# Patient Record
Sex: MALE | Race: WHITE | NOT HISPANIC OR LATINO | Employment: OTHER | URBAN - METROPOLITAN AREA
[De-identification: names, ages, dates, MRNs, and addresses within clinical notes are randomized per-mention and may not be internally consistent; named-entity substitution may affect disease eponyms.]

---

## 2017-01-11 RX ORDER — OMEPRAZOLE 40 MG/1
20 CAPSULE, DELAYED RELEASE ORAL
COMMUNITY
End: 2019-09-03

## 2017-01-18 ENCOUNTER — ALLSCRIPTS OFFICE VISIT (OUTPATIENT)
Dept: OTHER | Facility: OTHER | Age: 67
End: 2017-01-18

## 2017-01-23 ENCOUNTER — DOCTOR'S OFFICE (OUTPATIENT)
Dept: URBAN - NONMETROPOLITAN AREA CLINIC 1 | Facility: CLINIC | Age: 67
Setting detail: OPHTHALMOLOGY
End: 2017-01-23
Payer: COMMERCIAL

## 2017-01-23 DIAGNOSIS — Z79.4: ICD-10-CM

## 2017-01-23 DIAGNOSIS — E11.3412: ICD-10-CM

## 2017-01-23 DIAGNOSIS — H35.371: ICD-10-CM

## 2017-01-23 DIAGNOSIS — H35.373: ICD-10-CM

## 2017-01-23 DIAGNOSIS — H35.372: ICD-10-CM

## 2017-01-23 DIAGNOSIS — H35.61: ICD-10-CM

## 2017-01-23 DIAGNOSIS — H33.001: ICD-10-CM

## 2017-01-23 DIAGNOSIS — E11.3411: ICD-10-CM

## 2017-01-23 DIAGNOSIS — H33.301: ICD-10-CM

## 2017-01-23 DIAGNOSIS — H43.811: ICD-10-CM

## 2017-01-23 DIAGNOSIS — H43.812: ICD-10-CM

## 2017-01-23 PROCEDURE — 92226 OPHTHALMOSCOPY EXT SUBSEQUENT: CPT | Performed by: OPHTHALMOLOGY

## 2017-01-23 PROCEDURE — 92014 COMPRE OPH EXAM EST PT 1/>: CPT | Performed by: OPHTHALMOLOGY

## 2017-01-23 PROCEDURE — 92134 CPTRZ OPH DX IMG PST SGM RTA: CPT | Performed by: OPHTHALMOLOGY

## 2017-01-23 ASSESSMENT — REFRACTION_CURRENTRX
OS_OVR_VA: 20/
OD_OVR_VA: 20/

## 2017-01-23 ASSESSMENT — REFRACTION_MANIFEST
OS_VA1: 20/
OD_VA1: 20/
OS_VA3: 20/
OD_VA2: 20/
OD_VA3: 20/
OU_VA: 20/
OU_VA: 20/
OD_VA1: 20/
OU_VA: 20/
OD_VA1: 20/
OS_VA1: 20/
OD_VA3: 20/
OS_VA3: 20/
OS_VA3: 20/
OS_VA2: 20/
OS_VA1: 20/
OS_VA2: 20/
OD_VA3: 20/
OS_VA2: 20/
OD_VA2: 20/
OD_VA2: 20/

## 2017-01-23 ASSESSMENT — VISUAL ACUITY
OS_BCVA: 20/20-2
OD_BCVA: 20/CFX4'

## 2017-01-23 ASSESSMENT — CONFRONTATIONAL VISUAL FIELD TEST (CVF)
OS_FINDINGS: FULL
OD_FINDINGS: FULL

## 2017-01-25 ENCOUNTER — HOSPITAL ENCOUNTER (OUTPATIENT)
Dept: RADIOLOGY | Facility: MEDICAL CENTER | Age: 67
Discharge: HOME | End: 2017-01-25
Payer: COMMERCIAL

## 2017-01-25 ENCOUNTER — ALLSCRIPTS OFFICE VISIT (OUTPATIENT)
Dept: OTHER | Facility: OTHER | Age: 67
End: 2017-01-25

## 2017-01-25 DIAGNOSIS — M25.512 PAIN IN LEFT SHOULDER: ICD-10-CM

## 2017-01-25 PROCEDURE — 73030 X-RAY EXAM OF SHOULDER: CPT

## 2017-01-27 ENCOUNTER — TRANSCRIBE ORDERS (OUTPATIENT)
Dept: ADMINISTRATIVE | Facility: HOSPITAL | Age: 67
End: 2017-01-27

## 2017-01-27 ENCOUNTER — HOSPITAL ENCOUNTER (OUTPATIENT)
Dept: NON INVASIVE DIAGNOSTICS | Facility: HOSPITAL | Age: 67
Discharge: HOME | End: 2017-01-27
Attending: UROLOGY
Payer: COMMERCIAL

## 2017-01-27 ENCOUNTER — APPOINTMENT (OUTPATIENT)
Dept: LAB | Facility: HOSPITAL | Age: 67
End: 2017-01-27
Attending: UROLOGY
Payer: COMMERCIAL

## 2017-01-27 DIAGNOSIS — Z01.818 OTHER SPECIFIED PRE-OPERATIVE EXAMINATION: ICD-10-CM

## 2017-01-27 DIAGNOSIS — C67.9 MALIGNANT NEOPLASM OF OTHER SPECIFIED SITES OF BLADDER: ICD-10-CM

## 2017-01-27 DIAGNOSIS — C67.9 MALIGNANT NEOPLASM OF OTHER SPECIFIED SITES OF BLADDER: Primary | ICD-10-CM

## 2017-01-27 LAB
ANION GAP SERPL CALCULATED.3IONS-SCNC: 19 MMOL/L (ref 4–13)
APTT PPP: 31 SECONDS (ref 24–36)
ATRIAL RATE: 71 BPM
BASOPHILS # BLD AUTO: 0.05 THOUSANDS/ΜL (ref 0–0.1)
BASOPHILS NFR BLD AUTO: 1 % (ref 0–1)
BUN SERPL-MCNC: 55 MG/DL (ref 5–25)
CALCIUM SERPL-MCNC: 9.5 MG/DL (ref 8.3–10.1)
CHLORIDE SERPL-SCNC: 97 MMOL/L (ref 100–108)
CO2 SERPL-SCNC: 26 MMOL/L (ref 21–32)
CREAT SERPL-MCNC: 6.69 MG/DL (ref 0.6–1.3)
EOSINOPHIL # BLD AUTO: 0.53 THOUSAND/ΜL (ref 0–0.61)
EOSINOPHIL NFR BLD AUTO: 7 % (ref 0–6)
ERYTHROCYTE [DISTWIDTH] IN BLOOD BY AUTOMATED COUNT: 16 % (ref 11.6–15.1)
GFR SERPL CREATININE-BSD FRML MDRD: 8.3 ML/MIN/1.73SQ M
GLUCOSE SERPL-MCNC: 60 MG/DL (ref 65–140)
HCT VFR BLD AUTO: 34.2 % (ref 36.5–49.3)
HGB BLD-MCNC: 11.2 G/DL (ref 12–17)
INR PPP: 1.14 (ref 0.86–1.16)
LYMPHOCYTES # BLD AUTO: 1.16 THOUSANDS/ΜL (ref 0.6–4.47)
LYMPHOCYTES NFR BLD AUTO: 15 % (ref 14–44)
MCH RBC QN AUTO: 31.1 PG (ref 26.8–34.3)
MCHC RBC AUTO-ENTMCNC: 32.7 G/DL (ref 31.4–37.4)
MCV RBC AUTO: 95 FL (ref 82–98)
MONOCYTES # BLD AUTO: 0.83 THOUSAND/ΜL (ref 0.17–1.22)
MONOCYTES NFR BLD AUTO: 11 % (ref 4–12)
NEUTROPHILS # BLD AUTO: 5.14 THOUSANDS/ΜL (ref 1.85–7.62)
NEUTS SEG NFR BLD AUTO: 66 % (ref 43–75)
P AXIS: 48 DEGREES
PLATELET # BLD AUTO: 355 THOUSANDS/UL (ref 149–390)
PMV BLD AUTO: 9.4 FL (ref 8.9–12.7)
POTASSIUM SERPL-SCNC: 5.8 MMOL/L (ref 3.5–5.3)
PR INTERVAL: 168 MS
PROTHROMBIN TIME: 14.2 SECONDS (ref 12–14.3)
QRS AXIS: 37 DEGREES
QRSD INTERVAL: 92 MS
QT INTERVAL: 378 MS
QTC INTERVAL: 410 MS
RBC # BLD AUTO: 3.6 MILLION/UL (ref 3.88–5.62)
SODIUM SERPL-SCNC: 142 MMOL/L (ref 136–145)
T WAVE AXIS: 6 DEGREES
VENTRICULAR RATE: 71 BPM
WBC # BLD AUTO: 7.71 THOUSAND/UL (ref 4.31–10.16)

## 2017-01-27 PROCEDURE — 93005 ELECTROCARDIOGRAM TRACING: CPT

## 2017-01-27 PROCEDURE — 80048 BASIC METABOLIC PNL TOTAL CA: CPT

## 2017-01-27 PROCEDURE — 85730 THROMBOPLASTIN TIME PARTIAL: CPT

## 2017-01-27 PROCEDURE — 36415 COLL VENOUS BLD VENIPUNCTURE: CPT

## 2017-01-27 PROCEDURE — 85025 COMPLETE CBC W/AUTO DIFF WBC: CPT

## 2017-01-27 PROCEDURE — 85610 PROTHROMBIN TIME: CPT

## 2017-02-04 ENCOUNTER — ANESTHESIA EVENT (OUTPATIENT)
Dept: PERIOP | Facility: HOSPITAL | Age: 67
End: 2017-02-04
Payer: COMMERCIAL

## 2017-02-06 ENCOUNTER — HOSPITAL ENCOUNTER (OUTPATIENT)
Facility: HOSPITAL | Age: 67
Setting detail: OUTPATIENT SURGERY
Discharge: HOME | End: 2017-02-06
Attending: UROLOGY | Admitting: UROLOGY
Payer: COMMERCIAL

## 2017-02-06 ENCOUNTER — ANESTHESIA (OUTPATIENT)
Dept: PERIOP | Facility: HOSPITAL | Age: 67
End: 2017-02-06
Payer: COMMERCIAL

## 2017-02-06 VITALS
HEART RATE: 76 BPM | DIASTOLIC BLOOD PRESSURE: 84 MMHG | OXYGEN SATURATION: 99 % | BODY MASS INDEX: 25.18 KG/M2 | HEIGHT: 69 IN | WEIGHT: 170 LBS | TEMPERATURE: 98 F | SYSTOLIC BLOOD PRESSURE: 154 MMHG | RESPIRATION RATE: 18 BRPM

## 2017-02-06 DIAGNOSIS — C67.9 MALIGNANT NEOPLASM OF BLADDER (HCC): ICD-10-CM

## 2017-02-06 PROCEDURE — 88341 IMHCHEM/IMCYTCHM EA ADD ANTB: CPT | Performed by: UROLOGY

## 2017-02-06 PROCEDURE — 88305 TISSUE EXAM BY PATHOLOGIST: CPT | Performed by: UROLOGY

## 2017-02-06 PROCEDURE — 88342 IMHCHEM/IMCYTCHM 1ST ANTB: CPT | Performed by: UROLOGY

## 2017-02-06 PROCEDURE — 88112 CYTOPATH CELL ENHANCE TECH: CPT | Performed by: UROLOGY

## 2017-02-06 RX ORDER — MAGNESIUM HYDROXIDE 1200 MG/15ML
LIQUID ORAL AS NEEDED
Status: DISCONTINUED | OUTPATIENT
Start: 2017-02-06 | End: 2017-02-06 | Stop reason: HOSPADM

## 2017-02-06 RX ORDER — OXYBUTYNIN CHLORIDE 5 MG/1
5 TABLET ORAL ONCE
Status: COMPLETED | OUTPATIENT
Start: 2017-02-06 | End: 2017-02-06

## 2017-02-06 RX ORDER — FENTANYL CITRATE/PF 50 MCG/ML
25 SYRINGE (ML) INJECTION
Status: DISCONTINUED | OUTPATIENT
Start: 2017-02-06 | End: 2017-02-06 | Stop reason: HOSPADM

## 2017-02-06 RX ORDER — LIDOCAINE HYDROCHLORIDE 10 MG/ML
INJECTION, SOLUTION INFILTRATION; PERINEURAL AS NEEDED
Status: DISCONTINUED | OUTPATIENT
Start: 2017-02-06 | End: 2017-02-06 | Stop reason: SURG

## 2017-02-06 RX ORDER — ONDANSETRON 2 MG/ML
INJECTION INTRAMUSCULAR; INTRAVENOUS AS NEEDED
Status: DISCONTINUED | OUTPATIENT
Start: 2017-02-06 | End: 2017-02-06 | Stop reason: SURG

## 2017-02-06 RX ORDER — SOLIFENACIN SUCCINATE 5 MG/1
5 TABLET, FILM COATED ORAL DAILY
Qty: 10 TABLET | Refills: 0 | Status: ON HOLD | OUTPATIENT
Start: 2017-02-06 | End: 2017-06-14 | Stop reason: ALTCHOICE

## 2017-02-06 RX ORDER — CEPHALEXIN 250 MG/1
250 CAPSULE ORAL 2 TIMES DAILY
Qty: 6 CAPSULE | Refills: 0 | Status: SHIPPED | OUTPATIENT
Start: 2017-02-06 | End: 2017-02-09

## 2017-02-06 RX ORDER — SODIUM CHLORIDE, SODIUM LACTATE, POTASSIUM CHLORIDE, CALCIUM CHLORIDE 600; 310; 30; 20 MG/100ML; MG/100ML; MG/100ML; MG/100ML
125 INJECTION, SOLUTION INTRAVENOUS CONTINUOUS
Status: DISCONTINUED | OUTPATIENT
Start: 2017-02-06 | End: 2017-02-06 | Stop reason: HOSPADM

## 2017-02-06 RX ORDER — FENTANYL CITRATE 50 UG/ML
INJECTION, SOLUTION INTRAMUSCULAR; INTRAVENOUS AS NEEDED
Status: DISCONTINUED | OUTPATIENT
Start: 2017-02-06 | End: 2017-02-06 | Stop reason: SURG

## 2017-02-06 RX ORDER — MEPERIDINE HYDROCHLORIDE 25 MG/ML
12.5 INJECTION INTRAMUSCULAR; INTRAVENOUS; SUBCUTANEOUS ONCE AS NEEDED
Status: DISCONTINUED | OUTPATIENT
Start: 2017-02-06 | End: 2017-02-06 | Stop reason: HOSPADM

## 2017-02-06 RX ORDER — ONDANSETRON 2 MG/ML
4 INJECTION INTRAMUSCULAR; INTRAVENOUS ONCE AS NEEDED
Status: DISCONTINUED | OUTPATIENT
Start: 2017-02-06 | End: 2017-02-06 | Stop reason: HOSPADM

## 2017-02-06 RX ORDER — PROPOFOL 10 MG/ML
INJECTION, EMULSION INTRAVENOUS AS NEEDED
Status: DISCONTINUED | OUTPATIENT
Start: 2017-02-06 | End: 2017-02-06 | Stop reason: SURG

## 2017-02-06 RX ORDER — OXYCODONE HYDROCHLORIDE 5 MG/1
5 TABLET ORAL EVERY 4 HOURS PRN
Status: DISCONTINUED | OUTPATIENT
Start: 2017-02-06 | End: 2017-02-06 | Stop reason: HOSPADM

## 2017-02-06 RX ORDER — MIDAZOLAM HYDROCHLORIDE 1 MG/ML
INJECTION INTRAMUSCULAR; INTRAVENOUS AS NEEDED
Status: DISCONTINUED | OUTPATIENT
Start: 2017-02-06 | End: 2017-02-06 | Stop reason: SURG

## 2017-02-06 RX ADMIN — SODIUM CHLORIDE, SODIUM LACTATE, POTASSIUM CHLORIDE, AND CALCIUM CHLORIDE 125 ML/HR: .6; .31; .03; .02 INJECTION, SOLUTION INTRAVENOUS at 06:55

## 2017-02-06 RX ADMIN — DEXAMETHASONE SODIUM PHOSPHATE 4 MG: 10 INJECTION INTRAMUSCULAR; INTRAVENOUS at 07:45

## 2017-02-06 RX ADMIN — OXYBUTYNIN CHLORIDE 5 MG: 5 TABLET ORAL at 09:01

## 2017-02-06 RX ADMIN — FENTANYL CITRATE 25 MCG: 50 INJECTION, SOLUTION INTRAMUSCULAR; INTRAVENOUS at 07:47

## 2017-02-06 RX ADMIN — FENTANYL CITRATE 25 MCG: 50 INJECTION, SOLUTION INTRAMUSCULAR; INTRAVENOUS at 07:49

## 2017-02-06 RX ADMIN — CEFAZOLIN SODIUM 2000 MG: 2 SOLUTION INTRAVENOUS at 07:40

## 2017-02-06 RX ADMIN — LIDOCAINE HYDROCHLORIDE 50 MG: 10 INJECTION, SOLUTION INFILTRATION; PERINEURAL at 07:36

## 2017-02-06 RX ADMIN — MIDAZOLAM HYDROCHLORIDE 2 MG: 1 INJECTION, SOLUTION INTRAMUSCULAR; INTRAVENOUS at 07:30

## 2017-02-06 RX ADMIN — PROPOFOL 150 MG: 10 INJECTION, EMULSION INTRAVENOUS at 07:36

## 2017-02-06 RX ADMIN — ONDANSETRON HYDROCHLORIDE 4 MG: 2 INJECTION, SOLUTION INTRAVENOUS at 07:45

## 2017-02-06 RX ADMIN — FENTANYL CITRATE 50 MCG: 50 INJECTION, SOLUTION INTRAMUSCULAR; INTRAVENOUS at 07:36

## 2017-03-01 ENCOUNTER — ALLSCRIPTS OFFICE VISIT (OUTPATIENT)
Dept: OTHER | Facility: OTHER | Age: 67
End: 2017-03-01

## 2017-03-01 ENCOUNTER — TRANSCRIBE ORDERS (OUTPATIENT)
Dept: ADMINISTRATIVE | Facility: HOSPITAL | Age: 67
End: 2017-03-01

## 2017-03-01 DIAGNOSIS — M75.82 OTHER SHOULDER LESIONS, LEFT SHOULDER: ICD-10-CM

## 2017-03-01 DIAGNOSIS — K85.90 ACUTE PANCREATITIS WITHOUT INFECTION OR NECROSIS: ICD-10-CM

## 2017-03-01 DIAGNOSIS — M77.8 TENDINITIS OF LEFT SHOULDER: Primary | ICD-10-CM

## 2017-03-01 DIAGNOSIS — R30.0 DYSURIA: ICD-10-CM

## 2017-03-02 ENCOUNTER — GENERIC CONVERSION - ENCOUNTER (OUTPATIENT)
Dept: OTHER | Facility: OTHER | Age: 67
End: 2017-03-02

## 2017-03-06 ENCOUNTER — ALLSCRIPTS OFFICE VISIT (OUTPATIENT)
Dept: OTHER | Facility: OTHER | Age: 67
End: 2017-03-06

## 2017-03-06 LAB
CLARITY UR: NORMAL
COLOR UR: YELLOW
GLUCOSE (HISTORICAL): NORMAL
HGB UR QL STRIP.AUTO: NORMAL
KETONES UR STRIP-MCNC: NORMAL MG/DL
LEUKOCYTE ESTERASE UR QL STRIP: NORMAL
NITRITE UR QL STRIP: NORMAL
PH UR STRIP.AUTO: 8.5 [PH]
PROT UR STRIP-MCNC: 300 MG/DL
SP GR UR STRIP.AUTO: 1.01

## 2017-03-08 ENCOUNTER — ALLSCRIPTS OFFICE VISIT (OUTPATIENT)
Dept: OTHER | Facility: OTHER | Age: 67
End: 2017-03-08

## 2017-03-13 ENCOUNTER — HOSPITAL ENCOUNTER (OUTPATIENT)
Dept: RADIOLOGY | Facility: HOSPITAL | Age: 67
Discharge: HOME | End: 2017-03-13
Admitting: RADIOLOGY
Payer: COMMERCIAL

## 2017-03-13 DIAGNOSIS — N18.6 ESRD (END STAGE RENAL DISEASE) (HCC): ICD-10-CM

## 2017-03-13 PROCEDURE — 36901 INTRO CATH DIALYSIS CIRCUIT: CPT

## 2017-03-13 RX ADMIN — IOHEXOL 40 ML: 300 INJECTION, SOLUTION INTRAVENOUS at 14:47

## 2017-03-20 ENCOUNTER — DOCTOR'S OFFICE (OUTPATIENT)
Dept: URBAN - NONMETROPOLITAN AREA CLINIC 1 | Facility: CLINIC | Age: 67
Setting detail: OPHTHALMOLOGY
End: 2017-03-20
Payer: COMMERCIAL

## 2017-03-20 DIAGNOSIS — H35.61: ICD-10-CM

## 2017-03-20 DIAGNOSIS — H01.004: ICD-10-CM

## 2017-03-20 DIAGNOSIS — H43.812: ICD-10-CM

## 2017-03-20 DIAGNOSIS — H33.301: ICD-10-CM

## 2017-03-20 DIAGNOSIS — Z79.4: ICD-10-CM

## 2017-03-20 DIAGNOSIS — H33.003: ICD-10-CM

## 2017-03-20 DIAGNOSIS — H01.001: ICD-10-CM

## 2017-03-20 DIAGNOSIS — E11.3413: ICD-10-CM

## 2017-03-20 DIAGNOSIS — H35.373: ICD-10-CM

## 2017-03-20 DIAGNOSIS — H43.811: ICD-10-CM

## 2017-03-20 PROCEDURE — 92014 COMPRE OPH EXAM EST PT 1/>: CPT | Performed by: OPHTHALMOLOGY

## 2017-03-20 PROCEDURE — 92134 CPTRZ OPH DX IMG PST SGM RTA: CPT | Performed by: OPHTHALMOLOGY

## 2017-03-20 PROCEDURE — 92226 OPHTHALMOSCOPY EXT SUBSEQUENT: CPT | Performed by: OPHTHALMOLOGY

## 2017-03-20 ASSESSMENT — REFRACTION_MANIFEST
OS_VA1: 20/
OU_VA: 20/
OS_VA1: 20/
OU_VA: 20/
OD_VA3: 20/
OD_VA1: 20/
OS_VA3: 20/
OD_VA2: 20/
OU_VA: 20/
OD_VA3: 20/
OS_VA1: 20/
OD_VA3: 20/
OD_VA2: 20/
OS_VA2: 20/
OS_VA3: 20/
OD_VA1: 20/
OS_VA3: 20/
OS_VA2: 20/
OS_VA2: 20/
OD_VA1: 20/
OD_VA2: 20/

## 2017-03-20 ASSESSMENT — VISUAL ACUITY
OD_BCVA: 20/400
OS_BCVA: 20/20

## 2017-03-20 ASSESSMENT — LID EXAM ASSESSMENTS
OS_BLEPHARITIS: LUL 2+
OD_BLEPHARITIS: RUL 2+

## 2017-03-20 ASSESSMENT — CONFRONTATIONAL VISUAL FIELD TEST (CVF)
OS_FINDINGS: FULL
OD_FINDINGS: FULL

## 2017-03-20 ASSESSMENT — REFRACTION_CURRENTRX
OD_OVR_VA: 20/
OS_OVR_VA: 20/
OD_OVR_VA: 20/
OD_OVR_VA: 20/

## 2017-03-22 ENCOUNTER — GENERIC CONVERSION - ENCOUNTER (OUTPATIENT)
Dept: OTHER | Facility: OTHER | Age: 67
End: 2017-03-22

## 2017-03-22 ENCOUNTER — APPOINTMENT (OUTPATIENT)
Dept: LAB | Facility: HOSPITAL | Age: 67
End: 2017-03-22
Attending: UROLOGY
Payer: COMMERCIAL

## 2017-03-22 DIAGNOSIS — R30.0 DYSURIA: ICD-10-CM

## 2017-03-22 LAB
CLARITY UR: NORMAL
COLOR UR: YELLOW
GLUCOSE (HISTORICAL): NORMAL
HGB UR QL STRIP.AUTO: NORMAL
KETONES UR STRIP-MCNC: NORMAL MG/DL
LEUKOCYTE ESTERASE UR QL STRIP: NORMAL
NITRITE UR QL STRIP: NORMAL
PH UR STRIP.AUTO: 7.5 [PH]
PROT UR STRIP-MCNC: 300 MG/DL
SP GR UR STRIP.AUTO: 1.02

## 2017-03-22 PROCEDURE — 87086 URINE CULTURE/COLONY COUNT: CPT

## 2017-03-23 LAB — BACTERIA UR CULT: NORMAL

## 2017-04-03 ENCOUNTER — TRANSCRIBE ORDERS (OUTPATIENT)
Dept: ADMINISTRATIVE | Facility: HOSPITAL | Age: 67
End: 2017-04-03

## 2017-04-03 ENCOUNTER — APPOINTMENT (OUTPATIENT)
Dept: LAB | Facility: HOSPITAL | Age: 67
End: 2017-04-03
Payer: COMMERCIAL

## 2017-04-03 ENCOUNTER — ALLSCRIPTS OFFICE VISIT (OUTPATIENT)
Dept: OTHER | Facility: OTHER | Age: 67
End: 2017-04-03

## 2017-04-03 ENCOUNTER — GENERIC CONVERSION - ENCOUNTER (OUTPATIENT)
Dept: OTHER | Facility: OTHER | Age: 67
End: 2017-04-03

## 2017-04-03 DIAGNOSIS — K85.90 ACUTE PANCREATITIS WITHOUT INFECTION OR NECROSIS: ICD-10-CM

## 2017-04-03 DIAGNOSIS — E11.9 TYPE 2 DIABETES MELLITUS WITHOUT COMPLICATIONS (HCC): ICD-10-CM

## 2017-04-03 LAB
ALBUMIN SERPL BCP-MCNC: 3.3 G/DL (ref 3.5–5)
ALP SERPL-CCNC: 93 U/L (ref 46–116)
ALT SERPL W P-5'-P-CCNC: 15 U/L (ref 12–78)
AMYLASE SERPL-CCNC: 192 IU/L (ref 25–115)
ANION GAP SERPL CALCULATED.3IONS-SCNC: 14 MMOL/L (ref 4–13)
AST SERPL W P-5'-P-CCNC: 22 U/L (ref 5–45)
BASOPHILS # BLD AUTO: 0.03 THOUSANDS/ΜL (ref 0–0.1)
BASOPHILS NFR BLD AUTO: 0 % (ref 0–1)
BILIRUB SERPL-MCNC: 0.5 MG/DL (ref 0.2–1)
BUN SERPL-MCNC: 46 MG/DL (ref 5–25)
CALCIUM SERPL-MCNC: 9.3 MG/DL (ref 8.3–10.1)
CHLORIDE SERPL-SCNC: 101 MMOL/L (ref 100–108)
CLARITY UR: NORMAL
CO2 SERPL-SCNC: 29 MMOL/L (ref 21–32)
COLOR UR: YELLOW
CREAT SERPL-MCNC: 7.8 MG/DL (ref 0.6–1.3)
EOSINOPHIL # BLD AUTO: 0.25 THOUSAND/ΜL (ref 0–0.61)
EOSINOPHIL NFR BLD AUTO: 3 % (ref 0–6)
ERYTHROCYTE [DISTWIDTH] IN BLOOD BY AUTOMATED COUNT: 14.4 % (ref 11.6–15.1)
EST. AVERAGE GLUCOSE BLD GHB EST-MCNC: 131 MG/DL
GFR SERPL CREATININE-BSD FRML MDRD: 7 ML/MIN/1.73SQ M
GLUCOSE (HISTORICAL): NORMAL
GLUCOSE SERPL-MCNC: 88 MG/DL (ref 65–140)
HBA1C MFR BLD: 6.2 % (ref 4.2–6.3)
HCT VFR BLD AUTO: 30.2 % (ref 36.5–49.3)
HGB BLD-MCNC: 9.7 G/DL (ref 12–17)
HGB UR QL STRIP.AUTO: NORMAL
KETONES UR STRIP-MCNC: NORMAL MG/DL
LEUKOCYTE ESTERASE UR QL STRIP: NORMAL
LIPASE SERPL-CCNC: 735 U/L (ref 73–393)
LYMPHOCYTES # BLD AUTO: 1.45 THOUSANDS/ΜL (ref 0.6–4.47)
LYMPHOCYTES NFR BLD AUTO: 16 % (ref 14–44)
MCH RBC QN AUTO: 31.8 PG (ref 26.8–34.3)
MCHC RBC AUTO-ENTMCNC: 32.1 G/DL (ref 31.4–37.4)
MCV RBC AUTO: 99 FL (ref 82–98)
MONOCYTES # BLD AUTO: 0.81 THOUSAND/ΜL (ref 0.17–1.22)
MONOCYTES NFR BLD AUTO: 9 % (ref 4–12)
NEUTROPHILS # BLD AUTO: 6.53 THOUSANDS/ΜL (ref 1.85–7.62)
NEUTS SEG NFR BLD AUTO: 72 % (ref 43–75)
NITRITE UR QL STRIP: NORMAL
PH UR STRIP.AUTO: 8 [PH]
PLATELET # BLD AUTO: 458 THOUSANDS/UL (ref 149–390)
PMV BLD AUTO: 9.4 FL (ref 8.9–12.7)
POTASSIUM SERPL-SCNC: 4.4 MMOL/L (ref 3.5–5.3)
PROT SERPL-MCNC: 7 G/DL (ref 6.4–8.2)
PROT UR STRIP-MCNC: 300 MG/DL
RBC # BLD AUTO: 3.05 MILLION/UL (ref 3.88–5.62)
SODIUM SERPL-SCNC: 144 MMOL/L (ref 136–145)
SP GR UR STRIP.AUTO: 1.01
WBC # BLD AUTO: 9.07 THOUSAND/UL (ref 4.31–10.16)

## 2017-04-03 PROCEDURE — 83036 HEMOGLOBIN GLYCOSYLATED A1C: CPT

## 2017-04-03 PROCEDURE — 83690 ASSAY OF LIPASE: CPT

## 2017-04-03 PROCEDURE — 85025 COMPLETE CBC W/AUTO DIFF WBC: CPT

## 2017-04-03 PROCEDURE — 80053 COMPREHEN METABOLIC PANEL: CPT

## 2017-04-03 PROCEDURE — 36415 COLL VENOUS BLD VENIPUNCTURE: CPT

## 2017-04-03 PROCEDURE — 82150 ASSAY OF AMYLASE: CPT

## 2017-04-04 ENCOUNTER — GENERIC CONVERSION - ENCOUNTER (OUTPATIENT)
Dept: OTHER | Facility: OTHER | Age: 67
End: 2017-04-04

## 2017-04-11 ENCOUNTER — GENERIC CONVERSION - ENCOUNTER (OUTPATIENT)
Dept: OTHER | Facility: OTHER | Age: 67
End: 2017-04-11

## 2017-04-12 ENCOUNTER — ALLSCRIPTS OFFICE VISIT (OUTPATIENT)
Dept: OTHER | Facility: OTHER | Age: 67
End: 2017-04-12

## 2017-05-03 ENCOUNTER — ALLSCRIPTS OFFICE VISIT (OUTPATIENT)
Dept: OTHER | Facility: OTHER | Age: 67
End: 2017-05-03

## 2017-05-31 ENCOUNTER — ALLSCRIPTS OFFICE VISIT (OUTPATIENT)
Dept: OTHER | Facility: OTHER | Age: 67
End: 2017-05-31

## 2017-06-05 ENCOUNTER — HOSPITAL ENCOUNTER (EMERGENCY)
Facility: HOSPITAL | Age: 67
Discharge: HOME | End: 2017-06-05
Payer: COMMERCIAL

## 2017-06-05 ENCOUNTER — APPOINTMENT (OUTPATIENT)
Dept: LAB | Facility: HOSPITAL | Age: 67
End: 2017-06-05
Attending: UROLOGY
Payer: COMMERCIAL

## 2017-06-05 ENCOUNTER — APPOINTMENT (OUTPATIENT)
Dept: PREADMISSION TESTING | Facility: HOSPITAL | Age: 67
End: 2017-06-05
Payer: COMMERCIAL

## 2017-06-05 ENCOUNTER — TRANSCRIBE ORDERS (OUTPATIENT)
Dept: ADMINISTRATIVE | Facility: HOSPITAL | Age: 67
End: 2017-06-05

## 2017-06-05 ENCOUNTER — ANESTHESIA EVENT (OUTPATIENT)
Dept: PERIOP | Facility: HOSPITAL | Age: 67
End: 2017-06-05
Payer: COMMERCIAL

## 2017-06-05 ENCOUNTER — HOSPITAL ENCOUNTER (OUTPATIENT)
Dept: NON INVASIVE DIAGNOSTICS | Facility: HOSPITAL | Age: 67
Discharge: HOME | End: 2017-06-05
Attending: UROLOGY
Payer: COMMERCIAL

## 2017-06-05 VITALS
DIASTOLIC BLOOD PRESSURE: 75 MMHG | TEMPERATURE: 96.7 F | WEIGHT: 186 LBS | RESPIRATION RATE: 18 BRPM | HEIGHT: 69 IN | SYSTOLIC BLOOD PRESSURE: 131 MMHG | BODY MASS INDEX: 27.55 KG/M2 | HEART RATE: 75 BPM

## 2017-06-05 VITALS
OXYGEN SATURATION: 96 % | SYSTOLIC BLOOD PRESSURE: 185 MMHG | RESPIRATION RATE: 20 BRPM | WEIGHT: 186 LBS | BODY MASS INDEX: 27.55 KG/M2 | DIASTOLIC BLOOD PRESSURE: 81 MMHG | TEMPERATURE: 98.7 F | HEART RATE: 77 BPM | HEIGHT: 69 IN

## 2017-06-05 DIAGNOSIS — E87.5 HYPERKALEMIA: ICD-10-CM

## 2017-06-05 DIAGNOSIS — Z01.818 PREOP EXAMINATION: ICD-10-CM

## 2017-06-05 DIAGNOSIS — E87.5 HYPERKALEMIA: Primary | ICD-10-CM

## 2017-06-05 DIAGNOSIS — Z01.818 PREOP EXAMINATION: Primary | ICD-10-CM

## 2017-06-05 DIAGNOSIS — N32.89 NONTRAUMATIC RUPTURE OF BLADDER: ICD-10-CM

## 2017-06-05 DIAGNOSIS — C67.9 MALIGNANT NEOPLASM OF URINARY BLADDER, UNSPECIFIED SITE (HCC): ICD-10-CM

## 2017-06-05 DIAGNOSIS — E11.9 TYPE 2 DIABETES MELLITUS WITHOUT COMPLICATIONS (HCC): ICD-10-CM

## 2017-06-05 DIAGNOSIS — N18.9 CKD (CHRONIC KIDNEY DISEASE): ICD-10-CM

## 2017-06-05 DIAGNOSIS — E78.5 HYPERLIPIDEMIA: ICD-10-CM

## 2017-06-05 DIAGNOSIS — R30.0 DYSURIA: ICD-10-CM

## 2017-06-05 LAB
ANION GAP SERPL CALCULATED.3IONS-SCNC: 17 MMOL/L (ref 4–13)
ANION GAP SERPL CALCULATED.3IONS-SCNC: 18 MMOL/L (ref 4–13)
APTT PPP: 33 SECONDS (ref 23–35)
BASOPHILS # BLD AUTO: 0.03 THOUSANDS/ΜL (ref 0–0.1)
BASOPHILS # BLD AUTO: 0.04 THOUSANDS/ΜL (ref 0–0.1)
BASOPHILS NFR BLD AUTO: 0 % (ref 0–1)
BASOPHILS NFR BLD AUTO: 1 % (ref 0–1)
BUN SERPL-MCNC: 81 MG/DL (ref 5–25)
BUN SERPL-MCNC: 88 MG/DL (ref 5–25)
CALCIUM SERPL-MCNC: 9.6 MG/DL (ref 8.3–10.1)
CALCIUM SERPL-MCNC: 9.7 MG/DL (ref 8.3–10.1)
CHLORIDE SERPL-SCNC: 92 MMOL/L (ref 100–108)
CHLORIDE SERPL-SCNC: 94 MMOL/L (ref 100–108)
CO2 SERPL-SCNC: 27 MMOL/L (ref 21–32)
CO2 SERPL-SCNC: 28 MMOL/L (ref 21–32)
CREAT SERPL-MCNC: 9.49 MG/DL (ref 0.6–1.3)
CREAT SERPL-MCNC: 9.77 MG/DL (ref 0.6–1.3)
EOSINOPHIL # BLD AUTO: 0.54 THOUSAND/ΜL (ref 0–0.61)
EOSINOPHIL # BLD AUTO: 0.54 THOUSAND/ΜL (ref 0–0.61)
EOSINOPHIL NFR BLD AUTO: 7 % (ref 0–6)
EOSINOPHIL NFR BLD AUTO: 7 % (ref 0–6)
ERYTHROCYTE [DISTWIDTH] IN BLOOD BY AUTOMATED COUNT: 13.9 % (ref 11.6–15.1)
ERYTHROCYTE [DISTWIDTH] IN BLOOD BY AUTOMATED COUNT: 14.3 % (ref 11.6–15.1)
GFR SERPL CREATININE-BSD FRML MDRD: 5.4 ML/MIN/1.73SQ M
GFR SERPL CREATININE-BSD FRML MDRD: 5.6 ML/MIN/1.73SQ M
GLUCOSE SERPL-MCNC: 104 MG/DL (ref 65–140)
GLUCOSE SERPL-MCNC: 150 MG/DL (ref 65–140)
HCT VFR BLD AUTO: 30.2 % (ref 36.5–49.3)
HCT VFR BLD AUTO: 31.7 % (ref 36.5–49.3)
HGB BLD-MCNC: 10.3 G/DL (ref 12–17)
HGB BLD-MCNC: 10.4 G/DL (ref 12–17)
HOLD SPECIMEN: NORMAL
INR PPP: 1.04 (ref 0.86–1.16)
LYMPHOCYTES # BLD AUTO: 1.39 THOUSANDS/ΜL (ref 0.6–4.47)
LYMPHOCYTES # BLD AUTO: 1.75 THOUSANDS/ΜL (ref 0.6–4.47)
LYMPHOCYTES NFR BLD AUTO: 18 % (ref 14–44)
LYMPHOCYTES NFR BLD AUTO: 23 % (ref 14–44)
MAGNESIUM SERPL-MCNC: 2.2 MG/DL (ref 1.6–2.6)
MCH RBC QN AUTO: 33 PG (ref 26.8–34.3)
MCH RBC QN AUTO: 33.1 PG (ref 26.8–34.3)
MCHC RBC AUTO-ENTMCNC: 32.8 G/DL (ref 31.4–37.4)
MCHC RBC AUTO-ENTMCNC: 34.1 G/DL (ref 31.4–37.4)
MCV RBC AUTO: 101 FL (ref 82–98)
MCV RBC AUTO: 97 FL (ref 82–98)
MONOCYTES # BLD AUTO: 0.55 THOUSAND/ΜL (ref 0.17–1.22)
MONOCYTES # BLD AUTO: 0.65 THOUSAND/ΜL (ref 0.17–1.22)
MONOCYTES NFR BLD AUTO: 7 % (ref 4–12)
MONOCYTES NFR BLD AUTO: 8 % (ref 4–12)
NEUTROPHILS # BLD AUTO: 4.77 THOUSANDS/ΜL (ref 1.85–7.62)
NEUTROPHILS # BLD AUTO: 5.2 THOUSANDS/ΜL (ref 1.85–7.62)
NEUTS SEG NFR BLD AUTO: 62 % (ref 43–75)
NEUTS SEG NFR BLD AUTO: 67 % (ref 43–75)
NRBC BLD AUTO-RTO: 0 /100 WBCS
PLATELET # BLD AUTO: 316 THOUSANDS/UL (ref 149–390)
PLATELET # BLD AUTO: 320 THOUSANDS/UL (ref 149–390)
PMV BLD AUTO: 9.1 FL (ref 8.9–12.7)
PMV BLD AUTO: 9.8 FL (ref 8.9–12.7)
POTASSIUM SERPL-SCNC: 6.2 MMOL/L (ref 3.5–5.3)
POTASSIUM SERPL-SCNC: 6.4 MMOL/L (ref 3.5–5.3)
POTASSIUM SERPL-SCNC: 6.4 MMOL/L (ref 3.5–5.3)
PROTHROMBIN TIME: 13.6 SECONDS (ref 12.1–14.4)
RBC # BLD AUTO: 3.11 MILLION/UL (ref 3.88–5.62)
RBC # BLD AUTO: 3.15 MILLION/UL (ref 3.88–5.62)
SODIUM SERPL-SCNC: 137 MMOL/L (ref 136–145)
SODIUM SERPL-SCNC: 139 MMOL/L (ref 136–145)
WBC # BLD AUTO: 7.65 THOUSAND/UL (ref 4.31–10.16)
WBC # BLD AUTO: 7.81 THOUSAND/UL (ref 4.31–10.16)

## 2017-06-05 PROCEDURE — 99284 EMERGENCY DEPT VISIT MOD MDM: CPT

## 2017-06-05 PROCEDURE — 80048 BASIC METABOLIC PNL TOTAL CA: CPT | Performed by: PHYSICIAN ASSISTANT

## 2017-06-05 PROCEDURE — 87186 SC STD MICRODIL/AGAR DIL: CPT

## 2017-06-05 PROCEDURE — 85730 THROMBOPLASTIN TIME PARTIAL: CPT

## 2017-06-05 PROCEDURE — 36415 COLL VENOUS BLD VENIPUNCTURE: CPT

## 2017-06-05 PROCEDURE — 87147 CULTURE TYPE IMMUNOLOGIC: CPT

## 2017-06-05 PROCEDURE — 85025 COMPLETE CBC W/AUTO DIFF WBC: CPT

## 2017-06-05 PROCEDURE — 85610 PROTHROMBIN TIME: CPT

## 2017-06-05 PROCEDURE — 83735 ASSAY OF MAGNESIUM: CPT | Performed by: PHYSICIAN ASSISTANT

## 2017-06-05 PROCEDURE — 80048 BASIC METABOLIC PNL TOTAL CA: CPT

## 2017-06-05 PROCEDURE — 93005 ELECTROCARDIOGRAM TRACING: CPT | Performed by: PHYSICIAN ASSISTANT

## 2017-06-05 PROCEDURE — 93005 ELECTROCARDIOGRAM TRACING: CPT

## 2017-06-05 PROCEDURE — 85025 COMPLETE CBC W/AUTO DIFF WBC: CPT | Performed by: PHYSICIAN ASSISTANT

## 2017-06-05 PROCEDURE — 87077 CULTURE AEROBIC IDENTIFY: CPT

## 2017-06-05 PROCEDURE — 87086 URINE CULTURE/COLONY COUNT: CPT

## 2017-06-05 PROCEDURE — 84132 ASSAY OF SERUM POTASSIUM: CPT

## 2017-06-05 RX ORDER — SODIUM POLYSTYRENE SULFONATE 15 G/60ML
15 SUSPENSION ORAL; RECTAL ONCE
Qty: 60 ML | Refills: 0 | Status: SHIPPED | OUTPATIENT
Start: 2017-06-05 | End: 2017-06-05

## 2017-06-05 RX ORDER — LISINOPRIL 5 MG/1
5 TABLET ORAL DAILY
COMMUNITY
End: 2018-02-08 | Stop reason: ALTCHOICE

## 2017-06-05 RX ORDER — PAROXETINE HYDROCHLORIDE 20 MG/1
30 TABLET, FILM COATED ORAL EVERY MORNING
COMMUNITY
End: 2018-05-29

## 2017-06-05 RX ORDER — SODIUM POLYSTYRENE SULFONATE 15 G/60ML
15 SUSPENSION ORAL; RECTAL ONCE
Status: COMPLETED | OUTPATIENT
Start: 2017-06-05 | End: 2017-06-05

## 2017-06-05 RX ORDER — SODIUM CHLORIDE 9 MG/ML
125 INJECTION, SOLUTION INTRAVENOUS CONTINUOUS
Status: CANCELLED | OUTPATIENT
Start: 2017-06-05

## 2017-06-05 RX ADMIN — SODIUM POLYSTYRENE SULFONATE 15 G: 15 SUSPENSION ORAL; RECTAL at 19:22

## 2017-06-06 LAB
ATRIAL RATE: 70 BPM
ATRIAL RATE: 74 BPM
P AXIS: 29 DEGREES
P AXIS: 9 DEGREES
PR INTERVAL: 182 MS
PR INTERVAL: 188 MS
QRS AXIS: -14 DEGREES
QRS AXIS: -6 DEGREES
QRSD INTERVAL: 90 MS
QRSD INTERVAL: 92 MS
QT INTERVAL: 390 MS
QT INTERVAL: 408 MS
QTC INTERVAL: 432 MS
QTC INTERVAL: 440 MS
T WAVE AXIS: 15 DEGREES
T WAVE AXIS: 6 DEGREES
VENTRICULAR RATE: 70 BPM
VENTRICULAR RATE: 74 BPM

## 2017-06-07 LAB — BACTERIA UR CULT: NORMAL

## 2017-06-12 ENCOUNTER — ALLSCRIPTS OFFICE VISIT (OUTPATIENT)
Dept: OTHER | Facility: OTHER | Age: 67
End: 2017-06-12

## 2017-06-12 ENCOUNTER — APPOINTMENT (OUTPATIENT)
Dept: LAB | Facility: MEDICAL CENTER | Age: 67
End: 2017-06-12
Payer: COMMERCIAL

## 2017-06-12 ENCOUNTER — TRANSCRIBE ORDERS (OUTPATIENT)
Dept: LAB | Facility: MEDICAL CENTER | Age: 67
End: 2017-06-12

## 2017-06-12 DIAGNOSIS — E11.9 TYPE 2 DIABETES MELLITUS WITHOUT COMPLICATIONS (HCC): ICD-10-CM

## 2017-06-12 DIAGNOSIS — E87.5 HYPERKALEMIA: ICD-10-CM

## 2017-06-12 DIAGNOSIS — E78.5 HYPERLIPIDEMIA: ICD-10-CM

## 2017-06-12 LAB
CHOLEST SERPL-MCNC: 165 MG/DL (ref 50–200)
EST. AVERAGE GLUCOSE BLD GHB EST-MCNC: 128 MG/DL
HBA1C MFR BLD: 6.1 % (ref 4.2–6.3)
HDLC SERPL-MCNC: 59 MG/DL (ref 40–60)
LDLC SERPL CALC-MCNC: 90 MG/DL (ref 0–100)
POTASSIUM SERPL-SCNC: 5 MMOL/L (ref 3.5–5.3)
TRIGL SERPL-MCNC: 80 MG/DL

## 2017-06-12 PROCEDURE — 36415 COLL VENOUS BLD VENIPUNCTURE: CPT

## 2017-06-12 PROCEDURE — 80061 LIPID PANEL: CPT

## 2017-06-12 PROCEDURE — 83036 HEMOGLOBIN GLYCOSYLATED A1C: CPT

## 2017-06-12 PROCEDURE — 84132 ASSAY OF SERUM POTASSIUM: CPT

## 2017-06-13 ENCOUNTER — GENERIC CONVERSION - ENCOUNTER (OUTPATIENT)
Dept: OTHER | Facility: OTHER | Age: 67
End: 2017-06-13

## 2017-06-14 ENCOUNTER — ANESTHESIA (OUTPATIENT)
Dept: PERIOP | Facility: HOSPITAL | Age: 67
End: 2017-06-14
Payer: COMMERCIAL

## 2017-06-14 ENCOUNTER — HOSPITAL ENCOUNTER (OUTPATIENT)
Facility: HOSPITAL | Age: 67
Setting detail: OUTPATIENT SURGERY
Discharge: HOME | End: 2017-06-14
Attending: UROLOGY | Admitting: UROLOGY
Payer: COMMERCIAL

## 2017-06-14 VITALS
HEIGHT: 69 IN | RESPIRATION RATE: 18 BRPM | WEIGHT: 180.78 LBS | BODY MASS INDEX: 26.78 KG/M2 | SYSTOLIC BLOOD PRESSURE: 111 MMHG | DIASTOLIC BLOOD PRESSURE: 56 MMHG | TEMPERATURE: 98.3 F | HEART RATE: 86 BPM | OXYGEN SATURATION: 97 %

## 2017-06-14 DIAGNOSIS — N32.89 OTHER SPECIFIED DISORDERS OF BLADDER: ICD-10-CM

## 2017-06-14 DIAGNOSIS — C67.9 MALIGNANT NEOPLASM OF BLADDER (HCC): ICD-10-CM

## 2017-06-14 LAB
GLUCOSE SERPL-MCNC: 162 MG/DL (ref 65–140)
GLUCOSE SERPL-MCNC: 166 MG/DL (ref 65–140)

## 2017-06-14 PROCEDURE — 82948 REAGENT STRIP/BLOOD GLUCOSE: CPT

## 2017-06-14 PROCEDURE — 88305 TISSUE EXAM BY PATHOLOGIST: CPT | Performed by: UROLOGY

## 2017-06-14 PROCEDURE — 88312 SPECIAL STAINS GROUP 1: CPT | Performed by: UROLOGY

## 2017-06-14 PROCEDURE — 88307 TISSUE EXAM BY PATHOLOGIST: CPT | Performed by: UROLOGY

## 2017-06-14 RX ORDER — PROPOFOL 10 MG/ML
INJECTION, EMULSION INTRAVENOUS AS NEEDED
Status: DISCONTINUED | OUTPATIENT
Start: 2017-06-14 | End: 2017-06-14 | Stop reason: SURG

## 2017-06-14 RX ORDER — MIDAZOLAM HYDROCHLORIDE 1 MG/ML
INJECTION INTRAMUSCULAR; INTRAVENOUS AS NEEDED
Status: DISCONTINUED | OUTPATIENT
Start: 2017-06-14 | End: 2017-06-14 | Stop reason: SURG

## 2017-06-14 RX ORDER — B COMPLEX, C NO.20/FOLIC ACID 1 MG
1 CAPSULE ORAL DAILY
COMMUNITY

## 2017-06-14 RX ORDER — LIDOCAINE HYDROCHLORIDE 10 MG/ML
INJECTION, SOLUTION INFILTRATION; PERINEURAL AS NEEDED
Status: DISCONTINUED | OUTPATIENT
Start: 2017-06-14 | End: 2017-06-14 | Stop reason: SURG

## 2017-06-14 RX ORDER — FENTANYL CITRATE 50 UG/ML
INJECTION, SOLUTION INTRAMUSCULAR; INTRAVENOUS AS NEEDED
Status: DISCONTINUED | OUTPATIENT
Start: 2017-06-14 | End: 2017-06-14 | Stop reason: SURG

## 2017-06-14 RX ORDER — GLYCINE 1.5 G/100ML
SOLUTION IRRIGATION AS NEEDED
Status: DISCONTINUED | OUTPATIENT
Start: 2017-06-14 | End: 2017-06-14 | Stop reason: HOSPADM

## 2017-06-14 RX ORDER — FENTANYL CITRATE/PF 50 MCG/ML
50 SYRINGE (ML) INJECTION
Status: DISCONTINUED | OUTPATIENT
Start: 2017-06-14 | End: 2017-06-14 | Stop reason: HOSPADM

## 2017-06-14 RX ORDER — ONDANSETRON 2 MG/ML
4 INJECTION INTRAMUSCULAR; INTRAVENOUS ONCE AS NEEDED
Status: DISCONTINUED | OUTPATIENT
Start: 2017-06-14 | End: 2017-06-14 | Stop reason: HOSPADM

## 2017-06-14 RX ORDER — LORATADINE 10 MG/1
10 TABLET ORAL AS NEEDED
COMMUNITY
End: 2018-07-10 | Stop reason: ALTCHOICE

## 2017-06-14 RX ORDER — SODIUM CHLORIDE 9 MG/ML
125 INJECTION, SOLUTION INTRAVENOUS CONTINUOUS
Status: DISCONTINUED | OUTPATIENT
Start: 2017-06-14 | End: 2017-06-14 | Stop reason: HOSPADM

## 2017-06-14 RX ORDER — OXYCODONE HYDROCHLORIDE 5 MG/1
5 TABLET ORAL EVERY 4 HOURS PRN
Status: DISCONTINUED | OUTPATIENT
Start: 2017-06-14 | End: 2017-06-14 | Stop reason: HOSPADM

## 2017-06-14 RX ORDER — ONDANSETRON 2 MG/ML
INJECTION INTRAMUSCULAR; INTRAVENOUS AS NEEDED
Status: DISCONTINUED | OUTPATIENT
Start: 2017-06-14 | End: 2017-06-14 | Stop reason: SURG

## 2017-06-14 RX ADMIN — MIDAZOLAM HYDROCHLORIDE 1 MG: 1 INJECTION, SOLUTION INTRAMUSCULAR; INTRAVENOUS at 09:10

## 2017-06-14 RX ADMIN — FENTANYL CITRATE 50 MCG: 50 INJECTION, SOLUTION INTRAMUSCULAR; INTRAVENOUS at 09:24

## 2017-06-14 RX ADMIN — ONDANSETRON HYDROCHLORIDE 4 MG: 2 INJECTION, SOLUTION INTRAVENOUS at 09:33

## 2017-06-14 RX ADMIN — PROPOFOL 20 MG: 10 INJECTION, EMULSION INTRAVENOUS at 09:31

## 2017-06-14 RX ADMIN — CEFAZOLIN SODIUM 2000 MG: 2 SOLUTION INTRAVENOUS at 09:22

## 2017-06-14 RX ADMIN — LIDOCAINE HYDROCHLORIDE 60 MG: 10 INJECTION, SOLUTION INFILTRATION; PERINEURAL at 09:18

## 2017-06-14 RX ADMIN — SODIUM CHLORIDE 125 ML/HR: 0.9 INJECTION, SOLUTION INTRAVENOUS at 07:54

## 2017-06-14 RX ADMIN — PROPOFOL 30 MG: 10 INJECTION, EMULSION INTRAVENOUS at 09:20

## 2017-06-14 RX ADMIN — PROPOFOL 100 MG: 10 INJECTION, EMULSION INTRAVENOUS at 09:18

## 2017-06-14 RX ADMIN — PROPOFOL 50 MG: 10 INJECTION, EMULSION INTRAVENOUS at 09:19

## 2017-06-14 RX ADMIN — LEVOFLOXACIN 500 MG: 5 INJECTION, SOLUTION INTRAVENOUS at 09:30

## 2017-06-14 RX ADMIN — FENTANYL CITRATE 50 MCG: 50 INJECTION, SOLUTION INTRAMUSCULAR; INTRAVENOUS at 09:31

## 2017-06-16 ENCOUNTER — ALLSCRIPTS OFFICE VISIT (OUTPATIENT)
Dept: OTHER | Facility: OTHER | Age: 67
End: 2017-06-16

## 2017-06-19 ENCOUNTER — DOCTOR'S OFFICE (OUTPATIENT)
Dept: URBAN - NONMETROPOLITAN AREA CLINIC 1 | Facility: CLINIC | Age: 67
Setting detail: OPHTHALMOLOGY
End: 2017-06-19
Payer: COMMERCIAL

## 2017-06-19 DIAGNOSIS — H35.373: ICD-10-CM

## 2017-06-19 DIAGNOSIS — H35.61: ICD-10-CM

## 2017-06-19 DIAGNOSIS — H33.003: ICD-10-CM

## 2017-06-19 DIAGNOSIS — E11.3413: ICD-10-CM

## 2017-06-19 DIAGNOSIS — H33.301: ICD-10-CM

## 2017-06-19 DIAGNOSIS — H43.811: ICD-10-CM

## 2017-06-19 DIAGNOSIS — H43.812: ICD-10-CM

## 2017-06-19 PROCEDURE — 92226 OPHTHALMOSCOPY EXT SUBSEQUENT: CPT | Performed by: OPHTHALMOLOGY

## 2017-06-19 PROCEDURE — 92014 COMPRE OPH EXAM EST PT 1/>: CPT | Performed by: OPHTHALMOLOGY

## 2017-06-19 PROCEDURE — 92134 CPTRZ OPH DX IMG PST SGM RTA: CPT | Performed by: OPHTHALMOLOGY

## 2017-06-19 ASSESSMENT — REFRACTION_MANIFEST
OD_VA1: 20/
OD_VA3: 20/
OU_VA: 20/
OS_VA1: 20/
OS_VA2: 20/
OD_VA1: 20/
OD_VA2: 20/
OS_VA1: 20/
OS_VA3: 20/
OD_VA3: 20/
OS_VA1: 20/
OD_VA2: 20/
OS_VA2: 20/
OS_VA3: 20/
OS_VA2: 20/
OU_VA: 20/
OU_VA: 20/
OD_VA1: 20/
OD_VA3: 20/
OS_VA3: 20/
OD_VA2: 20/

## 2017-06-19 ASSESSMENT — VISUAL ACUITY
OD_BCVA: 20/CFX3'
OS_BCVA: 20/20-2

## 2017-06-19 ASSESSMENT — REFRACTION_CURRENTRX
OS_OVR_VA: 20/
OD_OVR_VA: 20/
OS_OVR_VA: 20/
OD_OVR_VA: 20/
OD_OVR_VA: 20/
OS_OVR_VA: 20/

## 2017-06-19 ASSESSMENT — CONFRONTATIONAL VISUAL FIELD TEST (CVF)
OD_FINDINGS: FULL
OS_FINDINGS: FULL

## 2017-06-19 ASSESSMENT — LID EXAM ASSESSMENTS
OD_BLEPHARITIS: RUL 2+
OS_BLEPHARITIS: LUL 2+

## 2017-06-26 ENCOUNTER — TRANSCRIBE ORDERS (OUTPATIENT)
Dept: RADIOLOGY | Facility: MEDICAL CENTER | Age: 67
End: 2017-06-26

## 2017-06-26 ENCOUNTER — APPOINTMENT (OUTPATIENT)
Dept: LAB | Facility: MEDICAL CENTER | Age: 67
End: 2017-06-26
Payer: COMMERCIAL

## 2017-06-26 ENCOUNTER — TRANSCRIBE ORDERS (OUTPATIENT)
Dept: LAB | Facility: MEDICAL CENTER | Age: 67
End: 2017-06-26

## 2017-06-26 DIAGNOSIS — R30.0 DYSURIA: ICD-10-CM

## 2017-06-26 PROCEDURE — 87086 URINE CULTURE/COLONY COUNT: CPT

## 2017-06-26 PROCEDURE — 87077 CULTURE AEROBIC IDENTIFY: CPT

## 2017-06-26 PROCEDURE — 87186 SC STD MICRODIL/AGAR DIL: CPT

## 2017-06-26 PROCEDURE — 87147 CULTURE TYPE IMMUNOLOGIC: CPT

## 2017-06-29 LAB — BACTERIA UR CULT: NORMAL

## 2017-07-11 ENCOUNTER — ALLSCRIPTS OFFICE VISIT (OUTPATIENT)
Dept: OTHER | Facility: OTHER | Age: 67
End: 2017-07-11

## 2017-07-12 ENCOUNTER — ANESTHESIA EVENT (OUTPATIENT)
Dept: PERIOP | Facility: HOSPITAL | Age: 67
End: 2017-07-12
Payer: COMMERCIAL

## 2017-07-13 ENCOUNTER — HOSPITAL ENCOUNTER (OUTPATIENT)
Facility: HOSPITAL | Age: 67
Setting detail: OUTPATIENT SURGERY
Discharge: HOME | End: 2017-07-13
Attending: UROLOGY | Admitting: UROLOGY
Payer: COMMERCIAL

## 2017-07-13 ENCOUNTER — ANESTHESIA (OUTPATIENT)
Dept: PERIOP | Facility: HOSPITAL | Age: 67
End: 2017-07-13
Payer: COMMERCIAL

## 2017-07-13 VITALS
HEART RATE: 88 BPM | DIASTOLIC BLOOD PRESSURE: 69 MMHG | RESPIRATION RATE: 18 BRPM | OXYGEN SATURATION: 95 % | SYSTOLIC BLOOD PRESSURE: 135 MMHG | TEMPERATURE: 97.6 F

## 2017-07-13 LAB — GLUCOSE SERPL-MCNC: 125 MG/DL (ref 65–140)

## 2017-07-13 PROCEDURE — 82948 REAGENT STRIP/BLOOD GLUCOSE: CPT

## 2017-07-13 RX ORDER — FENTANYL CITRATE 50 UG/ML
INJECTION, SOLUTION INTRAMUSCULAR; INTRAVENOUS AS NEEDED
Status: DISCONTINUED | OUTPATIENT
Start: 2017-07-13 | End: 2017-07-13 | Stop reason: SURG

## 2017-07-13 RX ORDER — PROPOFOL 10 MG/ML
INJECTION, EMULSION INTRAVENOUS AS NEEDED
Status: DISCONTINUED | OUTPATIENT
Start: 2017-07-13 | End: 2017-07-13 | Stop reason: SURG

## 2017-07-13 RX ORDER — SODIUM CHLORIDE, SODIUM LACTATE, POTASSIUM CHLORIDE, CALCIUM CHLORIDE 600; 310; 30; 20 MG/100ML; MG/100ML; MG/100ML; MG/100ML
125 INJECTION, SOLUTION INTRAVENOUS CONTINUOUS
Status: DISCONTINUED | OUTPATIENT
Start: 2017-07-13 | End: 2017-07-13 | Stop reason: HOSPADM

## 2017-07-13 RX ORDER — LIDOCAINE HYDROCHLORIDE 10 MG/ML
INJECTION, SOLUTION INFILTRATION; PERINEURAL AS NEEDED
Status: DISCONTINUED | OUTPATIENT
Start: 2017-07-13 | End: 2017-07-13 | Stop reason: SURG

## 2017-07-13 RX ORDER — ATROPA BELLADONNA AND OPIUM 16.2; 6 MG/1; MG/1
SUPPOSITORY RECTAL AS NEEDED
Status: DISCONTINUED | OUTPATIENT
Start: 2017-07-13 | End: 2017-07-13 | Stop reason: HOSPADM

## 2017-07-13 RX ORDER — FENTANYL CITRATE/PF 50 MCG/ML
25 SYRINGE (ML) INJECTION
Status: DISCONTINUED | OUTPATIENT
Start: 2017-07-13 | End: 2017-07-13 | Stop reason: HOSPADM

## 2017-07-13 RX ORDER — MIDAZOLAM HYDROCHLORIDE 1 MG/ML
INJECTION INTRAMUSCULAR; INTRAVENOUS AS NEEDED
Status: DISCONTINUED | OUTPATIENT
Start: 2017-07-13 | End: 2017-07-13 | Stop reason: SURG

## 2017-07-13 RX ORDER — ONDANSETRON 2 MG/ML
INJECTION INTRAMUSCULAR; INTRAVENOUS AS NEEDED
Status: DISCONTINUED | OUTPATIENT
Start: 2017-07-13 | End: 2017-07-13 | Stop reason: SURG

## 2017-07-13 RX ORDER — ONDANSETRON 2 MG/ML
4 INJECTION INTRAMUSCULAR; INTRAVENOUS ONCE AS NEEDED
Status: DISCONTINUED | OUTPATIENT
Start: 2017-07-13 | End: 2017-07-13 | Stop reason: HOSPADM

## 2017-07-13 RX ORDER — OXYBUTYNIN CHLORIDE 5 MG/1
5 TABLET, EXTENDED RELEASE ORAL DAILY
Qty: 10 TABLET | Refills: 0 | Status: ON HOLD | OUTPATIENT
Start: 2017-07-13 | End: 2019-08-27 | Stop reason: SDUPTHER

## 2017-07-13 RX ADMIN — ONDANSETRON HYDROCHLORIDE 4 MG: 2 INJECTION, SOLUTION INTRAVENOUS at 08:02

## 2017-07-13 RX ADMIN — FENTANYL CITRATE 25 MCG: 50 INJECTION, SOLUTION INTRAMUSCULAR; INTRAVENOUS at 08:03

## 2017-07-13 RX ADMIN — MIDAZOLAM HYDROCHLORIDE 2 MG: 1 INJECTION, SOLUTION INTRAMUSCULAR; INTRAVENOUS at 07:38

## 2017-07-13 RX ADMIN — CEFAZOLIN SODIUM 2000 MG: 2 SOLUTION INTRAVENOUS at 07:40

## 2017-07-13 RX ADMIN — PROPOFOL 100 MG: 10 INJECTION, EMULSION INTRAVENOUS at 07:47

## 2017-07-13 RX ADMIN — FENTANYL CITRATE 25 MCG: 50 INJECTION, SOLUTION INTRAMUSCULAR; INTRAVENOUS at 07:57

## 2017-07-13 RX ADMIN — FENTANYL CITRATE 25 MCG: 50 INJECTION, SOLUTION INTRAMUSCULAR; INTRAVENOUS at 07:48

## 2017-07-13 RX ADMIN — SODIUM CHLORIDE, SODIUM LACTATE, POTASSIUM CHLORIDE, AND CALCIUM CHLORIDE 125 ML/HR: .6; .31; .03; .02 INJECTION, SOLUTION INTRAVENOUS at 06:45

## 2017-07-13 RX ADMIN — LIDOCAINE HYDROCHLORIDE 50 MG: 10 INJECTION, SOLUTION INFILTRATION; PERINEURAL at 07:47

## 2017-07-13 RX ADMIN — FENTANYL CITRATE 25 MCG: 50 INJECTION, SOLUTION INTRAMUSCULAR; INTRAVENOUS at 07:56

## 2017-07-13 RX ADMIN — PROPOFOL 100 MG: 10 INJECTION, EMULSION INTRAVENOUS at 07:48

## 2017-08-04 ENCOUNTER — GENERIC CONVERSION - ENCOUNTER (OUTPATIENT)
Dept: OTHER | Facility: OTHER | Age: 67
End: 2017-08-04

## 2017-08-15 ENCOUNTER — HOSPITAL ENCOUNTER (OUTPATIENT)
Dept: INFUSION CENTER | Facility: HOSPITAL | Age: 67
Discharge: HOME | End: 2017-08-15
Payer: COMMERCIAL

## 2017-08-15 VITALS
RESPIRATION RATE: 16 BRPM | SYSTOLIC BLOOD PRESSURE: 151 MMHG | OXYGEN SATURATION: 99 % | TEMPERATURE: 96.6 F | HEART RATE: 95 BPM | DIASTOLIC BLOOD PRESSURE: 80 MMHG

## 2017-08-15 RX ADMIN — MITOMYCIN 40 MG: 40 INJECTION, POWDER, LYOPHILIZED, FOR SOLUTION INTRAVENOUS at 14:01

## 2017-08-15 NOTE — PROGRESS NOTES
Patient here for Chemotherapy via Bladder Instillation  Offering no complaints  Site prepped via aseptic technique  Catheter inserted without difficulty, clear yellow urine noted upon insertion  Tolerated insertion well via 14 Urdu newman as per order  Medication instilled as per order  Newman unclamped and removed without difficulty  Patient tolerated procedure well  Post instructions reviewed with patient and wife  Verbalized understanding of same  Left unit in stable condition

## 2017-08-18 ENCOUNTER — DOCTOR'S OFFICE (OUTPATIENT)
Dept: URBAN - NONMETROPOLITAN AREA CLINIC 1 | Facility: CLINIC | Age: 67
Setting detail: OPHTHALMOLOGY
End: 2017-08-18
Payer: COMMERCIAL

## 2017-08-18 DIAGNOSIS — H43.812: ICD-10-CM

## 2017-08-18 DIAGNOSIS — H43.811: ICD-10-CM

## 2017-08-18 DIAGNOSIS — H33.003: ICD-10-CM

## 2017-08-18 DIAGNOSIS — H33.301: ICD-10-CM

## 2017-08-18 DIAGNOSIS — E11.3413: ICD-10-CM

## 2017-08-18 DIAGNOSIS — Z79.4: ICD-10-CM

## 2017-08-18 DIAGNOSIS — H35.61: ICD-10-CM

## 2017-08-18 DIAGNOSIS — H35.373: ICD-10-CM

## 2017-08-18 PROCEDURE — 92014 COMPRE OPH EXAM EST PT 1/>: CPT | Performed by: OPHTHALMOLOGY

## 2017-08-18 PROCEDURE — 92226 OPHTHALMOSCOPY EXT SUBSEQUENT: CPT | Performed by: OPHTHALMOLOGY

## 2017-08-18 PROCEDURE — 92235 FLUORESCEIN ANGRPH MLTIFRAME: CPT | Performed by: OPHTHALMOLOGY

## 2017-08-18 PROCEDURE — 92250 FUNDUS PHOTOGRAPHY W/I&R: CPT | Performed by: OPHTHALMOLOGY

## 2017-08-18 ASSESSMENT — REFRACTION_CURRENTRX
OS_OVR_VA: 20/
OD_OVR_VA: 20/
OS_OVR_VA: 20/
OD_OVR_VA: 20/
OS_OVR_VA: 20/
OD_OVR_VA: 20/

## 2017-08-18 ASSESSMENT — REFRACTION_MANIFEST
OD_VA1: 20/
OD_VA2: 20/
OD_VA2: 20/
OS_VA2: 20/
OD_VA1: 20/
OD_VA3: 20/
OS_VA1: 20/
OD_VA1: 20/
OS_VA1: 20/
OS_VA3: 20/
OS_VA2: 20/
OS_VA2: 20/
OS_VA1: 20/
OU_VA: 20/
OS_VA3: 20/
OS_VA3: 20/
OU_VA: 20/
OU_VA: 20/
OD_VA2: 20/

## 2017-08-18 ASSESSMENT — VISUAL ACUITY
OD_BCVA: 20/CFX3'
OS_BCVA: 20/20

## 2017-08-18 ASSESSMENT — LID EXAM ASSESSMENTS
OS_BLEPHARITIS: LUL 2+
OD_BLEPHARITIS: RUL 2+

## 2017-08-18 ASSESSMENT — CONFRONTATIONAL VISUAL FIELD TEST (CVF)
OS_FINDINGS: FULL
OD_FINDINGS: FULL

## 2017-08-21 RX ORDER — LIDOCAINE HYDROCHLORIDE 20 MG/ML
JELLY TOPICAL ONCE
Status: COMPLETED | OUTPATIENT
Start: 2017-08-22 | End: 2017-08-22

## 2017-08-22 ENCOUNTER — HOSPITAL ENCOUNTER (OUTPATIENT)
Dept: INFUSION CENTER | Facility: HOSPITAL | Age: 67
Discharge: HOME | End: 2017-08-22
Payer: COMMERCIAL

## 2017-08-22 ENCOUNTER — TELEPHONE (OUTPATIENT)
Dept: INFUSION CENTER | Facility: HOSPITAL | Age: 67
End: 2017-08-22

## 2017-08-22 VITALS
OXYGEN SATURATION: 93 % | HEART RATE: 110 BPM | RESPIRATION RATE: 16 BRPM | SYSTOLIC BLOOD PRESSURE: 153 MMHG | DIASTOLIC BLOOD PRESSURE: 87 MMHG

## 2017-08-22 PROCEDURE — 51720 TREATMENT OF BLADDER LESION: CPT

## 2017-08-22 RX ADMIN — LIDOCAINE HYDROCHLORIDE: 20 JELLY TOPICAL at 13:07

## 2017-08-22 RX ADMIN — MITOMYCIN 40 MG: 40 INJECTION, POWDER, LYOPHILIZED, FOR SOLUTION INTRAVENOUS at 13:57

## 2017-08-22 NOTE — PROGRESS NOTES
Patient here for Chemotherapy  Lidocane 2 percent administered as per order at 1307pm    Site prepped via aseptic technique and 14 Chinese newman inserted at 1325pm    Patient rotated on side per request of wife  Resting quietly in bed monitored closely  Newman unclamped after one hour post insertion  Drained approved 100 cc of urine/chemo combination  Denies any pain  Patient tolerated treatment well  Offered no complaints  Left unit in stable condition

## 2017-08-28 RX ORDER — LIDOCAINE HYDROCHLORIDE 20 MG/ML
JELLY TOPICAL ONCE
Status: COMPLETED | OUTPATIENT
Start: 2017-08-29 | End: 2017-08-29

## 2017-08-29 ENCOUNTER — HOSPITAL ENCOUNTER (OUTPATIENT)
Dept: INFUSION CENTER | Facility: HOSPITAL | Age: 67
Discharge: HOME | End: 2017-08-29
Payer: COMMERCIAL

## 2017-08-29 VITALS
RESPIRATION RATE: 18 BRPM | DIASTOLIC BLOOD PRESSURE: 85 MMHG | OXYGEN SATURATION: 96 % | SYSTOLIC BLOOD PRESSURE: 136 MMHG | TEMPERATURE: 98.7 F | HEART RATE: 104 BPM

## 2017-08-29 PROCEDURE — 51720 TREATMENT OF BLADDER LESION: CPT

## 2017-08-29 RX ADMIN — MITOMYCIN 40 MG: 40 INJECTION, POWDER, LYOPHILIZED, FOR SOLUTION INTRAVENOUS at 13:14

## 2017-08-29 RX ADMIN — LIDOCAINE HYDROCHLORIDE: 20 JELLY TOPICAL at 12:27

## 2017-08-29 NOTE — PROGRESS NOTES
Patient here for Bladder Chemo  Lidocaine 2 percent given via jet by Kassy Sainz  Waited 5 minutes then 14 Maltese newman inserted via aseptic technique by Kassy Sainz  Bladder draining clear yellow urine approx  125cc in drainage bag  Tolerating po food and fluids  Patient tolerated procedure well  Offered no complaints  Patient discharge in stable condition

## 2017-09-13 ENCOUNTER — ALLSCRIPTS OFFICE VISIT (OUTPATIENT)
Dept: OTHER | Facility: OTHER | Age: 67
End: 2017-09-13

## 2017-09-20 ENCOUNTER — ALLSCRIPTS OFFICE VISIT (OUTPATIENT)
Dept: OTHER | Facility: OTHER | Age: 67
End: 2017-09-20

## 2017-09-25 ENCOUNTER — TRANSCRIBE ORDERS (OUTPATIENT)
Dept: ADMINISTRATIVE | Facility: HOSPITAL | Age: 67
End: 2017-09-25

## 2017-09-25 DIAGNOSIS — I71.2 THORACIC AORTIC ANEURYSM WITHOUT RUPTURE (HCC): Primary | ICD-10-CM

## 2017-10-03 ENCOUNTER — HOSPITAL ENCOUNTER (OUTPATIENT)
Dept: CT IMAGING | Facility: HOSPITAL | Age: 67
Discharge: HOME | End: 2017-10-03
Payer: COMMERCIAL

## 2017-10-03 DIAGNOSIS — I71.2 THORACIC AORTIC ANEURYSM WITHOUT RUPTURE (HCC): ICD-10-CM

## 2017-10-03 PROCEDURE — 71250 CT THORAX DX C-: CPT

## 2017-10-10 ENCOUNTER — GENERIC CONVERSION - ENCOUNTER (OUTPATIENT)
Dept: OTHER | Facility: OTHER | Age: 67
End: 2017-10-10

## 2017-11-10 ENCOUNTER — GENERIC CONVERSION - ENCOUNTER (OUTPATIENT)
Dept: OTHER | Facility: OTHER | Age: 67
End: 2017-11-10

## 2017-11-13 DIAGNOSIS — E11.9 TYPE 2 DIABETES MELLITUS WITHOUT COMPLICATIONS (HCC): ICD-10-CM

## 2017-11-13 DIAGNOSIS — M67.912 UNSPECIFIED DISORDER OF SYNOVIUM AND TENDON, LEFT SHOULDER: ICD-10-CM

## 2017-11-13 DIAGNOSIS — M75.42 IMPINGEMENT SYNDROME OF LEFT SHOULDER: ICD-10-CM

## 2017-11-13 DIAGNOSIS — M25.512 PAIN IN LEFT SHOULDER: ICD-10-CM

## 2017-11-14 ENCOUNTER — ALLSCRIPTS OFFICE VISIT (OUTPATIENT)
Dept: OTHER | Facility: OTHER | Age: 67
End: 2017-11-14

## 2017-11-16 ENCOUNTER — RX ONLY (RX ONLY)
Age: 67
End: 2017-11-16

## 2017-11-16 ENCOUNTER — DOCTOR'S OFFICE (OUTPATIENT)
Dept: URBAN - NONMETROPOLITAN AREA CLINIC 1 | Facility: CLINIC | Age: 67
Setting detail: OPHTHALMOLOGY
End: 2017-11-16
Payer: COMMERCIAL

## 2017-11-16 DIAGNOSIS — H35.61: ICD-10-CM

## 2017-11-16 DIAGNOSIS — H33.301: ICD-10-CM

## 2017-11-16 DIAGNOSIS — H35.373: ICD-10-CM

## 2017-11-16 DIAGNOSIS — H33.003: ICD-10-CM

## 2017-11-16 DIAGNOSIS — Z79.4: ICD-10-CM

## 2017-11-16 DIAGNOSIS — H43.811: ICD-10-CM

## 2017-11-16 DIAGNOSIS — E11.3413: ICD-10-CM

## 2017-11-16 DIAGNOSIS — H43.813: ICD-10-CM

## 2017-11-16 DIAGNOSIS — H43.812: ICD-10-CM

## 2017-11-16 PROCEDURE — 92014 COMPRE OPH EXAM EST PT 1/>: CPT | Performed by: OPHTHALMOLOGY

## 2017-11-16 PROCEDURE — 92226 OPHTHALMOSCOPY EXT SUBSEQUENT: CPT | Performed by: OPHTHALMOLOGY

## 2017-11-16 PROCEDURE — 92134 CPTRZ OPH DX IMG PST SGM RTA: CPT | Performed by: OPHTHALMOLOGY

## 2017-11-16 ASSESSMENT — REFRACTION_MANIFEST
OS_VA1: 20/
OD_VA1: 20/
OS_VA2: 20/
OD_VA3: 20/
OU_VA: 20/
OS_VA1: 20/
OD_VA1: 20/
OD_VA2: 20/
OD_VA1: 20/
OS_VA3: 20/
OD_VA3: 20/
OD_VA3: 20/
OD_VA2: 20/
OD_VA2: 20/
OS_VA1: 20/
OU_VA: 20/
OS_VA3: 20/
OS_VA2: 20/
OU_VA: 20/
OS_VA3: 20/
OS_VA2: 20/

## 2017-11-16 ASSESSMENT — LID EXAM ASSESSMENTS
OS_BLEPHARITIS: LUL 2+
OD_BLEPHARITIS: RUL 2+

## 2017-11-16 ASSESSMENT — VISUAL ACUITY
OS_BCVA: 20/20-1
OD_BCVA: 20/CF XS 4'

## 2017-11-16 ASSESSMENT — CONFRONTATIONAL VISUAL FIELD TEST (CVF)
OD_FINDINGS: FULL
OS_FINDINGS: FULL

## 2017-11-16 ASSESSMENT — REFRACTION_CURRENTRX
OS_OVR_VA: 20/
OS_OVR_VA: 20/
OD_OVR_VA: 20/
OS_OVR_VA: 20/
OD_OVR_VA: 20/
OD_OVR_VA: 20/
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
## 2017-12-05 ENCOUNTER — ALLSCRIPTS OFFICE VISIT (OUTPATIENT)
Dept: OTHER | Facility: OTHER | Age: 67
End: 2017-12-05

## 2017-12-05 ENCOUNTER — TRANSCRIBE ORDERS (OUTPATIENT)
Dept: RADIOLOGY | Facility: MEDICAL CENTER | Age: 67
End: 2017-12-05

## 2017-12-05 ENCOUNTER — APPOINTMENT (OUTPATIENT)
Dept: RADIOLOGY | Facility: MEDICAL CENTER | Age: 67
End: 2017-12-05
Payer: COMMERCIAL

## 2017-12-05 ENCOUNTER — APPOINTMENT (OUTPATIENT)
Dept: LAB | Facility: MEDICAL CENTER | Age: 67
End: 2017-12-05
Payer: COMMERCIAL

## 2017-12-05 DIAGNOSIS — E11.9 TYPE 2 DIABETES MELLITUS WITHOUT COMPLICATIONS (HCC): ICD-10-CM

## 2017-12-05 DIAGNOSIS — M25.512 PAIN IN LEFT SHOULDER: ICD-10-CM

## 2017-12-05 DIAGNOSIS — M75.42 IMPINGEMENT SYNDROME OF LEFT SHOULDER: ICD-10-CM

## 2017-12-05 LAB
EST. AVERAGE GLUCOSE BLD GHB EST-MCNC: 131 MG/DL
HBA1C MFR BLD: 6.2 % (ref 4.2–6.3)

## 2017-12-05 PROCEDURE — 83036 HEMOGLOBIN GLYCOSYLATED A1C: CPT

## 2017-12-05 PROCEDURE — 36415 COLL VENOUS BLD VENIPUNCTURE: CPT

## 2017-12-05 PROCEDURE — 73030 X-RAY EXAM OF SHOULDER: CPT

## 2017-12-06 ENCOUNTER — GENERIC CONVERSION - ENCOUNTER (OUTPATIENT)
Dept: OTHER | Facility: OTHER | Age: 67
End: 2017-12-06
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
## 2017-12-14 ENCOUNTER — ALLSCRIPTS OFFICE VISIT (OUTPATIENT)
Dept: OTHER | Facility: OTHER | Age: 67
End: 2017-12-14

## 2017-12-19 ENCOUNTER — HOSPITAL ENCOUNTER (OUTPATIENT)
Dept: MRI IMAGING | Facility: HOSPITAL | Age: 67
Discharge: HOME | End: 2017-12-19
Attending: ORTHOPAEDIC SURGERY
Payer: COMMERCIAL

## 2017-12-19 DIAGNOSIS — M67.912 UNSPECIFIED DISORDER OF SYNOVIUM AND TENDON, LEFT SHOULDER: ICD-10-CM

## 2017-12-19 PROCEDURE — 73221 MRI JOINT UPR EXTREM W/O DYE: CPT

## 2018-01-10 NOTE — MISCELLANEOUS
Message  patient is presently in [facility] with pancreatitis, will keep in clearance task 1  so when resolved patient can be medically cleared for AVF       1 Amended By: Selina Adame; Apr 27 2016 9:02 AM EST    Active Problems   1  Acute leg pain, left (729 5) (M79 605)  2  Allergic rhinitis (477 9) (J30 9)  3  Anemia (285 9) (D64 9)  4  Depression with anxiety (300 4) (F41 8)  5  Diabetic Peripheral Neuropathy (357 2)  6  DMII (diabetes mellitus, type 2) (250 00) (E11 9)  7  ESRD (end stage renal disease) (585 6) (N18 6)  8  Gastroenteritis (558 9) (K52 9)  9  Gout (274 9) (M10 9)  10  Headache (784 0) (R51)  11  Heme positive stool (792 1) (R19 5)  12  Hyperlipidemia (272 4) (E78 5)  13  Hypertension (401 9) (I10)  14  Hypertriglyceridemia (272 1) (E78 1)  15  Insomnia (780 52) (G47 00)  16  Lumbar radiculopathy (724 4) (M54 16)  17  Myalgia And Myositis (729 1)  18  Need for influenza vaccination (V04 81) (Z23)  19  Need for pneumococcal vaccination (V03 82) (Z23)  20  Oral thrush (112 0) (B37 0)  21  Osteoarthritis (715 90) (M19 90)  22  Overweight (278 02) (E66 3)  23  Right wrist pain (719 43) (M25 531)  24  Tooth pain (525 9) (K08 8)  25  Type 2 diabetes mellitus with hyperglycemia (250 00) (E11 65)  26  Vitamin D deficiency (268 9) (E55 9)    Current Meds  1  Allopurinol 100 MG Oral Tablet; TAKE 1 TABLET DAILY AS DIRECTED; Therapy: 87Bub1506 to (Evaluate:22Apr2016)  Requested for: 28Apr2015; Last   Rx:28Apr2015 Ordered  2  AmLODIPine Besylate 10 MG Oral Tablet; TAKE 1 TABLET DAILY; Therapy: 38OZU7337 to (Evaluate:24Jan2016)  Requested for: 14HWD2366; Last   Rx:65Jxp0916 Ordered  3  Atorvastatin Calcium 20 MG Oral Tablet; TAKE ONE TABLET BY MOUTH ONCE DAILY; Therapy: 37SPN3952 to (Jayda Roberson)  Requested for: 81WHE8217; Last   Rx:11Mar2016 Ordered  4  BD Pen Needle Mari U/F 32G X 4 MM Miscellaneous; use with insulin;    Therapy: 89OSE3673 to (Last Rx:08Sep2015)  Requested for: 08Sep2015 Ordered  5  Biotin 5000 MCG Oral Tablet; Take as directed Recorded  6  Claritin 10 MG Oral Capsule; TAKE 1 CAPSULE  PRN; Therapy: (Recorded:04Jun2015) to Recorded  7  Clobetasol Propionate 0 05 % External Cream; USE AS DIRECTED Recorded  8  Colchicine-Probenecid 0 5-500 MG Oral Tablet; TAKE 1 TABLET DAILY AS DIRECTED; Therapy: 53MYL0046 to (Evaluate:02Jun2016)  Requested for: 67NNE4501; Last   Rx:08Jun2015 Ordered  9  Dialyvite Vitamin D 5000 CAPS; Take as directed Recorded  10  Doxazosin Mesylate 2 MG Oral Tablet; hs Recorded  11  Fioricet -40 MG Oral Capsule; TAKE 1 CAPSULE 3 times daily PRN headache; Therapy: 28PJA5652 to (Evaluate:81Uzt3086)  Requested for: 09Apr2015; Last    Rx:09Apr2015 Ordered  12  Fluticasone Propionate 50 MCG/ACT Nasal Suspension; USE 2 SPRAYS IN EACH    NOSTRIL ONCE DAILY; Therapy: 84WAH2711 to (Last Rx:76Jex2208)  Requested for: 90Avh9407 Ordered  13  FreeStyle Test In Vitro Strip; Therapy: 66HYU9130 to (Last Rx:41Yxj7618)  Requested for: 77Ezb9885 Ordered  14  Gemfibrozil 600 MG Oral Tablet; TAKE 1 TABLET TWICE DAILY,  WITH MORNING AND    EVENING MEAL; Therapy: 27HIE5678 to (Evaluate:02Jun2016)  Requested for: 39JGS5873; Last    Rx:08Jun2015 Ordered  15  HydrALAZINE HCl - 100 MG Oral Tablet; TAKE 1 TABLET Every 8 hours; Therapy: 12OKA0424 to [phone])  Requested for: 38EJP2929; Last    Rx:04Jun2015 Ordered  16  Hydrocodone-Acetaminophen 5-325 MG Oral Tablet; TAKE 1 TABLET Every 6 hours    PRN pain; Therapy: 00Wqf6733 to (Evaluate:09Apr2016); Last Rx:69Uyi4196 Ordered  17  Lantus SoloStar 100 UNIT/ML Subcutaneous Solution Pen-injector; INJECT 20 UNIT    Daily; Therapy: 61MUH0562 to (Last Rx:09Nov2015)  Requested for: 42TFE1628 Ordered  18  Lisinopril 40 MG Oral Tablet; TAKE 1 TABLET DAILY AS DIRECTED; Therapy: (Recorded:75Qqq2485) to Recorded  19  Nizatidine 300 MG Oral Capsule; TAKE 1 CAPSULE DAILY;     Therapy: 29KDD2712 to (Last Rx:08Sep2015) Requested for: 60Yie1295 Ordered  20  Oxycodone-Acetaminophen 5-325 MG Oral Tablet; TAKE 1 TABLET EVERY 4 TO 6    HOURS AS NEEDED FOR PAIN;    Therapy: 15Aks7231 to (Evaluate:51Prf4406); Last Rx:92Ebx4807 Ordered  21  Sertraline HCl - 100 MG Oral Tablet; TAKE 1 TABLET DAILY; Therapy: 92TWF2898 to (Evaluate:07Hmj0829)  Requested for: 48Qsv0843; Last    Rx:06Evs7199 Ordered  22  TriLyte 420 GM Oral Solution Reconstituted; TAKE AS DIRECTED; Therapy: 14LXI7169 to (Evaluate:87Pfw5947)  Requested for: 06EFR2427; Last    Rx:97Ewk5360 Ordered  23  TRUEtest Control Level 1 In Vitro Liquid; USE AS DIRECTED; Therapy: 69QJC2950 to (Last Rx:98Uqj0663) Ordered  24  TrueTrack Test In Vitro Strip; USE AS DIRECTED once daily; Therapy: 45LIE0244 to (Verneita Nicky)  Requested for: 85Bmt0833; Last    Rx:16Zud7294 Ordered  25  Zofran 4 MG Oral Tablet (Ondansetron HCl); one every 6 hours as needed for nausea    Recorded    Allergies   1  No Known Drug Allergies    Signatures   Electronically signed by :  Esme Zavala, ; Apr 27 2016  9:02AM EST                       (Author)

## 2018-01-11 NOTE — MISCELLANEOUS
Message  called wife , patient is now home from hospital and doing better  she said when they are ready to reschedule, she will let us know  Active Problems    1  Acute leg pain, left (729 5) (M79 605)   2  Allergic rhinitis (477 9) (J30 9)   3  Anemia (285 9) (D64 9)   4  Depression with anxiety (300 4) (F41 8)   5  Diabetic Peripheral Neuropathy (357 2)   6  DMII (diabetes mellitus, type 2) (250 00) (E11 9)   7  ESRD (end stage renal disease) (585 6) (N18 6)   8  Gastroenteritis (558 9) (K52 9)   9  Gout (274 9) (M10 9)   10  Headache (784 0) (R51)   11  Heme positive stool (792 1) (R19 5)   12  Hyperlipidemia (272 4) (E78 5)   13  Hypertension (401 9) (I10)   14  Hypertriglyceridemia (272 1) (E78 1)   15  Insomnia (780 52) (G47 00)   16  Lumbar radiculopathy (724 4) (M54 16)   17  Myalgia And Myositis (729 1)   18  Need for influenza vaccination (V04 81) (Z23)   19  Need for pneumococcal vaccination (V03 82) (Z23)   20  Oral thrush (112 0) (B37 0)   21  Osteoarthritis (715 90) (M19 90)   22  Overweight (278 02) (E66 3)   23  Right wrist pain (719 43) (M25 531)   24  Tooth pain (525 9) (K08 8)   25  Type 2 diabetes mellitus with hyperglycemia (250 00) (E11 65)   26  Vitamin D deficiency (268 9) (E55 9)    Current Meds   1  Allopurinol 100 MG Oral Tablet; TAKE 1 TABLET DAILY AS DIRECTED; Therapy: 28Owq1930 to (Evaluate:22Apr2016)  Requested for: 93Liz3484; Last   Rx:28Apr2015 Ordered   2  AmLODIPine Besylate 10 MG Oral Tablet; TAKE 1 TABLET DAILY; Therapy: 75VUR6418 to (Evaluate:76Gui4150)  Requested for: 53DGF1315; Last   Rx:44Dox0012 Ordered   3  Atorvastatin Calcium 20 MG Oral Tablet; TAKE ONE TABLET BY MOUTH ONCE DAILY; Therapy: 18VTD5789 to (Rufus York)  Requested for: 37LBA8204; Last   Rx:11Mar2016 Ordered   4  BD Pen Needle Mari U/F 32G X 4 MM Miscellaneous; use with insulin; Therapy: 29KIN4340 to (Last Rx:08Sep2015)  Requested for: 08Sep2015 Ordered   5   Biotin 5000 MCG Oral Tablet; Take as directed Recorded   6  Claritin 10 MG Oral Capsule; TAKE 1 CAPSULE  PRN; Therapy: (Recorded:04Jun2015) to Recorded   7  Clobetasol Propionate 0 05 % External Cream; USE AS DIRECTED Recorded   8  Colchicine-Probenecid 0 5-500 MG Oral Tablet; TAKE 1 TABLET DAILY AS DIRECTED; Therapy: 20POZ5008 to (Evaluate:02Jun2016)  Requested for: 69MBW3698; Last   Rx:08Jun2015 Ordered   9  Dialyvite Vitamin D 5000 CAPS; Take as directed Recorded   10  Doxazosin Mesylate 2 MG Oral Tablet; hs Recorded   11  Fioricet -40 MG Oral Capsule; TAKE 1 CAPSULE 3 times daily PRN headache; Therapy: 30IVW8956 to (Evaluate:80Asq8826)  Requested for: 09Apr2015; Last    Rx:09Apr2015 Ordered   12  Fluticasone Propionate 50 MCG/ACT Nasal Suspension; USE 2 SPRAYS IN EACH    NOSTRIL ONCE DAILY; Therapy: 73EZN4481 to (Last Rx:11Wxs5287)  Requested for: 96Rtc0764 Ordered   13  FreeStyle Test In Vitro Strip; Therapy: 18WZV7410 to (Last Rx:36Hhi0562)  Requested for: 86Giw8776 Ordered   14  Gemfibrozil 600 MG Oral Tablet; TAKE 1 TABLET TWICE DAILY,  WITH MORNING AND    EVENING MEAL; Therapy: 40JKX6545 to (Evaluate:02Jun2016)  Requested for: 37YEU5856; Last    Rx:08Jun2015 Ordered   15  HydrALAZINE HCl - 100 MG Oral Tablet; TAKE 1 TABLET Every 8 hours; Therapy: 40XZV0934 to 320 2833)  Requested for: 60RSA9902; Last    Rx:04Jun2015 Ordered   16  Hydrocodone-Acetaminophen 5-325 MG Oral Tablet; TAKE 1 TABLET Every 6 hours    PRN pain; Therapy: 78Jgq5674 to (Evaluate:09Apr2016); Last Rx:73Ubv8638 Ordered   17  Lantus SoloStar 100 UNIT/ML Subcutaneous Solution Pen-injector; INJECT 20 UNIT    Daily; Therapy: 06TVD4119 to (Last Rx:09Nov2015)  Requested for: 03DMV2213 Ordered   18  Lisinopril 40 MG Oral Tablet; TAKE 1 TABLET DAILY AS DIRECTED; Therapy: (Recorded:50Ppy3783) to Recorded   19  Nizatidine 300 MG Oral Capsule; TAKE 1 CAPSULE DAILY;     Therapy: 78XTU7904 to (Last Rx:08Sep2015)  Requested for: 08Sep2015 Ordered   20  Oxycodone-Acetaminophen 5-325 MG Oral Tablet; TAKE 1 TABLET EVERY 4 TO 6    HOURS AS NEEDED FOR PAIN;    Therapy: 37Wiu2050 to (Evaluate:18Apr2016); Last Rx:13Wwm7491 Ordered   21  Sertraline HCl - 100 MG Oral Tablet; TAKE 1 TABLET DAILY; Therapy: 50UGW6099 to (Evaluate:03Apr2017)  Requested for: 95Slc4303; Last    Rx:41Fqy0324 Ordered   22  TriLyte 420 GM Oral Solution Reconstituted; TAKE AS DIRECTED; Therapy: 36IQE2187 to (Evaluate:02Ssi4915)  Requested for: 69KLA6806; Last    Rx:06Idu4723 Ordered   23  TRUEtest Control Level 1 In Vitro Liquid; USE AS DIRECTED; Therapy: 36AQB1818 to (Last Rx:54Cic1334) Ordered   24  TrueTrack Test In Vitro Strip; USE AS DIRECTED once daily; Therapy: 72TUK4599 to (Tiki Chisholm)  Requested for: 37Hzv7224; Last    Rx:24Ssl9354 Ordered   25  Zofran 4 MG Oral Tablet (Ondansetron HCl); one every 6 hours as needed for nausea    Recorded    Allergies    1  No Known Drug Allergies    Signatures   Electronically signed by :  Marques Babcock, ; May  2 2016 11:39AM EST                       (Author)

## 2018-01-11 NOTE — RESULT NOTES
Verified Results  (1) TISSUE EXAM [id number] 08:39AM [place]     Test Name Result Flag Reference   LAB AP CASE REPORT (Report)     Surgical Pathology Report             Case: [id number]                   Authorizing Provider: Samantha Schultz MD      Collected:      03/04/2016 0839        Ordering Location:   [facility] Received:      03/04/2016 1045                    Operating Room                                 Pathologist:      Zeynep Atkinson MD                                Specimens:  A) - Duodenum, rule out Celiac disease, retrieved with cold bipsy forcep                B) - Stomach, gastric body polyps, retrieved with cold biopsy forcep                  C) - Large Intestine, Right/Ascending Colon, polypectomy, retrieved with cold bipsy          forcep                                                 D) - Large Intestine, Sigmoid Colon, polypectomy, retrieved with cold bipsy forcep   LAB AP FINAL DIAGNOSIS (Report)     A  Duodenal biopsy:  ï¾ Duodenal mucosa showing focal Brunner's gland hyperplasia, focal gland   ectasia, and focal lymphangiectasia  ï¾ Diagnostic features of celiac disease are not identified  B  Gastric body polyps, biopsies of:  ï¾ Fundic gland polyp and gastric mucosa showing nonspecific patchy mild   chronic inflammation  ï¾ No H  pylori identified  ï¾ H  pylori immunohistochemical stain performed  Control slide reacted   appropriately  C  Ascending colon polyp, biopsy of:  ï¾ Tubular adenoma (two fragments)  D  Sigmoid colon polyp, biopsy of:  ï¾ Tubular adenoma (two fragments)  Interpretation performed at [facility]  [place] [address]   LAB AP SURGICAL ADDITIONAL INFORMATION (Report)     These tests were developed and their performance characteristics   determined by [address] [place] [address] Specialty Laboratory or [facility]   Laboratories  They may not be cleared or approved by the [place]  Food and   Drug Administration   The FDA has determined that such clearance or   approval is not necessary  These tests are used for clinical purposes  They should not be regarded as investigational or for research  This   laboratory has been approved by [place] [address], designated as a high-complexity   laboratory and is qualified to perform these tests  CPT codes: 92755W0, 75531   LAB AP GROSS DESCRIPTION (Report)     A  The specimen is received in formalin, labeled with the patient's name   and hospital number, and is designated duodenum rule out celiac   disease  The specimen consists of a single, rubbery, tan-brown tissue   fragment measuring up to 0 4 cm in greatest dimension  Entirely submitted  One cassette  B  The specimen is received in formalin, labeled with the patient's name   and hospital number, and is designated gastric body polyps  The   specimen consists of 2 rubbery, tan-brown tissue fragments measuring up to   0 5 cm in aggregate  Entirely submitted  One cassette  C  The specimen is received in formalin, labeled with the patient's name   and hospital number, and is designated polypectomy, ascending colon  The specimen consists of 2 rubbery and friable, tan-brown tissue fragments   measuring from 0 2 up to 0 3 cm in greatest dimension  Entirely submitted  One cassette  D  The specimen is received in formalin, labeled with the patient's name   and hospital number, and is designated sigmoid colon polypectomy  The   specimen consists of 2 rubbery, tan-brown tissue fragments measuring from   0 4 up to 0 5 cm in greatest dimension  Entirely submitted  One cassette  Note: The estimated total formalin fixation time based upon information   provided by the submitting clinician and the standard processing schedule   is over 72 hours  SRS    Note: The estimated total formalin fixation time based upon information   provided by the submitting clinician and the standard processing schedule   is over 72 hours
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
## 2018-01-12 VITALS
BODY MASS INDEX: 28.95 KG/M2 | DIASTOLIC BLOOD PRESSURE: 82 MMHG | RESPIRATION RATE: 14 BRPM | WEIGHT: 191 LBS | OXYGEN SATURATION: 96 % | TEMPERATURE: 97.5 F | SYSTOLIC BLOOD PRESSURE: 136 MMHG | HEIGHT: 68 IN | HEART RATE: 86 BPM

## 2018-01-12 VITALS
HEIGHT: 68 IN | SYSTOLIC BLOOD PRESSURE: 169 MMHG | WEIGHT: 203.25 LBS | DIASTOLIC BLOOD PRESSURE: 70 MMHG | HEART RATE: 81 BPM | BODY MASS INDEX: 30.8 KG/M2

## 2018-01-12 NOTE — CONSULTS
Assessment    1  Heme positive stool (792 1) (R19 5)   2  Anemia (285 9) (D64 9)    Plan  Anemia    · Follow Up After Tests Complete Evaluation and Treatment  Follow-up  Status: Hold For -  Scheduling  Requested for: 43CAN7408   Ordered; For: Anemia; Ordered By: Ismael Hinton Performed:  Due: 69LCL7648   · COLONOSCOPY (GI, SURG); Status:Hold For - Scheduling; Requested for:35Ttw8143;    Perform:[facility]; FGW:34TPW4885;VKQJIYO; Odalys Son; Ordered By:Sal Smith;   · EGD; Status:Hold For - Scheduling; Requested for:47Jxh0556;    Perform:[facility]; RTZ:55HKN7341;CUSHEABY; Odalys Son; Ordered By:Sal Smith; Anemia, Heme positive stool    · TriLyte 420 GM Oral Solution Reconstituted; TAKE AS DIRECTED   Rx By: Ismael Hinton; Dispense: 1 Days ; #:1 X 4000 ML Bottle; Refill: 0; For: Anemia, Heme positive stool; AAMIR = N; Verified Transmission to [facility]/PHARMACY #[id number] Last Updated By: System, [employer]; 2/3/2016 1:48:28 PM    Discussion/Summary  Discussion Summary:   I will schedule him for an upper endoscopy and colonoscopy because of his anemia and Hemoccult-positive stools  I offered him the option for starting with the colonoscopy and only performing the upper endoscopy if the colonoscopy is negative that he and his wife prefer to have both performed at the same time  I spoke to him about the benefits risks of the procedures as well as instructions for the bowel preparation and answered all of their questions  Medication SE Review and Pt Understands Tx: Possible side effects of new medications were reviewed with the patient/guardian today  The treatment plan was reviewed with the patient/guardian  The patient/guardian understands and agrees with the treatment plan   Counseling Documentation With Imm: The patient was counseled regarding diagnostic results, instructions for management, impressions, risks and benefits of treatment options, importance of compliance with treatment        Chief Complaint  Chief Complaint Free Text Note Form: Pt referred for blood in stool  History of Present Illness  HPI: He presents for evaluation because of a new diagnosis of Hemoccult-positive stool and worsening anemia  He was diagnosed with renal insufficiency and this seems to be progressing  He has not noticed any red blood in his stool or black stools and he denies abdominal pain, nausea, vomiting, diarrhea, constipation, but has had some intentional weight loss  He denies any prior history of colonoscopy or upper endoscopy  History Reviewed: The history was obtained today from the patient and I agree with the documented history  Review of Systems  Complete-Male GI Adult:   Constitutional: No fever or chills, feels well, no tiredness, no recent weight gain or weight loss  Eyes: eyesight problems and wears glasses, but no eye pain, no dryness of the eyes, eyes not red, no purulent discharge from the eyes and no itching of the eyes  ENT: no complaints of earache, no hearing loss, no nosebleeds, no nasal discharge, no sore throat, no hoarseness  Cardiovascular: No complaints of slow heart rate, no fast heart rate, no chest pain, no palpitations, no leg claudication, no lower extremity  Respiratory: No complaints of shortness of breath, no wheezing, no cough, no SOB on exertion, no orthopnea or PND  Gastrointestinal: No complaints of abdominal pain, no constipation, no nausea or vomiting, no diarrhea or bloody stools  Genitourinary: No complaints of dysuria, no incontinence, no hesitancy, no nocturia, no genital lesion, no testicular pain  Musculoskeletal: No complaints of arthralgia, no myalgias, no joint swelling or stiffness, no limb pain or swelling  Integumentary: No complaints of skin rash or skin lesions, no itching, no skin wound, no dry skin     Neurological: No compliants of headache, no confusion, no convulsions, no numbness or tingling, no dizziness or fainting, no limb weakness, no difficulty walking  Psychiatric: Is not suicidal, no sleep disturbances, no anxiety or depression, no change in personality, no emotional problems  Endocrine: No complaints of proptosis, no hot flashes, no muscle weakness, no erectile dysfunction, no deepening of the voice, no feelings of weakness  Hematologic/Lymphatic: No complaints of swollen glands, no swollen glands in the neck, does not bleed easily, no easy bruising  ROS Reviewed:   ROS reviewed  Active Problems    1  Acute leg pain, left (729 5) (M79 605)   2  Anemia (285 9) (D64 9)   3  Depression with anxiety (300 4) (F41 8)   4  Diabetic Peripheral Neuropathy (357 2)   5  DMII (diabetes mellitus, type 2) (250 00) (E11 9)   6  Gout (274 9) (M10 9)   7  Headache (784 0) (R51)   8  Heme positive stool (792 1) (R19 5)   9  Hyperlipidemia (272 4) (E78 5)   10  Hypertension (401 9) (I10)   11  Hypertriglyceridemia (272 1) (E78 1)   12  Insomnia (780 52) (G47 00)   13  Lumbar radiculopathy (724 4) (M54 16)   14  Myalgia And Myositis (729 1)   15  Need for influenza vaccination (V04 81) (Z23)   16  Oral thrush (112 0) (B37 0)   17  Osteoarthritis (715 90) (M19 90)   18  Overweight (278 02) (E66 3)   19  Right wrist pain (719 43) (M25 531)   20  Tooth pain (525 9) (K08 8)   21  Type 2 diabetes mellitus with hyperglycemia (250 00) (E11 65)   22  Vitamin D deficiency (268 9) (E55 9)    Past Medical History    1  History of Abnormal Liver Function Test (790 6)   2  History of Acute hepatitis A (070 1) (B15 9)   3  Acute upper respiratory infection (465 9) (J06 9)   4  Acute upper respiratory infection (465 9) (J06 9)   5  History of Diabetes Mellitus (250 00)   6  History of Fracture Of Carpal Bone(S) (814 00)   7  History of Gout (274 9) (M10 9)   8  History of acute bronchitis (V12 69) (Z87 09)   9  History of hyperlipidemia (V12 29) (Z86 39)   10  History of hypertension (V12 59) (Z86 79)   11  History of lipoma (V13 89) (Z86 018)   12  History of nicotine dependence (V15 82) (P65 803)  Active Problems And Past Medical History Reviewed: The active problems and past medical history were reviewed and updated today  Surgical History    1  History of Cataract Surgery   2  History of Hernia Repair  Surgical History Reviewed: The surgical history was reviewed and updated today  Family History    1  Family history of Diabetes Mellitus (250 00)    2  Family history of cardiac disorder (V17 49) (Z82 49)    3  Family history of thyroid disease (V18 19) (Z83 49)  Family History Reviewed: The family history was reviewed and updated today  Social History    · Being A Social Drinker   · Former smoker (F11 52) (M33 835)  Social History Reviewed: The social history was reviewed and is unchanged  Current Meds   1  Allopurinol 100 MG Oral Tablet; TAKE 1 TABLET DAILY AS DIRECTED; Therapy: 84Rap5371 to (Evaluate:22Apr2016)  Requested for: 28Apr2015; Last   Rx:28Apr2015 Ordered   2  AmLODIPine Besylate 10 MG Oral Tablet; TAKE 1 TABLET DAILY; Therapy: 64AHH4048 to (Evaluate:24Jan2016)  Requested for: 86WDW8878; Last   Rx:19Xah6578 Ordered   3  Atorvastatin Calcium 20 MG Oral Tablet; TAKE 1 TABLET DAILY AT BEDTIME; Therapy: 91ZXH0504 to (Evaluate:15Mar2016)  Requested for: 00WZW6746; Last   Rx:16Nov2015 Ordered   4  BD Pen Needle Mari U/F 32G X 4 MM Miscellaneous; use with insulin; Therapy: 76GVL8714 to (Last Rx:08Sep2015)  Requested for: 61Efx8339 Ordered   5  Biotin 1000 MCG Oral Tablet; Therapy: (Recorded:04Jun2015) to Recorded   6  Claritin 10 MG Oral Capsule; TAKE 1 CAPSULE  PRN; Therapy: (Recorded:04Jun2015) to Recorded   7  Clobetasol Propionate 0 05 % External Cream; USE AS DIRECTED Recorded   8  Colchicine-Probenecid 0 5-500 MG Oral Tablet; TAKE 1 TABLET DAILY AS DIRECTED; Therapy: 87XLG6962 to (Evaluate:02Jun2016)  Requested for: 33XGE0771; Last   Rx:08Jun2015 Ordered   9  Dialyvite Vitamin D 5000 CAPS;  Take as directed Recorded   10  Eye-Abdulaziz Plus Lutein CAPS Recorded   11  Fioricet -40 MG Oral Capsule; TAKE 1 CAPSULE 3 times daily PRN headache; Therapy: 26ODY0581 to (Evaluate:14Zzw8651)  Requested for: 09Apr2015; Last    Rx:09Apr2015 Ordered   12  FreeStyle Test In Vitro Strip; Therapy: 27HFQ3458 to (Last Rx:81Wwf0826)  Requested for: 60Pvq1619 Ordered   13  Gemfibrozil 600 MG Oral Tablet; TAKE 1 TABLET TWICE DAILY,  WITH MORNING AND    EVENING MEAL; Therapy: 79JHE9714 to (Evaluate:02Jun2016)  Requested for: 63WUK1178; Last    Rx:08Jun2015 Ordered   14  HydrALAZINE HCl - 100 MG Oral Tablet; TAKE 1 TABLET Every 8 hours; Therapy: 42PWN5444 to 467 20 767)  Requested for: 54YPI8294; Last    Rx:04Jun2015 Ordered   15  Hydrocodone-Acetaminophen 5-300 MG Oral Tablet; TAKE 1 TABLET 3 times daily PRN    pain; Therapy: 25Snm8230 to (Evaluate:18Jan2016); Last Rx:19Nov2015 Ordered   16  Januvia 50 MG Oral Tablet; TAKE 1 TABLET DAILY  Requested for: 30Xjv7711; Last    Rx:00Qzi5502 Ordered   17  Lantus SoloStar 100 UNIT/ML Subcutaneous Solution Pen-injector; INJECT 20 UNIT    Daily; Therapy: 57PYO4654 to (Last Rx:09Nov2015)  Requested for: 55WLO2970 Ordered   18  Lisinopril 40 MG Oral Tablet; TAKE 1 TABLET DAILY AS DIRECTED; Therapy: (Recorded:76Kaa2751) to Recorded   19  Magnesium 400 MG Oral Capsule; Take 1 capsule twice daily; Therapy: 75BWJ7098 to Recorded   20  Multi-Vitamin TABS; TAKE 1 TABLET DAILY; Therapy: (Recorded:04Jun2015) to Recorded   21  Nizatidine 300 MG Oral Capsule; TAKE 1 CAPSULE DAILY; Therapy: 07QFL3771 to (Last Rx:08Sep2015)  Requested for: 08Sep2015 Ordered   22  Sertraline HCl - 50 MG Oral Tablet; TAKE 1 TABLET BY MOUTH ONCE DAILY; Therapy: 98BZT6001 to (Evaluate:18Apr2016)  Requested for: 92LSN6582; Last    Rx:21Oct2015 Ordered   23  TRUEtest Control Level 1 In Vitro Liquid; USE AS DIRECTED; Therapy: 25FDP3867 to (Last Rx:56Klx0278) Ordered   24   TrueTrack Test In Vitro Strip; USE AS DIRECTED once daily; Therapy: 95UUG4864 to (Paola Rush)  Requested for: 90Uxe5126; Last    Rx:16Ltj6167 Ordered  Medication List Reviewed: The medication list was reviewed and updated today  Allergies    1  No Known Drug Allergies    Vitals  Vital Signs [Data Includes: Current Encounter]    Recorded: 74POI8624 01:08PM Recorded: 71Vsc2954 01:07PM   Temperature 96 6 F    Heart Rate 79    Respiration  20   Systolic 863    Diastolic 89    Height  5 ft 8 in   Weight  193 lb 4 00 oz   BMI Calculated  29 38   BSA Calculated  2 02     Physical Exam    Constitutional   General appearance: No acute distress, well appearing and well nourished  Eyes   Conjunctiva and lids: No swelling, erythema, or discharge  Pupils and irises: Equal, round and reactive to light  Ears, Nose, Mouth, and Throat   External inspection of ears and nose: Normal     Nasal mucosa, septum, and turbinates: Normal without edema or erythema  Oropharynx: Normal with no erythema, edema, exudate or lesions  Pulmonary   Respiratory effort: No increased work of breathing or signs of respiratory distress  Auscultation of lungs: Clear to auscultation, equal breath sounds bilaterally, no wheezes, no rales, no rhonci  Cardiovascular   Auscultation of heart: Normal rate and rhythm, normal S1 and S2, without murmurs  Examination of extremities for edema and/or varicosities: Normal     Abdomen   Abdomen: Abnormal   left groin nodule which he attributes to mesh from previous surgery  Liver and spleen: No hepatomegaly or splenomegaly  Lymphatic   Palpation of lymph nodes in neck: No lymphadenopathy  Musculoskeletal   Gait and station: Normal     Digits and nails: Normal without clubbing or cyanosis  Inspection/palpation of joints, bones, and muscles: Normal     Skin   Skin and subcutaneous tissue: Normal without rashes or lesions      Psychiatric   Orientation to person, place and time: Normal  Mood and affect: Normal          Future Appointments    Date/Time Provider Specialty Site   02/24/2016 12:15 PM Evan Olmstead DO [facility]     Signatures   Electronically signed by : Vito Barrios MD; Feb  3 2016  1:50PM EST                       (Author)
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
## 2018-01-12 NOTE — RESULT NOTES
Verified Results  (1) HEMOGLOBIN A1C 03Apr2017 02:16PM Giana Nephew Order Number: [id number]     Test Name Result Flag Reference   HEMOGLOBIN A1C 6 2 %  4 2-6 3   EST  AVG   GLUCOSE 131 mg/dl

## 2018-01-12 NOTE — MISCELLANEOUS
Message  received call from wife, patient has nausea and vomiting and his lipase and amylase are both elevated and on the rise  i notified Dr  Carolann Hammans, because his surgery was scheduled for 4/27 and he said to postphone the case  Active Problems    1  Acute leg pain, left (729 5) (M79 605)   2  Allergic rhinitis (477 9) (J30 9)   3  Anemia (285 9) (D64 9)   4  Depression with anxiety (300 4) (F41 8)   5  Diabetic Peripheral Neuropathy (357 2)   6  DMII (diabetes mellitus, type 2) (250 00) (E11 9)   7  ESRD (end stage renal disease) (585 6) (N18 6)   8  Gastroenteritis (558 9) (K52 9)   9  Gout (274 9) (M10 9)   10  Headache (784 0) (R51)   11  Heme positive stool (792 1) (R19 5)   12  Hyperlipidemia (272 4) (E78 5)   13  Hypertension (401 9) (I10)   14  Hypertriglyceridemia (272 1) (E78 1)   15  Insomnia (780 52) (G47 00)   16  Lumbar radiculopathy (724 4) (M54 16)   17  Myalgia And Myositis (729 1)   18  Need for influenza vaccination (V04 81) (Z23)   19  Need for pneumococcal vaccination (V03 82) (Z23)   20  Oral thrush (112 0) (B37 0)   21  Osteoarthritis (715 90) (M19 90)   22  Overweight (278 02) (E66 3)   23  Right wrist pain (719 43) (M25 531)   24  Tooth pain (525 9) (K08 8)   25  Type 2 diabetes mellitus with hyperglycemia (250 00) (E11 65)   26  Vitamin D deficiency (268 9) (E55 9)    Current Meds   1  Allopurinol 100 MG Oral Tablet; TAKE 1 TABLET DAILY AS DIRECTED; Therapy: 68Bpp4105 to (Evaluate:22Apr2016)  Requested for: 28Apr2015; Last   Rx:28Apr2015 Ordered   2  AmLODIPine Besylate 10 MG Oral Tablet; TAKE 1 TABLET DAILY; Therapy: 82TEK8293 to (Evaluate:24Jan2016)  Requested for: 43HLX6909; Last   Rx:12Pvh1139 Ordered   3  Atorvastatin Calcium 20 MG Oral Tablet; TAKE ONE TABLET BY MOUTH ONCE DAILY; Therapy: 31RWL0375 to (Butch Flores)  Requested for: 15XCM4454; Last   Rx:11Mar2016 Ordered   4  BD Pen Needle Mari U/F 32G X 4 MM Miscellaneous; use with insulin;    Therapy: 25HUM7642 to (Last Rx:08Sep2015)  Requested for: 65Wki6200 Ordered   5  Biotin 5000 MCG Oral Tablet; Take as directed Recorded   6  Claritin 10 MG Oral Capsule; TAKE 1 CAPSULE  PRN; Therapy: (Recorded:04Jun2015) to Recorded   7  Clobetasol Propionate 0 05 % External Cream; USE AS DIRECTED Recorded   8  Colchicine-Probenecid 0 5-500 MG Oral Tablet; TAKE 1 TABLET DAILY AS DIRECTED; Therapy: 00GJB3170 to (Evaluate:02Jun2016)  Requested for: 63YNJ2107; Last   Rx:08Jun2015 Ordered   9  Dialyvite Vitamin D 5000 CAPS; Take as directed Recorded   10  Doxazosin Mesylate 2 MG Oral Tablet; hs Recorded   11  Fioricet -40 MG Oral Capsule; TAKE 1 CAPSULE 3 times daily PRN headache; Therapy: 68HFT7294 to (Evaluate:74Knx0435)  Requested for: 09Apr2015; Last    Rx:09Apr2015 Ordered   12  Fluticasone Propionate 50 MCG/ACT Nasal Suspension; USE 2 SPRAYS IN EACH    NOSTRIL ONCE DAILY; Therapy: 12LXJ1854 to (Last Rx:42Njx8197)  Requested for: 98Zft4883 Ordered   13  FreeStyle Test In Vitro Strip; Therapy: 03WOI0222 to (Last Rx:04Lfm4295)  Requested for: 16Knx7160 Ordered   14  Gemfibrozil 600 MG Oral Tablet; TAKE 1 TABLET TWICE DAILY,  WITH MORNING AND    EVENING MEAL; Therapy: 45YSO6331 to (Evaluate:02Jun2016)  Requested for: 54XSN5114; Last    Rx:08Jun2015 Ordered   15  HydrALAZINE HCl - 100 MG Oral Tablet; TAKE 1 TABLET Every 8 hours; Therapy: 16NGV4860 to Uma Josue)  Requested for: 44YSI0592; Last    Rx:04Jun2015 Ordered   16  Hydrocodone-Acetaminophen 5-325 MG Oral Tablet; TAKE 1 TABLET Every 6 hours    PRN pain; Therapy: 88Gax7809 to (Evaluate:09Apr2016); Last Rx:43Ocx0158 Ordered   17  Lantus SoloStar 100 UNIT/ML Subcutaneous Solution Pen-injector; INJECT 20 UNIT    Daily; Therapy: 41TTI1405 to (Last Rx:09Nov2015)  Requested for: 70XFV6347 Ordered   18  Lisinopril 40 MG Oral Tablet; TAKE 1 TABLET DAILY AS DIRECTED; Therapy: (Recorded:41Rpl2113) to Recorded   19   Nizatidine 300 MG Oral Capsule; TAKE 1 CAPSULE DAILY; Therapy: 53ZXE1809 to (Last Rx:58Lov4086)  Requested for: 08Sep2015 Ordered   20  Oxycodone-Acetaminophen 5-325 MG Oral Tablet; TAKE 1 TABLET EVERY 4 TO 6    HOURS AS NEEDED FOR PAIN;    Therapy: 88Tag0145 to (Evaluate:13Glv7994); Last Rx:27Mks7423 Ordered   21  Sertraline HCl - 100 MG Oral Tablet; TAKE 1 TABLET DAILY; Therapy: 83SGF4041 to (Evaluate:78Qee1010)  Requested for: 08Apr2016; Last    Rx:18Ofv4369 Ordered   22  TriLyte 420 GM Oral Solution Reconstituted; TAKE AS DIRECTED; Therapy: 03ZGK2012 to (Evaluate:36Bzk8708)  Requested for: 23QZA5730; Last    Rx:23Xih3693 Ordered   23  TRUEtest Control Level 1 In Vitro Liquid; USE AS DIRECTED; Therapy: 18VGU3232 to (Last Rx:35Ddh5792) Ordered   24  TrueTrack Test In Vitro Strip; USE AS DIRECTED once daily; Therapy: 50PBS5896 to (Ethelene Francisco)  Requested for: 08Sep2015; Last    Rx:08Sep2015 Ordered   25  Zofran 4 MG Oral Tablet (Ondansetron HCl); one every 6 hours as needed for nausea    Recorded    Allergies    1  No Known Drug Allergies    Signatures   Electronically signed by :  Sandy Macdonald, ; Apr 25 2016  1:09PM EST                       (Author)

## 2018-01-13 VITALS
SYSTOLIC BLOOD PRESSURE: 120 MMHG | HEART RATE: 84 BPM | DIASTOLIC BLOOD PRESSURE: 78 MMHG | WEIGHT: 192 LBS | BODY MASS INDEX: 29.19 KG/M2

## 2018-01-13 VITALS
DIASTOLIC BLOOD PRESSURE: 70 MMHG | RESPIRATION RATE: 16 BRPM | HEIGHT: 68 IN | HEART RATE: 100 BPM | WEIGHT: 184 LBS | SYSTOLIC BLOOD PRESSURE: 150 MMHG | BODY MASS INDEX: 27.89 KG/M2

## 2018-01-13 VITALS
WEIGHT: 182.38 LBS | DIASTOLIC BLOOD PRESSURE: 68 MMHG | HEIGHT: 68 IN | BODY MASS INDEX: 27.64 KG/M2 | SYSTOLIC BLOOD PRESSURE: 126 MMHG

## 2018-01-13 VITALS
BODY MASS INDEX: 28.34 KG/M2 | SYSTOLIC BLOOD PRESSURE: 152 MMHG | DIASTOLIC BLOOD PRESSURE: 73 MMHG | HEART RATE: 99 BPM | HEIGHT: 68 IN | WEIGHT: 187 LBS

## 2018-01-13 VITALS
BODY MASS INDEX: 28.95 KG/M2 | HEIGHT: 68 IN | SYSTOLIC BLOOD PRESSURE: 150 MMHG | DIASTOLIC BLOOD PRESSURE: 74 MMHG | WEIGHT: 191 LBS

## 2018-01-13 NOTE — RESULT NOTES
Verified Results  (1) CBC/PLT/DIFF 03Apr2017 02:16PM Cierra Delvalle Order Number: [id number]     Test Name Result Flag Reference   WBC COUNT 9 07 Thousand/uL  4 31-10 16   RBC COUNT 3 05 Million/uL L 3 88-5 62   HEMOGLOBIN 9 7 g/dL L 12 0-17 0   HEMATOCRIT 30 2 % L 36 5-49 3   MCV 99 fL H 82-98   MCH 31 8 pg  26 8-34 3   MCHC 32 1 g/dL  31 4-37 4   RDW 14 4 %  11 6-15 1   MPV 9 4 fL  8 9-12 7   PLATELET COUNT 960 Thousands/uL H 149-390   NEUTROPHILS RELATIVE PERCENT 72 %  43-75   LYMPHOCYTES RELATIVE PERCENT 16 %  14-44   MONOCYTES RELATIVE PERCENT 9 %  4-12   EOSINOPHILS RELATIVE PERCENT 3 %  0-6   BASOPHILS RELATIVE PERCENT 0 %  0-1   NEUTROPHILS ABSOLUTE COUNT 6 53 Thousands/? ??L  1 85-7 62   LYMPHOCYTES ABSOLUTE COUNT 1 45 Thousands/? ??L  0 60-4 47   MONOCYTES ABSOLUTE COUNT 0 81 Thousand/? ??L  0 17-1 22   EOSINOPHILS ABSOLUTE COUNT 0 25 Thousand/? ??L  0 00-0 61   BASOPHILS ABSOLUTE COUNT 0 03 Thousands/? ??L  0 00-0 10   - Patient Instructions: This bloodwork is non-fasting  Please drink two glasses of water morning of bloodwork  - Patient Instructions: This bloodwork is non-fasting  Please drink two glasses of water morning of bloodwork  (1) COMPREHENSIVE METABOLIC PANEL 37WLT6894 90:38UC Cierra Delvalle Order Number: [id number]     Test Name Result Flag Reference   GLUCOSE,RANDM 88 mg/dL     If the patient is fasting, the ADA then defines impaired fasting glucose as > 100 mg/dL and diabetes as > or equal to 123 mg/dL     SODIUM 144 mmol/L  136-145   POTASSIUM 4 4 mmol/L  3 5-5 3   CHLORIDE 101 mmol/L  100-108   CARBON DIOXIDE 29 mmol/L  21-32   ANION GAP (CALC) 14 mmol/L H 4-13   BLOOD UREA NITROGEN 46 mg/dL H 5-25   CREATININE 7 80 mg/dL H 0 60-1 30   Standardized to IDMS reference method   CALCIUM 9 3 mg/dL  8 3-10 1   BILI, TOTAL 0 50 mg/dL  0 20-1 00   ALK PHOSPHATAS 93 U/L     ALT (SGPT) 15 U/L  12-78   AST(SGOT) 22 U/L  5-45   ALBUMIN 3 3 g/dL L 3 5-5 0   TOTAL PROTEIN 7 0 g/dL  6 4-8 2   eGFR Non-African American 7 0 ml/min/1 73sq [facility] Disease Education Program recommendations are as follows:  GFR calculation is accurate only with a steady state creatinine  Chronic Kidney disease less than 60 ml/min/1 73 sq  meters  Kidney failure less than 15 ml/min/1 73 sq  meters       (1) AMYLASE 33GFA8344 02:16PM [facility] Order Number: [id number]     Test Name Result Flag Reference   AMYLASE 192 IU/L H      (1) LIPASE 65LVC5534 02:16PM [facility] Order Number: [id number]     Test Name Result Flag Reference   LIPASE 257 u/L H

## 2018-01-13 NOTE — MISCELLANEOUS
Message  Patient with HGT1 bladder cancer  Received induction 6 week BCG therapy in October 2016  Patient has had multiple delays in treatment along standard SWOG maintenance protocol  His first course was delayed until March and he was only able to tolerate 2/3 treatments  He returned to the OR in June due to abnormal appearance on office cystoscopy  The pathology was negative  The patient has had recurrent infections and hematuria and has not been able to start his second maintenance course  He did return to the OR for evaluation  Because of his ESRD and dialysis, this is a potential persistent issue  At this point in time, the patient has responded to the BCG therapy but is not receiving it in a timely fashion due to other intevening issues   We have discussed usage of mitomycin-C as an alternative and the patient will be set up to receive this therapy for three weeks in the near future at the [facility] center at [address] [place] [address]   Electronically signed by : PINKY Murray ; Aug  4 2017  7:38AM EST                       (Author)

## 2018-01-13 NOTE — RESULT NOTES
Verified Results  (1) HEMOGLOBIN A1C 12Jun2017 08:49AM Tomasa NewRiver Order Number: [id number]     Test Name Result Flag Reference   HEMOGLOBIN A1C 6 1 %  4 2-6 3   EST  AVG  GLUCOSE 128 mg/dl       (1) LIPID PANEL, FASTING 12Jun2017 08:49AM [place] Order Number: [id number]     Test Name Result Flag Reference   CHOLESTEROL 165 mg/dL     HDL,DIRECT 59 mg/dL  40-60   Specimen collection should occur prior to Metamizole administration due to the potential for falsely depressed results  LDL CHOLESTEROL CALCULATED 90 mg/dL  0-100   Triglyceride:         Normal              <150 mg/dl       Borderline High    150-199 mg/dl       High               200-499 mg/dl       Very High          >499 mg/dl  Cholesterol:         Desirable        <200 mg/dl      Borderline High  200-239 mg/dl      High             >239 mg/dl  HDL Cholesterol:        High    >59 mg/dL      Low     <41 mg/dL  LDL CALCULATED:    This screening LDL is a calculated result  It does not have the accuracy of the Direct Measured LDL in the monitoring of patients with hyperlipidemia and/or statin therapy  Direct Measure LDL (XIB731) must be ordered separately in these patients  TRIGLYCERIDES 80 mg/dL  <=150   Specimen collection should occur prior to N-Acetylcysteine or Metamizole administration due to the potential for falsely depressed results

## 2018-01-13 NOTE — RESULT NOTES
Verified Results  (1) AMYLASE 80Vcb7748 09:23AM [facility] Number: [id number]     Test Name Result Flag Reference   AMYLASE 173 IU/L H      (1) LIPASE 14Ceb4334 09:23AM [facility] Order Number: [id number]     Test Name Result Flag Reference   LIPASE 717 u/L H 73393

## 2018-01-14 VITALS
HEIGHT: 68 IN | HEART RATE: 91 BPM | SYSTOLIC BLOOD PRESSURE: 131 MMHG | BODY MASS INDEX: 27.43 KG/M2 | DIASTOLIC BLOOD PRESSURE: 72 MMHG | WEIGHT: 181 LBS

## 2018-01-14 VITALS
BODY MASS INDEX: 26.58 KG/M2 | SYSTOLIC BLOOD PRESSURE: 120 MMHG | DIASTOLIC BLOOD PRESSURE: 70 MMHG | HEART RATE: 100 BPM | WEIGHT: 180 LBS

## 2018-01-14 VITALS
SYSTOLIC BLOOD PRESSURE: 136 MMHG | DIASTOLIC BLOOD PRESSURE: 77 MMHG | BODY MASS INDEX: 31.41 KG/M2 | HEIGHT: 68 IN | WEIGHT: 207.25 LBS

## 2018-01-14 VITALS
HEART RATE: 80 BPM | WEIGHT: 179 LBS | HEIGHT: 68 IN | BODY MASS INDEX: 27.13 KG/M2 | RESPIRATION RATE: 16 BRPM | SYSTOLIC BLOOD PRESSURE: 160 MMHG | DIASTOLIC BLOOD PRESSURE: 70 MMHG

## 2018-01-14 VITALS
SYSTOLIC BLOOD PRESSURE: 169 MMHG | WEIGHT: 180 LBS | HEIGHT: 68 IN | HEART RATE: 74 BPM | DIASTOLIC BLOOD PRESSURE: 82 MMHG | BODY MASS INDEX: 27.28 KG/M2

## 2018-01-14 VITALS
DIASTOLIC BLOOD PRESSURE: 84 MMHG | HEIGHT: 68 IN | BODY MASS INDEX: 26.83 KG/M2 | HEART RATE: 89 BPM | WEIGHT: 177 LBS | SYSTOLIC BLOOD PRESSURE: 131 MMHG

## 2018-01-14 NOTE — RESULT NOTES
Verified Results  (1) LIPID PANEL, FASTING 34JHO8105 06:48AM [facility] Number: [id number]      Triglyceride:         Normal              <150 mg/dl       Borderline High    150-199 mg/dl       High               200-499 mg/dl       Very High          >499 mg/dl  Cholesterol:         Desirable        <200 mg/dl      Borderline High  200-239 mg/dl      High             >239 mg/dl  HDL Cholesterol:        High    >59 mg/dL      Low     <41 mg/dL  LDL CALCULATED:    This screening LDL is a calculated result  It does not have the accuracy of the Direct Measured LDL in the monitoring of patients with hyperlipidemia and/or statin therapy  Direct Measure LDL (HOP121) must be ordered separately in these patients  Test Name Result Flag Reference   CHOLESTEROL 167 mg/dL     HDL,DIRECT 50 mg/dL  40-60   TRIGLYCERIDES 183 mg/dL H <=150     (1) MICROALBUMIN CREATININE RATIO, RANDOM URINE 51ZDA7527 06:48AM García Johnson Order Number: [id number]     Test Name Result Flag Reference   MICROALBUMIN/ CREAT R 3655 mg/g creatinine H 0-30   MICROALBUMIN,URINE 5190 0 mg/L H 0 0-20 0   CREATININE URINE 142 0 mg/dL       (1) HEMOGLOBIN A1C 04OWB7853 06:48AM Madeleine Quiñonez    Order Number: [id number]      5 7-6 4% impaired fasting glucose  >=6 5% diagnosis of diabetes    Falsely low levels are seen in conditions linked to short RBC life span-  hemolytic anemia, and splenomegaly  Falsely elevated levels are seen in situations where there is an increased production of RBC- receipt of erythropoietin or blood transfusions  Adopted from ADA-Clinical Practice Recommendations     Test Name Result Flag Reference   HEMOGLOBIN A1C 5 1 %  4 0-5 6   EST  AVG   GLUCOSE 100 mg/dl

## 2018-01-15 VITALS
RESPIRATION RATE: 20 BRPM | BODY MASS INDEX: 28.19 KG/M2 | HEART RATE: 120 BPM | SYSTOLIC BLOOD PRESSURE: 140 MMHG | WEIGHT: 186 LBS | HEIGHT: 68 IN | DIASTOLIC BLOOD PRESSURE: 80 MMHG

## 2018-01-15 NOTE — PROGRESS NOTES
Preliminary Nursing Report                Patient Information    Initial Encounter Entry Date:   2016 10:33 AM EST (Automated Transmission Automated Transmission)       Last Modified:   {Lilia Boothe}              Legal Name: Tasha Posey        Social Security Number:        YOB: 1950        Age (years): 72        Gender: M        Body Mass Index (BMI): 28 kg/m2        Height: 68 in  Weight: 181 lbs (82 kgs)           Address:   [address] [place] [id number] [place]              Phone: [phone]   (consent to leave messages)        Email:        Ethnicity: Decline to State        Muslim:        Marital Status:        Preferred Language: English        Race: Other Race                    Patient Insurance Information        Primary Insurance Information Carrier Name: {Primary  CarrierName}           Carrier Address:   {Primary  CarrierAddress}              Carrier Phone: {Primary  CarrierPhone}          Group Number: {Primary  GroupNumber}          Policy Number: {Primary  PolicyNumber}          Insured Name: {Primary  InsuredName}          Insured : {Primary  InsuredDOB}          Relationship to Insured: {Primary  RelationshiptoInsured}           Secondary Insurance Information Carrier Name: {Secondary  CarrierName}           Carrier Address:   {Secondary  CarrierAddress}              Carrier Phone: {Secondary  CarrierPhone}          Group Number: {Secondary  GroupNumber}          Policy Number: {Secondary  PolicyNumber}          Insured Name: {Secondary  InsuredName}          Insured : {Secondary  InsuredDOB}          Relationship to Insured: {Secondary  RelationshiptoInsured}                       Health Profile   Booking #:   Bart Standard #: [id number]               DOS: 2016    Surgery : CYSTOURETHROSCOPY, WITH URETEROSCOPY AND/OR PYELOSCOPY;    Add'l Procedures/Notes:     Surgery Risk: Minor          Precautions     DMII (diabetes mellitus, type 2) Type 2 diabetes mellitus with hyperglycemia                   Allergies    Anticoagulant Compound SOLN       Clinical Comments: Reaction Type: , Reaction: , Severity:        NSAIDs             Medications    Allopurinol 100 MG Oral Tablet       AmLODIPine Besylate 10 MG Oral Tablet       Atorvastatin Calcium 20 MG Oral Tablet       Claritin 10 MG Oral Capsule       Clobetasol Propionate 0 05 % External Cream       Colchicine-Probenecid 0 5-500 MG Oral Tablet       Dialyvite Vitamin D 5000 CAPS       Famotidine 20 MG Oral Tablet       Fioricet -40 MG Oral Capsule       Gemfibrozil 600 MG Oral Tablet       HydrALAZINE HCl - 50 MG Oral Tablet       Hydrocodone-Acetaminophen 5-325 MG Oral Tablet       Lantus SoloStar 100 UNIT/ML Subcutaneous Solution Pen-injector       Lisinopril 40 MG Oral Tablet       Lopressor 50 MG Oral Tablet       Metoprolol Tartrate 50 MG Oral Tablet       Oxycodone-Acetaminophen 5-325 MG Oral Tablet       Zofran 4 MG Oral Tablet               Conditions    Acute leg pain, left       Acute pancreatitis       Allergic rhinitis       Anemia       Chronic nausea       Depression with anxiety       Diabetic Peripheral Neuropathy       DMII (diabetes mellitus, type 2)       ESRD (end stage renal disease)       Gastroenteritis       Gout       Headache       Heme positive stool       Hyperlipidemia       Hypertension       Hypertriglyceridemia       Insomnia       Kidney stone       Lumbar radiculopathy       Myalgia And Myositis       Need for influenza vaccination       Need for pneumococcal vaccination       Oral thrush       Osteoarthritis       Overweight       Right wrist pain       Screening for neurological condition       Tooth pain       Type 2 diabetes mellitus with hyperglycemia       Vitamin D deficiency               Family History    None             Surgical History    None             Social History    Being A Social Drinker       Former smoker Patient Instructions       ? NPO Instructions   The day before surgery it is recommended to have a light dinner at your usual time and you are allowed a light snack early in the evening  Do not eat anything heavy or eat a big meal after 7pm  Do not eat or drink anything after midnight prior to your surgery  If you are supposed to take any of your medications, do so with a sip of water  Failure to follow these instructions can lead to an increased risk of lung complications and may result in a delay or cancellation of your procedure  If you have any questions, contact your institution for further instructions  No candy, no gum, no mints, no chewing tobacco   Triggered by: Medical Procedure Risk         ? ACE/ARB (Blood Pressure Medication) 1  Please continue the following medications up to the evening before surgery, but do not take it on the day of surgery  Please restart your medications as soon as clinically feasible  Triggered by: Lisinopril 40 MG Oral Tablet         ? Antacids 34, 35  Please continue to take this medication on your normal schedule  If this is an oral medication and you take in the morning, you may do so with a sip of water  Triggered by: Famotidine 20 MG Oral Tablet         ? Beta Blocker 3, 2, c, 4, 6, 5  Please continue to take this medication on your normal schedule  If this is an oral medication and you take in the morning, you may do so with a sip of water  Triggered by: Lopressor 50 MG Oral Tablet         ? Calcium Blocker (Blood Pressure Medication) 3, 4, 6, 5, 2  Please continue to take this medication on your normal schedule  If this is an oral medication and you take in the morning, you may do so with a sip of water  Triggered by: AmLODIPine Besylate 10 MG Oral Tablet         ? Opioids (Pain Medication) 62  Please continue the following medications, if needed, up to and including the day of surgery (with a sip of water)    Triggered by: Hydrocodone-Acetaminophen 5-325 MG Oral Tablet, Oxycodone-Acetaminophen 5-325 MG Oral Tablet               Testing Considerations       ? Blood Glucose on Day of Surgery t  Please check the blood sugar on the morning of surgery  Triggered by: Type 2 diabetes mellitus with hyperglycemia, DMII (diabetes mellitus, type 2)         ? Serum Potassium (K) on the morning of surgery t  Triggered by: ESRD (end stage renal disease)               Consultations       No recommendations for this classification  Miscellaneous Questions         Question: Are you able to walk up a flight of stairs, walk up a hill or do heavy housework WITHOUT having chest pain or shortness of breath? Answer: YES                   Allergies/Conditions/Medications Not Found        The following were not recognized by our system when generating the recommendations  Please consider if this would impact any preoperative protocols  ? Being A Social Drinker       ? Diabetic Peripheral Neuropathy       ? Myalgia And Myositis       ? Screening for neurological condition       ? Allopurinol 100 MG Oral Tablet       ? Claritin 10 MG Oral Capsule       ? Dialyvite Vitamin D 5000 CAPS       ? Fioricet -40 MG Oral Capsule       ? Lantus SoloStar 100 UNIT/ML Subcutaneous Solution Pen-injector       ? Metoprolol Tartrate 50 MG Oral Tablet                  Appointment Information         Date:    06/17/2016        Location:    [place]        Address:           Directions:                      Footnotes revision 14      ?? Denotes a free-text entry  Legal Disclaimer: Any and all recommendations and services provided herein are designed to assist in the preoperative care of the patient  Nothing contained herein is designed to replace, eliminate or alleviate the responsibility of the attending physician to supervise and determine the patient?s preoperative care and course of treatment   Failure to provide complete, accurate information may negatively impact the system?s ability to recommend the proper preoperative protocol  THE ATTENDING PHYSICIAN IS RESPONSIBLE TO REVIEW THE SUGGESTED PREOPERATIVE PROTOCOLS/COURSE OF TREATMENT AND PRESCRIBE THE FINAL COURSE OF PREOPERATIVE TREATMENT IN CONSULTATION WITH THE PATIENT  THE ePREOP SYSTEM AND ITS MATERIALS ARE PROVIDED ? AS IS? WITHOUT WARRANTY OF ANY KIND, EXPRESS OR IMPLIED, INCLUDING, BUT NOT LIMITED TO, WARRANTIES OF PERFORMANCE OR MERCHANTABILITY OR FITNESS FOR A PARTICULAR PURPOSE  PATIENT AND PHYSICIANS HEREBY AGREE THAT THEIR USE OF THE MATERIALS AND RESOURCES ACT AS A CONSENT TO RELEASE AND WAIVE ePREOP FROM ANY AND ALL CLAIMS OF WARRANTY, TORT OR CONTRACT LAW OF ANY KIND  Electronically signed Edel CALVILLO    Jun 14 2016 10:56PM EST

## 2018-01-16 NOTE — RESULT NOTES
ABSOLUTE COUNT 0 65 Thousands/µL  0 60-4 47   MONOCYTES ABSOLUTE COUNT 0 88 Thousand/µL  0 17-1 22   EOSINOPHILS ABSOLUTE COUNT 0 27 Thousand/µL  0 00-0 61   BASOPHILS ABSOLUTE COUNT 0 03 Thousands/µL  0 00-0 10     (1) LIPASE 19Apr2016 09:30AM Darrick Jc Order Number: [id number]     Order Number: [id number] Order Number: [id number] Order Number: [id number]     Test Name Result Flag Reference   LIPASE 731 u/L H      (1) AMYLASE 19Apr2016 09:30AM [facility] Order Number: [id number]     Order Number: [id number] Order Number: [id number] Order Number: [id number]     Test Name Result Flag Reference   AMYLASE 177 IU/L H

## 2018-01-16 NOTE — RESULT NOTES
Message   no gallstones seen on ultrasound  Mildly dilated bile duct  Follow-up with Dr Russell Tay  Verified Results  NM GASTRIC EMPTYING [id number] 07:15AM Tavia Pierre     Test Name Result Flag Reference   NM GASTRIC EMPTYING (Report)     GASTRIC EMPTYING STUDY     INDICATION: Acute pancreatitis, weight loss, nausea, vomiting, diabetes      COMPARISON: None available     TECHNIQUE:  The study was performed following the oral administration of 1 mCi Tc-99m sulfur colloid combined with scrambled eggs, as part of a standard meal  Following the meal, one minute anterior and posterior images were obtained immediately and at   0 25 hours, 0 5 hour, 1 hour, intervals from the time of ingestion  Geometric mean analyses were then performed  As of March 1, 2016, this gastric emptying protocol has been modified and updated  The gastric emptying times and the normal values    reported below reflect the change in protocol  FINDINGS:     Gastric emptying at 0 5 hours = 76 (N < 30%)    Gastric emptying at 1 hour = 95 % (N = 10 - 70%)     Linear T-1/2 = 28 minutes         IMPRESSION:     Abnormally increased rate of gastric emptying  Clinical correlation recommended  Workstation performed: [id number]     Signed by:   Casper Ernandez MD   5/31/16     US ABDOMEN LIMITED [id number] 08:07AM Diamond Amaya Order Number: [id number]   Performing Comments: assess foe cholelithiasis   - Patient Instructions: To schedule this appointment, please contact Central Scheduling at [phone]  Test Name Result Flag Reference   US ABDOMEN LIMITED (Report)     RIGHT UPPER QUADRANT ULTRASOUND     INDICATION: Acute pancreatitis  Assess for cholelithiasis  COMPARISON: CAT scan of the abdomen and pelvis April 26, 2016   Ultrasound of the retroperitoneum August 21, 2015     TECHNIQUE:  Real-time ultrasound of the right upper quadrant was performed with a curvilinear transducer with both volumetric sweeps and still imaging techniques  FINDINGS:     PANCREAS: Visualized portions of the pancreas are within normal limits  AORTA AND IVC: Visualized portions are normal for patient age  LIVER:   Size: Mildly enlarged  The liver measures 16 5 cm in the midclavicular line  Contour: Surface contour is smooth  Parenchyma: Echogenicity and echotexture are within normal limits  No evidence of suspicious mass  The main portal vein is patent and hepatopetal       BILIARY:   The gallbladder is normal in caliber  No wall thickening or pericholecystic fluid  No stones or sludge identified  No sonographic Barrios's sign  No intrahepatic biliary dilatation  CBD measures 7 mm  No choledocholithiasis  KIDNEY:    Right kidney measures 13 1 x 5 2 cm  Simple cyst lower pole (20 x 19 x 17 mm)  ASCITES:  None  IMPRESSION:     Mild hepatomegaly  Mildly dilated common bile duct (7 mm)  Negative for cholelithiasis  Workstation performed: [id number]     Signed by:   Lázaro Martinez MD   5/17/16     (1) HEPATIC FUNCTION PANEL [id number] 01:49PM Jael MAHMOOD Order Number: [id number]   Order Number: [id number]     Test Name Result Flag Reference   ALBUMIN 3 3 g/dL L 3 5-5 0   ALK PHOSPHATAS 72 U/L     ALT (SGPT) 17 U/L  12-78   AST(SGOT) 15 U/L  5-45   BILI, DIRECT 0 08 mg/dL  0 00-0 20   BILI, TOTAL 0 20 mg/dL  0 20-1 00   TOTAL PROTEIN 6 5 g/dL  6 4-8 2

## 2018-01-16 NOTE — MISCELLANEOUS
Assessment    1  Kidney stone (592 0) (N20 0)   2  ESRD (end stage renal disease) (585 6) (N18 6)    Plan  Depression with anxiety    · PARoxetine HCl - 10 MG Oral Tablet; take 0 5 tablet daily   Rx By: Juliane Fry; Dispense: 30 Days ; #:15 Tablet; Refill: 3; For: Depression with anxiety; AAMIR = N; Sent To: [facility]Y #3089 Depression with anxiety, DMII (diabetes mellitus, type 2), ESRD (end stage renal  disease)    · Follow-up visit in 2 months Evaluation and Treatment  Follow-up  Status: Hold For -  Scheduling  Requested for: 18VRN2706   Ordered; For: Depression with anxiety, DMII (diabetes mellitus, type 2), ESRD (end stage renal disease); Ordered By: Juliane Fry Performed:  Due: 02OTO3919  Health Maintenance    · Metoprolol Tartrate 50 MG Oral Tablet (Lopressor); TAKE 1 5 TABLET Every  twelve hours   Rx By: Juliane Fry; Dispense: 15 Days ; #:45 Tablet; Refill: 0; For: Health Maintenance; AAMIR = N; Record    Discussion/Summary  Discussion Summary:   Medically cleared for urologic surgery on 6/17/2016  Rx for Paxil 5 mg daily  Follow-up in 2 months or when necessary  Medication SE Review and Pt Understands Tx: Possible side effects of new medications were reviewed with the patient/guardian today  The treatment plan was reviewed with the patient/guardian  The patient/guardian understands and agrees with the treatment plan      Chief Complaint  Chief Complaint Free Text Note Form: CHELLE      History of Present Illness  TCM Communication [facility]: The patient is being contacted for follow-up after hospitalization and Patients wife called to schedule CHELLE appt for 6/13/16 at 12 noon  Patient has a pending appt 7/6/ at 12:15pm for 3 mos f/u  Hospital records were reviewed and FI Retrograde Pyelogram and CT Renal Stone Study of Abd/Plvs w/o contrast  He was hospitalized at [facility] and transferred to [facility]  The date of admission: 05/31/2016, date of discharge: 06/05/2016   Diagnosis: Right flank pain D/C dx - Gross hematuria; Chronic kidney disease, stage IV; Anemia; HTN; Hypertension; Hypokalemia; Anxiety  He was discharged to home  Medications were not reviewed today  He scheduled a follow up appointment  The patient is currently asymptomatic  Counseling was provided to patient's family  Wife Tor Armendariz  Communication performed and completed by Belen Palomino   HPI: CHELLE  Admitted for R kidney stone  Has stent in now  Going to get it removed on 6/17/2016  Wife says he is more depressed  Had to stop the sertraline because of stomach upset  Kidney Disease, Chronic (Brief): The patient is being seen for follow-up of a hospitalization for chronic kidney disease  This is classified as stage 4  The patient is currently asymptomatic  No associated symptoms are reported  Current treatment includes ACE inhibitor  By report, there is fair symptom control  Nephrolithiasis, Acute (Brief): The patient is being seen for follow-up of a hospitalization for acute nephrolithiasis  The patient is currently experiencing symptoms  Associated symptoms:  painful urination, but no nausea and no vomiting  Current treatment includes stent  By report, there is good compliance with treatment, fair tolerance of treatment and good symptom control  Review of Systems  Complete-Male:   Constitutional: No fever or chills, feels well, no tiredness, no recent weight gain or weight loss  Cardiovascular: No complaints of slow heart rate, no fast heart rate, no chest pain, no palpitations, no leg claudication, no lower extremity  Respiratory: No complaints of shortness of breath, no wheezing, no cough, no SOB on exertion, no orthopnea or PND  Gastrointestinal: No complaints of abdominal pain, no constipation, no nausea or vomiting, no diarrhea or bloody stools  Genitourinary: as noted in HPI  Psychiatric: depression  Active Problems    1  Acute leg pain, left (729 5) (M79 605)   2   Acute pancreatitis (577 0) (K85 9)   3  Allergic rhinitis (477 9) (J30 9)   4  Anemia (285 9) (D64 9)   5  Chronic nausea (787 02) (R11 0)   6  Depression with anxiety (300 4) (F41 8)   7  Diabetic Peripheral Neuropathy (357 2)   8  DMII (diabetes mellitus, type 2) (250 00) (E11 9)   9  ESRD (end stage renal disease) (585 6) (N18 6)   10  Gastroenteritis (558 9) (K52 9)   11  Gout (274 9) (M10 9)   12  Headache (784 0) (R51)   13  Heme positive stool (792 1) (R19 5)   14  Hyperlipidemia (272 4) (E78 5)   15  Hypertension (401 9) (I10)   16  Hypertriglyceridemia (272 1) (E78 1)   17  Insomnia (780 52) (G47 00)   18  Lumbar radiculopathy (724 4) (M54 16)   19  Myalgia And Myositis (729 1)   20  Need for influenza vaccination (V04 81) (Z23)   21  Need for pneumococcal vaccination (V03 82) (Z23)   22  Oral thrush (112 0) (B37 0)   23  Osteoarthritis (715 90) (M19 90)   24  Overweight (278 02) (E66 3)   25  Right wrist pain (719 43) (M25 531)   26  Screening for neurological condition (V80 09) (Z13 89)   27  Tooth pain (525 9) (K08 8)   28  Type 2 diabetes mellitus with hyperglycemia (250 00) (E11 65)   29  Vitamin D deficiency (268 9) (E55 9)    Past Medical History    1  History of Abnormal Liver Function Test (790 6)   2  History of Acute hepatitis A (070 1) (B15 9)   3  Acute upper respiratory infection (465 9) (J06 9)   4  Acute upper respiratory infection (465 9) (J06 9)   5  History of Diabetes Mellitus (250 00)   6  History of Fracture Of Carpal Bone(S) (814 00)   7  History of Gout (274 9) (M10 9)   8  History of acute bronchitis (V12 69) (Z87 09)   9  History of hyperlipidemia (V12 29) (Z86 39)   10  History of hypertension (V12 59) (Z86 79)   11  History of lipoma (V13 89) (Z86 018)   12  History of nicotine dependence (V15 82) (L77 463)    Surgical History    1  History of Cataract Surgery   2  History of Hernia Repair  Surgical History Reviewed: The surgical history was reviewed and updated today  Family History  Mother    1  Family history of Diabetes Mellitus (250 00)  Father    2  Family history of cardiac disorder (V17 49) (Z82 49)  Sister    3  Family history of thyroid disease (V18 19) (Z83 49)  Family History Reviewed: The family history was reviewed and updated today  Social History    · Being A Social Drinker   · Former smoker (G39 81) (X07 989)  Social History Reviewed: The social history was reviewed and updated today  The social history was reviewed and is unchanged  Current Meds   1  Allopurinol 100 MG Oral Tablet; TAKE 1 TABLET DAILY AS DIRECTED; Therapy: 91Rpt8230 to (Last S46TBV3961)  Requested for: 33DWQ4189 Ordered   2  AmLODIPine Besylate 10 MG Oral Tablet; TAKE 1 TABLET DAILY; Therapy: 68IRL1718 to (Evaluate:2016)  Requested for: 79SSQ6494; Last   Rx:82Jyl4587 Ordered   3  Atorvastatin Calcium 20 MG Oral Tablet; TAKE ONE TABLET BY MOUTH ONCE DAILY; Therapy: 91DUQ2789 to (Arleene Asa)  Requested for: 92ISJ1360; Last   Rx:2016 Ordered   4  BD Pen Needle Mari U/F 32G X 4 MM Miscellaneous; use with insulin; Therapy: 54BII8239 to (Last Rx:89Aqn3920)  Requested for: 20Gvg4803 Ordered   5  Claritin 10 MG Oral Capsule; TAKE 1 CAPSULE  PRN; Therapy: (Recorded:2015) to Recorded   6  Clobetasol Propionate 0 05 % External Cream; USE AS DIRECTED Recorded   7  Colchicine-Probenecid 0 5-500 MG Oral Tablet; TAKE 1 TABLET DAILY AS DIRECTED; Therapy: 68MPM3608 to (Evaluate:2016)  Requested for: 11IES1591; Last   Rx:2015 Ordered   8  Dialyvite Vitamin D 5000 CAPS; Take as directed Recorded   9  Famotidine 20 MG Oral Tablet; TAKE 1 TABLET EVERY 12 HOURS DAILY; Therapy: 01SYB5914 to (Rebbeca Viral)  Requested for: 77GTG8899; Last   Rx:2016 Ordered   10  Fioricet -40 MG Oral Capsule; TAKE 1 CAPSULE 3 times daily PRN headache; Therapy: 17EZN5664 to (Evaluate:2015)  Requested for: 2015; Last    Rx:2015 Ordered   11   FreeStyle Test In Vitro Strip; Therapy: 20HYF1304 to (Last Rx:92Jcr8470)  Requested for: 84Uhh7529 Ordered   12  Gemfibrozil 600 MG Oral Tablet; TAKE 1 TABLET TWICE DAILY,  WITH MORNING AND    EVENING MEAL; Therapy: 85UFK9669 to (Evaluate:02Jun2016)  Requested for: 82WYR7417; Last    Rx:08Jun2015 Ordered   13  HydrALAZINE HCl - 50 MG Oral Tablet; TAKE 3 TABLET 3 times daily; Therapy: 71LVZ5805 to (Evaluate:07Nov2016); Last Rx:51Lvy9495 Ordered   14  Hydrocodone-Acetaminophen 5-325 MG Oral Tablet; TAKE 1 TABLET Every 6 hours PRN    pain; Therapy: 79Ddb7982 to (Evaluate:25Jun2016); Last Rx:11May2016 Ordered   15  Lantus SoloStar 100 UNIT/ML Subcutaneous Solution Pen-injector; INJECT 15 UNIT    Daily; Therapy: 17NWT4042 to (Last Rx:11May2016)  Requested for: 51BAO3090 Ordered   16  Lisinopril 40 MG Oral Tablet; TAKE 1 TABLET DAILY AS DIRECTED; Therapy: (Recorded:44Geg6380) to Recorded   17  Lopressor 50 MG Oral Tablet; Take 1 tablet every 12 hours Recorded   18  Metoprolol Tartrate 50 MG Oral Tablet; take 1 tablet by mouth twice a day; Therapy: 34NIH3784 to (Last Rx:11May2016) Ordered   19  Oxycodone-Acetaminophen 5-325 MG Oral Tablet; TAKE 1 TABLET EVERY 4 TO 6    HOURS AS NEEDED FOR PAIN;    Therapy: 78Awp3361 to (Evaluate:18Apr2016); Last Rx:98Ehk6902 Ordered   20  TRUEtest Control Level 1 In Vitro Liquid; USE AS DIRECTED; Therapy: 59QUW4305 to (Last Rx:75Wwh2254) Ordered   21  TrueTrack Test In Vitro Strip; USE AS DIRECTED once daily; Therapy: 01AZY7599 to ([facility])  Requested for: 08Sep2015; Last    Rx:08Sep2015 Ordered   22  Zofran 4 MG Oral Tablet; one every 6 hours as needed for nausea Recorded  Medication List Reviewed: The medication list was reviewed and updated today  Allergies    1   Anticoagulant Compound SOLN   2  NSAIDs    Vitals  Signs [Data Includes: Current Encounter]   Recorded: 63WZJ2658 12:06PM   Blood Pressure: 76 mm Hg, LUE, Sitting  Height: 5 ft 8 in  Weight: 184 lb   BMI Calculated: 27 98 kg/m2  BSA Calculated: 1 98 m2  Blood Pressure: 158 mm Hg, LUE, Sitting    Physical Exam    Constitutional   General appearance: No acute distress, well appearing and well nourished  Pulmonary   Respiratory effort: No increased work of breathing or signs of respiratory distress  Auscultation of lungs: Clear to auscultation, equal breath sounds bilaterally, no wheezes, no rales, no rhonci  Cardiovascular   Auscultation of heart: Normal rate and rhythm, normal S1 and S2, without murmurs  Examination of extremities for edema and/or varicosities: Normal     Psychiatric   Orientation to person, place and time: Normal     Mood and affect: Normal          Future Appointments    Date/Time Provider Specialty Site   07/13/2016 01:00 PM Ranjeet Chauhan MD [facility]ascular [facility]   08/17/2016 10:45 AM Ranjeet Chauhan MD Vascular Surgery [facility]   07/06/2016 12:15 PM Nader Munguia DO [facility] [address] [place] [address]   07/20/2016 01:40 PM Ismael Hinton MD Gastroenterology Adult ST [address] [place] [address]   06/17/2016 03:00 PM PINKY Dorman   Urology [address]    Electronically signed by : Nacny Valdez, ; Jun 7 2016  3:46PM EST                       (Author)    Electronically signed by : Mara Severance, DO; Giovanni 15 2016 12:32PM EST                       (Author)

## 2018-01-17 NOTE — RESULT NOTES
Verified Results  (1) LIPID PANEL, FASTING [id number] 06:48AM Darrick Jc Order Number: [id number]      Triglyceride:         Normal              <150 mg/dl       Borderline High    150-199 mg/dl       High               200-499 mg/dl       Very High          >499 mg/dl  Cholesterol:         Desirable        <200 mg/dl      Borderline High  200-239 mg/dl      High             >239 mg/dl  HDL Cholesterol:        High    >59 mg/dL      Low     <41 mg/dL  LDL CALCULATED:    This screening LDL is a calculated result  It does not have the accuracy of the Direct Measured LDL in the monitoring of patients with hyperlipidemia and/or statin therapy  Direct Measure LDL (IGP727) must be ordered separately in these patients       Test Name Result Flag Reference   CHOLESTEROL 167 mg/dL     HDL,DIRECT 50 mg/dL  40-60   LDL CHOLESTEROL CALCULATED 80 mg/dL  0-100   TRIGLYCERIDES 183 mg/dL H <=150
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
## 2018-01-17 NOTE — RESULT NOTES
Verified Results  (1) AMYLASE 53Kjp3258 10:02AM [facility] Order Number: [id number]    TW Order Number: [id number] Order Number: [id number] Order Number: [id number]     Test Name Result Flag Reference   AMYLASE 314 IU/L H      (1) LIPASE 56Rtt0812 10:02AM [facility] Order Number: [id number]    TW Order Number: [id number] Order Number: [id number] Order Number: [id number]     Test Name Result Flag Reference   LIPASE 2071 u/L H      (1) COMPREHENSIVE METABOLIC PANEL 31FCT5323 25:76SY [facility] Order Number: [id number]    TW Order Number: [id number] Order Number: [id number] Order Number: [id number]  National Kidney Disease Education Program recommendations are as follows:  GFR calculation is accurate only with a steady state creatinine  Chronic Kidney disease less than 60 ml/min/1 73 sq  meters  Kidney failure less than 15 ml/min/1 73 sq  meters  Test Name Result Flag Reference   GLUCOSE,RANDM 55 mg/dL L    If the patient is fasting, the ADA then defines impaired fasting glucose as > 100 mg/dL and diabetes as > or equal to 123 mg/dL     SODIUM 141 mmol/L  136-145   POTASSIUM 4 0 mmol/L  3 5-5 3   CHLORIDE 105 mmol/L  100-108   CARBON DIOXIDE 21 mmol/L  21-32   ANION GAP (CALC) 15 mmol/L H 4-13   BLOOD UREA NITROGEN 51 mg/dL H 5-25   CREATININE 3 59 mg/dL H 0 60-1 30   Standardized to IDMS reference method   CALCIUM 9 0 mg/dL  8 3-10 1   BILI, TOTAL 0 27 mg/dL  0 20-1 00   ALK PHOSPHATAS 81 U/L     ALT (SGPT) 22 U/L  12-78   AST(SGOT) 19 U/L  5-45   ALBUMIN 3 5 g/dL  3 5-5 0   TOTAL PROTEIN 6 7 g/dL  6 4-8 2   eGFR Non-African American 17 2 ml/min/1 73sq m

## 2018-01-17 NOTE — RESULT NOTES
Verified Results  ECG 12-LEAD 13Jun2016 12:53PM Radha Dunn Order Number: [id number]     Test Name Result Flag Reference   ECG 12-LEAD (Report)     Normal sinus rhythm   Minimal voltage criteria for LVH, may be normal variant   T wave abnormality, consider inferior ischemia   Abnormal ECG   When compared with ECG of 19-APR-2016 11:23,   No significant change was found   Confirmed by DARIEL BERRIOS DO ([id number]) on 6/13/2016 5:28:43 PM

## 2018-01-18 ENCOUNTER — ALLSCRIPTS OFFICE VISIT (OUTPATIENT)
Dept: OTHER | Facility: OTHER | Age: 68
End: 2018-01-18

## 2018-01-18 ENCOUNTER — TRANSCRIBE ORDERS (OUTPATIENT)
Dept: LAB | Facility: HOSPITAL | Age: 68
End: 2018-01-18

## 2018-01-18 ENCOUNTER — APPOINTMENT (OUTPATIENT)
Dept: LAB | Facility: HOSPITAL | Age: 68
End: 2018-01-18
Attending: UROLOGY
Payer: COMMERCIAL

## 2018-01-18 DIAGNOSIS — N32.89 OTHER SPECIFIED DISORDERS OF BLADDER (CODE): ICD-10-CM

## 2018-01-18 PROCEDURE — 88112 CYTOPATH CELL ENHANCE TECH: CPT
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
## 2018-01-22 VITALS
BODY MASS INDEX: 29.4 KG/M2 | SYSTOLIC BLOOD PRESSURE: 140 MMHG | WEIGHT: 194 LBS | HEART RATE: 100 BPM | DIASTOLIC BLOOD PRESSURE: 90 MMHG | RESPIRATION RATE: 20 BRPM | HEIGHT: 68 IN

## 2018-01-22 VITALS
SYSTOLIC BLOOD PRESSURE: 134 MMHG | RESPIRATION RATE: 16 BRPM | HEART RATE: 100 BPM | DIASTOLIC BLOOD PRESSURE: 70 MMHG | BODY MASS INDEX: 27.13 KG/M2 | WEIGHT: 179 LBS | HEIGHT: 68 IN

## 2018-01-22 VITALS
WEIGHT: 193 LBS | SYSTOLIC BLOOD PRESSURE: 130 MMHG | HEART RATE: 80 BPM | BODY MASS INDEX: 29.35 KG/M2 | DIASTOLIC BLOOD PRESSURE: 80 MMHG

## 2018-01-23 ENCOUNTER — GENERIC CONVERSION - ENCOUNTER (OUTPATIENT)
Dept: OTHER | Facility: OTHER | Age: 68
End: 2018-01-23

## 2018-01-23 VITALS
BODY MASS INDEX: 29.4 KG/M2 | HEIGHT: 68 IN | HEART RATE: 118 BPM | SYSTOLIC BLOOD PRESSURE: 132 MMHG | WEIGHT: 194 LBS | DIASTOLIC BLOOD PRESSURE: 83 MMHG

## 2018-01-23 VITALS
BODY MASS INDEX: 29.44 KG/M2 | SYSTOLIC BLOOD PRESSURE: 124 MMHG | DIASTOLIC BLOOD PRESSURE: 84 MMHG | HEIGHT: 68 IN | WEIGHT: 194.25 LBS

## 2018-01-23 NOTE — RESULT NOTES
Verified Results  (1) HEMOGLOBIN A1C 50ZGM7095 11:04AM Norma Rodrigez Order Number: [id number]     Test Name Result Flag Reference   HEMOGLOBIN A1C 6 2 %  4 2-6 3   EST  AVG   GLUCOSE 131 mg/dl

## 2018-01-23 NOTE — PROGRESS NOTES
Chief Complaint  Pt presents to office for PVR  Pt developed urinary retention following ureteroscopy, holmium laser, removal right ureteral stone and stent exchange 06/17/2016  Also incidental TURBT at the same time  Active Problems    1  Acute leg pain, left (729 5) (M79 605)   2  Acute pancreatitis (577 0) (K85 9)   3  Acute urinary retention (788 29) (R33 8)   4  Allergic rhinitis (477 9) (J30 9)   5  Anemia (285 9) (D64 9)   6  Bladder mass (596 89) (N32 89)   7  Chronic nausea (787 02) (R11 0)   8  Depression with anxiety (300 4) (F41 8)   9  Diabetic Peripheral Neuropathy (357 2)   10  DMII (diabetes mellitus, type 2) (250 00) (E11 9)   11  ESRD (end stage renal disease) (585 6) (N18 6)   12  Gastroenteritis (558 9) (K52 9)   13  Gout (274 9) (M10 9)   14  Headache (784 0) (R51)   15  Heme positive stool (792 1) (R19 5)   16  Hyperlipidemia (272 4) (E78 5)   17  Hypertension (401 9) (I10)   18  Hypertriglyceridemia (272 1) (E78 1)   19  Insomnia (780 52) (G47 00)   20  Kidney stone (592 0) (N20 0)   21  Lumbar radiculopathy (724 4) (M54 16)   22  Myalgia And Myositis (729 1)   23  Need for influenza vaccination (V04 81) (Z23)   24  Need for pneumococcal vaccination (V03 82) (Z23)   25  Oral thrush (112 0) (B37 0)   26  Osteoarthritis (715 90) (M19 90)   27  Overweight (278 02) (E66 3)   28  Right wrist pain (719 43) (M25 531)   29  Screening for neurological condition (V80 09) (Z13 89)   30  Tooth pain (525 9) (K08 8)   31  Type 2 diabetes mellitus with hyperglycemia (250 00) (E11 65)   32  Vitamin D deficiency (268 9) (E55 9)    Current Meds   1  Allopurinol 100 MG Oral Tablet; TAKE 1 TABLET DAILY AS DIRECTED; Therapy: 37Pbb0945 to (Last EW:25LIZ8045)  Requested for: 46BTC7117 Ordered   2  AmLODIPine Besylate 10 MG Oral Tablet; TAKE 1 TABLET DAILY; Therapy: 86QRQ0444 to (Evaluate:24Jan2016)  Requested for: 51MZH3717; Last   Rx:68Zeq9969 Ordered   3   Atorvastatin Calcium 20 MG Oral Tablet; TAKE ONE TABLET BY MOUTH ONCE DAILY; Therapy: 71JMC3753 to (Rufus Prior)  Requested for: 12PKT2143; Last   Rx:11Mar2016 Ordered   4  BD Pen Needle Mari U/F 32G X 4 MM Miscellaneous; use with insulin; Therapy: 22UAU3649 to (Last Rx:08Sep2015)  Requested for: 08Sep2015 Ordered   5  Claritin 10 MG Oral Capsule; TAKE 1 CAPSULE  PRN; Therapy: (Recorded:04Jun2015) to Recorded   6  Clobetasol Propionate 0 05 % External Cream; USE AS DIRECTED Recorded   7  Colchicine-Probenecid 0 5-500 MG Oral Tablet; TAKE 1 TABLET DAILY AS DIRECTED; Therapy: 04ZBW7427 to (Evaluate:02Jun2016)  Requested for: 90VHC7478; Last   Rx:08Jun2015 Ordered   8  Dialyvite Vitamin D 5000 CAPS; Take as directed Recorded   9  Famotidine 20 MG Oral Tablet; TAKE 1 TABLET EVERY 12 HOURS DAILY; Therapy: 45YDG3408 to ([place])  Requested for: 27QOC4261; Last   Rx:10May2016 Ordered   10  Fioricet -40 MG Oral Capsule; TAKE 1 CAPSULE 3 times daily PRN headache; Therapy: 74HNU1281 to (Evaluate:19May2015)  Requested for: 09Apr2015; Last    Rx:09Apr2015 Ordered   11  FreeStyle Test In Vitro Strip; Therapy: 65GVH3117 to (Last Rx:99Idc7345)  Requested for: 81Pqp1721 Ordered   12  Gemfibrozil 600 MG Oral Tablet; TAKE 1 TABLET TWICE DAILY, WITH MORNING AND    EVENING MEAL; Therapy: 57DBX4184 to (Last Rx:07Jun2016)  Requested for: 07Jun2016 Ordered   13  HydrALAZINE HCl - 50 MG Oral Tablet; TAKE 3 TABLET 3 times daily; Therapy: 17ZLH4257 to (Evaluate:07Nov2016); Last Rx:11May2016 Ordered   14  Hydrocodone-Acetaminophen 5-325 MG Oral Tablet; TAKE 1 TABLET Every 6 hours    PRN pain; Therapy: 65Suy0514 to (Evaluate:25Jun2016); Last Rx:11May2016 Ordered   15  Lantus SoloStar 100 UNIT/ML Subcutaneous Solution Pen-injector; INJECT 15 UNIT    Daily; Therapy: 01QBP8099 to (Last Rx:75Vqj4933)  Requested for: 09GFK2910 Ordered   16  Lisinopril 40 MG Oral Tablet; TAKE 1 TABLET DAILY AS DIRECTED;     Therapy: (Recorded:94Zyb4178) to Recorded   17  Metoprolol Tartrate 50 MG Oral Tablet; TAKE 1 5 TABLET Every twelve hours; Last    Rx:15Jun2016 Ordered   18  PARoxetine HCl - 10 MG Oral Tablet; take 0 5 tablet daily; Therapy: 60OBM7023 to (Evaluate:13Oct2016)  Requested for: 53AZP5323; Last    Rx:15Jun2016 Ordered   19  Tamsulosin HCl - 0 4 MG Oral Capsule; 1 PO every HS; Therapy: 20Jun2016 to (Evaluate:15Jun2017)  Requested for: 20Jun2016; Last    Rx:20Jun2016; Status: ACTIVE - Retrospective Authorization Ordered   20  TRUEtest Control Level 1 In Vitro Liquid; USE AS DIRECTED; Therapy: 76AUI4212 to (Last Rx:16Xut9190) Ordered   21  TrueTrack Test In Vitro Strip; USE AS DIRECTED once daily; Therapy: 20EEU8866 to (Nelia [place])  Requested for: 64Aza2893; Last    Rx:02Shi2808 Ordered   22  Zofran 4 MG Oral Tablet; one every 6 hours as needed for nausea Recorded    Allergies    1  Anticoagulant Compound SOLN   2  NSAIDs    Vitals  Signs [Data Includes: Current Encounter]    Heart Rate: 76  Respiration: 20  Systolic: 712  Diastolic: 86  Height: 5 ft 8 in  Weight: 179 lb   BMI Calculated: 27 22  BSA Calculated: 1 95    Procedure    Procedure: Bladder Ultrasound Post Void Residual    Test indication: Urinary Retention and Pt's wife removed stent and newman this am  Pt denies any flank pain, c/o mild dysuria  Equipment And Procedure: The patient voided Voided amount not measured  U/S Findings: US PVR 64 21ML  Assessment    1  Acute urinary retention (788 29) (R33 8)    Plan  Acute urinary retention    · Follow-up as previously scheduled Evaluation and Treatment  Follow-up as previously  scheduled  Status: Complete - Retrospective By Protocol Authorization  Done:  25ASQ9851   Ordered;  For: Acute urinary retention; Ordered By: Bashir Jensen Performed:  Due: 15OJL0789; Last Updated By: Jed Raymundo; 6/21/2016 3:34:56 PM    Future Appointments    Date/Time Provider Specialty Site   07/13/2016 01:00 PM Sukumar Butler MD Vascular Surgery [facility]   08/17/2016 10:45 AM Ranjeet Chauhan MD Vascular Surgery [facility]   07/06/2016 12:15 PM Nader Munguia DO Family Medicine [address] [place] [address]   08/17/2016 12:00 PM Nader Munguia DO Family Medicine [address] [place] [address]   07/20/2016 01:40 PM Ismael Hinton MD Gastroenterology Adult [address]   07/07/2016 01:30 PM PINKY Dorman   Urology [address] [facility]     Signatures   Electronically signed by : Malia Herrera, ; Jun 21 2016  3:35PM EST                       (Author)    Electronically signed by : Leonel Leyva MD; Jun 22 2016  9:27AM EST
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
## 2018-01-24 NOTE — MISCELLANEOUS
Assessment   1  Acute pancreatitis (577 0) (K85 9)1      1 Amended By: Elayne De Los Santos; May 11 2016 12:46 PM EST    Plan  Acute pancreatitis    · 1,3   1,3   DMII (diabetes mellitus, type 2)    · 2,3   2,3   Myalgia And Myositis    · 1,3   1,3   Screening for neurological condition    · *VB - Fall Risk Assessment  (Dx V80 09 Screen for Neurologic Disorder);  Status:Complete - Retrospective By Protocol Authorization;   Done: 71ZWU2441 12:39PM  3   Performed: In Office; LOX:89CMM8481; Last Updated Elvia Knapp; 5/11/2016   12:39:59 PM;Ordered; For:Screening for neurological condition; Ordered By:Jonathan Chapa3      1 Amended By: Elayne De Los Santos; May 11 2016 12:46 PM EST   2 Amended By: Elayne De Los Santos; May 11 2016 12:55 PM EST   3 Amended By: Elayne De Los Santos; May 11 2016 2:34 PM EST    Discussion/Summary  Discussion Summary:   Patient will get gastric emptying study and abdominal ultrasound  Continue to eat smaller meals  Follow-up in 2 months or when necessary  Continue to follow-up with specialists as scheduled  1    Medication SE Review and Pt Understands Tx: Possible side effects of new medications were reviewed with the patient/guardian today1  The treatment plan was reviewed with the patient/guardian  The patient/guardian understands and agrees with the treatment plan1        1 Amended By: Elayne De Los Santos; May 11 2016 12:58 PM EST    Chief Complaint  Chief Complaint Free Text Note Form: TOC1        1 Amended By: Elayne De Los Santos; May 11 2016 12:45 PM EST    History of Present Illness  TCM Communication [facility]: The patient is being contacted for follow-up after hospitalization and Called patient and spoke with wife Brian Starr to schedule CHELLE appt for 5/11/16 at 12:30pm  Patient does have a pending appt for 6/1/16 at 12 noon  Hospital  records were reviewed1  1   He was hospitalized at [facility]  The date of admission: 04/26/2016, date of discharge: 04/29/2016   Diagnosis: Nausea/vomiting; Greasy stools; Acute pancreatitis D/C dx - Acute pancreatitis; ESRD; IDDM; HTN; HLD  He was discharged to home  Medications were not reviewed today  He scheduled a follow up appointment  Symptoms: fatigue  per wife Anibal Aguilar was provided to patient's family  wife Marla Muñoz  Communication performed and completed by Morteza Knapp   HPI: Patient here today for follow-up from hospitalization for acute pancreatitis  Patient saw GI yesterday  They ordered a gastric emptying study and abdominal ultrasound  Recommend that he eats smaller meals  Patient still having epigastric pain off and on  Has been losing weight  Patient still on hold for fistula surgery for hemodialysis2    Pancreatitis, Acute (Brief): The patient is being seen for  follow-up of a hospitalization for2  acute pancreatitis2   Symptoms: 2  loss of appetite2     The patient presents with complaints of intermittent episodes of mild epigastric abdominal pain, described as aching  Symptoms are improving  2   The patient is currently experiencing symptoms  2  Associated symptoms: 2  weight loss2   Current treatment includes  food restriction2  and  opioid analgesics2  2   By report, there is  good compliance with treatment2  ,  good tolerance of treatment2  and  fair symptom control2  2         1 Amended By: Omaira Osorio; May 11 2016 12:40 PM EST   2 Amended By: Omaira Osorio; May 11 2016 1:01 PM EST    Review of Systems  Complete-Male:   Constitutional:1  feeling tired1  and recent 10 lb weight loss1   Cardiovascular: 1  No complaints of slow heart rate, no fast heart rate, no chest pain, no palpitations, no leg claudication, no lower extremity1   Respiratory:1  No complaints of shortness of breath, no wheezing, no cough, no SOB on exertion, no orthopnea or PND1   Gastrointestinal:1  as noted in Jagdeep Gray   Genitourinary:1  No complaints of dysuria, no incontinence, no hesitancy, no nocturia, no genital lesion, no testicular pain1          1 Amended By: Ava Johns; May 11 2016 12:59 PM EST    Active Problems   1  Acute leg pain, left (729 5) (M79 605)  2  Allergic rhinitis (477 9) (J30 9)  3  Anemia (285 9) (D64 9)  4  Depression with anxiety (300 4) (F41 8)  5  Diabetic Peripheral Neuropathy (357 2)  6  DMII (diabetes mellitus, type 2) (250 00) (E11 9)  7  ESRD (end stage renal disease) (585 6) (N18 6)  8  Gastroenteritis (558 9) (K52 9)  9  Gout (274 9) (M10 9)  10  Headache (784 0) (R51)  11  Heme positive stool (792 1) (R19 5)  12  Hyperlipidemia (272 4) (E78 5)  13  Hypertension (401 9) (I10)  14  Hypertriglyceridemia (272 1) (E78 1)  15  Insomnia (780 52) (G47 00)  16  Lumbar radiculopathy (724 4) (M54 16)  17  Myalgia And Myositis (729 1)  18  Need for influenza vaccination (V04 81) (Z23)  19  Need for pneumococcal vaccination (V03 82) (Z23)  20  Oral thrush (112 0) (B37 0)  21  Osteoarthritis (715 90) (M19 90)  22  Overweight (278 02) (E66 3)  23  Right wrist pain (719 43) (M25 531)  24  Tooth pain (525 9) (K08 8)  25  Type 2 diabetes mellitus with hyperglycemia (250 00) (E11 65)  26  Vitamin D deficiency (268 9) (E55 9)    Past Medical History   1  History of Abnormal Liver Function Test (790 6)  2  History of Acute hepatitis A (070 1) (B15 9)  3  Acute upper respiratory infection (465 9) (J06 9)  4  Acute upper respiratory infection (465 9) (J06 9)  5  History of Diabetes Mellitus (250 00)  6  History of Fracture Of Carpal Bone(S) (814 00)  7  History of Gout (274 9) (M10 9)  8  History of acute bronchitis (V12 69) (Z87 09)  9  History of hyperlipidemia (V12 29) (Z86 39)  10  History of hypertension (V12 59) (Z86 79)  11  History of lipoma (V13 89) (Z86 018)  12  History of nicotine dependence (V15 82) (G86 374)    Surgical History   1  History of Cataract Surgery  2  History of Hernia Repair  Surgical History Reviewed: The surgical history was reviewed and updated today   1        1 Amended By: Ava Carrillo; May 11 2016 12:59 PM EST    Family History  Mother   1  Family history of Diabetes Mellitus (250 00)  Father   2  Family history of cardiac disorder (V17 49) (Z82 49)  Sister   3  Family history of thyroid disease (V18 19) (Z83 49)  Family History Reviewed: The family history was reviewed and updated today  1        1 Amended By: Grecia Resendiz; May 11 2016 12:59 PM EST    Social History    · Being A Social Drinker   · Former smoker (J88 06) (I32 543)    Social History Reviewed: The social history was reviewed and updated today  1  The social history was reviewed and is unchanged  1        1 Amended By: Grecia Resendiz; May 11 2016 12:59 PM EST    Current Meds  1  Allopurinol 100 MG Oral Tablet; TAKE 1 TABLET DAILY AS DIRECTED; Therapy: 26Xql1873 to (Evaluate:22Apr2016)  Requested for: 28Apr2015; Last   Rx:28Apr2015 Ordered  2  AmLODIPine Besylate 10 MG Oral Tablet; TAKE 1 TABLET DAILY; Therapy: 16RVM1880 to (Evaluate:24Jan2016)  Requested for: 77KHT6328; Last   Rx:66Pne2788 Ordered  3  Atorvastatin Calcium 20 MG Oral Tablet; TAKE ONE TABLET BY MOUTH ONCE DAILY; Therapy: 97RLK3652 to (Tani Geronimo)  Requested for: 35XEE7747; Last   Rx:11Mar2016 Ordered  4  BD Pen Needle Mari U/F 32G X 4 MM Miscellaneous; use with insulin; Therapy: 33RKN8306 to (Last Rx:23Rpr4724)  Requested for: 73Vop9939 Ordered  5  Biotin 5000 MCG Oral Tablet; Take as directed Recorded  6  Claritin 10 MG Oral Capsule; TAKE 1 CAPSULE  PRN; Therapy: (Recorded:04Jun2015) to Recorded  7  Clobetasol Propionate 0 05 % External Cream; USE AS DIRECTED Recorded  8  Colchicine-Probenecid 0 5-500 MG Oral Tablet; TAKE 1 TABLET DAILY AS DIRECTED; Therapy: 86MAK6040 to (Evaluate:02Jun2016)  Requested for: 24MLO1954; Last   Rx:08Jun2015 Ordered  9  Dialyvite Vitamin D 5000 CAPS; Take as directed Recorded  10  Doxazosin Mesylate 2 MG Oral Tablet; hs Recorded  11  Fioricet -40 MG Oral Capsule; TAKE 1 CAPSULE 3 times daily PRN headache;     Therapy: 00WKT7196 to (Evaluate:46Obd4031)  Requested for: 74 911 574; Last    Rx:09Apr2015 Ordered  12  Fluticasone Propionate 50 MCG/ACT Nasal Suspension; USE 2 SPRAYS IN EACH    NOSTRIL ONCE DAILY; Therapy: 47KDB2690 to (Last Rx:42Zcj8546)  Requested for: 91Qpv8936 Ordered  13  FreeStyle Test In Vitro Strip; Therapy: 70XTC3593 to (Last Rx:72Uwe1618)  Requested for: 16Zcv5683 Ordered  14  Gemfibrozil 600 MG Oral Tablet; TAKE 1 TABLET TWICE DAILY,  WITH MORNING AND    EVENING MEAL; Therapy: 65VUP3160 to (Evaluate:02Jun2016)  Requested for: 19HDP5868; Last    Rx:08Jun2015 Ordered  15  HydrALAZINE HCl - 100 MG Oral Tablet; TAKE 1 TABLET Every 8 hours; Therapy: 61JFH4589 to [phone] 74)  Requested for: 76DSK5756; Last    Rx:04Jun2015 Ordered  16  Hydrocodone-Acetaminophen 5-325 MG Oral Tablet; TAKE 1 TABLET Every 6 hours PRN    pain; Therapy: 77Oaq1294 to (Evaluate:09Apr2016); Last Rx:68Alf1848 Ordered  17  Lantus SoloStar 100 UNIT/ML Subcutaneous Solution Pen-injector; INJECT 20 UNIT    Daily; Therapy: 14DBC0067 to (Last Rx:09Nov2015)  Requested for: 69LBC4888 Ordered  18  Lisinopril 40 MG Oral Tablet; TAKE 1 TABLET DAILY AS DIRECTED; Therapy: (Recorded:30Tre0997) to Recorded  19  Nizatidine 300 MG Oral Capsule; TAKE 1 CAPSULE DAILY; Therapy: 65RBJ1005 to (Last Rx:08Sep2015)  Requested for: 86Wut1332 Ordered  20  Oxycodone-Acetaminophen 5-325 MG Oral Tablet; TAKE 1 TABLET EVERY 4 TO 6    HOURS AS NEEDED FOR PAIN;    Therapy: 66Slh1557 to (Evaluate:18Apr2016); Last Rx:13Apr2016 Ordered  21  Sertraline HCl - 100 MG Oral Tablet; TAKE 1 TABLET DAILY; Therapy: 35CQM2798 to (Evaluate:03Apr2017)  Requested for: 08Apr2016; Last    Rx:08Apr2016 Ordered  22  TriLyte 420 GM Oral Solution Reconstituted; TAKE AS DIRECTED; Therapy: 66POD3775 to (Evaluate:70Hdx9096)  Requested for: 74QSE6064; Last    Rx:20Esq4426 Ordered  23  TRUEtest Control Level 1 In Vitro Liquid; USE AS DIRECTED;     Therapy: 25WBM2085 to (Last Rx:43Lrr9973) Ordered  24  TrueTrack Test In Vitro Strip; USE AS DIRECTED once daily; Therapy: 58BIQ7063 to (Clifford Nance)  Requested for: 97Wzx1227; Last    Rx:21Eds9557 Ordered  25  Zofran 4 MG Oral Tablet (Ondansetron HCl); one every 6 hours as needed for nausea    Recorded  Medication List Reviewed: The medication list was reviewed and updated today  1        1 Amended By: Elayne De Los Santos; May 11 2016 12:58 PM EST    Allergies   1  No Known Drug Allergies    Physical Exam    Constitutional1    General appearance: No acute distress, well appearing and well nourished  1    Pulmonary1    Respiratory effort: No increased work of breathing or signs of respiratory distress  1    Auscultation of lungs: Clear to auscultation, equal breath sounds bilaterally, no wheezes, no rales, no rhonci  1    Cardiovascular1    Auscultation of heart: Normal rate and rhythm, normal S1 and S2, without murmurs  1    Examination of extremities for edema and/or varicosities: Normal 1    Psychiatric1    Orientation to person, place and time: Normal 1    Mood and affect: Normal 1          1 Amended By: Elayne De Los Santos; May 11 2016 12:58 PM EST    Future Appointments    Date/Time Provider Specialty Site   05/11/2016 09:00 AM Lazaro Puri MD Vascular Surgery [facility]   06/08/2016 01:45 PM Lazaro Puri MD Vascular Surgery [facility]   05/11/2016 12:30 PM Elayne De Los Santos DO Family Medicine [address] [place] [address]   06/01/2016 12:00 PM Elayne De Los Santos DO Family Medicine [facility]     Signatures   Electronically signed by : Treasa Goldmann, ; May  3 2016  3:35PM EST                       (Author)    Electronically signed by : Darrick Maldonado DO; May  3 2016  4:55PM EST                          Electronically signed by : Darrick Maldonado DO; May 11 2016  1:01PM EST                       (Author)    Electronically signed by : Darrick Maldonado DO; May 11 2016  1:12PM EST (Author)    Electronically signed by : Jason Calvin DO; May 11 2016  2:34PM EST                       (Author)

## 2018-02-01 ENCOUNTER — DOCTOR'S OFFICE (OUTPATIENT)
Dept: URBAN - NONMETROPOLITAN AREA CLINIC 1 | Facility: CLINIC | Age: 68
Setting detail: OPHTHALMOLOGY
End: 2018-02-01
Payer: COMMERCIAL

## 2018-02-01 DIAGNOSIS — H43.812: ICD-10-CM

## 2018-02-01 DIAGNOSIS — H33.003: ICD-10-CM

## 2018-02-01 DIAGNOSIS — H35.61: ICD-10-CM

## 2018-02-01 DIAGNOSIS — H43.811: ICD-10-CM

## 2018-02-01 DIAGNOSIS — H35.373: ICD-10-CM

## 2018-02-01 DIAGNOSIS — E11.3413: ICD-10-CM

## 2018-02-01 PROBLEM — N39.0 ACUTE UTI: Status: ACTIVE | Noted: 2017-03-22

## 2018-02-01 PROCEDURE — 92134 CPTRZ OPH DX IMG PST SGM RTA: CPT | Performed by: OPHTHALMOLOGY

## 2018-02-01 PROCEDURE — 92226 OPHTHALMOSCOPY EXT SUBSEQUENT: CPT | Performed by: OPHTHALMOLOGY

## 2018-02-01 PROCEDURE — 92014 COMPRE OPH EXAM EST PT 1/>: CPT | Performed by: OPHTHALMOLOGY

## 2018-02-01 ASSESSMENT — REFRACTION_MANIFEST
OS_VA3: 20/
OU_VA: 20/
OD_VA3: 20/
OD_VA1: 20/
OS_VA2: 20/
OU_VA: 20/
OD_VA1: 20/
OS_VA3: 20/
OS_VA1: 20/
OD_VA1: 20/
OD_VA3: 20/
OS_VA2: 20/
OS_VA2: 20/
OS_VA1: 20/
OD_VA3: 20/
OD_VA2: 20/
OU_VA: 20/
OS_VA1: 20/
OS_VA3: 20/

## 2018-02-01 ASSESSMENT — CONFRONTATIONAL VISUAL FIELD TEST (CVF)
OD_FINDINGS: FULL
OS_FINDINGS: FULL

## 2018-02-01 ASSESSMENT — VISUAL ACUITY
OD_BCVA: 20/CF XS 4'
OS_BCVA: 20/20-2

## 2018-02-01 ASSESSMENT — REFRACTION_CURRENTRX
OS_OVR_VA: 20/
OS_OVR_VA: 20/
OD_OVR_VA: 20/
OS_OVR_VA: 20/
OD_OVR_VA: 20/
OD_OVR_VA: 20/

## 2018-02-01 ASSESSMENT — LID EXAM ASSESSMENTS
OD_BLEPHARITIS: RUL 2+
OS_BLEPHARITIS: LUL 2+

## 2018-02-07 RX ORDER — LIDOCAINE HYDROCHLORIDE 20 MG/ML
JELLY TOPICAL ONCE
Status: COMPLETED | OUTPATIENT
Start: 2018-02-08 | End: 2018-02-08

## 2018-02-08 ENCOUNTER — HOSPITAL ENCOUNTER (OUTPATIENT)
Dept: INFUSION CENTER | Facility: HOSPITAL | Age: 68
Discharge: HOME | End: 2018-02-08
Payer: COMMERCIAL

## 2018-02-08 VITALS
SYSTOLIC BLOOD PRESSURE: 165 MMHG | OXYGEN SATURATION: 95 % | HEART RATE: 82 BPM | RESPIRATION RATE: 20 BRPM | TEMPERATURE: 97.3 F | DIASTOLIC BLOOD PRESSURE: 94 MMHG

## 2018-02-08 PROCEDURE — 51720 TREATMENT OF BLADDER LESION: CPT

## 2018-02-08 RX ADMIN — LIDOCAINE HYDROCHLORIDE: 20 JELLY TOPICAL at 13:07

## 2018-02-08 RX ADMIN — MITOMYCIN 40 MG: 40 INJECTION, POWDER, LYOPHILIZED, FOR SOLUTION INTRAVENOUS at 13:32

## 2018-02-15 ENCOUNTER — HOSPITAL ENCOUNTER (OUTPATIENT)
Dept: RADIOLOGY | Facility: HOSPITAL | Age: 68
Discharge: HOME | End: 2018-02-15
Payer: COMMERCIAL

## 2018-02-15 VITALS
OXYGEN SATURATION: 100 % | DIASTOLIC BLOOD PRESSURE: 89 MMHG | RESPIRATION RATE: 20 BRPM | HEART RATE: 93 BPM | SYSTOLIC BLOOD PRESSURE: 148 MMHG

## 2018-02-15 DIAGNOSIS — N18.6 END STAGE KIDNEY DISEASE (HCC): ICD-10-CM

## 2018-02-15 PROCEDURE — C1769 GUIDE WIRE: HCPCS

## 2018-02-15 PROCEDURE — 36902 INTRO CATH DIALYSIS CIRCUIT: CPT

## 2018-02-15 PROCEDURE — C1894 INTRO/SHEATH, NON-LASER: HCPCS

## 2018-02-15 PROCEDURE — 36902 INTRO CATH DIALYSIS CIRCUIT: CPT | Performed by: RADIOLOGY

## 2018-02-15 PROCEDURE — C1725 CATH, TRANSLUMIN NON-LASER: HCPCS

## 2018-02-15 RX ORDER — FENTANYL CITRATE 50 UG/ML
INJECTION, SOLUTION INTRAMUSCULAR; INTRAVENOUS CODE/TRAUMA/SEDATION MEDICATION
Status: COMPLETED | OUTPATIENT
Start: 2018-02-15 | End: 2018-02-15

## 2018-02-15 RX ADMIN — IOHEXOL 24 ML: 300 INJECTION, SOLUTION INTRAVENOUS at 13:27

## 2018-02-15 RX ADMIN — FENTANYL CITRATE 100 MCG: 50 INJECTION, SOLUTION INTRAMUSCULAR; INTRAVENOUS at 13:06

## 2018-02-15 NOTE — BRIEF OP NOTE (RAD/CATH)
IR FISTULA/GRAFTOGRAM  Procedure Note    PATIENT NAME: Schuyler Foote  : 1950  MRN: [id number]     Pre-op Diagnosis:   1  End stage kidney disease (HCC)      Post-op Diagnosis:   1  End stage kidney disease [facility])        Surgeon:   Bridger Roldan MD  Assistants:     No qualified resident was available, Resident is only observing    Estimated Blood Loss: minimal     Findings:     Patient documentation describes arterial inflow is decreased, and concern for outflow stenosis  4 mm angioplasty of the arterial inflow, moderate improvement post angioplasty, good flow throughout the dialysis circuit  2 areas of aneurysmal dilatation persist, somewhat increased in extent from the previous  Specimens: none    Complications:  none    Anesthesia: Conscious sedation and IV fentanyl       Bridger Roldan MD     Date: 2/15/2018  Time: 3:01 PM

## 2018-02-15 NOTE — DISCHARGE INSTRUCTIONS
Fistulagram   WHAT YOU NEED TO KNOW:   Your arm or leg my  be sore, swollen, and bruised after the procedure  This is normal and should get better in a few days  DISCHARGE INSTRUCTIONS:     Contact Interventional Radiology at [phone] Bryanna PATIENTS: Contact Interventional Radiology at [phone]) Gonzalez Stoney PATIENTS: Contact Interventional Radiology at [phone]) if:    · You have a fever or chills  · Your puncture site is red, swollen, or draining pus  · You have nausea or are vomiting  · Your skin is itchy, swollen, or you have a rash  · You cannot feel a thrill over your graft or fistula  · You have questions or concerns about your condition or care  Seek care immediately if:     · You have bleeding that does not stop after 10 minutes of holding firm, direct pressure over the puncture site  · Blood soaks through your bandage  · Your hand or foot closest to the graft or fistula feels cold, painful, or numb  · Your hand or foot closest to the graft or fistula is pale or blue  · You have trouble moving your arm or leg closest to the graft or fistula  · Your bruise suddenly gets bigger  Care for your wound as directed:  Remove the bandage in 4 to 6 hours or as         directed  Wash the area once a day with soap and water  Gently pat the area dry  Apply firm, steady pressure to the puncture site if it bleeds  Use a clean gauze or towel to hold pressure for 10 to 15 minutes  Call 911 if you cannot stop the bleeding or the bleeding gets heavier  Feel for a thrill once a day or as directed  Place your index and second finger over your fistula or graft as directed  You should feel a vibration  The vibration means that blood is flowing through your graft or fistula correctly  Rest your arm or leg as directed  Do not lift anything heavier than 5 pounds or do strenuous activity for 24 hours  Prevent damage to your graft or fistula    Do not wear tight-fitting clothing over your graft or fistula  Do not wear tight jewelry on the arm or leg with the graft or fistula  Tell healthcare providers not to do, IVs, blood draws, and blood pressure readings in the arm with your graft or fistula  Do not allow flu shots or vaccinations in your arm with your graft or fistula      Follow up with your healthcare provider as directed

## 2018-02-16 ENCOUNTER — APPOINTMENT (OUTPATIENT)
Dept: LAB | Facility: HOSPITAL | Age: 68
End: 2018-02-16
Payer: COMMERCIAL

## 2018-02-16 ENCOUNTER — TRANSCRIBE ORDERS (OUTPATIENT)
Dept: ADMINISTRATIVE | Facility: HOSPITAL | Age: 68
End: 2018-02-16

## 2018-02-16 DIAGNOSIS — B19.10 HEPATITIS B INFECTION WITHOUT DELTA AGENT WITHOUT HEPATIC COMA, UNSPECIFIED CHRONICITY: Primary | ICD-10-CM

## 2018-02-16 DIAGNOSIS — B19.10 HEPATITIS B INFECTION WITHOUT DELTA AGENT WITHOUT HEPATIC COMA, UNSPECIFIED CHRONICITY: ICD-10-CM

## 2018-02-16 PROCEDURE — 36415 COLL VENOUS BLD VENIPUNCTURE: CPT

## 2018-02-16 PROCEDURE — 87517 HEPATITIS B DNA QUANT: CPT

## 2018-02-20 LAB
HBV DNA SERPL NAA+PROBE-ACNC: NORMAL IU/ML
HBV DNA SERPL NAA+PROBE-LOG IU: 5.63 {LOG_IU}/ML
REF LAB TEST REF RANGE: NORMAL

## 2018-02-22 ENCOUNTER — HOSPITAL ENCOUNTER (OUTPATIENT)
Dept: INFUSION CENTER | Facility: HOSPITAL | Age: 68
Discharge: HOME | End: 2018-02-22
Payer: COMMERCIAL

## 2018-02-22 VITALS
DIASTOLIC BLOOD PRESSURE: 92 MMHG | RESPIRATION RATE: 20 BRPM | TEMPERATURE: 98.6 F | HEART RATE: 104 BPM | SYSTOLIC BLOOD PRESSURE: 154 MMHG

## 2018-02-22 PROCEDURE — 51720 TREATMENT OF BLADDER LESION: CPT

## 2018-02-22 RX ORDER — LIDOCAINE HYDROCHLORIDE 20 MG/ML
JELLY TOPICAL ONCE
Status: COMPLETED | OUTPATIENT
Start: 2018-02-22 | End: 2018-02-22

## 2018-02-22 RX ADMIN — WATER 40 MG: 1 INJECTION INTRAMUSCULAR; INTRAVENOUS; SUBCUTANEOUS at 13:36

## 2018-02-22 RX ADMIN — LIDOCAINE HYDROCHLORIDE: 20 JELLY TOPICAL at 13:15

## 2018-02-22 NOTE — PROGRESS NOTES
Pt arrived on unit at 1300  Offers no complaints  States he had some burning after last treatment x 1 week  Otherwise no complaints  Viscous lidocaine injected into urethra and waited 5 minutes  14 fr coude catheter inserted into bladder 75 ml jordin colored urine drained over 10 minutes  Catheter clamped  Mitomycin instilled  Pt laying on back x 15 minutes, right side x 15 minutes, left side x 15 minutes then sitting up x 15 minutes  Doherty catheter unclamped and drained 75 ml purple colored urine  Doherty catheter removed and discarded in chemotherapy waste bag then bag put in chemotherapy waste container  Pt tolerated procedure well  Discharged to home in satisfactory condition  Next appointment 3/1/18

## 2018-02-23 ENCOUNTER — TELEPHONE (OUTPATIENT)
Dept: UROLOGY | Facility: CLINIC | Age: 68
End: 2018-02-23

## 2018-02-23 NOTE — TELEPHONE ENCOUNTER
Pt's wife, Silke Chawla , called  Pt had 2 out 3 Mitomycin treatments yesterday at [facility]  Pt developed urinary urgency and frequency, urge incontinence, pink tinged urine and nausea and vomiting  Pt vomited through out the night  Denies any fever/chills  Wife had Phenergan at home to give for the vomiting and it was effective  Pt weak this morning, no further vomiting and bladder symptoms less  Wife wishes Dr Goyo Alvarado to be aware of incident  Will notify MD and if any changes will contact wife

## 2018-02-23 NOTE — TELEPHONE ENCOUNTER
Wife called again today requesting a prescription for an antiemetic for the weekend  Previous note stated wife gave Phenergan which was an error, wife gave Reglan for vomiting  Discussed with Cecile cherry antiemetic   Roman Leroy prefers if pt is seen by medical if N/V persists  Wife reports pt has raised rash across his torso, not itching  Wife will take pt to [facility] if pt does not improve  Will inform Dr Dandre Tinoco

## 2018-02-26 ENCOUNTER — TELEPHONE (OUTPATIENT)
Dept: GASTROENTEROLOGY | Facility: CLINIC | Age: 68
End: 2018-02-26

## 2018-02-26 NOTE — TELEPHONE ENCOUNTER
I agree  Nausea and vomiting not typical symptom of mitomycin reaction  May consider UTI or potentially other medical concern, given his comorbidities  Would recommend medical eval if symptoms continue

## 2018-02-27 ENCOUNTER — TELEPHONE (OUTPATIENT)
Dept: UROLOGY | Facility: HOSPITAL | Age: 68
End: 2018-02-27

## 2018-02-27 RX ORDER — LIDOCAINE HYDROCHLORIDE 20 MG/ML
JELLY TOPICAL ONCE
Status: DISCONTINUED | OUTPATIENT
Start: 2018-03-01 | End: 2018-03-04 | Stop reason: HOSPADM

## 2018-02-27 NOTE — TELEPHONE ENCOUNTER
Pt's wife called  Pt scheduled for 3 out of 3 Mitomycin treatment for 3/1/2018 and pt refusing treatment  Pt experienced severe pain with catheterization and instillation of medicine last treatment and then afterwards developed nausea/vomiting, dysuria, hematuria and urinary urgency with incontinence  Discussed with Dr Arley Gracia MD wishes office appt with pt and wife  Discussed with wife and appt made 3/27/2018 at [facility]

## 2018-03-01 ENCOUNTER — HOSPITAL ENCOUNTER (OUTPATIENT)
Dept: INFUSION CENTER | Facility: HOSPITAL | Age: 68
Discharge: HOME | End: 2018-03-01

## 2018-03-05 ENCOUNTER — OFFICE VISIT (OUTPATIENT)
Dept: GASTROENTEROLOGY | Facility: MEDICAL CENTER | Age: 68
End: 2018-03-05
Payer: COMMERCIAL

## 2018-03-05 VITALS
DIASTOLIC BLOOD PRESSURE: 84 MMHG | SYSTOLIC BLOOD PRESSURE: 136 MMHG | HEIGHT: 69 IN | TEMPERATURE: 95.7 F | HEART RATE: 105 BPM | BODY MASS INDEX: 28.44 KG/M2 | WEIGHT: 192 LBS

## 2018-03-05 DIAGNOSIS — B19.10 HEPATITIS B: Primary | ICD-10-CM

## 2018-03-05 PROCEDURE — 99214 OFFICE O/P EST MOD 30 MIN: CPT | Performed by: INTERNAL MEDICINE

## 2018-03-05 NOTE — LETTER
March 5, 2018     Leidy Lynch DO  [address] [facility] [address]    Patient: Breanna Trimble   YOB: 1950   Date of Visit: 3/5/2018       Dear Dr Cassie Albert: Thank you for referring Breanna Trimble to me for evaluation  Below are my notes for this consultation  If you have questions, please do not hesitate to call me  I look forward to following your patient along with you           Sincerely,        Pablo Coleman MD        CC: No Recipients

## 2018-03-05 NOTE — PROGRESS NOTES
[facility] - Outpatient Follow-up Note  Landon Nava 79 y o  male MRN: [id number]  Encounter: [id number]          ASSESSMENT AND PLAN:      1  Hepatitis B   - newly diagnosed hepatitis-B, etiology is not clear  Wife was recently tested immune to hepatitis-B  She had previous vaccination     - Patient is asymptomatic with normal liver function tests  No indication to start pharmaceutical treatment at this point  We will monitor his liver function test and viral load in 3 months  If  Liver function test remains to be normal, we will follow up in 6 months  -  Patient denies family history of liver cancer  I would recommend to do abdominal ultrasound  - Labs to rule out HIV and hepatitis a and hepatitis C infection  2  Anemia: negative EGD and colonoscopy 2 years ago  Likely secondary to CKD  3  Acute pancreatitis: resolved  Follow up LFTs in 3 months   ______________________________________________________________________    SUBJECTIVE:        80-year-old man with past medical history of anemia, acute pancreatitis  Presented for evaluation of newly diagnosed hepatitis B infection  Patient is on hemodialysis and he had routine checkups in February and was found to have surface antigen positive  His hepatitis-B viral load was 762592 international units per mL  He was found to have positive hepatitis B surface antigen  His hep B surface ab was equivocal   Around 2 years ago when he initially was started on hemodialysis  He was found to have negative hepatitis B surface antigen and his surface antibody was 6 5 3, considered negative  Patient  And his wife denies high risks behavior  He denies increased abdominal girth  Denies other symptoms  His most recent alpha LFTs showed AST at 16, ALT at 20, albumin is 4 1  His platelets of 253  REVIEW OF SYSTEMS IS OTHERWISE NEGATIVE        Historical Information   Past Medical History:   Diagnosis Date    AAA (abdominal aortic aneurysm) (HCC)     Anemia     Anxiety     Arthritis     Cancer (HCC)     bladder    Cardiac disorder     Chronic kidney disease     renal failure    Chronic pain disorder     back and legs    Depression     Diabetes mellitus ([facility] [address] [facility]     Dialysis patient [facility] [address] [facility]     Fractures     spinal compression fx,closed fx of wrist & ankle    GERD (gastroesophageal reflux disease)     Gout     Hearing difficulty of both ears     Hepatitis A     A - remission since 1970's    Hyperlipidemia     Hyperlipidemia     Hypertension     Kidney stone     Migraine     Neuropathy     Pancreatitis     Vitamin D deficiency     Wears glasses      Past Surgical History:   Procedure Laterality Date    CATARACT EXTRACTION Bilateral     congenital    COLONOSCOPY      CYSTOSCOPY      CYSTOSCOPY N/A 6/14/2017    Procedure: CYSTOSCOPY WITH BLADDER  BIOPSIES WITH FULGURATION;  Surgeon: Gentry Enamorado MD;  [facility];  Service: Urology    DENTAL SURGERY      ESOPHAGOGASTRODUODENOSCOPY      EYE SURGERY      catatact    EYE SURGERY Bilateral     Left eye detachment R eye retinal tear    HERNIA REPAIR      HERNIA REPAIR      L inguinal w/mesh;umbilical herniorraphy    NV ANASTOMOSIS,AV,ANY SITE Right 6/29/2016    Procedure: LOWER FOREARM AV FISTULA;  Surgeon: Leatha Arana MD;  [facility];  Service: Vascular    NV COLONOSCOPY FLX DX W/COLLJ SPEC WHEN PFRMD N/A 3/4/2016    Procedure: COLONOSCOPY;  Surgeon: Inge Sadler MD;  [facility];  Service: Gastroenterology    NV CYSTO/URETERO W/LITHOTRIPSY &INDWELL STENT INSRT Right 6/17/2016    Procedure: CYSTOSCOPY URETEROSCOPY WITH LITHOTRIPSY HOLMIUM LASER,BASKET STONE EXTRACTION, RETROGRADE PYELOGRAM AND INSERTION STENT URETERAL;  Surgeon: Cris Beaver MD;  Location:  MAIN OR;  Service: Urology    NV CYSTOURETHROSCOPY N/A 7/13/2017    Procedure: CYSTOSCOPY;  Surgeon: Gentry Enamorado MD;  [facility]; Service: Urology    UT CYSTOURETHROSCOPY W/IRRIG & EVAC CLOTS N/A 7/13/2017    Procedure: CYSTOSCOPY EVACUATION OF CLOTS,POSSIBLE FULGURATION;  Surgeon: Malka Dunham MD;  [facility];  Service: Urology    UT CYSTOURETHROSCOPY,BIOPSY N/A 8/15/2016    Procedure: CYSTOSCOPY WITH RE BIOPSY OF BLADDER;  Surgeon: Phil De Oliveira MD;  [facility];  Service: Urology    UT CYSTOURETHROSCOPY,FULGUR 0 5-2 CM LESN N/A 2/6/2017    Procedure: TRANSURETHRAL RESECTION OF BLADDER TUMOR (TURBT); Surgeon: Malka Dunham MD;  [facility];  Service: Urology    UT CYSTOURETHROSCOPY,FULGUR 0 5-2 CM LESN N/A 6/14/2017    Procedure: TRANSURETHRAL RESECTION OF BLADDER TUMOR (TURBT); Surgeon: Malka Dunham MD;  [facility];  Service: Urology    UT CYSTOURETHROSCOPY,FULGUR <0 5 CM LESN N/A 2/6/2017    Procedure: Haskel Distad; BLADDER BIOPSY WITH Paty Speed;  Surgeon: Malka Dunham MD;  [facility];  Service: Urology    UT CYSTOURETHROSCOPY,URETER CATHETER Right 6/1/2016    Procedure: CYSTOSCOPY RETROGRADE PYELOGRAM WITH INSERTION STENT URETERAL;  Surgeon: Sunni Lozano MD;  Location:  MAIN OR;  Service: Urology    UT EGD TRANSORAL BIOPSY SINGLE/MULTIPLE N/A 3/4/2016    Procedure: ESOPHAGOGASTRODUODENOSCOPY (EGD);   Surgeon: Jovana Nunes MD;  [facility];  Service: Gastroenterology    VASCULAR SURGERY Right     AV fistula     Social History   History   Alcohol Use    Yes     Comment: rarely     History   Drug Use No     History   Smoking Status    Former Smoker    Quit date: 1985   Smokeless Tobacco    Never Used     Family History   Problem Relation Age of Onset    Depression Mother     Diabetes Mother     Heart disease Mother     Hypertension Mother     Kidney disease Mother     Diabetes Father     Heart disease Father     Hypertension Father     Stroke Father     Cancer Maternal Grandmother        Meds/Allergies       Current Outpatient Prescriptions:     allopurinol (ZYLOPRIM) 100 mg tablet    ALPRAZolam (XANAX) 0 5 mg tablet    ampicillin (PRINCIPEN) 250 mg capsule    atorvastatin (LIPITOR) 20 mg tablet    B Complex-C-Folic Acid (TRIPHROCAPS) 1 MG CAPS    butalbital-acetaminophen-caffeine-codeine (FIORICET WITH CODEINE) -83-30 MG per capsule    calcium acetate (PHOSLO) 667 mg capsule    Cholecalciferol (VITAMIN D-3 PO)    colchicine-probenecid 0 5-500 MG per tablet    gemfibrozil (LOPID) 600 mg tablet    HYDROcodone-acetaminophen (NORCO) 5-325 mg per tablet    insulin glargine (LANTUS) 100 units/mL subcutaneous injection    loratadine (CLARITIN) 10 mg tablet    metoprolol tartrate (LOPRESSOR) 25 mg tablet    omeprazole (PriLOSEC) 40 MG capsule    ondansetron (ZOFRAN) 4 mg tablet    oxybutynin (DITROPAN-XL) 5 mg 24 hr tablet    PARoxetine (PAXIL) 20 mg tablet    Allergies   Allergen Reactions    Heparin      Avoids due to eye conditions;can't have any med that may cause bleeding    Lovenox [Enoxaparin Sodium]      Avoids due to eye conditions;can't have any med that may cause bleeding in eyes    Nsaids      Avoids due to renal failure    Cardura [Doxazosin] Palpitations           Objective     Blood pressure 136/84, pulse 105, temperature (!) 95 7 °F (35 4 °C), temperature source Tympanic, height 5' 9" (1 753 m), weight 87 1 kg (192 lb)  PHYSICAL EXAM:      General Appearance:   Alert, cooperative, no distress   HEENT:   Normocephalic, atraumatic, anicteric      Neck:  Supple, symmetrical, trachea midline   Lungs:   Clear to auscultation bilaterally; no rales, rhonchi or wheezing; respirations unlabored    Heart[de-identified]   Regular rate and rhythm; no murmur, rub, or gallop     Abdomen:   Soft, non-tender, non-distended; normal bowel sounds; no masses, no organomegaly    Genitalia:   Deferred    Rectal:   Deferred    Extremities:  No cyanosis, clubbing or edema    Pulses:  2+ and symmetric    Skin:  No jaundice, rashes, or lesions    Lymph nodes:  No palpable cervical lymphadenopathy        Lab Results:   No visits with results within 1 Day(s) from this visit  Latest known visit with results is:   Appointment on 02/16/2018   Component Date Value    Hep B Viral DNA IU/ML 02/16/2018 116530     Hep B Viral DNA Copies/ML 02/16/2018 5 627     HEP B TEST INFORMATION 02/16/2018 Comment          Radiology Results:   Ir Fistula/graftogram    Result Date: 2/15/2018  Narrative: Arteriovenous fistulogram with angioplasty Clinical History: Patient able to get treatment however there is question of decreased inflow and additionally outflow stenosis based on clinical exam by dialysis nursing staff  Pressures are variable during dialysis  Contrast: 24 mL Omnipaque 300 Fluoro time: 3 2 minutes Images: 167 Technique: The patient was brought to the interventional radiology suite and identified verbally and by wristband  The patient was placed supine on the table and the existing right upper extremity AV fistula was prepped and draped in usual sterile fashion  The site was prepped and draped in the usual sterile fashion  All elements of maximal sterile barrier technique were followed (cap, mask, sterile gown, sterile gloves, large sterile sheet, hand hygiene, and 2% chlorhexidine for cutaneous antisepsis)  Micropuncture needle  was advanced into the fistula near the arterial anastomosis, directed towards central venous outflow   An arteriovenous fistula gram was performed from the arterial anastomosis to the superior vena cava  Findings: There are 2 aneurysmal segments at previously seen near the arterial anastomosis, slightly increased in size from prior exam   There is no outflow stenosis seen through the right atrium  Arterial anastomosis is patent, there may be mild focal narrowing  Intervention: Access across the arterial anastomosis was achieved with Glidewire and Kumpe catheter    4 mm angioplasty was performed of mild focal stenosis  There is improved flow across the anastomosis post angioplasty  All catheters and sheaths were removed and hemostasis was achieved with manual compression  Impression: Impression: 4 mm angioplasty at arterial anastomosis  No significant outflow stenosis identified Aneurysmal segments near access sites remain   Workstation performed: [id number]

## 2018-03-15 ENCOUNTER — DOCTOR'S OFFICE (OUTPATIENT)
Dept: URBAN - NONMETROPOLITAN AREA CLINIC 1 | Facility: CLINIC | Age: 68
Setting detail: OPHTHALMOLOGY
End: 2018-03-15
Payer: COMMERCIAL

## 2018-03-15 DIAGNOSIS — H43.812: ICD-10-CM

## 2018-03-15 DIAGNOSIS — H33.003: ICD-10-CM

## 2018-03-15 DIAGNOSIS — H43.811: ICD-10-CM

## 2018-03-15 DIAGNOSIS — H35.61: ICD-10-CM

## 2018-03-15 DIAGNOSIS — E11.3413: ICD-10-CM

## 2018-03-15 DIAGNOSIS — H35.373: ICD-10-CM

## 2018-03-15 PROCEDURE — 92226 OPHTHALMOSCOPY EXT SUBSEQUENT: CPT | Performed by: OPHTHALMOLOGY

## 2018-03-15 PROCEDURE — 92014 COMPRE OPH EXAM EST PT 1/>: CPT | Performed by: OPHTHALMOLOGY

## 2018-03-15 PROCEDURE — 92235 FLUORESCEIN ANGRPH MLTIFRAME: CPT | Performed by: OPHTHALMOLOGY

## 2018-03-15 PROCEDURE — 92250 FUNDUS PHOTOGRAPHY W/I&R: CPT | Performed by: OPHTHALMOLOGY

## 2018-03-15 PROCEDURE — 92134 CPTRZ OPH DX IMG PST SGM RTA: CPT | Performed by: OPHTHALMOLOGY

## 2018-03-15 ASSESSMENT — REFRACTION_CURRENTRX
OD_OVR_VA: 20/
OS_OVR_VA: 20/
OS_OVR_VA: 20/
OD_OVR_VA: 20/
OD_OVR_VA: 20/
OS_OVR_VA: 20/

## 2018-03-15 ASSESSMENT — VISUAL ACUITY
OD_BCVA: CF 4FT
OS_BCVA: 20/20-2

## 2018-03-15 ASSESSMENT — REFRACTION_MANIFEST
OD_VA3: 20/
OS_VA1: 20/
OS_VA3: 20/
OD_VA2: 20/
OD_VA3: 20/
OU_VA: 20/
OS_VA2: 20/
OS_VA3: 20/
OU_VA: 20/
OS_VA3: 20/
OS_VA1: 20/
OD_VA1: 20/
OD_VA3: 20/
OD_VA1: 20/
OS_VA2: 20/
OD_VA2: 20/
OS_VA1: 20/
OD_VA1: 20/
OU_VA: 20/
OS_VA2: 20/
OD_VA2: 20/

## 2018-03-15 ASSESSMENT — CONFRONTATIONAL VISUAL FIELD TEST (CVF)
OS_FINDINGS: FULL
OD_FINDINGS: FULL

## 2018-03-15 ASSESSMENT — LID EXAM ASSESSMENTS
OD_BLEPHARITIS: RUL 2+
OS_BLEPHARITIS: LUL 2+

## 2018-03-20 ENCOUNTER — APPOINTMENT (OUTPATIENT)
Dept: LAB | Facility: HOSPITAL | Age: 68
End: 2018-03-20
Attending: INTERNAL MEDICINE
Payer: COMMERCIAL

## 2018-03-20 ENCOUNTER — HOSPITAL ENCOUNTER (OUTPATIENT)
Dept: ULTRASOUND IMAGING | Facility: HOSPITAL | Age: 68
Discharge: HOME | End: 2018-03-20
Attending: INTERNAL MEDICINE
Payer: COMMERCIAL

## 2018-03-20 DIAGNOSIS — B19.10 HEPATITIS B: ICD-10-CM

## 2018-03-20 LAB
HIV 1+2 AB+HIV1 P24 AG SERPL QL IA: NORMAL
HIV1 P24 AG SER QL: NORMAL

## 2018-03-20 PROCEDURE — 87806 HIV AG W/HIV1&2 ANTB W/OPTIC: CPT

## 2018-03-20 PROCEDURE — 86803 HEPATITIS C AB TEST: CPT

## 2018-03-20 PROCEDURE — 76705 ECHO EXAM OF ABDOMEN: CPT

## 2018-03-20 PROCEDURE — 36415 COLL VENOUS BLD VENIPUNCTURE: CPT

## 2018-03-20 PROCEDURE — 86708 HEPATITIS A ANTIBODY: CPT

## 2018-03-21 LAB
HAV AB SER QL IA: NORMAL
HCV AB SER QL: NORMAL

## 2018-03-22 ENCOUNTER — TELEPHONE (OUTPATIENT)
Dept: GASTROENTEROLOGY | Facility: MEDICAL CENTER | Age: 68
End: 2018-03-22

## 2018-03-22 DIAGNOSIS — E78.5 HYPERLIPIDEMIA, UNSPECIFIED HYPERLIPIDEMIA TYPE: Primary | ICD-10-CM

## 2018-03-22 RX ORDER — ATORVASTATIN CALCIUM 20 MG/1
20 TABLET, FILM COATED ORAL DAILY
Qty: 90 TABLET | Refills: 1 | Status: SHIPPED | OUTPATIENT
Start: 2018-03-22 | End: 2018-09-10 | Stop reason: SDUPTHER

## 2018-03-22 NOTE — TELEPHONE ENCOUNTER
----- Message from Tyshawn Blanton MD sent at 3/22/2018  2:30 PM EDT -----  Labs showed patient was negative for hep A and hep C and HIV  Patient should get hep A vaccine at PMD's office  Thanks

## 2018-03-22 NOTE — TELEPHONE ENCOUNTER
----- Message from Lam Alvarado MD sent at 3/22/2018  2:34 PM EDT -----  U/S showed kidney cyst was stable  No liver lesions or signs of cirrhosis

## 2018-03-23 NOTE — TELEPHONE ENCOUNTER
Please call pt wife, Marian Sat, back for results  Madhavi Rae stated it is ok to give her the results     [phone]

## 2018-03-26 NOTE — TELEPHONE ENCOUNTER
Pt wife called in, she wants to make sure which vaccine for hep is Estrellita Saran suppose to be getting  She would like to know if it is hep a or hep c  Please advise, if she does not answer please [facility]   [phone]

## 2018-03-27 ENCOUNTER — TELEPHONE (OUTPATIENT)
Dept: FAMILY MEDICINE CLINIC | Facility: CLINIC | Age: 68
End: 2018-03-27

## 2018-03-27 NOTE — TELEPHONE ENCOUNTER
Patient seen Dr Dong Guajardo for gastro and suggested pt get a Hepatitis A vaccine by his PCP  He has an appt with you on 4/3/18, wife wants to know if this can be done at that visit? ?

## 2018-04-03 ENCOUNTER — OFFICE VISIT (OUTPATIENT)
Dept: FAMILY MEDICINE CLINIC | Facility: CLINIC | Age: 68
End: 2018-04-03
Payer: COMMERCIAL

## 2018-04-03 VITALS
BODY MASS INDEX: 27.43 KG/M2 | HEIGHT: 69 IN | DIASTOLIC BLOOD PRESSURE: 76 MMHG | SYSTOLIC BLOOD PRESSURE: 134 MMHG | WEIGHT: 185.2 LBS

## 2018-04-03 DIAGNOSIS — C67.9 MALIGNANT NEOPLASM OF URINARY BLADDER, UNSPECIFIED SITE (HCC): ICD-10-CM

## 2018-04-03 DIAGNOSIS — Z23 NEED FOR HEPATITIS A VACCINATION: ICD-10-CM

## 2018-04-03 DIAGNOSIS — F41.8 DEPRESSION WITH ANXIETY: ICD-10-CM

## 2018-04-03 DIAGNOSIS — E11.9 DIABETES MELLITUS TYPE 2 IN NONOBESE (HCC): Primary | Chronic | ICD-10-CM

## 2018-04-03 PROCEDURE — 90472 IMMUNIZATION ADMIN EACH ADD: CPT | Performed by: FAMILY MEDICINE

## 2018-04-03 PROCEDURE — 90471 IMMUNIZATION ADMIN: CPT | Performed by: FAMILY MEDICINE

## 2018-04-03 PROCEDURE — 99214 OFFICE O/P EST MOD 30 MIN: CPT | Performed by: FAMILY MEDICINE

## 2018-04-03 PROCEDURE — 90632 HEPA VACCINE ADULT IM: CPT | Performed by: FAMILY MEDICINE

## 2018-04-03 RX ORDER — OMEPRAZOLE 20 MG/1
40 CAPSULE, DELAYED RELEASE ORAL
COMMUNITY
Start: 2018-03-12 | End: 2018-05-29

## 2018-04-03 RX ORDER — HYDROCODONE BITARTRATE AND ACETAMINOPHEN 5; 325 MG/1; MG/1
1 TABLET ORAL EVERY 6 HOURS PRN
Qty: 180 TABLET | Refills: 0 | Status: SHIPPED | OUTPATIENT
Start: 2018-04-03 | End: 2018-05-18 | Stop reason: ALTCHOICE

## 2018-04-03 RX ORDER — PAROXETINE 30 MG/1
TABLET, FILM COATED ORAL
COMMUNITY
Start: 2018-03-12 | End: 2018-04-03 | Stop reason: SDUPTHER

## 2018-04-03 RX ORDER — PAROXETINE HYDROCHLORIDE 40 MG/1
40 TABLET, FILM COATED ORAL DAILY
Qty: 90 TABLET | Refills: 3 | Status: SHIPPED | OUTPATIENT
Start: 2018-04-03 | End: 2018-10-12 | Stop reason: SDUPTHER

## 2018-04-03 NOTE — PROGRESS NOTES
Assessment/Plan:  Hepatitis A vaccine #1 Given today  Refilled his Vicodin  We will increase his Paxil to 40 mg daily  He will get a hemoglobin A1c prior to his next visit in 3-4 months  No problem-specific Assessment & Plan notes found for this encounter  Diagnoses and all orders for this visit:    Diabetes mellitus type 2 in nonobese ([facility] [address] )  -     HEMOGLOBIN A1C W/ EAG ESTIMATION    Need for hepatitis A vaccination  -     HEPATITIS A VACCINE ADULT IM    Depression with anxiety  -     PARoxetine (PAXIL) 40 MG tablet; Take 1 tablet (40 mg total) by mouth daily    Malignant neoplasm of urinary bladder, unspecified site (HCC)  -     HYDROcodone-acetaminophen (NORCO) 5-325 mg per tablet; Take 1 tablet by mouth every 6 (six) hours as needed for pain Max Daily Amount: 4 tablets    Other orders  -     omeprazole (PriLOSEC) 20 mg delayed release capsule;   -     Discontinue: PARoxetine (PAXIL) 30 mg tablet;           Subjective:      Patient ID: Sunny Hauser is a 79 y o  male  Patient here today with his wife  Patient denies any chest pain or shortness of breath  Patient is in need of a hepatitis a vaccine due to his recent discovery of hepatitis B  patient has been feeling more depressed lately  No SI or HI  Diabetes   He presents for his follow-up diabetic visit  He has type 2 diabetes mellitus  His disease course has been stable  There are no hypoglycemic associated symptoms  Symptoms are stable  Diabetic complications include nephropathy  Risk factors for coronary artery disease include dyslipidemia, hypertension, male sex and diabetes mellitus  Current diabetic treatment includes insulin injections  He is compliant with treatment all of the time  His weight is stable  He is following a generally healthy diet  When asked about meal planning, he reported none  There is no change in his home blood glucose trend  An ACE inhibitor/angiotensin II receptor blocker is contraindicated   He sees a podiatrist Eye exam is current  Depression   This is a chronic problem  The problem occurs constantly  The problem has been gradually worsening  The following portions of the patient's history were reviewed and updated as appropriate:   He  has a past medical history of AAA (abdominal aortic aneurysm) ([place] [address] ); Abnormal liver function test; Anemia; Anxiety; Arthritis; Cancer ([place] [address] ); Cardiac disorder; Chronic kidney disease; Chronic pain disorder; Depression; Diabetes mellitus ([place] [address] ); Dialysis patient [facility]); Fracture of carpal bone; Fractures; GERD (gastroesophageal reflux disease); Gout; Hearing difficulty of both ears; Hepatitis A; Hyperlipidemia; Hyperlipidemia; Hypertension; Kidney stone; Migraine; Neuropathy; Nicotine dependence; Pancreatitis; Vitamin D deficiency; and Wears glasses  He   Patient Active Problem List    Diagnosis Date Noted    Acute UTI 03/22/2017    Ascending aortic aneurysm ([place] [address] ) 10/03/2016    Malignant tumor of urinary bladder ([place] [address] ) 07/07/2016    End stage renal disease ([place] [address] ) 06/29/2016    Diabetes mellitus type 2 in nonobese ([place] [address] ) 06/05/2016    CKD (chronic kidney disease) stage 4, GFR 15-29 ml/min ([facility]) 06/03/2016    History of anemia due to chronic kidney disease 06/01/2016    Acute pancreatitis 04/26/2016    HTN (hypertension) 04/26/2016    HLD (hyperlipidemia) 04/26/2016    Depression with anxiety 10/21/2015     He  has a past surgical history that includes Hernia repair; Eye surgery; Hernia repair; pr cystourethroscopy,fulgur <0 5 cm lesn (N/A, 2/6/2017); pr cystourethroscopy,fulgur 0 5-2 cm lesn (N/A, 2/6/2017); pr cystourethroscopy,biopsy (N/A, 8/15/2016); pr anastomosis,av,any site (Right, 6/29/2016); pr cysto/uretero w/lithotripsy &indwell stent insrt (Right, 6/17/2016); pr cystourethroscopy,ureter catheter (Right, 6/1/2016); pr colonoscopy flx dx w/collj spec when pfrmd (N/A, 3/4/2016); pr egd transoral biopsy single/multiple (N/A, 3/4/2016);  Vascular surgery (Right); Eye surgery (Bilateral); Cataract extraction (Bilateral); Cystoscopy; Esophagogastroduodenoscopy; Colonoscopy; Dental surgery; pr cystourethroscopy,fulgur 0 5-2 cm lesn (N/A, 6/14/2017); CYSTOSCOPY (N/A, 6/14/2017); pr cystourethroscopy (N/A, 7/13/2017); pr cystourethroscopy w/irrig & evac clots (N/A, 7/13/2017); Cystostomy w/ bladder biopsy; Bladder fulguration (06/17/2016); Cystoscopy w/ ureteral stent placement (Right, 06/01/2016); and Cystoscopy w/ ureteroscopy (06/17/2016)  His family history includes Cancer in his maternal grandmother; Depression in his mother; Diabetes in his father and mother; Heart disease in his father and mother; Hypertension in his father and mother; Kidney disease in his mother; Stroke in his father; Thyroid disease in his sister  He  reports that he quit smoking about 33 years ago  He has never used smokeless tobacco  He reports that he drinks alcohol  He reports that he does not use drugs  Current Outpatient Prescriptions   Medication Sig Dispense Refill    allopurinol (ZYLOPRIM) 100 mg tablet Take 100 mg by mouth daily   ALPRAZolam (XANAX) 0 5 mg tablet Take 0 5 mg by mouth daily as needed for anxiety   ampicillin (PRINCIPEN) 250 mg capsule Take 250 mg by mouth every 12 (twelve) hours Current treatment for UTI      atorvastatin (LIPITOR) 20 mg tablet Take 1 tablet (20 mg total) by mouth daily 90 tablet 1    B Complex-C-Folic Acid (TRIPHROCAPS) 1 MG CAPS Take 1 mg by mouth daily      butalbital-acetaminophen-caffeine-codeine (FIORICET WITH CODEINE) -76-30 MG per capsule Take 1 capsule by mouth every 4 (four) hours as needed for headaches   calcium acetate (PHOSLO) 667 mg capsule Take 667 mg by mouth daily   Cholecalciferol (VITAMIN D-3 PO) Take 1 capsule by mouth once a week ON sunday       colchicine-probenecid 0 5-500 MG per tablet Take 1 tablet by mouth daily        gemfibrozil (LOPID) 600 mg tablet Take 600 mg by mouth 2 (two) times a day before meals       HYDROcodone-acetaminophen (NORCO) 5-325 mg per tablet Take 1 tablet by mouth every 6 (six) hours as needed for pain Max Daily Amount: 4 tablets 180 tablet 0    insulin glargine (LANTUS) 100 units/mL subcutaneous injection Inject 15 Units under the skin daily at bedtime        loratadine (CLARITIN) 10 mg tablet Take 10 mg by mouth as needed        metoprolol tartrate (LOPRESSOR) 25 mg tablet Take 12 5 mg by mouth every other day        omeprazole (PriLOSEC) 40 MG capsule Take 40 mg by mouth daily      ondansetron (ZOFRAN) 4 mg tablet Take 4 mg by mouth 4 (four) times a day as needed for nausea or vomiting   oxybutynin (DITROPAN-XL) 5 mg 24 hr tablet Take 1 tablet by mouth daily (Patient taking differently: Take 5 mg by mouth as needed  ) 10 tablet 0    PARoxetine (PAXIL) 40 MG tablet Take 1 tablet (40 mg total) by mouth daily 90 tablet 3    omeprazole (PriLOSEC) 20 mg delayed release capsule       PARoxetine (PAXIL) 20 mg tablet Take 30 mg by mouth every morning         No current facility-administered medications for this visit  Current Outpatient Prescriptions on File Prior to Visit   Medication Sig    allopurinol (ZYLOPRIM) 100 mg tablet Take 100 mg by mouth daily   ALPRAZolam (XANAX) 0 5 mg tablet Take 0 5 mg by mouth daily as needed for anxiety   ampicillin (PRINCIPEN) 250 mg capsule Take 250 mg by mouth every 12 (twelve) hours Current treatment for UTI    atorvastatin (LIPITOR) 20 mg tablet Take 1 tablet (20 mg total) by mouth daily    B Complex-C-Folic Acid (TRIPHROCAPS) 1 MG CAPS Take 1 mg by mouth daily    butalbital-acetaminophen-caffeine-codeine (FIORICET WITH CODEINE) -90-30 MG per capsule Take 1 capsule by mouth every 4 (four) hours as needed for headaches   calcium acetate (PHOSLO) 667 mg capsule Take 667 mg by mouth daily      Cholecalciferol (VITAMIN D-3 PO) Take 1 capsule by mouth once a week ON sunday     colchicine-probenecid 0 5-500 MG per tablet Take 1 tablet by mouth daily   gemfibrozil (LOPID) 600 mg tablet Take 600 mg by mouth 2 (two) times a day before meals   insulin glargine (LANTUS) 100 units/mL subcutaneous injection Inject 15 Units under the skin daily at bedtime      loratadine (CLARITIN) 10 mg tablet Take 10 mg by mouth as needed      metoprolol tartrate (LOPRESSOR) 25 mg tablet Take 12 5 mg by mouth every other day      omeprazole (PriLOSEC) 40 MG capsule Take 40 mg by mouth daily    ondansetron (ZOFRAN) 4 mg tablet Take 4 mg by mouth 4 (four) times a day as needed for nausea or vomiting   oxybutynin (DITROPAN-XL) 5 mg 24 hr tablet Take 1 tablet by mouth daily (Patient taking differently: Take 5 mg by mouth as needed  )    [DISCONTINUED] HYDROcodone-acetaminophen (NORCO) 5-325 mg per tablet Take 1 tablet by mouth every 6 (six) hours as needed for moderate pain   PARoxetine (PAXIL) 20 mg tablet Take 30 mg by mouth every morning       No current facility-administered medications on file prior to visit  He is allergic to heparin; lovenox [enoxaparin sodium]; nsaids; and cardura [doxazosin]       Review of Systems   Constitutional: Negative  Respiratory: Negative  Cardiovascular: Negative  Gastrointestinal: Negative  Endocrine:        As per HPI   Genitourinary:        On dialysis   Psychiatric/Behavioral: Positive for depression  Negative for suicidal ideas  Objective:      /76   Ht 5' 9" (1 753 m)   Wt 84 kg (185 lb 3 2 oz)   BMI 27 35 kg/m²          Physical Exam   Constitutional: He is oriented to person, place, and time  He appears well-developed and well-nourished  No distress  Cardiovascular: Normal rate, regular rhythm and normal heart sounds  No murmur heard  Pulmonary/Chest: Effort normal and breath sounds normal  No respiratory distress  He has no wheezes  He has no rales  Musculoskeletal: He exhibits no edema  Neurological: He is alert and oriented to person, place, and time  Psychiatric: He has a normal mood and affect   His behavior is normal

## 2018-04-18 DIAGNOSIS — Z79.4 TYPE 2 DIABETES MELLITUS WITH COMPLICATION, WITH LONG-TERM CURRENT USE OF INSULIN (HCC): Primary | ICD-10-CM

## 2018-04-18 DIAGNOSIS — E11.8 TYPE 2 DIABETES MELLITUS WITH COMPLICATION, WITH LONG-TERM CURRENT USE OF INSULIN (HCC): Primary | ICD-10-CM

## 2018-04-18 RX ORDER — INSULIN GLARGINE 100 [IU]/ML
20 INJECTION, SOLUTION SUBCUTANEOUS
Qty: 30 ML | Refills: 3 | Status: SHIPPED | OUTPATIENT
Start: 2018-04-18 | End: 2018-07-10 | Stop reason: SDUPTHER

## 2018-04-19 ENCOUNTER — OFFICE VISIT (OUTPATIENT)
Dept: UROLOGY | Facility: CLINIC | Age: 68
End: 2018-04-19
Payer: COMMERCIAL

## 2018-04-19 VITALS
SYSTOLIC BLOOD PRESSURE: 140 MMHG | HEART RATE: 116 BPM | BODY MASS INDEX: 26.51 KG/M2 | WEIGHT: 179 LBS | DIASTOLIC BLOOD PRESSURE: 76 MMHG | HEIGHT: 69 IN

## 2018-04-19 DIAGNOSIS — C67.9 MALIGNANT NEOPLASM OF URINARY BLADDER, UNSPECIFIED SITE (HCC): Primary | ICD-10-CM

## 2018-04-19 PROCEDURE — 99213 OFFICE O/P EST LOW 20 MIN: CPT | Performed by: UROLOGY

## 2018-04-19 NOTE — PROGRESS NOTES
UROLOGY ROUTINE FOLLOW UP NOTE     CHIEF COMPLAINT   Abbi Delgado is a 79 y o  male with a complaint of   Chief Complaint   Patient presents with    Bladder Cancer     discuss treatment       History of Present Illness:     Yair Carvalho is a 79year old male with a history of high-grade T1 bladder cancer  Patient underwent induction 6 weeks of BCG in our office  His routine SWOG maintenance course of BCG was complicated by multiple infections requiring treatment with ampicillin  The patient developed hematuria and clot retention  Ultimately, we decided that the patient should receive maintenance therapy with mitomycin in lieu of BCG given the patient's issues with recurrent infections and the need to delay his treatment  He underwent a course of 3 weeks of maintenance in August 2017  He underwent an additional course of mitomycin in February 2018  Patient did have nausea and vomiting throughout  Both BCG and mitomycin have been extremely morbid for this patient  He and his wife present today to discuss discussed cessation of treatment      Past Medical History:     Past Medical History:   Diagnosis Date    AAA (abdominal aortic aneurysm) (HCC)     Abnormal liver function test     Anemia     Anxiety     Arthritis     Cancer (HCC)     bladder    Cardiac disorder     Chronic kidney disease     renal failure    Chronic pain disorder     back and legs    Depression     [facility] [address] [facility]     [facility] patient [facility] [address] [facility]     Fracture of carpal bone     Fractures     spinal compression fx,closed fx of wrist & ankle    GERD (gastroesophageal reflux disease)     Gout     Hearing difficulty of both ears     Hepatitis A     A - remission since 1970's    Hyperlipidemia     Hyperlipidemia     Hypertension     Kidney stone     Migraine     Neuropathy     Nicotine dependence     Pancreatitis     Vitamin D deficiency     Wears glasses        PAST SURGICAL HISTORY:     Past Surgical History: Procedure Laterality Date    BLADDER FULGURATION  06/17/2016    Cystoscopy with fulguration medium lesion (2-5 cm)    CATARACT EXTRACTION Bilateral     congenital    COLONOSCOPY      CYSTOSCOPY      Diagnostic 5/31/17, 10/10/17    CYSTOSCOPY N/A 6/14/2017    Procedure: CYSTOSCOPY WITH BLADDER  BIOPSIES WITH FULGURATION;  Surgeon: Danyelle Miller MD;  [facility];  Service: Urology    [address] [place] [address] [facility] Right 06/01/2016    CYSTOSCOPY W/ URETEROSCOPY  06/17/2016    With removal of calculus    CYSTOSTOMY W/ BLADDER BIOPSY      9/6/26, 2/6/17    DENTAL SURGERY      ESOPHAGOGASTRODUODENOSCOPY      EYE SURGERY      catatact    EYE SURGERY Bilateral     Left eye detachment R eye retinal tear    HERNIA REPAIR      HERNIA REPAIR      L inguinal w/mesh;umbilical herniorraphy    NH ANASTOMOSIS,AV,ANY SITE Right 6/29/2016    Procedure: LOWER FOREARM AV FISTULA;  Surgeon: Melissa Tinsley MD;  [facility];  Service: Vascular    NH COLONOSCOPY FLX DX W/COLLJ SPEC WHEN PFRMD N/A 3/4/2016    Procedure: COLONOSCOPY;  Surgeon: Rosendo Pineda MD;  [facility];  Service: Gastroenterology    NH CYSTO/URETERO W/LITHOTRIPSY &INDWELL STENT INSRT Right 6/17/2016    Procedure: CYSTOSCOPY URETEROSCOPY WITH LITHOTRIPSY HOLMIUM LASER,BASKET STONE EXTRACTION, RETROGRADE PYELOGRAM AND INSERTION STENT URETERAL;  Surgeon: Anil Morrison MD;  Location:  MAIN OR;  Service: Urology    NH CYSTOURETHROSCOPY N/A 7/13/2017    Procedure: CYSTOSCOPY;  Surgeon: Danyelle Miller MD;  [facility];  Service: Urology    NH CYSTOURETHROSCOPY W/IRRIG & EVAC CLOTS N/A 7/13/2017    Procedure: CYSTOSCOPY EVACUATION OF CLOTS,POSSIBLE FULGURATION;  Surgeon: Danyelle Miller MD;  [facility];  Service: Urology    NH CYSTOURETHROSCOPY,BIOPSY N/A 8/15/2016    Procedure: CYSTOSCOPY WITH RE BIOPSY OF BLADDER;  Surgeon: Bolivar Ortega MD;  [facility];  Service: Urology    CO CYSTOURETHROSCOPY,FULGUR 0 5-2 CM LESN N/A 2/6/2017    Procedure: TRANSURETHRAL RESECTION OF BLADDER TUMOR (TURBT); Surgeon: Mic Marie MD;  [facility];  Service: Urology    CO CYSTOURETHROSCOPY,FULGUR 0 5-2 CM LESN N/A 6/14/2017    Procedure: TRANSURETHRAL RESECTION OF BLADDER TUMOR (TURBT); Surgeon: Mic Marie MD;  [facility];  Service: Urology    CO CYSTOURETHROSCOPY,FULGUR <0 5 CM LESN N/A 2/6/2017    Procedure: Wells Banco; BLADDER BIOPSY WITH Denton Gross;  Surgeon: Mic Marie MD;  [facility];  Service: Urology    CO CYSTOURETHROSCOPY,URETER CATHETER Right 6/1/2016    Procedure: CYSTOSCOPY RETROGRADE PYELOGRAM WITH INSERTION STENT URETERAL;  Surgeon: Chaitanya Lopez MD;  Location:  MAIN OR;  Service: Urology    CO EGD TRANSORAL BIOPSY SINGLE/MULTIPLE N/A 3/4/2016    Procedure: ESOPHAGOGASTRODUODENOSCOPY (EGD); Surgeon: Gabi Kennedy MD;  [facility];  Service: Gastroenterology    VASCULAR SURGERY Right     AV fistula       CURRENT MEDICATIONS:     Current Outpatient Prescriptions   Medication Sig Dispense Refill    allopurinol (ZYLOPRIM) 100 mg tablet Take 100 mg by mouth daily   ALPRAZolam (XANAX) 0 5 mg tablet Take 0 5 mg by mouth daily as needed for anxiety   ampicillin (PRINCIPEN) 250 mg capsule Take 250 mg by mouth every 12 (twelve) hours Current treatment for UTI      atorvastatin (LIPITOR) 20 mg tablet Take 1 tablet (20 mg total) by mouth daily 90 tablet 1    B Complex-C-Folic Acid (TRIPHROCAPS) 1 MG CAPS Take 1 mg by mouth daily      butalbital-acetaminophen-caffeine-codeine (FIORICET WITH CODEINE) -47-30 MG per capsule Take 1 capsule by mouth every 4 (four) hours as needed for headaches   calcium acetate (PHOSLO) 667 mg capsule Take 667 mg by mouth daily        Cholecalciferol (VITAMIN D-3 PO) Take 1 capsule by mouth once a week ON sunday       colchicine-probenecid 0 5-500 MG per tablet Take 1 tablet by mouth daily   gemfibrozil (LOPID) 600 mg tablet Take 600 mg by mouth 2 (two) times a day before meals   HYDROcodone-acetaminophen (NORCO) 5-325 mg per tablet Take 1 tablet by mouth every 6 (six) hours as needed for pain Max Daily Amount: 4 tablets 180 tablet 0    insulin glargine (LANTUS) 100 units/mL subcutaneous injection Inject 20 Units under the skin daily at bedtime 30 mL 3    loratadine (CLARITIN) 10 mg tablet Take 10 mg by mouth as needed        metoprolol tartrate (LOPRESSOR) 25 mg tablet Take 12 5 mg by mouth every other day        omeprazole (PriLOSEC) 20 mg delayed release capsule       omeprazole (PriLOSEC) 40 MG capsule Take 40 mg by mouth daily      ondansetron (ZOFRAN) 4 mg tablet Take 4 mg by mouth 4 (four) times a day as needed for nausea or vomiting   oxybutynin (DITROPAN-XL) 5 mg 24 hr tablet Take 1 tablet by mouth daily (Patient taking differently: Take 5 mg by mouth as needed  ) 10 tablet 0    PARoxetine (PAXIL) 20 mg tablet Take 30 mg by mouth every morning        PARoxetine (PAXIL) 40 MG tablet Take 1 tablet (40 mg total) by mouth daily 90 tablet 3     No current facility-administered medications for this visit          ALLERGIES:     Allergies   Allergen Reactions    Heparin      Avoids due to eye conditions;can't have any med that may cause bleeding    Lovenox [Enoxaparin Sodium]      Avoids due to eye conditions;can't have any med that may cause bleeding in eyes    Nsaids      Avoids due to renal failure    Cardura [Doxazosin] Palpitations       SOCIAL HISTORY:     Social History     Social History    Marital status: /Civil Union     Spouse name: N/A    Number of children: N/A    Years of education: N/A     Social History Main Topics    Smoking status: Former Smoker     Quit date: 1985    Smokeless tobacco: Never Used    Alcohol use Yes      Comment: rarely, social    Drug use: No    Sexual activity: Yes     Partners: Female Other Topics Concern    Not on file     Social History Narrative    Advance directive on file    Copy of advanced directive obtained from patient    History of no living will    Patient has living will       SOCIAL HISTORY:     Family History   Problem Relation Age of Onset    Depression Mother     Diabetes Mother     Heart disease Mother     Hypertension Mother     Kidney disease Mother     Diabetes Father     Heart disease Father      Cardiac disorder    Hypertension Father     Stroke Father     Thyroid disease Sister     Cancer Maternal Grandmother        REVIEW OF SYSTEMS:     Review of Systems   Constitutional: Positive for fatigue  HENT: Negative  Respiratory: Positive for chest tightness and shortness of breath  Cardiovascular: Positive for chest pain  Gastrointestinal: Negative  Genitourinary: Positive for hematuria, penile pain and urgency  Musculoskeletal: Positive for arthralgias, back pain and gait problem  Skin: Negative  Psychiatric/Behavioral: Negative  PHYSICAL EXAM:     /76   Pulse (!) 116   Ht 5' 9" (1 753 m)   Wt 81 2 kg (179 lb)   BMI 26 43 kg/m²     General:  Ill-appearing male older than stated age  Has some jaundice  Hard of hearing  HEENT:  Normocephalic, atraumatic  Neck is supple without any palpable lymphadenopathy  Cardiovascular:  Patient has normal palpable distal radial pulses  There is no significant peripheral edema  No JVD is noted  Respiratory:  Patient has course respirations  There is audible wheeze  Abdomen:  Abdomen is obese but not distended or tender  Musculoskeletal:  Shuffling gait  Dermatologic:  Patient has no skin abnormalities or rashes        LABS:     CBC:   Lab Results   Component Value Date    WBC 7 81 06/05/2017    HGB 10 4 (L) 06/05/2017    HCT 31 7 (L) 06/05/2017     (H) 06/05/2017     06/05/2017       BMP:   Lab Results   Component Value Date    GLUCOSE 150 (H) 06/05/2017    CALCIUM 9 7 06/05/2017     06/05/2017    K 5 0 06/12/2017    CO2 27 06/05/2017    CL 92 (L) 06/05/2017    BUN 88 (H) 06/05/2017    CREATININE 9 77 (H) 06/05/2017       PATHOLOGY:     6/17/16  Final Diagnosis   A  Urinary bladder tumor:     - Invasive low to focally high grade papillary urothelial carcinoma  - Carcinoma invades lamina propria  - No muscularis propria is identified in the submitted tissue  ASSESSMENT:     79 y o  male with HG T1 bladder cancer who underwent stunted intra vesicle treatments over the last year complicated by comorbidities, infection, and poor tolerance of BCG and mitomycin    PLAN:     At this point time, the patient and his wife understand that he had a low to high-grade invasive T1 bladder cancer  Although there was no muscle initially in the specimen, Re resections have not demonstrated any muscle invasive disease  The patient failed BCG therapy due to recurrent infections and his weakened immunocompromised state from his multiple other medical comorbidities  We then transitioned him to mitomycin  The patient has had significant morbidity from this as well including hematuria and bladder irritation  At this point time, he has refused additional mitomycin or intravesical treatments  He instead would like a break from therapy and is interested in returning in the summer for a surveillance cystoscopy  I have also recommended a CT scan of the chest abdomen pelvis  The patient is on dialysis and so I have ordered this film with contrast   It will be done the day before his dialysis in order to dialyze off the contrast material     Patient and his wife understand that by losing out on the opportunity to follow the standard protocol, there is certainly a risk of disease progression or metastasis  They are comfortable with this risk    I will see them back in the summer

## 2018-04-20 ENCOUNTER — TELEPHONE (OUTPATIENT)
Dept: FAMILY MEDICINE CLINIC | Facility: CLINIC | Age: 68
End: 2018-04-20

## 2018-04-20 DIAGNOSIS — B18.1 CHRONIC VIRAL HEPATITIS B WITHOUT DELTA AGENT AND WITHOUT COMA (HCC): Primary | ICD-10-CM

## 2018-04-22 ENCOUNTER — APPOINTMENT (OUTPATIENT)
Dept: LAB | Facility: HOSPITAL | Age: 68
End: 2018-04-22
Payer: COMMERCIAL

## 2018-04-22 DIAGNOSIS — B18.1 CHRONIC VIRAL HEPATITIS B WITHOUT DELTA AGENT AND WITHOUT COMA (HCC): ICD-10-CM

## 2018-04-22 LAB
ALBUMIN SERPL BCP-MCNC: 3 G/DL (ref 3.5–5)
ALP SERPL-CCNC: 173 U/L (ref 46–116)
ALT SERPL W P-5'-P-CCNC: 391 U/L (ref 12–78)
AMMONIA PLAS-SCNC: 23 UMOL/L (ref 11–35)
ANION GAP SERPL CALCULATED.3IONS-SCNC: 16 MMOL/L (ref 4–13)
AST SERPL W P-5'-P-CCNC: 224 U/L (ref 5–45)
BILIRUB DIRECT SERPL-MCNC: 4.96 MG/DL (ref 0–0.2)
BILIRUB SERPL-MCNC: 6 MG/DL (ref 0.2–1)
BUN SERPL-MCNC: 60 MG/DL (ref 5–25)
CALCIUM SERPL-MCNC: 9.6 MG/DL (ref 8.3–10.1)
CHLORIDE SERPL-SCNC: 92 MMOL/L (ref 100–108)
CO2 SERPL-SCNC: 29 MMOL/L (ref 21–32)
CREAT SERPL-MCNC: 9.38 MG/DL (ref 0.6–1.3)
EST. AVERAGE GLUCOSE BLD GHB EST-MCNC: 111 MG/DL
GFR SERPL CREATININE-BSD FRML MDRD: 5 ML/MIN/1.73SQ M
GLUCOSE P FAST SERPL-MCNC: 154 MG/DL (ref 65–99)
HBA1C MFR BLD: 5.5 % (ref 4.2–6.3)
POTASSIUM SERPL-SCNC: 4.6 MMOL/L (ref 3.5–5.3)
PROT SERPL-MCNC: 7 G/DL (ref 6.4–8.2)
SODIUM SERPL-SCNC: 137 MMOL/L (ref 136–145)

## 2018-04-22 PROCEDURE — 83036 HEMOGLOBIN GLYCOSYLATED A1C: CPT | Performed by: FAMILY MEDICINE

## 2018-04-22 PROCEDURE — 82140 ASSAY OF AMMONIA: CPT

## 2018-04-22 PROCEDURE — 80053 COMPREHEN METABOLIC PANEL: CPT

## 2018-04-22 PROCEDURE — 36415 COLL VENOUS BLD VENIPUNCTURE: CPT | Performed by: FAMILY MEDICINE

## 2018-04-22 PROCEDURE — 82248 BILIRUBIN DIRECT: CPT

## 2018-04-23 ENCOUNTER — TELEPHONE (OUTPATIENT)
Dept: GASTROENTEROLOGY | Facility: CLINIC | Age: 68
End: 2018-04-23

## 2018-04-23 NOTE — TELEPHONE ENCOUNTER
DR Guanako Sanchez PT    Pt spouse called to notify DR Destin Norman of pt jaundice symptoms  Pt have the yellowing of the skin but no pain  Pt was advise to visit the ER but need to speak to DR Destin Norman in order for the doctor to prescribed an antiviral medication  This started the end of last week        Cell before 2:30pm #[phone], spouse work #[phone]

## 2018-04-24 DIAGNOSIS — R17 JAUNDICE: ICD-10-CM

## 2018-04-24 DIAGNOSIS — R79.89 ELEVATED LFTS: Primary | ICD-10-CM

## 2018-04-24 NOTE — TELEPHONE ENCOUNTER
Thank you  I can not find the 3/5 lab result  I would like to see the patient in the office on Thursday if possible, you can call the office and add him on  Can you also order a viral load for him to be done today or tomorrow so that I can initiate his treatment when I see him on Thursday  Thanks

## 2018-04-26 ENCOUNTER — OFFICE VISIT (OUTPATIENT)
Dept: GASTROENTEROLOGY | Facility: CLINIC | Age: 68
End: 2018-04-26
Payer: COMMERCIAL

## 2018-04-26 ENCOUNTER — APPOINTMENT (OUTPATIENT)
Dept: LAB | Facility: HOSPITAL | Age: 68
End: 2018-04-26
Attending: INTERNAL MEDICINE
Payer: COMMERCIAL

## 2018-04-26 VITALS
HEIGHT: 69 IN | TEMPERATURE: 98.3 F | DIASTOLIC BLOOD PRESSURE: 89 MMHG | BODY MASS INDEX: 26.36 KG/M2 | WEIGHT: 178 LBS | HEART RATE: 109 BPM | SYSTOLIC BLOOD PRESSURE: 138 MMHG

## 2018-04-26 DIAGNOSIS — B19.10 HEPATITIS B: Primary | ICD-10-CM

## 2018-04-26 DIAGNOSIS — B19.10 HEPATITIS B: ICD-10-CM

## 2018-04-26 LAB
ALBUMIN SERPL BCP-MCNC: 3.1 G/DL (ref 3.5–5)
ALBUMIN SERPL BCP-MCNC: 3.1 G/DL (ref 3.5–5)
ALP SERPL-CCNC: 156 U/L (ref 46–116)
ALP SERPL-CCNC: 161 U/L (ref 46–116)
ALT SERPL W P-5'-P-CCNC: 380 U/L (ref 12–78)
ALT SERPL W P-5'-P-CCNC: 394 U/L (ref 12–78)
AMYLASE SERPL-CCNC: 246 IU/L (ref 25–115)
AST SERPL W P-5'-P-CCNC: 285 U/L (ref 5–45)
AST SERPL W P-5'-P-CCNC: 294 U/L (ref 5–45)
BILIRUB DIRECT SERPL-MCNC: 6.05 MG/DL (ref 0–0.2)
BILIRUB DIRECT SERPL-MCNC: 6.26 MG/DL (ref 0–0.2)
BILIRUB SERPL-MCNC: 7.3 MG/DL (ref 0.2–1)
BILIRUB SERPL-MCNC: 7.3 MG/DL (ref 0.2–1)
LIPASE SERPL-CCNC: 961 U/L (ref 73–393)
PROT SERPL-MCNC: 7.3 G/DL (ref 6.4–8.2)
PROT SERPL-MCNC: 7.4 G/DL (ref 6.4–8.2)

## 2018-04-26 PROCEDURE — 82150 ASSAY OF AMYLASE: CPT

## 2018-04-26 PROCEDURE — 86705 HEP B CORE ANTIBODY IGM: CPT

## 2018-04-26 PROCEDURE — 86707 HEPATITIS BE ANTIBODY: CPT

## 2018-04-26 PROCEDURE — 87350 HEPATITIS BE AG IA: CPT

## 2018-04-26 PROCEDURE — 83690 ASSAY OF LIPASE: CPT

## 2018-04-26 PROCEDURE — 36415 COLL VENOUS BLD VENIPUNCTURE: CPT

## 2018-04-26 PROCEDURE — 99214 OFFICE O/P EST MOD 30 MIN: CPT | Performed by: INTERNAL MEDICINE

## 2018-04-26 PROCEDURE — 80076 HEPATIC FUNCTION PANEL: CPT

## 2018-04-26 PROCEDURE — 86704 HEP B CORE ANTIBODY TOTAL: CPT

## 2018-04-26 PROCEDURE — 87517 HEPATITIS B DNA QUANT: CPT

## 2018-04-26 RX ORDER — GUAIFENESIN 200 MG/1
400 TABLET ORAL 2 TIMES DAILY
COMMUNITY

## 2018-04-26 NOTE — PROGRESS NOTES
[facility] - Outpatient Follow-up Note  Jairo Curling 79 y o  male MRN: [id number]  Encounter: [id number]          ASSESSMENT AND PLAN:      1  Hepatitis B    - Pending VL  - his acute exacerbation of the liver function likely secondary to acute reactivation of chronic hepatitis B    - I called his dialysis center and was trying to contact Dr Kala Vigil who is his nephrologist    - Patient likely need to start treatment with entecavir or tenofovir  I want to discuss with Dr Kala Vigil prior to start him on the treatment as he has ESRD on HD  The medication dose will need to be adjusted  - repeat hepatic function in 1 week  - MRCP to rule out other causes of acute onset of painless jaundice        - Hepatic function panel; Future  - Hepatitis B e antigen; Future  - Hepatitis B e antibody; Future  - Hepatitis B core antibody, IgM; Future  - Hepatitis B core antibody, total; Future  - Hepatitis B Virus DNA, Qn PCR W/Refl HBV Genotype; Future  - MRI abdomen wo contrast and mrcp; Future    ______________________________________________________________________    SUBJECTIVE: 78 yo M presented for follow up  Patient was diagnosed with hepatitis B in February and he was evaluated by me in office in March  During last visit,  patient had normal liver function test   His wife noticed he started having jaundiced in the past few days and repeat liver function tests showed significant today elevated AST and ALT and bilirubin  His AST is 294, , bilirubin was 7 3  Patient denies increased abdominal girth or leg swelling  His wife reported his appetite decreased  REVIEW OF SYSTEMS IS OTHERWISE NEGATIVE        Historical Information   Past Medical History:   Diagnosis Date    AAA (abdominal aortic aneurysm) (HCC)     Abnormal liver function test     Anemia     Anxiety     Arthritis     Cancer (HCC)     bladder    Cardiac disorder     Chronic kidney disease     renal failure    Chronic pain disorder     back and legs    Depression     [facility] [address] [facility]     Dialysis patient ([facility] [address] [facility]     Fracture of carpal bone     Fractures     spinal compression fx,closed fx of wrist & ankle    GERD (gastroesophageal reflux disease)     Gout     Hearing difficulty of both ears     Hepatitis A     A - remission since 1970's    Hyperlipidemia     Hyperlipidemia     Hypertension     Kidney stone     Migraine     Neuropathy     Nicotine dependence     Pancreatitis     Vitamin D deficiency     Wears glasses      Past Surgical History:   Procedure Laterality Date    BLADDER FULGURATION  06/17/2016    Cystoscopy with fulguration medium lesion (2-5 cm)    CATARACT EXTRACTION Bilateral     congenital    COLONOSCOPY      CYSTOSCOPY      Diagnostic 5/31/17, 10/10/17    CYSTOSCOPY N/A 6/14/2017    Procedure: CYSTOSCOPY WITH BLADDER  BIOPSIES WITH FULGURATION;  Surgeon: Erlinda Lawler MD;  [facility];  Service: Urology    CYSTOSCOPY W/ URETERAL STENT PLACEMENT Right 06/01/2016    CYSTOSCOPY W/ URETEROSCOPY  06/17/2016    With removal of calculus    CYSTOSTOMY W/ BLADDER BIOPSY      9/6/26, 2/6/17    DENTAL SURGERY      ESOPHAGOGASTRODUODENOSCOPY      EYE SURGERY      catatact    EYE SURGERY Bilateral     Left eye detachment R eye retinal tear    HERNIA REPAIR      HERNIA REPAIR      L inguinal w/mesh;umbilical herniorraphy    HI ANASTOMOSIS,AV,ANY SITE Right 6/29/2016    Procedure: LOWER FOREARM AV FISTULA;  Surgeon: Maddy Smiley MD;  [facility];  Service: Vascular    HI COLONOSCOPY FLX DX W/COLLJ SPEC WHEN PFRMD N/A 3/4/2016    Procedure: COLONOSCOPY;  Surgeon: Aracelis Ahumada MD;  [facility];  Service: Gastroenterology    HI CYSTO/URETERO W/LITHOTRIPSY &INDWELL STENT INSRT Right 6/17/2016    Procedure: CYSTOSCOPY URETEROSCOPY WITH LITHOTRIPSY HOLMIUM LASER,BASKET STONE EXTRACTION, RETROGRADE PYELOGRAM AND INSERTION STENT URETERAL;  Surgeon: Diamond Brambila MD;  [facility];  Service: Urology    CO CYSTOURETHROSCOPY N/A 7/13/2017    Procedure: Sydelle Boys;  Surgeon: Gita Valdez MD;  [facility];  Service: Urology    CO CYSTOURETHROSCOPY W/IRRIG & EVAC CLOTS N/A 7/13/2017    Procedure: CYSTOSCOPY EVACUATION OF CLOTS,POSSIBLE FULGURATION;  Surgeon: Gita Valdez MD;  [facility];  Service: Urology    CO CYSTOURETHROSCOPY,BIOPSY N/A 8/15/2016    Procedure: CYSTOSCOPY WITH RE BIOPSY OF BLADDER;  Surgeon: Jeff Fragoso MD;  [facility];  Service: Urology    CO CYSTOURETHROSCOPY,FULGUR 0 5-2 CM LESN N/A 2/6/2017    Procedure: TRANSURETHRAL RESECTION OF BLADDER TUMOR (TURBT); Surgeon: Gita Valdez MD;  [facility];  Service: Urology    CO CYSTOURETHROSCOPY,FULGUR 0 5-2 CM LESN N/A 6/14/2017    Procedure: TRANSURETHRAL RESECTION OF BLADDER TUMOR (TURBT); Surgeon: Gita Valdez MD;  [facility];  Service: Urology    CO CYSTOURETHROSCOPY,FULGUR <0 5 CM LESN N/A 2/6/2017    Procedure: Sydelle Boys; BLADDER BIOPSY WITH Judene Susan;  Surgeon: Gita Valdez MD;  [facility];  Service: Urology    CO CYSTOURETHROSCOPY,URETER CATHETER Right 6/1/2016    Procedure: CYSTOSCOPY RETROGRADE PYELOGRAM WITH INSERTION STENT URETERAL;  Surgeon: Diamond Brambila MD;  Location:  MAIN OR;  Service: Urology    CO EGD TRANSORAL BIOPSY SINGLE/MULTIPLE N/A 3/4/2016    Procedure: ESOPHAGOGASTRODUODENOSCOPY (EGD);   Surgeon: Samantha Schultz MD;  [facility];  Service: Gastroenterology    VASCULAR SURGERY Right     AV fistula     Social History   History   Alcohol Use    Yes     Comment: rarely, social     History   Drug Use No     History   Smoking Status    Former Smoker    Quit date: 1985   Smokeless Tobacco    Never Used     Family History   Problem Relation Age of Onset    Depression Mother     Diabetes Mother     Heart disease Mother     Hypertension Mother     Kidney disease Mother     Diabetes Father     Heart disease Father      Cardiac disorder    Hypertension Father     Stroke Father     Thyroid disease Sister     Cancer Maternal Grandmother        Meds/Allergies       Current Outpatient Prescriptions:     allopurinol (ZYLOPRIM) 100 mg tablet    ALPRAZolam (XANAX) 0 5 mg tablet    ampicillin (PRINCIPEN) 250 mg capsule    atorvastatin (LIPITOR) 20 mg tablet    B Complex-C-Folic Acid (TRIPHROCAPS) 1 MG CAPS    butalbital-acetaminophen-caffeine-codeine (FIORICET WITH CODEINE) -42-30 MG per capsule    calcium acetate (PHOSLO) 667 mg capsule    Cholecalciferol (VITAMIN D-3 PO)    colchicine-probenecid 0 5-500 MG per tablet    gemfibrozil (LOPID) 600 mg tablet    guaiFENesin 200 MG tablet    HYDROcodone-acetaminophen (NORCO) 5-325 mg per tablet    insulin glargine (LANTUS) 100 units/mL subcutaneous injection    loratadine (CLARITIN) 10 mg tablet    metoprolol tartrate (LOPRESSOR) 25 mg tablet    omeprazole (PriLOSEC) 40 MG capsule    ondansetron (ZOFRAN) 4 mg tablet    oxybutynin (DITROPAN-XL) 5 mg 24 hr tablet    PARoxetine (PAXIL) 40 MG tablet    omeprazole (PriLOSEC) 20 mg delayed release capsule    PARoxetine (PAXIL) 20 mg tablet    Allergies   Allergen Reactions    Heparin      Avoids due to eye conditions;can't have any med that may cause bleeding    Lovenox [Enoxaparin Sodium]      Avoids due to eye conditions;can't have any med that may cause bleeding in eyes    Nsaids      Avoids due to renal failure    Cardura [Doxazosin] Palpitations           Objective     Blood pressure 138/89, pulse (!) 109, temperature 98 3 °F (36 8 °C), temperature source Tympanic, height 5' 9" (1 753 m), weight 80 7 kg (178 lb)  Body mass index is 26 29 kg/m²        PHYSICAL EXAM:      General Appearance:   Alert, cooperative, no distress   HEENT:   Normocephalic, atraumatic, anicteric      Neck:  Supple, symmetrical, trachea midline   Lungs:   Clear to auscultation bilaterally; no rales, rhonchi or wheezing; respirations unlabored    Heart[de-identified]   Regular rate and rhythm; no murmur, rub, or gallop  Abdomen:   Soft, non-tender, non-distended; normal bowel sounds; no masses, no organomegaly    Genitalia:   Deferred    Rectal:   Deferred    Extremities:  No cyanosis, clubbing or edema    Pulses:  2+ and symmetric    Skin:  No jaundice, rashes, or lesions    Lymph nodes:  No palpable cervical lymphadenopathy        Lab Results:   No visits with results within 1 Day(s) from this visit  Latest known visit with results is:   Orders Only on 04/24/2018   Component Date Value    Total Bilirubin 04/26/2018 7 30*    Bilirubin, Direct 04/26/2018 6 05*    Alkaline Phosphatase 04/26/2018 156*    AST 04/26/2018 294*    ALT 04/26/2018 394*    Total Protein 04/26/2018 7 3     Albumin 04/26/2018 3 1*    Lipase 04/26/2018 961*    Amylase 04/26/2018 246*         Radiology Results:   No results found

## 2018-04-27 ENCOUNTER — TELEPHONE (OUTPATIENT)
Dept: OTHER | Facility: HOSPITAL | Age: 68
End: 2018-04-27

## 2018-04-27 ENCOUNTER — TELEPHONE (OUTPATIENT)
Dept: GASTROENTEROLOGY | Facility: CLINIC | Age: 68
End: 2018-04-27

## 2018-04-27 LAB
HBV CORE AB SER QL: REACTIVE
HBV CORE IGM SER QL: REACTIVE

## 2018-04-27 RX ORDER — ENTECAVIR 0.5 MG/1
0.5 TABLET, FILM COATED ORAL WEEKLY
Qty: 4 TABLET | Refills: 3 | Status: SHIPPED | OUTPATIENT
Start: 2018-04-27 | End: 2018-05-18 | Stop reason: SDUPTHER

## 2018-04-27 NOTE — TELEPHONE ENCOUNTER
KASI PATIENT---his wife randy was following up to see if dr Elayne Haywood had been contacted about starting the medication you had discussed---call back # [phone]

## 2018-04-27 NOTE — TELEPHONE ENCOUNTER
I called the patient's dialysis center ([facility]) at [phone] to discuss with Dr Kat Farias regarding patient's condition  I was given his cell phone number at [phone] multiple times  I was not able to get in touch with him  I called again just now to [facility] and I was told Dr Kat Farias is on vacation and Dr Kristie Norton is covering him  I left my cell phone number for Dr Kristie Norton to call back  I spoke with our nephrologist Dr Irma Garrison regarding the treatment for acute heatitis B with entacavir as patient has ESRD on HD 0 5 mg per week after hemodialysis was recommended  I agree with the plan and will continue close follow up  I spoke to patient's wife regarding the plan

## 2018-04-28 LAB
HBV E AB SERPL QL IA: NEGATIVE
HBV E AG SERPL QL IA: POSITIVE

## 2018-04-30 ENCOUNTER — TELEPHONE (OUTPATIENT)
Dept: GASTROENTEROLOGY | Facility: MEDICAL CENTER | Age: 68
End: 2018-04-30

## 2018-04-30 NOTE — TELEPHONE ENCOUNTER
Pt's wife Clifton Falcon called wanted you to know that the soonest appt for pt that would be accommodating to their sched 05/16/18 with [facility]

## 2018-04-30 NOTE — TELEPHONE ENCOUNTER
I called and left a message to the voicemail  Please mail the scripts of repeat lab works in 1 week  Thanks

## 2018-04-30 NOTE — TELEPHONE ENCOUNTER
I spoke with Dr Temitope Roberson who was covering Dr Diego Thrasher  He agrees with the plan for entecavir 0 5 mg weekly after dialysis  I also left him with my cell phone number with him  I will inform the wife

## 2018-05-01 DIAGNOSIS — B19.10 HEPATITIS B: Primary | ICD-10-CM

## 2018-05-01 NOTE — TELEPHONE ENCOUNTER
Are you referring to the most recent labs that are in his chart?- It looks like these have been done 4/26  If you would like them repeated, please place new orders  Thank you

## 2018-05-01 NOTE — TELEPHONE ENCOUNTER
I tried to call both phone numbers but no one picked up  I placed orders for hepatic function and pt/inr to be done in one week  Also I prescribed entecavir for which he should be taking once a week after dialysis  Please let me know whether you can reach them  Thanks

## 2018-05-02 ENCOUNTER — HOSPITAL ENCOUNTER (OUTPATIENT)
Dept: MRI IMAGING | Facility: HOSPITAL | Age: 68
Discharge: HOME | End: 2018-05-02
Attending: INTERNAL MEDICINE
Payer: COMMERCIAL

## 2018-05-02 ENCOUNTER — TELEPHONE (OUTPATIENT)
Dept: GASTROENTEROLOGY | Facility: MEDICAL CENTER | Age: 68
End: 2018-05-02

## 2018-05-02 DIAGNOSIS — B19.10 HEPATITIS B: ICD-10-CM

## 2018-05-02 PROCEDURE — 74181 MRI ABDOMEN W/O CONTRAST: CPT

## 2018-05-02 NOTE — TELEPHONE ENCOUNTER
[facility] called stating there are significant findings in pt's MRI ABD that is in EPIC ready to be read   Also text

## 2018-05-02 NOTE — TELEPHONE ENCOUNTER
Called pt and spoke with his wife; they are aware of labs and will get these done in 1 week  Also, pt aware of med

## 2018-05-02 NOTE — TELEPHONE ENCOUNTER
Next time, can you give me the extension of the person who called so that I can call back in time  Thanks

## 2018-05-03 LAB — MISCELLANEOUS LAB TEST RESULT: NORMAL

## 2018-05-10 ENCOUNTER — TRANSCRIBE ORDERS (OUTPATIENT)
Dept: LAB | Facility: MEDICAL CENTER | Age: 68
End: 2018-05-10

## 2018-05-10 ENCOUNTER — APPOINTMENT (OUTPATIENT)
Dept: LAB | Facility: MEDICAL CENTER | Age: 68
End: 2018-05-10
Payer: COMMERCIAL

## 2018-05-10 DIAGNOSIS — B19.10 HEPATITIS B: ICD-10-CM

## 2018-05-10 LAB
ALBUMIN SERPL BCP-MCNC: 2.8 G/DL (ref 3.5–5)
ALP SERPL-CCNC: 146 U/L (ref 46–116)
ALT SERPL W P-5'-P-CCNC: 306 U/L (ref 12–78)
AST SERPL W P-5'-P-CCNC: 243 U/L (ref 5–45)
BILIRUB DIRECT SERPL-MCNC: 4.26 MG/DL (ref 0–0.2)
BILIRUB SERPL-MCNC: 5.22 MG/DL (ref 0.2–1)
INR PPP: 1.11 (ref 0.86–1.16)
PROT SERPL-MCNC: 7.2 G/DL (ref 6.4–8.2)
PROTHROMBIN TIME: 14.3 SECONDS (ref 12.1–14.4)

## 2018-05-10 PROCEDURE — 80076 HEPATIC FUNCTION PANEL: CPT

## 2018-05-10 PROCEDURE — 85610 PROTHROMBIN TIME: CPT

## 2018-05-10 PROCEDURE — 36415 COLL VENOUS BLD VENIPUNCTURE: CPT

## 2018-05-18 ENCOUNTER — TELEPHONE (OUTPATIENT)
Dept: FAMILY MEDICINE CLINIC | Facility: CLINIC | Age: 68
End: 2018-05-18

## 2018-05-18 ENCOUNTER — TELEPHONE (OUTPATIENT)
Dept: GASTROENTEROLOGY | Facility: MEDICAL CENTER | Age: 68
End: 2018-05-18

## 2018-05-18 DIAGNOSIS — C67.9 MALIGNANT NEOPLASM OF URINARY BLADDER, UNSPECIFIED SITE (HCC): Primary | ICD-10-CM

## 2018-05-18 DIAGNOSIS — B18.1 CHRONIC VIRAL HEPATITIS B WITHOUT DELTA AGENT AND WITHOUT COMA (HCC): Primary | ICD-10-CM

## 2018-05-18 PROBLEM — S63.519A SPRAIN OF SCAPHOLUNATE LIGAMENT: Status: ACTIVE | Noted: 2018-05-18

## 2018-05-18 RX ORDER — ENTECAVIR 0.5 MG/1
0.5 TABLET, FILM COATED ORAL WEEKLY
Qty: 4 TABLET | Refills: 3 | Status: SHIPPED | OUTPATIENT
Start: 2018-05-18 | End: 2018-08-16

## 2018-05-18 NOTE — TELEPHONE ENCOUNTER
I refilled his entecavir  I would recommend not taking vicodin & trying to get something without tylenol from the Dr Jo Engle prescribes this    Suellen Kayser

## 2018-05-18 NOTE — TELEPHONE ENCOUNTER
Dr Aline Watt pt's wife called stating pt is sched for an office visit on 05/29/18 but he is supposed to stay on the entecavir pt will need a refill because he will be out by Monday  Wife also wanted to know since pt was dx with Hep B is it ok for him to be taking vicodin since it has tylenol in it?  Pt can be reached at [phone]

## 2018-05-18 NOTE — TELEPHONE ENCOUNTER
When he runs out of his current prescription, we can place him on just hydrocodone    Call us when he needs  prescription

## 2018-05-18 NOTE — TELEPHONE ENCOUNTER
Wife called said she changed some of his meds, he was increased to Paxil 40mg but was not tolerating it well so she put him back down to 30mg  lantus was up to 15 units but he was not eating very well so she bumped him back down to 10 units and will keep an eye on it  Patient ws diag with Hepatitis and his GI visit his last labs his LFTs and Billirubin  Levels were off, patient is taking vicodin and the wife is unsure he should be due to these levels  She does have a call into GI to ask as well

## 2018-05-18 NOTE — TELEPHONE ENCOUNTER
Wife spoke with GI and they recommended that patient should NOT take NSAIDS OR Any pain meds with Tylenol in them and wants to know if you could recommend something else for his pain? ?

## 2018-05-21 ENCOUNTER — TELEPHONE (OUTPATIENT)
Dept: FAMILY MEDICINE CLINIC | Facility: CLINIC | Age: 68
End: 2018-05-21

## 2018-05-21 DIAGNOSIS — C67.9 MALIGNANT NEOPLASM OF URINARY BLADDER, UNSPECIFIED SITE (HCC): ICD-10-CM

## 2018-05-21 DIAGNOSIS — C67.9 MALIGNANT NEOPLASM OF URINARY BLADDER, UNSPECIFIED SITE (HCC): Primary | ICD-10-CM

## 2018-05-21 RX ORDER — HYDROCODONE BITARTRATE 20 MG/1
1 TABLET, EXTENDED RELEASE ORAL DAILY
Qty: 30 EACH | Refills: 0 | Status: SHIPPED | OUTPATIENT
Start: 2018-05-21 | End: 2018-05-21 | Stop reason: CLARIF

## 2018-05-21 RX ORDER — OXYCODONE HYDROCHLORIDE 5 MG/1
5 TABLET ORAL 3 TIMES DAILY PRN
Qty: 90 TABLET | Refills: 0 | Status: SHIPPED | OUTPATIENT
Start: 2018-05-21 | End: 2018-08-15 | Stop reason: SDUPTHER

## 2018-05-21 RX ORDER — HYDROCODONE BITARTRATE 10 MG/1
1 CAPSULE, EXTENDED RELEASE ORAL
Qty: 60 CAPSULE | Refills: 0 | Status: SHIPPED | OUTPATIENT
Start: 2018-05-21 | End: 2018-05-21 | Stop reason: CLARIF

## 2018-05-21 NOTE — TELEPHONE ENCOUNTER
Phone call from Friday  Hydrocodone 10 mg sent through it is invalid, please send through valid rx    [facility]

## 2018-05-21 NOTE — TELEPHONE ENCOUNTER
New medication is not covered under [employer], it needs prior auth [phone] or do you want to switch it to something else?

## 2018-05-22 LAB
HBV DNA SERPL NAA+PROBE-ACNC: NORMAL IU/ML
HBV DNA SERPL NAA+PROBE-LOG IU: 8.12 {LOG_IU}/ML
REF LAB TEST REF RANGE: NORMAL

## 2018-05-24 ENCOUNTER — DOCTOR'S OFFICE (OUTPATIENT)
Dept: URBAN - NONMETROPOLITAN AREA CLINIC 1 | Facility: CLINIC | Age: 68
Setting detail: OPHTHALMOLOGY
End: 2018-05-24
Payer: COMMERCIAL

## 2018-05-24 DIAGNOSIS — H43.812: ICD-10-CM

## 2018-05-24 DIAGNOSIS — H33.301: ICD-10-CM

## 2018-05-24 DIAGNOSIS — H35.373: ICD-10-CM

## 2018-05-24 DIAGNOSIS — H35.61: ICD-10-CM

## 2018-05-24 DIAGNOSIS — H43.813: ICD-10-CM

## 2018-05-24 DIAGNOSIS — H43.811: ICD-10-CM

## 2018-05-24 DIAGNOSIS — E11.3413: ICD-10-CM

## 2018-05-24 DIAGNOSIS — H33.003: ICD-10-CM

## 2018-05-24 PROCEDURE — 92226 OPHTHALMOSCOPY EXT SUBSEQUENT: CPT | Performed by: OPHTHALMOLOGY

## 2018-05-24 PROCEDURE — 92134 CPTRZ OPH DX IMG PST SGM RTA: CPT | Performed by: OPHTHALMOLOGY

## 2018-05-24 PROCEDURE — 92014 COMPRE OPH EXAM EST PT 1/>: CPT | Performed by: OPHTHALMOLOGY

## 2018-05-24 ASSESSMENT — REFRACTION_MANIFEST
OU_VA: 20/
OU_VA: 20/
OS_VA3: 20/
OS_VA1: 20/
OD_VA1: 20/
OD_VA2: 20/
OD_VA3: 20/
OS_VA2: 20/
OD_VA3: 20/
OD_VA3: 20/
OS_VA1: 20/
OD_VA1: 20/
OD_VA2: 20/
OS_VA1: 20/
OS_VA3: 20/
OU_VA: 20/
OS_VA2: 20/
OS_VA3: 20/
OD_VA2: 20/
OD_VA1: 20/
OS_VA2: 20/

## 2018-05-24 ASSESSMENT — REFRACTION_CURRENTRX
OS_OVR_VA: 20/
OD_OVR_VA: 20/

## 2018-05-24 ASSESSMENT — CONFRONTATIONAL VISUAL FIELD TEST (CVF)
OS_FINDINGS: FULL
OD_FINDINGS: FULL

## 2018-05-24 ASSESSMENT — LID EXAM ASSESSMENTS
OD_BLEPHARITIS: RUL 2+
OS_BLEPHARITIS: LUL 2+

## 2018-05-24 ASSESSMENT — VISUAL ACUITY
OD_BCVA: CF 4FT
OS_BCVA: 20/20-2

## 2018-05-29 ENCOUNTER — OFFICE VISIT (OUTPATIENT)
Dept: GASTROENTEROLOGY | Facility: MEDICAL CENTER | Age: 68
End: 2018-05-29
Payer: COMMERCIAL

## 2018-05-29 VITALS
WEIGHT: 178 LBS | HEART RATE: 63 BPM | BODY MASS INDEX: 26.36 KG/M2 | TEMPERATURE: 96.3 F | HEIGHT: 69 IN | SYSTOLIC BLOOD PRESSURE: 134 MMHG | DIASTOLIC BLOOD PRESSURE: 82 MMHG

## 2018-05-29 DIAGNOSIS — B18.1 CHRONIC VIRAL HEPATITIS B WITHOUT DELTA AGENT AND WITHOUT COMA (HCC): Primary | ICD-10-CM

## 2018-05-29 PROCEDURE — 99214 OFFICE O/P EST MOD 30 MIN: CPT | Performed by: INTERNAL MEDICINE

## 2018-06-03 ENCOUNTER — APPOINTMENT (OUTPATIENT)
Dept: LAB | Facility: HOSPITAL | Age: 68
End: 2018-06-03
Attending: INTERNAL MEDICINE
Payer: COMMERCIAL

## 2018-06-03 DIAGNOSIS — B18.1 CHRONIC VIRAL HEPATITIS B WITHOUT DELTA AGENT AND WITHOUT COMA (HCC): ICD-10-CM

## 2018-06-03 DIAGNOSIS — B19.10 HEPATITIS B: ICD-10-CM

## 2018-06-03 LAB
ALBUMIN SERPL BCP-MCNC: 3.1 G/DL (ref 3.5–5)
ALP SERPL-CCNC: 174 U/L (ref 46–116)
ALT SERPL W P-5'-P-CCNC: 245 U/L (ref 12–78)
ANION GAP SERPL CALCULATED.3IONS-SCNC: 16 MMOL/L (ref 4–13)
AST SERPL W P-5'-P-CCNC: 110 U/L (ref 5–45)
BASOPHILS # BLD AUTO: 0.08 THOUSANDS/ΜL (ref 0–0.1)
BASOPHILS NFR BLD AUTO: 1 % (ref 0–1)
BILIRUB DIRECT SERPL-MCNC: 3.15 MG/DL (ref 0–0.2)
BILIRUB SERPL-MCNC: 3.5 MG/DL (ref 0.2–1)
BUN SERPL-MCNC: 59 MG/DL (ref 5–25)
CALCIUM SERPL-MCNC: 9.7 MG/DL (ref 8.3–10.1)
CHLORIDE SERPL-SCNC: 94 MMOL/L (ref 100–108)
CO2 SERPL-SCNC: 29 MMOL/L (ref 21–32)
CREAT SERPL-MCNC: 10.3 MG/DL (ref 0.6–1.3)
EOSINOPHIL # BLD AUTO: 0.18 THOUSAND/ΜL (ref 0–0.61)
EOSINOPHIL NFR BLD AUTO: 2 % (ref 0–6)
ERYTHROCYTE [DISTWIDTH] IN BLOOD BY AUTOMATED COUNT: 16.1 % (ref 11.6–15.1)
GFR SERPL CREATININE-BSD FRML MDRD: 5 ML/MIN/1.73SQ M
GLUCOSE SERPL-MCNC: 84 MG/DL (ref 65–140)
HCT VFR BLD AUTO: 44.6 % (ref 36.5–49.3)
HGB BLD-MCNC: 15.5 G/DL (ref 12–17)
INR PPP: 1.09 (ref 0.86–1.17)
LYMPHOCYTES # BLD AUTO: 1.55 THOUSANDS/ΜL (ref 0.6–4.47)
LYMPHOCYTES NFR BLD AUTO: 20 % (ref 14–44)
MCH RBC QN AUTO: 35.6 PG (ref 26.8–34.3)
MCHC RBC AUTO-ENTMCNC: 34.8 G/DL (ref 31.4–37.4)
MCV RBC AUTO: 102 FL (ref 82–98)
MONOCYTES # BLD AUTO: 0.64 THOUSAND/ΜL (ref 0.17–1.22)
MONOCYTES NFR BLD AUTO: 8 % (ref 4–12)
NEUTROPHILS # BLD AUTO: 5.15 THOUSANDS/ΜL (ref 1.85–7.62)
NEUTS SEG NFR BLD AUTO: 68 % (ref 43–75)
PLATELET # BLD AUTO: 204 THOUSANDS/UL (ref 149–390)
PMV BLD AUTO: 10.8 FL (ref 8.9–12.7)
POTASSIUM SERPL-SCNC: 5.1 MMOL/L (ref 3.5–5.3)
PROT SERPL-MCNC: 7.4 G/DL (ref 6.4–8.2)
PROTHROMBIN TIME: 13.6 SECONDS (ref 11.8–14.2)
RBC # BLD AUTO: 4.36 MILLION/UL (ref 3.88–5.62)
SODIUM SERPL-SCNC: 139 MMOL/L (ref 136–145)
WBC # BLD AUTO: 7.6 THOUSAND/UL (ref 4.31–10.16)

## 2018-06-03 PROCEDURE — 87517 HEPATITIS B DNA QUANT: CPT

## 2018-06-03 PROCEDURE — 85025 COMPLETE CBC W/AUTO DIFF WBC: CPT

## 2018-06-03 PROCEDURE — 85610 PROTHROMBIN TIME: CPT

## 2018-06-03 PROCEDURE — 80048 BASIC METABOLIC PNL TOTAL CA: CPT

## 2018-06-03 PROCEDURE — 36415 COLL VENOUS BLD VENIPUNCTURE: CPT

## 2018-06-03 PROCEDURE — 80076 HEPATIC FUNCTION PANEL: CPT

## 2018-06-06 LAB
HBV DNA SERPL NAA+PROBE-ACNC: NORMAL IU/ML
HBV DNA SERPL NAA+PROBE-LOG IU: 6.04 LOG10 IU/ML
REF LAB TEST REF RANGE: NORMAL

## 2018-06-26 ENCOUNTER — OFFICE VISIT (OUTPATIENT)
Dept: OBGYN CLINIC | Facility: CLINIC | Age: 68
End: 2018-06-26

## 2018-06-26 ENCOUNTER — TRANSCRIBE ORDERS (OUTPATIENT)
Dept: ADMINISTRATIVE | Facility: HOSPITAL | Age: 68
End: 2018-06-26

## 2018-06-26 ENCOUNTER — APPOINTMENT (OUTPATIENT)
Dept: LAB | Facility: MEDICAL CENTER | Age: 68
End: 2018-06-26

## 2018-06-26 VITALS
SYSTOLIC BLOOD PRESSURE: 150 MMHG | WEIGHT: 176 LBS | DIASTOLIC BLOOD PRESSURE: 93 MMHG | BODY MASS INDEX: 26.07 KG/M2 | HEIGHT: 69 IN | HEART RATE: 86 BPM

## 2018-06-26 DIAGNOSIS — Z00.8 HEALTH EXAMINATION IN POPULATION SURVEYS: ICD-10-CM

## 2018-06-26 DIAGNOSIS — M75.42 IMPINGEMENT SYNDROME OF LEFT SHOULDER: Primary | ICD-10-CM

## 2018-06-26 DIAGNOSIS — Z00.8 HEALTH EXAMINATION IN POPULATION SURVEYS: Primary | ICD-10-CM

## 2018-06-26 LAB
CHOLEST SERPL-MCNC: 163 MG/DL (ref 50–200)
EST. AVERAGE GLUCOSE BLD GHB EST-MCNC: 114 MG/DL
HBA1C MFR BLD: 5.6 % (ref 4.2–6.3)
HDLC SERPL-MCNC: 35 MG/DL (ref 40–60)
LDLC SERPL CALC-MCNC: 81 MG/DL (ref 0–100)
NONHDLC SERPL-MCNC: 128 MG/DL
TRIGL SERPL-MCNC: 234 MG/DL

## 2018-06-26 PROCEDURE — 36415 COLL VENOUS BLD VENIPUNCTURE: CPT

## 2018-06-26 PROCEDURE — 99213 OFFICE O/P EST LOW 20 MIN: CPT | Performed by: ORTHOPAEDIC SURGERY

## 2018-06-26 PROCEDURE — 83036 HEMOGLOBIN GLYCOSYLATED A1C: CPT

## 2018-06-26 PROCEDURE — 80061 LIPID PANEL: CPT

## 2018-06-26 NOTE — PROGRESS NOTES
Chief Complaint  Left shoulder pain    History Of Presenting Illness  Josh Josue 1950 presents with left shoulder pain due to impingement  Patient has periodically episodes of discomfort in the shoulder  At present the left shoulder is asymptomatic  Patient feels tired most of the time he says it is secondary to his dialysis      Current Medications  Current Outpatient Prescriptions   Medication Sig Dispense Refill    allopurinol (ZYLOPRIM) 100 mg tablet Take 100 mg by mouth daily   ALPRAZolam (XANAX) 0 5 mg tablet Take 0 5 mg by mouth daily as needed for anxiety   ampicillin (PRINCIPEN) 250 mg capsule Take 250 mg by mouth every 12 (twelve) hours Current treatment for UTI      atorvastatin (LIPITOR) 20 mg tablet Take 1 tablet (20 mg total) by mouth daily 90 tablet 1    B Complex-C-Folic Acid (TRIPHROCAPS) 1 MG CAPS Take 1 mg by mouth daily      calcium acetate (PHOSLO) 667 mg capsule Take 667 mg by mouth daily   Cholecalciferol (VITAMIN D-3 PO) Take 1 capsule by mouth daily ON sunday       colchicine-probenecid 0 5-500 MG per tablet Take 1 tablet by mouth daily   entecavir (BARACLUDE) 0 5 MG tablet Take 1 tablet (0 5 mg total) by mouth once a week for 4 doses 4 tablet 3    gemfibrozil (LOPID) 600 mg tablet Take 600 mg by mouth 2 (two) times a day before meals   guaiFENesin 200 MG tablet Take 400 mg by mouth 2 (two) times a day      insulin glargine (LANTUS) 100 units/mL subcutaneous injection Inject 20 Units under the skin daily at bedtime (Patient taking differently: Inject 10 Units under the skin daily at bedtime  ) 30 mL 3    loratadine (CLARITIN) 10 mg tablet Take 10 mg by mouth as needed        metoprolol tartrate (LOPRESSOR) 25 mg tablet Take 12 5 mg by mouth every other day        omeprazole (PriLOSEC) 40 MG capsule Take 20 mg by mouth daily        ondansetron (ZOFRAN) 4 mg tablet Take 4 mg by mouth 4 (four) times a day as needed for nausea or vomiting        oxybutynin (DITROPAN-XL) 5 mg 24 hr tablet Take 1 tablet by mouth daily (Patient taking differently: Take 5 mg by mouth as needed  ) 10 tablet 0    oxyCODONE (ROXICODONE) 5 mg immediate release tablet Take 1 tablet (5 mg total) by mouth 3 (three) times a day as needed for moderate pain Max Daily Amount: 15 mg 90 tablet 0    PARoxetine (PAXIL) 40 MG tablet Take 1 tablet (40 mg total) by mouth daily 90 tablet 3    butalbital-acetaminophen-caffeine-codeine (FIORICET WITH CODEINE) -24-30 MG per capsule Take 1 capsule by mouth every 4 (four) hours as needed for headaches  No current facility-administered medications for this visit          Current Problems    Active Problems:   Patient Active Problem List    Diagnosis Date Noted    Sprain of scapholunate ligament 05/18/2018    Chronic viral hepatitis B without delta agent and without coma ([place] [id number] ) 04/20/2018    Acute UTI 03/22/2017    Ascending aortic aneurysm ([place] [address] ) 10/03/2016    Malignant tumor of urinary bladder ([place] [address] ) 07/07/2016    End stage renal disease ([place] [address] ) 06/29/2016    Diabetes mellitus type 2 in nonobese ([place] [address] ) 06/05/2016    CKD (chronic kidney disease) stage 4, GFR 15-29 ml/min ([facility]) 06/03/2016    History of anemia due to chronic kidney disease 06/01/2016    Acute pancreatitis 04/26/2016    HTN (hypertension) 04/26/2016    HLD (hyperlipidemia) 04/26/2016    Depression with anxiety 10/21/2015         Review of Systems:    General: negative for - chills, fatigue, fever,  weight gain or weight loss      Past Medical History:   Past Medical History:   Diagnosis Date    AAA (abdominal aortic aneurysm) ([facility])     Abnormal liver function test     Anemia     Anxiety     Arthritis     Cancer (HCC)     bladder    Cardiac disorder     Chronic kidney disease     renal failure    Chronic pain disorder     back and legs    Depression     Diabetes mellitus ([place] [address] )     Dialysis patient ([place] [address] )     Fracture of carpal bone     Fractures     spinal compression fx,closed fx of wrist & ankle    GERD (gastroesophageal reflux disease)     Gout     Hearing difficulty of both ears     Hepatitis A     A - remission since 1970's    Hyperlipidemia     Hyperlipidemia     Hypertension     Kidney stone     Migraine     Neuropathy     Nicotine dependence     Pancreatitis     Vitamin D deficiency     Wears glasses        Past Surgical History:   Past Surgical History:   Procedure Laterality Date    BLADDER FULGURATION  06/17/2016    Cystoscopy with fulguration medium lesion (2-5 cm)    CATARACT EXTRACTION Bilateral     congenital    COLONOSCOPY      CYSTOSCOPY      Diagnostic 5/31/17, 10/10/17    CYSTOSCOPY N/A 6/14/2017    Procedure: CYSTOSCOPY WITH BLADDER  BIOPSIES WITH FULGURATION;  Surgeon: Mic Marie MD;  [facility];  Service: Urology    CYSTOSCOPY W/ URETERAL STENT PLACEMENT Right 06/01/2016    CYSTOSCOPY W/ URETEROSCOPY  06/17/2016    With removal of calculus    CYSTOSTOMY W/ BLADDER BIOPSY      9/6/26, 2/6/17    DENTAL SURGERY      ESOPHAGOGASTRODUODENOSCOPY      EYE SURGERY      catatact    EYE SURGERY Bilateral     Left eye detachment R eye retinal tear    HERNIA REPAIR      HERNIA REPAIR      L inguinal w/mesh;umbilical herniorraphy    NJ ANASTOMOSIS,AV,ANY SITE Right 6/29/2016    Procedure: LOWER FOREARM AV FISTULA;  Surgeon: Deb Lezama MD;  [facility];  Service: Vascular    NJ COLONOSCOPY FLX DX W/COLLJ SPEC WHEN PFRMD N/A 3/4/2016    Procedure: COLONOSCOPY;  Surgeon: Gabi Kennedy MD;  [facility];  Service: Gastroenterology    NJ CYSTO/URETERO W/LITHOTRIPSY &INDWELL STENT INSRT Right 6/17/2016    Procedure: CYSTOSCOPY URETEROSCOPY WITH LITHOTRIPSY HOLMIUM LASER,BASKET STONE EXTRACTION, RETROGRADE PYELOGRAM AND INSERTION STENT URETERAL;  Surgeon: Chaitanya Lopez MD;  Location:  MAIN OR;  Service: Urology    NJ CYSTOURETHROSCOPY N/A 7/13/2017    Procedure: CYSTOSCOPY;  Surgeon: Shawna Keane MD;  [facility];  Service: Urology    MO CYSTOURETHROSCOPY W/IRRIG & EVAC CLOTS N/A 7/13/2017    Procedure: CYSTOSCOPY EVACUATION OF CLOTS,POSSIBLE FULGURATION;  Surgeon: Shawna Keane MD;  [facility];  Service: Urology    MO CYSTOURETHROSCOPY,BIOPSY N/A 8/15/2016    Procedure: CYSTOSCOPY WITH RE BIOPSY OF BLADDER;  Surgeon: Wes Alonzo MD;  [facility];  Service: Urology    MO CYSTOURETHROSCOPY,FULGUR 0 5-2 CM LESN N/A 2/6/2017    Procedure: TRANSURETHRAL RESECTION OF BLADDER TUMOR (TURBT); Surgeon: Shawna Keane MD;  [facility];  Service: Urology    MO CYSTOURETHROSCOPY,FULGUR 0 5-2 CM LESN N/A 6/14/2017    Procedure: TRANSURETHRAL RESECTION OF BLADDER TUMOR (TURBT); Surgeon: Shawna Keane MD;  [facility];  Service: Urology    MO CYSTOURETHROSCOPY,FULGUR <0 5 CM LESN N/A 2/6/2017    Procedure: Jan Kieran; BLADDER BIOPSY WITH Leesa Olivares;  Surgeon: Shawna Keane MD;  [facility];  Service: Urology    MO CYSTOURETHROSCOPY,URETER CATHETER Right 6/1/2016    Procedure: CYSTOSCOPY RETROGRADE PYELOGRAM WITH INSERTION STENT URETERAL;  Surgeon: Timothy Feldman MD;  Location:  MAIN OR;  Service: Urology    MO EGD TRANSORAL BIOPSY SINGLE/MULTIPLE N/A 3/4/2016    Procedure: ESOPHAGOGASTRODUODENOSCOPY (EGD);   Surgeon: Paolo Hodge MD;  [facility];  Service: Gastroenterology    VASCULAR SURGERY Right     AV fistula       Family History:  Family history reviewed and non-contributory  Family History   Problem Relation Age of Onset    Depression Mother     Diabetes Mother     Heart disease Mother    Sharath Margareth Hypertension Mother     Kidney disease Mother     Diabetes Father     Heart disease Father         Cardiac disorder    Hypertension Father     Stroke Father     Thyroid disease Sister     Cancer Maternal Grandmother        Social History:  Social History     Social History    Marital status: /Civil Union     Spouse name: N/A    Number of children: N/A    Years of education: N/A     Social History Main Topics    Smoking status: Former Smoker     Quit date: 1985    Smokeless tobacco: Never Used    Alcohol use Yes      Comment: rarely, social    Drug use: No    Sexual activity: Yes     Partners: Female     Other Topics Concern    None     Social History Narrative    Advance directive on file    Copy of advanced directive obtained from patient    History of no living will    Patient has living will       Allergies: Allergies   Allergen Reactions    Acetaminophen      Due to LFT elevation    Heparin      Avoids due to eye conditions;can't have any med that may cause bleeding    Lovenox [Enoxaparin Sodium]      Avoids due to eye conditions;can't have any med that may cause bleeding in eyes    Nsaids      Avoids due to renal failure    Cardura [Doxazosin] Palpitations           Physical ExaminationBP 150/93   Pulse 86   Ht 5' 9" (1 753 m)   Wt 79 8 kg (176 lb)   BMI 25 99 kg/m²   Gen: Alert and oriented to person, place, time  HEENT: EOMI, eyes clear, moist mucus membranes, hearing intact  Respiratory: Bilateral chest rise  No audible wheezing found  Cardiovascular: Regular Rate and Rhythm  Abdomen: soft nontender/nondistended    Orthopedic Exam  Left shoulder no obvious swelling or deformity  Forward flexion 160°  AB duction 150°  Cuff strength 4/5          Impression  Left shoulder impingement asymptomatic at present    Plan    Patient will follow up when shoulder symptomatic for repeat injections    Ronny Gonzalez MD        Portions of the record may have been created with voice recognition software   Occasional wrong word or "sound a like" substitutions may have occurred due to the inherent limitations of voice recognition software   Read the chart carefully and recognize, using context, where substitutions have occurred

## 2018-07-10 ENCOUNTER — OFFICE VISIT (OUTPATIENT)
Dept: FAMILY MEDICINE CLINIC | Facility: CLINIC | Age: 68
End: 2018-07-10
Payer: COMMERCIAL

## 2018-07-10 VITALS
HEIGHT: 69 IN | SYSTOLIC BLOOD PRESSURE: 140 MMHG | BODY MASS INDEX: 26.48 KG/M2 | DIASTOLIC BLOOD PRESSURE: 82 MMHG | WEIGHT: 178.8 LBS

## 2018-07-10 DIAGNOSIS — Z79.4 TYPE 2 DIABETES MELLITUS WITH COMPLICATION, WITH LONG-TERM CURRENT USE OF INSULIN (HCC): ICD-10-CM

## 2018-07-10 DIAGNOSIS — I10 ESSENTIAL HYPERTENSION: Primary | ICD-10-CM

## 2018-07-10 DIAGNOSIS — E11.8 TYPE 2 DIABETES MELLITUS WITH COMPLICATION, WITH LONG-TERM CURRENT USE OF INSULIN (HCC): ICD-10-CM

## 2018-07-10 DIAGNOSIS — F41.8 DEPRESSION WITH ANXIETY: ICD-10-CM

## 2018-07-10 DIAGNOSIS — E11.9 DIABETES MELLITUS TYPE 2 IN NONOBESE (HCC): Chronic | ICD-10-CM

## 2018-07-10 PROCEDURE — 99213 OFFICE O/P EST LOW 20 MIN: CPT | Performed by: FAMILY MEDICINE

## 2018-07-10 RX ORDER — INSULIN GLARGINE 100 [IU]/ML
10 INJECTION, SOLUTION SUBCUTANEOUS
Qty: 10 ML | Refills: 5 | Status: SHIPPED | OUTPATIENT
Start: 2018-07-10 | End: 2018-08-16

## 2018-07-10 NOTE — PROGRESS NOTES
Assessment/Plan:  Patient will continue same medications, and continue to follow up with specialists  Follow-up here in 4 months or p r n  No problem-specific Assessment & Plan notes found for this encounter  Diagnoses and all orders for this visit:    Essential hypertension    Diabetes mellitus type 2 in nonobese [facility])    Type 2 diabetes mellitus with complication, with long-term current use of insulin (HCC)  -     insulin glargine (LANTUS) 100 units/mL subcutaneous injection; Inject 10 Units under the skin daily at bedtime    Depression with anxiety          Subjective:      Patient ID: Josh Richardson is a 76 y o  male  Patient here today follow-up  Patient denies any chest pain or shortness of breath  Patient continues on dialysis  Patient is now back on 40 milligrams of Paxil daily  He did not tolerate the higher dose, made him too tired  Diabetes   He presents for his follow-up diabetic visit  He has type 2 diabetes mellitus  His disease course has been stable  There are no hypoglycemic associated symptoms  There are no hypoglycemic complications  Symptoms are stable  Diabetic complications include nephropathy  Risk factors for coronary artery disease include diabetes mellitus, dyslipidemia and hypertension  Current diabetic treatment includes insulin injections  He is compliant with treatment all of the time  His weight is stable  He is following a generally healthy diet  He has not had a previous visit with a dietitian  He never participates in exercise  There is no change in his home blood glucose trend  An ACE inhibitor/angiotensin II receptor blocker is contraindicated  He sees a podiatrist Eye exam is current  Hypertension   This is a chronic problem  The problem is controlled  There are no associated agents to hypertension  Risk factors for coronary artery disease include diabetes mellitus, dyslipidemia and male gender  Past treatments include beta blockers   The current treatment provides significant improvement  There are no compliance problems  The following portions of the patient's history were reviewed and updated as appropriate:   He  has a past medical history of AAA (abdominal aortic aneurysm) ([place] [address] ); Abnormal liver function test; Anemia; Anxiety; Arthritis; Cancer ([place] [address] ); Cardiac disorder; Chronic kidney disease; Chronic pain disorder; Depression; Diabetes mellitus ([place] [address] ); Dialysis patient [facility]); Fracture of carpal bone; Fractures; GERD (gastroesophageal reflux disease); Gout; Hearing difficulty of both ears; Hepatitis A; Hyperlipidemia; Hyperlipidemia; Hypertension; Kidney stone; Migraine; Neuropathy; Nicotine dependence; Pancreatitis; Vitamin D deficiency; and Wears glasses  He   Patient Active Problem List    Diagnosis Date Noted    Sprain of scapholunate ligament 05/18/2018    Chronic viral hepatitis B without delta agent and without coma ([place] [address] ) 04/20/2018    Acute UTI 03/22/2017    Ascending aortic aneurysm ([place] [address] ) 10/03/2016    Malignant tumor of urinary bladder ([place] [address] ) 07/07/2016    End stage renal disease ([place] [address] ) 06/29/2016    Diabetes mellitus type 2 in nonobese ([place] [address] ) 06/05/2016    CKD (chronic kidney disease) stage 4, GFR 15-29 ml/min ([facility]) 06/03/2016    History of anemia due to chronic kidney disease 06/01/2016    Acute pancreatitis 04/26/2016    Essential hypertension 04/26/2016    HLD (hyperlipidemia) 04/26/2016    Depression with anxiety 10/21/2015     He  has a past surgical history that includes Hernia repair; Eye surgery;  Hernia repair; pr cystourethroscopy,fulgur <0 5 cm lesn (N/A, 2/6/2017); pr cystourethroscopy,fulgur 0 5-2 cm lesn (N/A, 2/6/2017); pr cystourethroscopy,biopsy (N/A, 8/15/2016); pr anastomosis,av,any site (Right, 6/29/2016); pr cysto/uretero w/lithotripsy &indwell stent insrt (Right, 6/17/2016); pr cystourethroscopy,ureter catheter (Right, 6/1/2016); pr colonoscopy flx dx w/collj spec when pfrmd (N/A, 3/4/2016); pr egd transoral biopsy single/multiple (N/A, 3/4/2016); Vascular surgery (Right); Eye surgery (Bilateral); Cataract extraction (Bilateral); Cystoscopy; Esophagogastroduodenoscopy; Colonoscopy; Dental surgery; pr cystourethroscopy,fulgur 0 5-2 cm lesn (N/A, 6/14/2017); CYSTOSCOPY (N/A, 6/14/2017); pr cystourethroscopy (N/A, 7/13/2017); pr cystourethroscopy w/irrig & evac clots (N/A, 7/13/2017); Cystostomy w/ bladder biopsy; Bladder fulguration (06/17/2016); Cystoscopy w/ ureteral stent placement (Right, 06/01/2016); and Cystoscopy w/ ureteroscopy (06/17/2016)  His family history includes Cancer in his maternal grandmother; Depression in his mother; Diabetes in his father and mother; Heart disease in his father and mother; Hypertension in his father and mother; Kidney disease in his mother; Stroke in his father; Thyroid disease in his sister  He  reports that he quit smoking about 33 years ago  He has never used smokeless tobacco  He reports that he does not drink alcohol or use drugs  Current Outpatient Prescriptions   Medication Sig Dispense Refill    allopurinol (ZYLOPRIM) 100 mg tablet Take 100 mg by mouth daily   ALPRAZolam (XANAX) 0 5 mg tablet Take 0 5 mg by mouth daily as needed for anxiety   ampicillin (PRINCIPEN) 250 mg capsule Take 250 mg by mouth every 12 (twelve) hours Current treatment for UTI      atorvastatin (LIPITOR) 20 mg tablet Take 1 tablet (20 mg total) by mouth daily 90 tablet 1    B Complex-C-Folic Acid (TRIPHROCAPS) 1 MG CAPS Take 1 mg by mouth daily      butalbital-acetaminophen-caffeine-codeine (FIORICET WITH CODEINE) -09-30 MG per capsule Take 1 capsule by mouth every 4 (four) hours as needed for headaches   calcium acetate (PHOSLO) 667 mg capsule Take 667 mg by mouth daily   Cholecalciferol (VITAMIN D-3 PO) Take 1 capsule by mouth daily ON sunday       colchicine-probenecid 0 5-500 MG per tablet Take 1 tablet by mouth daily        entecavir (BARACLUDE) 0 5 MG tablet Take 1 tablet (0 5 mg total) by mouth once a week for 4 doses 4 tablet 3    gemfibrozil (LOPID) 600 mg tablet Take 600 mg by mouth 2 (two) times a day before meals   guaiFENesin 200 MG tablet Take 400 mg by mouth 2 (two) times a day      insulin glargine (LANTUS) 100 units/mL subcutaneous injection Inject 10 Units under the skin daily at bedtime 10 mL 5    metoprolol tartrate (LOPRESSOR) 25 mg tablet Take 12 5 mg by mouth every other day        omeprazole (PriLOSEC) 40 MG capsule Take 20 mg by mouth daily        ondansetron (ZOFRAN) 4 mg tablet Take 4 mg by mouth 4 (four) times a day as needed for nausea or vomiting   oxybutynin (DITROPAN-XL) 5 mg 24 hr tablet Take 1 tablet by mouth daily (Patient taking differently: Take 5 mg by mouth as needed  ) 10 tablet 0    oxyCODONE (ROXICODONE) 5 mg immediate release tablet Take 1 tablet (5 mg total) by mouth 3 (three) times a day as needed for moderate pain Max Daily Amount: 15 mg 90 tablet 0    PARoxetine (PAXIL) 40 MG tablet Take 1 tablet (40 mg total) by mouth daily 90 tablet 3     No current facility-administered medications for this visit  Current Outpatient Prescriptions on File Prior to Visit   Medication Sig    allopurinol (ZYLOPRIM) 100 mg tablet Take 100 mg by mouth daily   ALPRAZolam (XANAX) 0 5 mg tablet Take 0 5 mg by mouth daily as needed for anxiety   ampicillin (PRINCIPEN) 250 mg capsule Take 250 mg by mouth every 12 (twelve) hours Current treatment for UTI    atorvastatin (LIPITOR) 20 mg tablet Take 1 tablet (20 mg total) by mouth daily    B Complex-C-Folic Acid (TRIPHROCAPS) 1 MG CAPS Take 1 mg by mouth daily    butalbital-acetaminophen-caffeine-codeine (FIORICET WITH CODEINE) -02-30 MG per capsule Take 1 capsule by mouth every 4 (four) hours as needed for headaches   calcium acetate (PHOSLO) 667 mg capsule Take 667 mg by mouth daily      Cholecalciferol (VITAMIN D-3 PO) Take 1 capsule by mouth daily ON sunday     colchicine-probenecid 0 5-500 MG per tablet Take 1 tablet by mouth daily   entecavir (BARACLUDE) 0 5 MG tablet Take 1 tablet (0 5 mg total) by mouth once a week for 4 doses    gemfibrozil (LOPID) 600 mg tablet Take 600 mg by mouth 2 (two) times a day before meals   guaiFENesin 200 MG tablet Take 400 mg by mouth 2 (two) times a day    metoprolol tartrate (LOPRESSOR) 25 mg tablet Take 12 5 mg by mouth every other day      omeprazole (PriLOSEC) 40 MG capsule Take 20 mg by mouth daily      ondansetron (ZOFRAN) 4 mg tablet Take 4 mg by mouth 4 (four) times a day as needed for nausea or vomiting   oxybutynin (DITROPAN-XL) 5 mg 24 hr tablet Take 1 tablet by mouth daily (Patient taking differently: Take 5 mg by mouth as needed  )    oxyCODONE (ROXICODONE) 5 mg immediate release tablet Take 1 tablet (5 mg total) by mouth 3 (three) times a day as needed for moderate pain Max Daily Amount: 15 mg    PARoxetine (PAXIL) 40 MG tablet Take 1 tablet (40 mg total) by mouth daily    [DISCONTINUED] insulin glargine (LANTUS) 100 units/mL subcutaneous injection Inject 20 Units under the skin daily at bedtime (Patient taking differently: Inject 10 Units under the skin daily at bedtime  )    [DISCONTINUED] loratadine (CLARITIN) 10 mg tablet Take 10 mg by mouth as needed       No current facility-administered medications on file prior to visit  He is allergic to acetaminophen; heparin; lovenox [enoxaparin sodium]; nsaids; and cardura [doxazosin]       Review of Systems   Constitutional: Negative  Respiratory: Negative  Cardiovascular: Negative  Gastrointestinal: Negative  Endocrine:        As per HPI   Genitourinary: Negative  Psychiatric/Behavioral: Negative for suicidal ideas          As per HPI         Objective:      /82 (BP Location: Left arm, Patient Position: Sitting)   Ht 5' 9" (1 753 m)   Wt 81 1 kg (178 lb 12 8 oz)   BMI 26 40 kg/m²          Physical Exam   Constitutional: He is oriented to person, place, and time  He appears well-developed and well-nourished  No distress  Cardiovascular: Normal rate, regular rhythm and normal heart sounds  Exam reveals no gallop and no friction rub  No murmur heard  Pulmonary/Chest: Effort normal and breath sounds normal  No respiratory distress  He has no wheezes  He has no rales  Musculoskeletal: He exhibits no edema  Neurological: He is alert and oriented to person, place, and time  Skin: He is not diaphoretic  Psychiatric: He has a normal mood and affect  His behavior is normal  Judgment and thought content normal    Vitals reviewed

## 2018-07-20 ENCOUNTER — HOSPITAL ENCOUNTER (OUTPATIENT)
Dept: CT IMAGING | Facility: HOSPITAL | Age: 68
Discharge: HOME | End: 2018-07-20
Attending: UROLOGY
Payer: COMMERCIAL

## 2018-07-20 DIAGNOSIS — C67.9 MALIGNANT NEOPLASM OF URINARY BLADDER, UNSPECIFIED SITE (HCC): ICD-10-CM

## 2018-07-20 PROCEDURE — 74177 CT ABD & PELVIS W/CONTRAST: CPT

## 2018-07-20 PROCEDURE — 71260 CT THORAX DX C+: CPT

## 2018-07-20 RX ADMIN — IODIXANOL 100 ML: 320 INJECTION, SOLUTION INTRAVASCULAR at 10:49

## 2018-07-25 ENCOUNTER — TELEPHONE (OUTPATIENT)
Dept: FAMILY MEDICINE CLINIC | Facility: CLINIC | Age: 68
End: 2018-07-25

## 2018-07-25 ENCOUNTER — TELEPHONE (OUTPATIENT)
Dept: UROLOGY | Facility: CLINIC | Age: 68
End: 2018-07-25

## 2018-07-25 NOTE — TELEPHONE ENCOUNTER
Since last week his appetite has not been good  his sugar were low so randy stopped his insulin  She has been watching his sugars since and has good sugars since  she will continue to monitor them without the insulin    LUIS

## 2018-08-02 ENCOUNTER — DOCTOR'S OFFICE (OUTPATIENT)
Dept: URBAN - NONMETROPOLITAN AREA CLINIC 1 | Facility: CLINIC | Age: 68
Setting detail: OPHTHALMOLOGY
End: 2018-08-02
Payer: COMMERCIAL

## 2018-08-02 DIAGNOSIS — H43.812: ICD-10-CM

## 2018-08-02 DIAGNOSIS — H40.013: ICD-10-CM

## 2018-08-02 DIAGNOSIS — Z79.4: ICD-10-CM

## 2018-08-02 DIAGNOSIS — H35.61: ICD-10-CM

## 2018-08-02 DIAGNOSIS — E11.3393: ICD-10-CM

## 2018-08-02 DIAGNOSIS — H35.373: ICD-10-CM

## 2018-08-02 DIAGNOSIS — H43.813: ICD-10-CM

## 2018-08-02 DIAGNOSIS — H43.811: ICD-10-CM

## 2018-08-02 DIAGNOSIS — H33.003: ICD-10-CM

## 2018-08-02 DIAGNOSIS — H33.301: ICD-10-CM

## 2018-08-02 PROCEDURE — 92134 CPTRZ OPH DX IMG PST SGM RTA: CPT | Performed by: OPHTHALMOLOGY

## 2018-08-02 PROCEDURE — 92014 COMPRE OPH EXAM EST PT 1/>: CPT | Performed by: OPHTHALMOLOGY

## 2018-08-02 PROCEDURE — 92226 OPHTHALMOSCOPY EXT SUBSEQUENT: CPT | Performed by: OPHTHALMOLOGY

## 2018-08-02 ASSESSMENT — REFRACTION_CURRENTRX
OD_OVR_VA: 20/
OS_OVR_VA: 20/
OS_OVR_VA: 20/
OD_OVR_VA: 20/
OD_OVR_VA: 20/
OS_OVR_VA: 20/

## 2018-08-02 ASSESSMENT — REFRACTION_MANIFEST
OU_VA: 20/
OD_VA3: 20/
OD_VA1: 20/
OD_VA1: 20/
OS_VA1: 20/
OS_VA3: 20/
OU_VA: 20/
OS_VA2: 20/
OD_VA3: 20/
OU_VA: 20/
OS_VA3: 20/
OD_VA1: 20/
OD_VA2: 20/
OS_VA2: 20/
OD_VA2: 20/
OD_VA2: 20/
OD_VA3: 20/
OS_VA2: 20/
OS_VA1: 20/
OS_VA3: 20/
OS_VA1: 20/

## 2018-08-02 ASSESSMENT — VISUAL ACUITY
OD_BCVA: CF 4FT
OS_BCVA: 20/25+2

## 2018-08-02 ASSESSMENT — CONFRONTATIONAL VISUAL FIELD TEST (CVF)
OS_FINDINGS: FULL
OD_FINDINGS: FULL

## 2018-08-02 ASSESSMENT — LID EXAM ASSESSMENTS
OS_BLEPHARITIS: LUL 2+
OD_BLEPHARITIS: RUL 2+

## 2018-08-08 DIAGNOSIS — M10.9 GOUT, UNSPECIFIED CAUSE, UNSPECIFIED CHRONICITY, UNSPECIFIED SITE: Primary | ICD-10-CM

## 2018-08-08 RX ORDER — ALLOPURINOL 100 MG/1
100 TABLET ORAL DAILY
Qty: 90 TABLET | Refills: 3 | Status: SHIPPED | OUTPATIENT
Start: 2018-08-08 | End: 2019-07-23

## 2018-08-15 DIAGNOSIS — C67.9 MALIGNANT NEOPLASM OF URINARY BLADDER, UNSPECIFIED SITE (HCC): ICD-10-CM

## 2018-08-15 RX ORDER — OXYCODONE HYDROCHLORIDE 5 MG/1
5 TABLET ORAL 3 TIMES DAILY PRN
Qty: 90 TABLET | Refills: 0 | Status: SHIPPED | OUTPATIENT
Start: 2018-08-15 | End: 2018-10-26 | Stop reason: SDUPTHER

## 2018-08-16 ENCOUNTER — OFFICE VISIT (OUTPATIENT)
Dept: GASTROENTEROLOGY | Facility: MEDICAL CENTER | Age: 68
End: 2018-08-16
Payer: COMMERCIAL

## 2018-08-16 VITALS
TEMPERATURE: 97.9 F | HEIGHT: 69 IN | BODY MASS INDEX: 26.36 KG/M2 | DIASTOLIC BLOOD PRESSURE: 82 MMHG | WEIGHT: 178 LBS | HEART RATE: 87 BPM | SYSTOLIC BLOOD PRESSURE: 144 MMHG

## 2018-08-16 DIAGNOSIS — B18.1 CHRONIC VIRAL HEPATITIS B WITHOUT DELTA AGENT AND WITHOUT COMA (HCC): Primary | ICD-10-CM

## 2018-08-16 PROCEDURE — 99213 OFFICE O/P EST LOW 20 MIN: CPT | Performed by: INTERNAL MEDICINE

## 2018-08-16 NOTE — PROGRESS NOTES
[facility] - Outpatient Follow-up Note  Purvi Isabel 76 y o  male MRN: [id number]  Encounter: [id number]          ASSESSMENT AND PLAN:      1  Chronic viral hepatitis B without delta agent and without coma (HCC)  Repeat labs to evaluate for disease activity  He is not jaundiced  No other complaints except than fatigue  CT showed fatty liver, no ascites  Follow up in 3 months      - CBC and differential; Future  - Basic metabolic panel; Future  - Hepatic function panel; Future  - Protime-INR; Future  - Hepatitis B DNA, ultraquantitative, PCR; Future  - Hepatitis delta virus; Future    ______________________________________________________________________    SUBJECTIVE:  27-year-old man with recent diagnosis of hepatitis B presented for follow-up  Patient reported he has been doing well but feeling fatigued intermittently  Patient has history of end stage renal disease on hemodialysis  He also has history of bladder cancer on chemotherapy  But his chemotherapy was discontinued as he could not tolerate the chemotherapy  He was recently seen as outpatient for jaundice and abnormal liver function test and hepatitis B and was positive  Patient had 2 tattoos in 1960s  He could have had hepatitis B infection at that time but remained asymptomatic until he started having hemodialysis and overall compromised immune system  His most recent CT scan showed fatty liver and no ascites  His platelets are above 518  His PT INR in June was normal  His albumin was 3 1 in June  REVIEW OF SYSTEMS IS OTHERWISE NEGATIVE        Historical Information   Past Medical History:   Diagnosis Date    AAA (abdominal aortic aneurysm) (HCC)     Abnormal liver function test     Anemia     Anxiety     Arthritis     Cancer (HCC)     bladder    Cardiac disorder     Chronic kidney disease     renal failure    Chronic pain disorder     back and legs    Depression     Diabetes mellitus ([address] )     [facility]ialysis patient [facility])     Fracture of carpal bone     Fractures     spinal compression fx,closed fx of wrist & ankle    GERD (gastroesophageal reflux disease)     Gout     Hearing difficulty of both ears     Hepatitis A     A - remission since 1970's    Hyperlipidemia     Hyperlipidemia     Hypertension     Kidney stone     Migraine     Neuropathy     Nicotine dependence     Pancreatitis     Vitamin D deficiency     Wears glasses      Past Surgical History:   Procedure Laterality Date    BLADDER FULGURATION  06/17/2016    Cystoscopy with fulguration medium lesion (2-5 cm)    CATARACT EXTRACTION Bilateral     congenital    COLONOSCOPY      CYSTOSCOPY      Diagnostic 5/31/17, 10/10/17    CYSTOSCOPY N/A 6/14/2017    Procedure: CYSTOSCOPY WITH BLADDER  BIOPSIES WITH FULGURATION;  Surgeon: Angela Stewart MD;  [facility];  Service: Urology    CYSTOSCOPY W/ URETERAL STENT PLACEMENT Right 06/01/2016    CYSTOSCOPY W/ URETEROSCOPY  06/17/2016    With removal of calculus    CYSTOSTOMY W/ BLADDER BIOPSY      9/6/26, 2/6/17    DENTAL SURGERY      ESOPHAGOGASTRODUODENOSCOPY      EYE SURGERY      catatact    EYE SURGERY Bilateral     Left eye detachment R eye retinal tear    HERNIA REPAIR      HERNIA REPAIR      L inguinal w/mesh;umbilical herniorraphy    WI ANASTOMOSIS,AV,ANY SITE Right 6/29/2016    Procedure: LOWER FOREARM AV FISTULA;  Surgeon: Rufus Clarke MD;  [facility];  Service: Vascular    WI COLONOSCOPY FLX DX W/COLLJ SPEC WHEN PFRMD N/A 3/4/2016    Procedure: COLONOSCOPY;  Surgeon: Lorenza Ardon MD;  [facility];  Service: Gastroenterology    WI CYSTO/URETERO W/LITHOTRIPSY &INDWELL STENT INSRT Right 6/17/2016    Procedure: CYSTOSCOPY URETEROSCOPY WITH LITHOTRIPSY HOLMIUM LASER,BASKET STONE EXTRACTION, RETROGRADE PYELOGRAM AND INSERTION STENT URETERAL;  Surgeon: Madhuri Garcia MD;  Location:  MAIN OR;  Service: Urology    WI CYSTOURETHROSCOPY N/A 7/13/2017    Procedure: CYSTOSCOPY;  Surgeon: Shawna Keane MD;  [facility];  Service: Urology    NJ CYSTOURETHROSCOPY W/IRRIG & EVAC CLOTS N/A 7/13/2017    Procedure: CYSTOSCOPY EVACUATION OF CLOTS,POSSIBLE FULGURATION;  Surgeon: Shawna Keane MD;  [facility];  Service: Urology    NJ CYSTOURETHROSCOPY,BIOPSY N/A 8/15/2016    Procedure: CYSTOSCOPY WITH RE BIOPSY OF BLADDER;  Surgeon: Wes Alonzo MD;  [facility];  Service: Urology    NJ CYSTOURETHROSCOPY,FULGUR 0 5-2 CM LESN N/A 2/6/2017    Procedure: TRANSURETHRAL RESECTION OF BLADDER TUMOR (TURBT); Surgeon: Shawna Keane MD;  [facility];  Service: Urology    NJ CYSTOURETHROSCOPY,FULGUR 0 5-2 CM LESN N/A 6/14/2017    Procedure: TRANSURETHRAL RESECTION OF BLADDER TUMOR (TURBT); Surgeon: Shawna Keane MD;  [facility];  Service: Urology    NJ CYSTOURETHROSCOPY,FULGUR <0 5 CM LESN N/A 2/6/2017    Procedure: Lyon Kieran; BLADDER BIOPSY WITH Leesa Olivares;  Surgeon: Shawna Keane MD;  [facility];  Service: Urology    NJ CYSTOURETHROSCOPY,URETER CATHETER Right 6/1/2016    Procedure: CYSTOSCOPY RETROGRADE PYELOGRAM WITH INSERTION STENT URETERAL;  Surgeon: Timothy Feldman MD;  Location:  MAIN OR;  Service: Urology    NJ EGD TRANSORAL BIOPSY SINGLE/MULTIPLE N/A 3/4/2016    Procedure: ESOPHAGOGASTRODUODENOSCOPY (EGD);   Surgeon: Paolo Hodge MD;  [facility];  Service: Gastroenterology    VASCULAR SURGERY Right     AV fistula     Social History   History   Alcohol Use No     History   Drug Use No     History   Smoking Status    Former Smoker    Quit date: 1985   Smokeless Tobacco    Never Used     Family History   Problem Relation Age of Onset    Depression Mother     Diabetes Mother     Heart disease Mother     Hypertension Mother     Kidney disease Mother     Diabetes Father     Heart disease Father         Cardiac disorder    Hypertension Father     Stroke Father     Thyroid disease Sister     Cancer Maternal Grandmother        Meds/Allergies       Current Outpatient Prescriptions:     allopurinol (ZYLOPRIM) 100 mg tablet    ALPRAZolam (XANAX) 0 5 mg tablet    ampicillin (PRINCIPEN) 250 mg capsule    atorvastatin (LIPITOR) 20 mg tablet    B Complex-C-Folic Acid (TRIPHROCAPS) 1 MG CAPS    butalbital-acetaminophen-caffeine-codeine (FIORICET WITH CODEINE) -92-30 MG per capsule    calcium acetate (PHOSLO) 667 mg capsule    Cholecalciferol (VITAMIN D-3 PO)    colchicine-probenecid 0 5-500 MG per tablet    gemfibrozil (LOPID) 600 mg tablet    guaiFENesin 200 MG tablet    metoprolol tartrate (LOPRESSOR) 25 mg tablet    omeprazole (PriLOSEC) 40 MG capsule    ondansetron (ZOFRAN) 4 mg tablet    oxybutynin (DITROPAN-XL) 5 mg 24 hr tablet    oxyCODONE (ROXICODONE) 5 mg immediate release tablet    PARoxetine (PAXIL) 40 MG tablet    Allergies   Allergen Reactions    Acetaminophen      Due to LFT elevation    Heparin      Avoids due to eye conditions;can't have any med that may cause bleeding    Lovenox [Enoxaparin Sodium]      Avoids due to eye conditions;can't have any med that may cause bleeding in eyes    Nsaids      Avoids due to renal failure    Cardura [Doxazosin] Palpitations           Objective     Blood pressure 144/82, pulse 87, temperature 97 9 °F (36 6 °C), temperature source Tympanic, height 5' 9" (1 753 m), weight 80 7 kg (178 lb)  Body mass index is 26 29 kg/m²  PHYSICAL EXAM:      General Appearance:   Alert, cooperative, no distress   HEENT:   Normocephalic, atraumatic, anicteric      Neck:  Supple, symmetrical, trachea midline   Lungs:   Clear to auscultation bilaterally; no rales, rhonchi or wheezing; respirations unlabored    Heart[de-identified]   Regular rate and rhythm; no murmur, rub, or gallop     Abdomen:   Soft, non-tender, non-distended; normal bowel sounds; no masses, no organomegaly    Genitalia:   Deferred  Rectal:   Deferred    Extremities:  No cyanosis, clubbing or edema    Pulses:  2+ and symmetric    Skin:  No jaundice, rashes, or lesions    Lymph nodes:  No palpable cervical lymphadenopathy        Lab Results:   No visits with results within 1 Day(s) from this visit  Latest known visit with results is:   Appointment on 06/26/2018   Component Date Value    Hemoglobin A1C 06/26/2018 5 6     EAG 06/26/2018 114     Cholesterol 06/26/2018 163     Triglycerides 06/26/2018 234*    HDL, Direct 06/26/2018 35*    LDL Calculated 06/26/2018 81     Non-HDL-Chol (CHOL-HDL) 06/26/2018 128          Radiology Results:   Ct Chest Abdomen Pelvis W Contrast    Result Date: 7/24/2018  Narrative: CT CHEST, ABDOMEN AND PELVIS WITH IV CONTRAST INDICATION:   C67 9: Malignant neoplasm of bladder, unspecified  COMPARISON:  10/3/2017 TECHNIQUE: CT examination of the chest, abdomen and pelvis was performed  Axial, sagittal, and coronal 2D reformatted images were created from the source data and submitted for interpretation  Radiation dose length product (DLP) for this visit:  1238 14 mGy-cm   This examination, like all CT scans performed in the [place], was performed utilizing techniques to minimize radiation dose exposure, including the use of iterative reconstruction and automated exposure control  IV Contrast:  100 mL of iodixanol (VISIPAQUE)   Patient received dialysis directly following CT  Enteric Contrast: Enteric contrast was administered  FINDINGS: CHEST LUNGS:  There is a new nodular density in the right upper lobe measuring 5 mm on series 3, image 24  There There is no tracheal or endobronchial lesion  PLEURA:  Unremarkable  HEART/GREAT VESSELS:  There is stable mild cardiomegaly  There is a stable 4 1 cm ascending aortic aneurysm  MEDIASTINUM AND ROSAS:  There is stable shotty mediastinal lymphadenopathy  CHEST WALL AND LOWER NECK:   Unremarkable   ABDOMEN LIVER/BILIARY TREE:  There is probable mild hepatic steatosis  GALLBLADDER:  No calcified gallstones  No pericholecystic inflammatory change  SPLEEN:  Unremarkable  PANCREAS:  Unremarkable  ADRENAL GLANDS:  There is stable mild bilateral adrenal gland thickening, likely adenomatous hyperplasia  KIDNEYS/URETERS:  There are small hypodense lesions in the kidneys, likely cysts  No hydronephrosis  STOMACH AND BOWEL:  There is colonic diverticulosis without evidence of acute diverticulitis  APPENDIX:  No findings to suggest appendicitis  ABDOMINOPELVIC CAVITY:  No ascites or free intraperitoneal air  No lymphadenopathy  VESSELS:  Unremarkable for patient's age  PELVIS REPRODUCTIVE ORGANS:  The prostate is enlarged  URINARY BLADDER:  There is mild bladder wall thickening and irregularity  ABDOMINAL WALL/INGUINAL REGIONS:  There is a fat-containing right inguinal hernia  The patient is status post left inguinal hernia repair  OSSEOUS STRUCTURES:  No acute fracture or destructive osseous lesion  Impression: 1  New 5 mm nodular density in the right upper lobe  While the appearance is not typical for metastasis and this may be inflammatory, a short interval follow-up CT in 3 months is recommended to ensure resolution  2   No evidence of metastatic disease in the abdomen or pelvis  3   Mild bladder wall thickening and irregularity without focal enhancing lesion identified  This may reflect posttreatment change  4   Unchanged 4 1 cm ascending aortic aneurysm  5   Prostatomegaly  6   Probable mild hepatic steatosis   Workstation performed: [id number]

## 2018-09-04 ENCOUNTER — PROCEDURE VISIT (OUTPATIENT)
Dept: UROLOGY | Facility: CLINIC | Age: 68
End: 2018-09-04
Payer: COMMERCIAL

## 2018-09-04 VITALS
WEIGHT: 175 LBS | DIASTOLIC BLOOD PRESSURE: 98 MMHG | SYSTOLIC BLOOD PRESSURE: 140 MMHG | HEIGHT: 69 IN | BODY MASS INDEX: 25.92 KG/M2

## 2018-09-04 DIAGNOSIS — C67.9 MALIGNANT NEOPLASM OF URINARY BLADDER, UNSPECIFIED SITE (HCC): Primary | ICD-10-CM

## 2018-09-04 PROCEDURE — 88112 CYTOPATH CELL ENHANCE TECH: CPT | Performed by: PATHOLOGY

## 2018-09-04 PROCEDURE — 52000 CYSTOURETHROSCOPY: CPT | Performed by: UROLOGY

## 2018-09-04 RX ORDER — ENTECAVIR 0.5 MG/1
0.5 TABLET, FILM COATED ORAL DAILY
COMMUNITY
End: 2018-11-01 | Stop reason: SDUPTHER

## 2018-09-04 NOTE — PROGRESS NOTES
UROLOGY ROUTINE FOLLOW UP NOTE     CHIEF COMPLAINT   Sunny Hauser is a 76 y o  male with a complaint of   Chief Complaint   Patient presents with    Cystoscopy    Bladder Cancer       History of Present Illness:     Julianna Varela is a 76 y o  male with a history of high-grade T1 bladder cancer  Patient underwent induction 6 weeks of BCG in our office  His routine SWOG maintenance course of BCG was complicated by multiple infections requiring treatment with ampicillin  The patient developed hematuria and clot retention  Ultimately, we decided that the patient should receive maintenance therapy with mitomycin in lieu of BCG given the patient's issues with recurrent infections and the need to delay his treatment  He underwent a course of 3 weeks of maintenance in August 2017  He underwent an additional course of mitomycin in February 2018  Patient did have nausea and vomiting throughout  Both BCG and mitomycin have been extremely morbid for this patient    He and his wife decided to abort intravesicle therapy given his poor tolerance  He is not a candidate for cystectomy at this time  We instead opted for surveillance imaging and cystoscopy  Since we have aborted attempts at intravesical therapy, the patient feels very well  He is not having urinary symptoms  The patient's wife to give him a single dose of antibiotics which we have agreed to prior to manipulation of the urinary system      Past Medical History:     Past Medical History:   Diagnosis Date    AAA (abdominal aortic aneurysm) (HCC)     Abnormal liver function test     Anemia     Anxiety     Arthritis     Cancer (HCC)     bladder    Cardiac disorder     Chronic kidney disease     renal failure    Chronic pain disorder     back and legs    Depression     [facility] [address] [facility]     Dialysis patient ([facility] [address] [facility]     Fracture of carpal bone     Fractures     spinal compression fx,closed fx of wrist & ankle    GERD (gastroesophageal reflux disease)     Gout     Hearing difficulty of both ears     Hepatitis A     A - remission since 1970's    Hyperlipidemia     Hyperlipidemia     Hypertension     Kidney stone     Migraine     Neuropathy     Nicotine dependence     Pancreatitis     Vitamin D deficiency     Wears glasses        PAST SURGICAL HISTORY:     Past Surgical History:   Procedure Laterality Date    BLADDER FULGURATION  06/17/2016    Cystoscopy with fulguration medium lesion (2-5 cm)    CATARACT EXTRACTION Bilateral     congenital    COLONOSCOPY      CYSTOSCOPY      Diagnostic 5/31/17, 10/10/17    CYSTOSCOPY N/A 6/14/2017    Procedure: CYSTOSCOPY WITH BLADDER  BIOPSIES WITH FULGURATION;  Surgeon: Jeffy Escamilla MD;  [facility];  Service: Urology    [address] [place] [address] Right 06/01/2016    CYSTOSCOPY W/ URETEROSCOPY  06/17/2016    With removal of calculus    CYSTOSTOMY W/ BLADDER BIOPSY      9/6/26, 2/6/17    DENTAL SURGERY      ESOPHAGOGASTRODUODENOSCOPY      EYE SURGERY      catatact    EYE SURGERY Bilateral     Left eye detachment R eye retinal tear    HERNIA REPAIR      HERNIA REPAIR      L inguinal w/mesh;umbilical herniorraphy    NH ANASTOMOSIS,AV,ANY SITE Right 6/29/2016    Procedure: LOWER FOREARM AV FISTULA;  Surgeon: Amanda Young MD;  [facility];  Service: Vascular    NH COLONOSCOPY FLX DX W/COLLJ SPEC WHEN PFRMD N/A 3/4/2016    Procedure: COLONOSCOPY;  Surgeon: Merlin Lulas, MD;  [facility];  Service: Gastroenterology    NH CYSTO/URETERO W/LITHOTRIPSY &INDWELL STENT INSRT Right 6/17/2016    Procedure: CYSTOSCOPY URETEROSCOPY WITH LITHOTRIPSY HOLMIUM LASER,BASKET STONE EXTRACTION, RETROGRADE PYELOGRAM AND INSERTION STENT URETERAL;  Surgeon: Susie Cole MD;  Location:  MAIN OR;  Service: Urology    NH CYSTOURETHROSCOPY N/A 7/13/2017    Procedure: CYSTOSCOPY;  Surgeon: Jeffy Escamilla MD;  [facility];  Service: Urology    NH CYSTOURETHROSCOPY W/IRRIG & EVAC CLOTS N/A 7/13/2017    Procedure: CYSTOSCOPY EVACUATION OF CLOTS,POSSIBLE FULGURATION;  Surgeon: Polly Lu MD;  [facility];  Service: Urology    VT CYSTOURETHROSCOPY,BIOPSY N/A 8/15/2016    Procedure: CYSTOSCOPY WITH RE BIOPSY OF BLADDER;  Surgeon: Citlalli Rondon MD;  [facility];  Service: Urology    VT CYSTOURETHROSCOPY,FULGUR 0 5-2 CM LESN N/A 2/6/2017    Procedure: TRANSURETHRAL RESECTION OF BLADDER TUMOR (TURBT); Surgeon: Polly Lu MD;  [facility];  Service: Urology    VT CYSTOURETHROSCOPY,FULGUR 0 5-2 CM LESN N/A 6/14/2017    Procedure: TRANSURETHRAL RESECTION OF BLADDER TUMOR (TURBT); Surgeon: Polly Lu MD;  [facility];  Service: Urology    VT CYSTOURETHROSCOPY,FULGUR <0 5 CM LESN N/A 2/6/2017    Procedure: Haylee Enter; BLADDER BIOPSY WITH [facility];  Surgeon: Polly Lu MD;  [facility];  Service: Urology    VT CYSTOURETHROSCOPY,URETER CATHETER Right 6/1/2016    Procedure: CYSTOSCOPY RETROGRADE PYELOGRAM WITH INSERTION STENT URETERAL;  Surgeon: Edith Doty MD;  Location:  MAIN OR;  Service: Urology    VT EGD TRANSORAL BIOPSY SINGLE/MULTIPLE N/A 3/4/2016    Procedure: ESOPHAGOGASTRODUODENOSCOPY (EGD); Surgeon: Ynes Azul MD;  [facility];  Service: Gastroenterology    VASCULAR SURGERY Right     AV fistula       CURRENT MEDICATIONS:     Current Outpatient Prescriptions   Medication Sig Dispense Refill    allopurinol (ZYLOPRIM) 100 mg tablet Take 1 tablet (100 mg total) by mouth daily 90 tablet 3    ALPRAZolam (XANAX) 0 5 mg tablet Take 0 5 mg by mouth daily as needed for anxiety        ampicillin (PRINCIPEN) 250 mg capsule Take 250 mg by mouth every 12 (twelve) hours Current treatment for UTI      atorvastatin (LIPITOR) 20 mg tablet Take 1 tablet (20 mg total) by mouth daily 90 tablet 1    B Complex-C-Folic Acid (TRIPHROCAPS) 1 MG CAPS Take 1 mg by mouth daily      calcium acetate (PHOSLO) 667 mg capsule Take 667 mg by mouth daily   Cholecalciferol (VITAMIN D-3 PO) Take 1 capsule by mouth daily ON sunday       colchicine-probenecid 0 5-500 MG per tablet Take 1 tablet by mouth daily   entecavir (BARACLUDE) 0 5 MG tablet Take 0 5 mg by mouth daily      gemfibrozil (LOPID) 600 mg tablet Take 600 mg by mouth 2 (two) times a day before meals   guaiFENesin 200 MG tablet Take 400 mg by mouth 2 (two) times a day      metoprolol tartrate (LOPRESSOR) 25 mg tablet Take 12 5 mg by mouth every other day        omeprazole (PriLOSEC) 40 MG capsule Take 20 mg by mouth daily        ondansetron (ZOFRAN) 4 mg tablet Take 4 mg by mouth 4 (four) times a day as needed for nausea or vomiting   oxybutynin (DITROPAN-XL) 5 mg 24 hr tablet Take 1 tablet by mouth daily (Patient taking differently: Take 5 mg by mouth as needed  ) 10 tablet 0    oxyCODONE (ROXICODONE) 5 mg immediate release tablet Take 1 tablet (5 mg total) by mouth 3 (three) times a day as needed for moderate pain Max Daily Amount: 15 mg 90 tablet 0    PARoxetine (PAXIL) 40 MG tablet Take 1 tablet (40 mg total) by mouth daily 90 tablet 3    butalbital-acetaminophen-caffeine-codeine (FIORICET WITH CODEINE) -90-30 MG per capsule Take 1 capsule by mouth every 4 (four) hours as needed for headaches  No current facility-administered medications for this visit          ALLERGIES:     Allergies   Allergen Reactions    Acetaminophen      Due to LFT elevation    Heparin      Avoids due to eye conditions;can't have any med that may cause bleeding    Lovenox [Enoxaparin Sodium]      Avoids due to eye conditions;can't have any med that may cause bleeding in eyes    Nsaids      Avoids due to renal failure    Cardura [Doxazosin] Palpitations       SOCIAL HISTORY:     Social History     Social History    Marital status: /Civil Union     Spouse name: N/A    Number of children: N/A    Years of education: N/A     Social History Main Topics    Smoking status: Former Smoker     Quit date: 1985    Smokeless tobacco: Never Used    Alcohol use No    Drug use: No    Sexual activity: Yes     Partners: Female     Other Topics Concern    None     Social History Narrative    Advance directive on file    Copy of advanced directive obtained from patient    History of no living will    Patient has living will       SOCIAL HISTORY:     Family History   Problem Relation Age of Onset    Depression Mother     Diabetes Mother     Heart disease Mother     Hypertension Mother     Kidney disease Mother     Diabetes Father     Heart disease Father         Cardiac disorder    Hypertension Father     Stroke Father     Thyroid disease Sister     Cancer Maternal Grandmother        REVIEW OF SYSTEMS:     Review of Systems   Constitutional: Negative for fatigue (Improved)  HENT: Negative  Respiratory: Negative for chest tightness and shortness of breath  Cardiovascular: Negative for chest pain  Gastrointestinal: Negative  Genitourinary: Negative for hematuria, penile pain and urgency  Musculoskeletal: Positive for arthralgias, back pain and gait problem  Skin: Negative  Psychiatric/Behavioral: Negative  PHYSICAL EXAM:     /98   Ht 5' 9" (1 753 m)   Wt 79 4 kg (175 lb)   BMI 25 84 kg/m²     General:   Middle-age male, appears older than stated age, improvement in vitality since last visit  Has some jaundice  Hard of hearing  HEENT:  Normocephalic, atraumatic  Neck is supple without any palpable lymphadenopathy  Cardiovascular:  Patient has normal palpable distal radial pulses  There is no significant peripheral edema  No JVD is noted  Respiratory:  Patient has course respirations  There is audible wheeze  Abdomen:  Abdomen is obese but not distended or tender  : Circumcised, testicles descended    Musculoskeletal:  Shuffling gait  Dermatologic:  Patient has no skin abnormalities or rashes  LABS:     CBC:   Lab Results   Component Value Date    WBC 7 60 06/03/2018    HGB 15 5 06/03/2018    HCT 44 6 06/03/2018     (H) 06/03/2018     06/03/2018       BMP:   Lab Results   Component Value Date    GLUCOSE 152 (H) 09/14/2016    CALCIUM 9 7 06/03/2018     06/03/2018    K 5 1 06/03/2018    CO2 29 06/03/2018    CL 94 (L) 06/03/2018    BUN 59 (H) 06/03/2018    CREATININE 10 30 (H) 06/03/2018       PATHOLOGY:     CT CHEST, ABDOMEN AND PELVIS WITH IV CONTRAST     INDICATION:   C67 9: Malignant neoplasm of bladder, unspecified      COMPARISON:  10/3/2017     TECHNIQUE: CT examination of the chest, abdomen and pelvis was performed  Axial, sagittal, and coronal 2D reformatted images were created from the source data and submitted for interpretation      Radiation dose length product (DLP) for this visit:  1238 14 mGy-cm   This examination, like all CT scans performed in the [facility], was performed utilizing techniques to minimize radiation dose exposure, including the use of   iterative reconstruction and automated exposure control      IV Contrast:  100 mL of iodixanol (VISIPAQUE)   Patient received dialysis directly following CT  Enteric Contrast: Enteric contrast was administered      FINDINGS:     CHEST     LUNGS:  There is a new nodular density in the right upper lobe measuring 5 mm on series 3, image 24  There There is no tracheal or endobronchial lesion      PLEURA:  Unremarkable      HEART/GREAT VESSELS:  There is stable mild cardiomegaly  There is a stable 4 1 cm ascending aortic aneurysm      MEDIASTINUM AND ROSAS:  There is stable shotty mediastinal lymphadenopathy      CHEST WALL AND LOWER NECK:   Unremarkable      ABDOMEN     LIVER/BILIARY TREE:  There is probable mild hepatic steatosis      GALLBLADDER:  No calcified gallstones   No pericholecystic inflammatory change      SPLEEN:  Unremarkable      PANCREAS: Unremarkable      ADRENAL GLANDS:  There is stable mild bilateral adrenal gland thickening, likely adenomatous hyperplasia      KIDNEYS/URETERS:  There are small hypodense lesions in the kidneys, likely cysts  No hydronephrosis      STOMACH AND BOWEL:  There is colonic diverticulosis without evidence of acute diverticulitis      APPENDIX:  No findings to suggest appendicitis      ABDOMINOPELVIC CAVITY:  No ascites or free intraperitoneal air  No lymphadenopathy      VESSELS:  Unremarkable for patient's age      PELVIS     REPRODUCTIVE ORGANS:  The prostate is enlarged      URINARY BLADDER:  There is mild bladder wall thickening and irregularity      ABDOMINAL WALL/INGUINAL REGIONS:  There is a fat-containing right inguinal hernia  The patient is status post left inguinal hernia repair      OSSEOUS STRUCTURES:  No acute fracture or destructive osseous lesion      IMPRESSION:     1  New 5 mm nodular density in the right upper lobe  While the appearance is not typical for metastasis and this may be inflammatory, a short interval follow-up CT in 3 months is recommended to ensure resolution      2  No evidence of metastatic disease in the abdomen or pelvis      3   Mild bladder wall thickening and irregularity without focal enhancing lesion identified  This may reflect posttreatment change      4   Unchanged 4 1 cm ascending aortic aneurysm      5   Prostatomegaly      6   Probable mild hepatic steatosis  CYTOLOGY:     1/18/18  Final Diagnosis   A  Urine, Other (ThinPrep):  Negative for high grade urothelial carcinoma ([address]) - see comment  2/6/18  Final Diagnosis   A  Urine, Cystoscopic:  Negative for high grade urothelial carcinoma ([address]) - see comment  PATHOLOGY:     6/14/17  Final Diagnosis   A  Left lateral bladder tumor (transurethral resection):     - Denuded urothelium with mild cystitis      B   Left lateral bladder wall (biopsy):     - Partially denuded urothelium with cystitis and non-necrotizing granulomatous reaction      - Microorganism studies pending      C  Posterior bladder (biopsy):     - Partially denuded urothelium with mild cystitis      D  Right bladder wall (biopsy):     - Denuded urothelium with cystitis  2/6/17  Final Diagnosis   A  Left lateral bladder (biopsy):     - Partially denuded urothelial mucosa  - No malignancy identified      B  Right lateral bladder (biopsy):     - Urothelium with reactive-type changes       - No malignancy identified       C  Posterior bladder (biopsy):     - Urothelium with reactive-type changes       - No malignancy identified  8/15/16  Final Diagnosis   A  Bladder, left lateral wall tumor, transurethral resection:  -  Ulcerated and severely inflamed urothelial tissue with cytologic atypia, cannot entirely exclude residual/recurrent tumor  6/17/16  Final Diagnosis   A  Urinary bladder tumor:     - Invasive low to focally high grade papillary urothelial carcinoma  - Carcinoma invades lamina propria  - No muscularis propria is identified in the submitted tissue  ASSESSMENT:     76 y o  male with HG T1 bladder cancer who underwent stunted intravesicle treatments between [id number] complicated by comorbidities, infection, and poor tolerance of BCG and mitomycin    PLAN:     At this point time, the patient and his wife understand that he had a previous diagnosis of low to high-grade invasive T1 bladder cancer  Although there was no muscle initially in the specimen, repeat resections have not demonstrated any muscle invasive disease  The patient failed BCG therapy due to recurrent infections and his weakened immunocompromised state from his multiple other medical comorbidities  We then transitioned him to mitomycin  The patient has had significant morbidity from this as well including hematuria and bladder irritation  At this point time, he has refused additional mitomycin or intravesical treatments    He instead opted for surveillance imaging/cystoscopy  Patient and his wife understand that by losing out on the opportunity to follow the standard protocol, there is certainly a risk of disease progression or metastasis  They are comfortable with this risk  The patient appears overall improved with the decision to abort it additional intravesical therapy  He is not having any bladder symptoms and is overall fatigue has gotten better  His bladder is free of any abnormalities today we will send the urine for cytology  I will repeat a set of imaging in 3 months with focus on the chest   He will undergo this imaging the day prior to his dialysis given the contrast load  Patient will return for cystoscopy at that time  His wife will administer antibiotics the day prior  Because we are not moving forward with any additional treatment, I will closely survey the patient  Patient and his will wife are very comfortable with the plan

## 2018-09-04 NOTE — PROGRESS NOTES
Cystoscopy  Date/Time: 9/4/2018 9:51 AM  Performed by: Ancelmo Block  Authorized by: Ancelmo Block     Procedure details: cystoscopy    Patient tolerance: Patient tolerated the procedure well with no immediate complications    Additional Procedure Details:   Cystoscopy procedure note:    Risk and benefits of flexible cystoscopy were discussed  Informed consent was obtained  Urine dip was adequate for cystoscopy  The patient was placed in the supine position  His genitalia was prepped with Betadine and draped in a sterile fashion  Viscous 2% lidocaine jelly was instilled into the urethra and allowed dwell time for local anesthesia  Flexible cystoscopy was then performed using a 16F scope  The distal urethra and prostatic urethra were evaluated and were normal in course and caliber  Once inside the bladder, it was carefully inspected in a 360 degree fashion  There was no evidence of mucosal abnormalities, lesions, diverticula or stones  Both ureteral orifices in their orthotopic location were visualized with clear efflux of urine  Retroflexion for evaluation of the bladder neck was normal      Overall this was a negative cystoscopy

## 2018-09-06 ENCOUNTER — TELEPHONE (OUTPATIENT)
Dept: UROLOGY | Facility: CLINIC | Age: 68
End: 2018-09-06

## 2018-09-06 NOTE — TELEPHONE ENCOUNTER
Left message for pt's wife re urine cytology report is negative  Asked wife to return to call to office confirming she received the message

## 2018-09-06 NOTE — TELEPHONE ENCOUNTER
----- Message from Tricia Abebe MD sent at 9/6/2018  9:55 AM EDT -----  Can you please let them know the good news on cytology?  Thanks

## 2018-09-07 DIAGNOSIS — C67.9 MALIGNANT NEOPLASM OF URINARY BLADDER, UNSPECIFIED SITE (HCC): Primary | ICD-10-CM

## 2018-09-10 DIAGNOSIS — E78.5 HYPERLIPIDEMIA, UNSPECIFIED HYPERLIPIDEMIA TYPE: ICD-10-CM

## 2018-09-10 RX ORDER — ATORVASTATIN CALCIUM 20 MG/1
20 TABLET, FILM COATED ORAL DAILY
Qty: 90 TABLET | Refills: 3 | Status: SHIPPED | OUTPATIENT
Start: 2018-09-10 | End: 2019-09-03

## 2018-10-07 ENCOUNTER — APPOINTMENT (OUTPATIENT)
Dept: LAB | Facility: HOSPITAL | Age: 68
End: 2018-10-07
Attending: INTERNAL MEDICINE
Payer: COMMERCIAL

## 2018-10-07 DIAGNOSIS — B18.1 CHRONIC VIRAL HEPATITIS B WITHOUT DELTA AGENT AND WITHOUT COMA (HCC): ICD-10-CM

## 2018-10-07 LAB
ALBUMIN SERPL BCP-MCNC: 3.6 G/DL (ref 3.5–5)
ALBUMIN SERPL BCP-MCNC: 3.6 G/DL (ref 3.5–5)
ALP SERPL-CCNC: 102 U/L (ref 46–116)
ALT SERPL W P-5'-P-CCNC: 25 U/L (ref 12–78)
ANION GAP SERPL CALCULATED.3IONS-SCNC: 13 MMOL/L (ref 4–13)
AST SERPL W P-5'-P-CCNC: 23 U/L (ref 5–45)
BASOPHILS # BLD AUTO: 0.08 THOUSANDS/ΜL (ref 0–0.1)
BASOPHILS NFR BLD AUTO: 1 % (ref 0–1)
BILIRUB DIRECT SERPL-MCNC: 0.39 MG/DL (ref 0–0.2)
BILIRUB SERPL-MCNC: 0.9 MG/DL (ref 0.2–1)
BUN SERPL-MCNC: 55 MG/DL (ref 5–25)
CALCIUM ALBUM COR SERPL-MCNC: 10.5 MG/DL (ref 8.3–10.1)
CALCIUM SERPL-MCNC: 10.2 MG/DL (ref 8.3–10.1)
CALCIUM SERPL-MCNC: 10.2 MG/DL (ref 8.3–10.1)
CHLORIDE SERPL-SCNC: 94 MMOL/L (ref 100–108)
CO2 SERPL-SCNC: 31 MMOL/L (ref 21–32)
CREAT SERPL-MCNC: 8.11 MG/DL (ref 0.6–1.3)
EOSINOPHIL # BLD AUTO: 0.31 THOUSAND/ΜL (ref 0–0.61)
EOSINOPHIL NFR BLD AUTO: 3 % (ref 0–6)
ERYTHROCYTE [DISTWIDTH] IN BLOOD BY AUTOMATED COUNT: 14.7 % (ref 11.6–15.1)
GFR SERPL CREATININE-BSD FRML MDRD: 6 ML/MIN/1.73SQ M
GLUCOSE P FAST SERPL-MCNC: 130 MG/DL (ref 65–99)
HCT VFR BLD AUTO: 41.4 % (ref 36.5–49.3)
HGB BLD-MCNC: 14 G/DL (ref 12–17)
IMM GRANULOCYTES # BLD AUTO: 0.04 THOUSAND/UL (ref 0–0.2)
IMM GRANULOCYTES NFR BLD AUTO: 0 % (ref 0–2)
INR PPP: 1.08 (ref 0.86–1.17)
LYMPHOCYTES # BLD AUTO: 1.25 THOUSANDS/ΜL (ref 0.6–4.47)
LYMPHOCYTES NFR BLD AUTO: 10 % (ref 14–44)
MCH RBC QN AUTO: 33.4 PG (ref 26.8–34.3)
MCHC RBC AUTO-ENTMCNC: 33.8 G/DL (ref 31.4–37.4)
MCV RBC AUTO: 99 FL (ref 82–98)
MONOCYTES # BLD AUTO: 0.98 THOUSAND/ΜL (ref 0.17–1.22)
MONOCYTES NFR BLD AUTO: 8 % (ref 4–12)
NEUTROPHILS # BLD AUTO: 9.87 THOUSANDS/ΜL (ref 1.85–7.62)
NEUTS SEG NFR BLD AUTO: 78 % (ref 43–75)
NRBC BLD AUTO-RTO: 0 /100 WBCS
PLATELET # BLD AUTO: 261 THOUSANDS/UL (ref 149–390)
PMV BLD AUTO: 10.5 FL (ref 8.9–12.7)
POTASSIUM SERPL-SCNC: 4.5 MMOL/L (ref 3.5–5.3)
PROT SERPL-MCNC: 7.7 G/DL (ref 6.4–8.2)
PROTHROMBIN TIME: 13.4 SECONDS (ref 11.8–14.2)
RBC # BLD AUTO: 4.19 MILLION/UL (ref 3.88–5.62)
SODIUM SERPL-SCNC: 138 MMOL/L (ref 136–145)
WBC # BLD AUTO: 12.53 THOUSAND/UL (ref 4.31–10.16)

## 2018-10-07 PROCEDURE — 36415 COLL VENOUS BLD VENIPUNCTURE: CPT

## 2018-10-07 PROCEDURE — 86692 HEPATITIS DELTA AGENT ANTBDY: CPT

## 2018-10-07 PROCEDURE — 85610 PROTHROMBIN TIME: CPT

## 2018-10-07 PROCEDURE — 80048 BASIC METABOLIC PNL TOTAL CA: CPT

## 2018-10-07 PROCEDURE — 87517 HEPATITIS B DNA QUANT: CPT

## 2018-10-07 PROCEDURE — 85025 COMPLETE CBC W/AUTO DIFF WBC: CPT

## 2018-10-07 PROCEDURE — 82040 ASSAY OF SERUM ALBUMIN: CPT

## 2018-10-07 PROCEDURE — 80076 HEPATIC FUNCTION PANEL: CPT

## 2018-10-10 LAB
HBV DNA SERPL NAA+PROBE-ACNC: 50 IU/ML
HBV DNA SERPL NAA+PROBE-LOG IU: 1.7 LOG10 IU/ML
REF LAB TEST REF RANGE: NORMAL

## 2018-10-11 ENCOUNTER — DOCTOR'S OFFICE (OUTPATIENT)
Dept: URBAN - NONMETROPOLITAN AREA CLINIC 1 | Facility: CLINIC | Age: 68
Setting detail: OPHTHALMOLOGY
End: 2018-10-11
Payer: COMMERCIAL

## 2018-10-11 DIAGNOSIS — H33.301: ICD-10-CM

## 2018-10-11 DIAGNOSIS — H43.812: ICD-10-CM

## 2018-10-11 DIAGNOSIS — E11.3393: ICD-10-CM

## 2018-10-11 DIAGNOSIS — H35.373: ICD-10-CM

## 2018-10-11 DIAGNOSIS — H33.003: ICD-10-CM

## 2018-10-11 DIAGNOSIS — H43.813: ICD-10-CM

## 2018-10-11 DIAGNOSIS — H43.811: ICD-10-CM

## 2018-10-11 LAB — HDV AB SER-ACNC: NEGATIVE

## 2018-10-11 PROCEDURE — 92250 FUNDUS PHOTOGRAPHY W/I&R: CPT | Performed by: OPHTHALMOLOGY

## 2018-10-11 PROCEDURE — 92014 COMPRE OPH EXAM EST PT 1/>: CPT | Performed by: OPHTHALMOLOGY

## 2018-10-11 PROCEDURE — 92226 OPHTHALMOSCOPY EXT SUBSEQUENT: CPT | Performed by: OPHTHALMOLOGY

## 2018-10-11 PROCEDURE — 92235 FLUORESCEIN ANGRPH MLTIFRAME: CPT | Performed by: OPHTHALMOLOGY

## 2018-10-11 PROCEDURE — 92134 CPTRZ OPH DX IMG PST SGM RTA: CPT | Performed by: OPHTHALMOLOGY

## 2018-10-11 PROCEDURE — 67145 PROPH RTA DTCHMNT PC: CPT | Performed by: OPHTHALMOLOGY

## 2018-10-11 ASSESSMENT — REFRACTION_MANIFEST
OD_VA3: 20/
OD_VA3: 20/
OS_VA3: 20/
OD_VA2: 20/
OU_VA: 20/
OU_VA: 20/
OS_VA3: 20/
OS_VA2: 20/
OS_VA1: 20/
OS_VA2: 20/
OD_VA2: 20/
OD_VA1: 20/
OD_VA1: 20/
OS_VA1: 20/

## 2018-10-11 ASSESSMENT — VISUAL ACUITY
OD_BCVA: CF 4FT
OS_BCVA: 20/25+2

## 2018-10-11 ASSESSMENT — LID EXAM ASSESSMENTS
OS_BLEPHARITIS: LUL 2+
OD_BLEPHARITIS: RUL 2+

## 2018-10-11 ASSESSMENT — REFRACTION_CURRENTRX
OD_OVR_VA: 20/
OS_OVR_VA: 20/
OS_OVR_VA: 20/
OD_OVR_VA: 20/
OD_OVR_VA: 20/
OS_OVR_VA: 20/

## 2018-10-11 ASSESSMENT — CONFRONTATIONAL VISUAL FIELD TEST (CVF)
OD_FINDINGS: FULL
OS_FINDINGS: FULL

## 2018-10-12 DIAGNOSIS — F41.8 DEPRESSION WITH ANXIETY: ICD-10-CM

## 2018-10-12 RX ORDER — PAROXETINE HYDROCHLORIDE 40 MG/1
40 TABLET, FILM COATED ORAL DAILY
Qty: 90 TABLET | Refills: 3 | Status: SHIPPED | OUTPATIENT
Start: 2018-10-12 | End: 2018-11-08 | Stop reason: SDUPTHER

## 2018-10-26 DIAGNOSIS — C67.9 MALIGNANT NEOPLASM OF URINARY BLADDER, UNSPECIFIED SITE (HCC): ICD-10-CM

## 2018-10-26 RX ORDER — OXYCODONE HYDROCHLORIDE 5 MG/1
5 TABLET ORAL 3 TIMES DAILY PRN
Qty: 90 TABLET | Refills: 0 | Status: SHIPPED | OUTPATIENT
Start: 2018-10-26 | End: 2018-12-31 | Stop reason: SDUPTHER

## 2018-11-01 ENCOUNTER — TELEPHONE (OUTPATIENT)
Dept: GASTROENTEROLOGY | Facility: AMBULARY SURGERY CENTER | Age: 68
End: 2018-11-01

## 2018-11-01 DIAGNOSIS — B18.1 CHRONIC VIRAL HEPATITIS B WITHOUT DELTA AGENT AND WITHOUT COMA (HCC): Primary | ICD-10-CM

## 2018-11-01 RX ORDER — ENTECAVIR 0.5 MG/1
0.5 TABLET, FILM COATED ORAL DAILY
Qty: 90 TABLET | Refills: 1 | Status: SHIPPED | OUTPATIENT
Start: 2018-11-01 | End: 2019-11-07

## 2018-11-01 NOTE — TELEPHONE ENCOUNTER
Dr Vallejo's PT    Pts wife called requesting a medication refill for entecavir, 90 days supply; to be sent to [address] [place] [address] [phone]

## 2018-11-08 ENCOUNTER — OFFICE VISIT (OUTPATIENT)
Dept: FAMILY MEDICINE CLINIC | Facility: CLINIC | Age: 68
End: 2018-11-08
Payer: COMMERCIAL

## 2018-11-08 VITALS
DIASTOLIC BLOOD PRESSURE: 92 MMHG | HEIGHT: 69 IN | SYSTOLIC BLOOD PRESSURE: 146 MMHG | BODY MASS INDEX: 25.42 KG/M2 | WEIGHT: 171.6 LBS

## 2018-11-08 DIAGNOSIS — N18.4 CKD (CHRONIC KIDNEY DISEASE) STAGE 4, GFR 15-29 ML/MIN (HCC): Chronic | ICD-10-CM

## 2018-11-08 DIAGNOSIS — N18.9 HISTORY OF ANEMIA DUE TO CHRONIC KIDNEY DISEASE: Chronic | ICD-10-CM

## 2018-11-08 DIAGNOSIS — I10 ESSENTIAL HYPERTENSION: ICD-10-CM

## 2018-11-08 DIAGNOSIS — Z86.2 HISTORY OF ANEMIA DUE TO CHRONIC KIDNEY DISEASE: Chronic | ICD-10-CM

## 2018-11-08 DIAGNOSIS — E11.9 DIABETES MELLITUS TYPE 2 IN NONOBESE (HCC): Primary | Chronic | ICD-10-CM

## 2018-11-08 DIAGNOSIS — F41.8 DEPRESSION WITH ANXIETY: ICD-10-CM

## 2018-11-08 PROCEDURE — 99214 OFFICE O/P EST MOD 30 MIN: CPT | Performed by: FAMILY MEDICINE

## 2018-11-08 PROCEDURE — 3066F NEPHROPATHY DOC TX: CPT | Performed by: FAMILY MEDICINE

## 2018-11-08 RX ORDER — PROBENECID AND COLCHICINE 500; .5 MG/1; MG/1
1 TABLET ORAL DAILY
Qty: 90 TABLET | Refills: 3 | Status: SHIPPED | OUTPATIENT
Start: 2018-11-08 | End: 2019-10-21 | Stop reason: SDUPTHER

## 2018-11-08 RX ORDER — PAROXETINE 30 MG/1
30 TABLET, FILM COATED ORAL DAILY
Qty: 90 TABLET | Refills: 3 | Status: SHIPPED | OUTPATIENT
Start: 2018-11-08 | End: 2019-07-23

## 2018-11-08 NOTE — PROGRESS NOTES
Assessment/Plan:  Patient will continue the same medications  Continue with dialysis  Reviewed his blood work from dialysis, stable  We will see him back in 4 months or p r n  Kateryna Skipper Patient did not want foot exam   No problem-specific Assessment & Plan notes found for this encounter  Diagnoses and all orders for this visit:    Diabetes mellitus type 2 in nonobese [facility])    Essential hypertension  -     metoprolol tartrate (LOPRESSOR) 25 mg tablet; 1/2 tab po BID every other day    Depression with anxiety  -     PARoxetine (PAXIL) 30 mg tablet; Take 1 tablet (30 mg total) by mouth daily    History of anemia due to chronic kidney disease    CKD (chronic kidney disease) stage 4, GFR 15-29 ml/min ([facility])  -     colchicine-probenecid 0 5-500 MG per tablet; Take 1 tablet by mouth daily          Subjective:      Patient ID: Messi Courtney is a 76 y o  male  Patient here today for follow-up  Patient denies any chest pain or shortness of breath  Patient has is good and bad days  Suffering from arthritis  Patient still on dialysis 3 days a week  Depression   This is a chronic problem  The problem occurs daily  The problem has been unchanged  Nothing aggravates the symptoms  Treatments tried: Paxil  The treatment provided moderate relief  Diabetes   He presents for his follow-up diabetic visit  He has type 2 diabetes mellitus  His disease course has been stable  There are no hypoglycemic associated symptoms  There are no diabetic associated symptoms  There are no hypoglycemic complications  Diabetic complications include nephropathy  Risk factors for coronary artery disease include diabetes mellitus, male sex and sedentary lifestyle  Current diabetic treatment includes diet  He is compliant with treatment all of the time  His weight is stable  He is following a generally healthy diet  When asked about meal planning, he reported none  He has not had a previous visit with a dietitian   He never participates in exercise  There is no change in his home blood glucose trend  His overall blood glucose range is  mg/dl  An ACE inhibitor/angiotensin II receptor blocker is contraindicated  He does not see a podiatrist Eye exam is not current  The following portions of the patient's history were reviewed and updated as appropriate:   He  has a past medical history of AAA (abdominal aortic aneurysm) ([place] [address] ); Abnormal liver function test; Anemia; Anxiety; Arthritis; Cancer ([place] [address] ); Cardiac disorder; Chronic kidney disease; Chronic pain disorder; Depression; Diabetes mellitus ([place] [address] ); Dialysis patient [facility]); Fracture of carpal bone; Fractures; GERD (gastroesophageal reflux disease); Gout; Hearing difficulty of both ears; Hepatitis A; Hyperlipidemia; Hyperlipidemia; Hypertension; Kidney stone; Migraine; Neuropathy; Nicotine dependence; Pancreatitis; Vitamin D deficiency; and Wears glasses  He   Patient Active Problem List    Diagnosis Date Noted    Sprain of scapholunate ligament 05/18/2018    Chronic viral hepatitis B without delta agent and without coma ([place] [address] ) 04/20/2018    Acute UTI 03/22/2017    Ascending aortic aneurysm ([place] [address] ) 10/03/2016    Malignant tumor of urinary bladder ([place] [address] ) 07/07/2016    End stage renal disease ([place] [address] ) 06/29/2016    Diabetes mellitus type 2 in nonobese ([place] [address] ) 06/05/2016    CKD (chronic kidney disease) stage 4, GFR 15-29 ml/min ([facility]) 06/03/2016    History of anemia due to chronic kidney disease 06/01/2016    Acute pancreatitis 04/26/2016    Essential hypertension 04/26/2016    HLD (hyperlipidemia) 04/26/2016    Depression with anxiety 10/21/2015     He  has a past surgical history that includes Hernia repair; Eye surgery;  Hernia repair; pr cystourethroscopy,fulgur <0 5 cm lesn (N/A, 2/6/2017); pr cystourethroscopy,fulgur 0 5-2 cm lesn (N/A, 2/6/2017); pr cystourethroscopy,biopsy (N/A, 8/15/2016); pr anastomosis,av,any site (Right, 6/29/2016); pr cysto/uretero w/lithotripsy &indwell stent insrt (Right, 6/17/2016); pr cystourethroscopy,ureter catheter (Right, 6/1/2016); pr colonoscopy flx dx w/collj spec when pfrmd (N/A, 3/4/2016); pr egd transoral biopsy single/multiple (N/A, 3/4/2016); Vascular surgery (Right); Eye surgery (Bilateral); Cataract extraction (Bilateral); Cystoscopy; Esophagogastroduodenoscopy; Colonoscopy; Dental surgery; pr cystourethroscopy,fulgur 0 5-2 cm lesn (N/A, 6/14/2017); CYSTOSCOPY (N/A, 6/14/2017); pr cystourethroscopy (N/A, 7/13/2017); pr cystourethroscopy w/irrig & evac clots (N/A, 7/13/2017); Cystostomy w/ bladder biopsy; Bladder fulguration (06/17/2016); Cystoscopy w/ ureteral stent placement (Right, 06/01/2016); and Cystoscopy w/ ureteroscopy (06/17/2016)  His family history includes Cancer in his maternal grandmother; Depression in his mother; Diabetes in his father and mother; Heart disease in his father and mother; Hypertension in his father and mother; Kidney disease in his mother; Stroke in his father; Thyroid disease in his sister  He  reports that he quit smoking about 33 years ago  He has never used smokeless tobacco  He reports that he does not drink alcohol or use drugs  Current Outpatient Prescriptions   Medication Sig Dispense Refill    allopurinol (ZYLOPRIM) 100 mg tablet Take 1 tablet (100 mg total) by mouth daily 90 tablet 3    ALPRAZolam (XANAX) 0 5 mg tablet Take 0 5 mg by mouth daily as needed for anxiety   ampicillin (PRINCIPEN) 250 mg capsule Take 250 mg by mouth every 12 (twelve) hours Current treatment for UTI      atorvastatin (LIPITOR) 20 mg tablet Take 1 tablet (20 mg total) by mouth daily 90 tablet 3    B Complex-C-Folic Acid (TRIPHROCAPS) 1 MG CAPS Take 1 mg by mouth daily      calcium acetate (PHOSLO) 667 mg capsule Take 667 mg by mouth daily        Cholecalciferol (VITAMIN D-3 PO) Take 1 capsule by mouth daily        colchicine-probenecid 0 5-500 MG per tablet Take 1 tablet by mouth daily 90 tablet 3    entecavir (BARACLUDE) 0 5 MG tablet Take 1 tablet (0 5 mg total) by mouth daily for 90 days 90 tablet 1    gemfibrozil (LOPID) 600 mg tablet Take 600 mg by mouth 2 (two) times a day before meals   guaiFENesin 200 MG tablet Take 400 mg by mouth 2 (two) times a day      metoprolol tartrate (LOPRESSOR) 25 mg tablet 1/2 tab po BID every other day 90 tablet 3    omeprazole (PriLOSEC) 40 MG capsule Take 20 mg by mouth daily        ondansetron (ZOFRAN) 4 mg tablet Take 4 mg by mouth 4 (four) times a day as needed for nausea or vomiting   oxybutynin (DITROPAN-XL) 5 mg 24 hr tablet Take 1 tablet by mouth daily (Patient taking differently: Take 5 mg by mouth as needed  ) 10 tablet 0    oxyCODONE (ROXICODONE) 5 mg immediate release tablet Take 1 tablet (5 mg total) by mouth 3 (three) times a day as needed for moderate pain Max Daily Amount: 15 mg 90 tablet 0    PARoxetine (PAXIL) 30 mg tablet Take 1 tablet (30 mg total) by mouth daily 90 tablet 3    butalbital-acetaminophen-caffeine-codeine (FIORICET WITH CODEINE) -98-30 MG per capsule Take 1 capsule by mouth every 4 (four) hours as needed for headaches  No current facility-administered medications for this visit  Current Outpatient Prescriptions on File Prior to Visit   Medication Sig    allopurinol (ZYLOPRIM) 100 mg tablet Take 1 tablet (100 mg total) by mouth daily    ALPRAZolam (XANAX) 0 5 mg tablet Take 0 5 mg by mouth daily as needed for anxiety   ampicillin (PRINCIPEN) 250 mg capsule Take 250 mg by mouth every 12 (twelve) hours Current treatment for UTI    atorvastatin (LIPITOR) 20 mg tablet Take 1 tablet (20 mg total) by mouth daily    B Complex-C-Folic Acid (TRIPHROCAPS) 1 MG CAPS Take 1 mg by mouth daily    calcium acetate (PHOSLO) 667 mg capsule Take 667 mg by mouth daily      Cholecalciferol (VITAMIN D-3 PO) Take 1 capsule by mouth daily      entecavir (BARACLUDE) 0 5 MG tablet Take 1 tablet (0 5 mg total) by mouth daily for 90 days    gemfibrozil (LOPID) 600 mg tablet Take 600 mg by mouth 2 (two) times a day before meals   guaiFENesin 200 MG tablet Take 400 mg by mouth 2 (two) times a day    omeprazole (PriLOSEC) 40 MG capsule Take 20 mg by mouth daily      ondansetron (ZOFRAN) 4 mg tablet Take 4 mg by mouth 4 (four) times a day as needed for nausea or vomiting   oxybutynin (DITROPAN-XL) 5 mg 24 hr tablet Take 1 tablet by mouth daily (Patient taking differently: Take 5 mg by mouth as needed  )    oxyCODONE (ROXICODONE) 5 mg immediate release tablet Take 1 tablet (5 mg total) by mouth 3 (three) times a day as needed for moderate pain Max Daily Amount: 15 mg    [DISCONTINUED] colchicine-probenecid 0 5-500 MG per tablet Take 1 tablet by mouth daily   [DISCONTINUED] metoprolol tartrate (LOPRESSOR) 25 mg tablet Take 12 5 mg by mouth every other day      [DISCONTINUED] PARoxetine (PAXIL) 40 MG tablet Take 1 tablet (40 mg total) by mouth daily    butalbital-acetaminophen-caffeine-codeine (FIORICET WITH CODEINE) -98-30 MG per capsule Take 1 capsule by mouth every 4 (four) hours as needed for headaches  No current facility-administered medications on file prior to visit  He is allergic to acetaminophen; heparin; lovenox [enoxaparin sodium]; nsaids; and cardura [doxazosin]       Review of Systems   Constitutional: Negative  Respiratory: Negative  Cardiovascular: Negative  Gastrointestinal: Negative  Genitourinary: Negative  Psychiatric/Behavioral: Positive for depression  Objective:      /92   Ht 5' 9" (1 753 m)   Wt 77 8 kg (171 lb 9 6 oz)   BMI 25 34 kg/m²          Physical Exam   Constitutional: He is oriented to person, place, and time  He appears well-developed and well-nourished  No distress  HENT:   Head: Normocephalic and atraumatic  Eyes: Conjunctivae are normal    Cardiovascular: Normal rate, regular rhythm and normal heart sounds  Exam reveals no gallop and no friction rub      No murmur heard   Pulmonary/Chest: Effort normal and breath sounds normal  No respiratory distress  He has no wheezes  He has no rales  Musculoskeletal: He exhibits no edema  Neurological: He is alert and oriented to person, place, and time  Skin: He is not diaphoretic  Psychiatric: He has a normal mood and affect  His behavior is normal  Judgment and thought content normal    Vitals reviewed

## 2018-11-13 ENCOUNTER — OFFICE VISIT (OUTPATIENT)
Dept: GASTROENTEROLOGY | Facility: MEDICAL CENTER | Age: 68
End: 2018-11-13
Payer: COMMERCIAL

## 2018-11-13 VITALS
BODY MASS INDEX: 25.18 KG/M2 | SYSTOLIC BLOOD PRESSURE: 142 MMHG | HEART RATE: 90 BPM | DIASTOLIC BLOOD PRESSURE: 84 MMHG | TEMPERATURE: 97.5 F | WEIGHT: 170 LBS | HEIGHT: 69 IN

## 2018-11-13 DIAGNOSIS — B19.10 HEPATITIS B: Primary | ICD-10-CM

## 2018-11-13 PROCEDURE — 99213 OFFICE O/P EST LOW 20 MIN: CPT | Performed by: INTERNAL MEDICINE

## 2018-11-13 NOTE — PROGRESS NOTES
[facility] - Outpatient Follow-up Note  Humaira Ma 76 y o  male MRN: [id number]  Encounter: [id number]          ASSESSMENT AND PLAN:      1  Hepatitis B  - patient's recent lab test showed normal liver function test and hepatitis B viral load was significantly decreased  Continue entecavir 0 5 mg every week  - follow-up in 6 months with labs  - patient has no symptoms or signs to suggest liver cirrhosis  His most recent abdominal CT scan showed fatty liver  - CBC and differential; Future  - Basic metabolic panel; Future  - Hepatic function panel; Future  - Protime-INR; Future  - Hepatitis B DNA, ultraquantitative, PCR; Future    2  Colon cancer screening:  - patient's last colonoscopy was in March 2016 with good prep and 3 tubular adenoma removed  It is recommended to have a colonoscopy done in 2019 however that will also be decided based on his bladder cancer treatment  Patient was undergoing bladder cancer treatment but his chemotherapy was discontinued prematurely due to intolerance  Follow-up in 6 months     ______________________________________________________________________    SUBJECTIVE:  51-year-old man with recent diagnosis of hepatitis B on anti could be a presented for follow-up  Patient reported intermittent feelings of fatigue but otherwise he feels fine  His jaundice has subsided  He has no lower extremity swellings  His most recent labs a month ago showed normal liver function test with normal albumin at 3 6 and viral load of 50  His AST and ALT were in 20s  He has been compliant with entecavir 0 5 mg every week after hemodialysis  REVIEW OF SYSTEMS IS OTHERWISE NEGATIVE        Historical Information   Past Medical History:   Diagnosis Date    AAA (abdominal aortic aneurysm) (HCC)     Abnormal liver function test     Anemia     Anxiety     Arthritis     Cancer (HCC)     bladder    Cardiac disorder     Chronic kidney disease renal failure    Chronic pain disorder     back and legs    Depression     Diabetes mellitus ([facility] [address] [facility]     Dialysis patient ([facility] [address] )     Fracture of carpal bone     Fractures     spinal compression fx,closed fx of wrist & ankle    GERD (gastroesophageal reflux disease)     Gout     Hearing difficulty of both ears     Hepatitis A     A - remission since 1970's    Hyperlipidemia     Hyperlipidemia     Hypertension     Kidney stone     Migraine     Neuropathy     Nicotine dependence     Pancreatitis     Vitamin D deficiency     Wears glasses      Past Surgical History:   Procedure Laterality Date    BLADDER FULGURATION  06/17/2016    Cystoscopy with fulguration medium lesion (2-5 cm)    CATARACT EXTRACTION Bilateral     congenital    COLONOSCOPY      CYSTOSCOPY      Diagnostic 5/31/17, 10/10/17    CYSTOSCOPY N/A 6/14/2017    Procedure: CYSTOSCOPY WITH BLADDER  BIOPSIES WITH FULGURATION;  Surgeon: Alicia Wan MD;  [facility];  Service: Urology    CYSTOSCOPY W/ URETERAL STENT PLACEMENT Right 06/01/2016    CYSTOSCOPY W/ URETEROSCOPY  06/17/2016    With removal of calculus    CYSTOSTOMY W/ BLADDER BIOPSY      9/6/26, 2/6/17    DENTAL SURGERY      ESOPHAGOGASTRODUODENOSCOPY      EYE SURGERY      catatact    EYE SURGERY Bilateral     Left eye detachment R eye retinal tear    HERNIA REPAIR      HERNIA REPAIR      L inguinal w/mesh;umbilical herniorraphy    WA ANASTOMOSIS,AV,ANY SITE Right 6/29/2016    Procedure: LOWER FOREARM AV FISTULA;  Surgeon: Pio Arellano MD;  [facility];  Service: Vascular    WA COLONOSCOPY FLX DX W/COLLJ SPEC WHEN PFRMD N/A 3/4/2016    Procedure: COLONOSCOPY;  Surgeon: Sage Juarez MD;  [facility];  Service: Gastroenterology    WA CYSTO/URETERO W/LITHOTRIPSY &INDWELL STENT INSRT Right 6/17/2016    Procedure: CYSTOSCOPY URETEROSCOPY WITH LITHOTRIPSY HOLMIUM LASER,BASKET STONE EXTRACTION, RETROGRADE PYELOGRAM AND INSERTION STENT URETERAL;  Surgeon: Amber Patel MD;  [facility];  Service: Urology    IA CYSTOURETHROSCOPY N/A 7/13/2017    Procedure: Cheryl Stinsoninstein;  Surgeon: Emanuel Lozada MD;  [facility];  Service: Urology    IA CYSTOURETHROSCOPY W/IRRIG & EVAC CLOTS N/A 7/13/2017    Procedure: CYSTOSCOPY EVACUATION OF CLOTS,POSSIBLE FULGURATION;  Surgeon: Emanuel Lozada MD;  [facility];  Service: Urology    IA CYSTOURETHROSCOPY,BIOPSY N/A 8/15/2016    Procedure: CYSTOSCOPY WITH RE BIOPSY OF BLADDER;  Surgeon: Nisha Cruz MD;  [facility];  Service: Urology    IA CYSTOURETHROSCOPY,FULGUR 0 5-2 CM LESN N/A 2/6/2017    Procedure: TRANSURETHRAL RESECTION OF BLADDER TUMOR (TURBT); Surgeon: Emanuel Lozada MD;  [facility];  Service: Urology    IA CYSTOURETHROSCOPY,FULGUR 0 5-2 CM LESN N/A 6/14/2017    Procedure: TRANSURETHRAL RESECTION OF BLADDER TUMOR (TURBT); Surgeon: Emanuel Lozada MD;  [facility];  Service: Urology    IA CYSTOURETHROSCOPY,FULGUR <0 5 CM LESN N/A 2/6/2017    Procedure: Harland Rubinstein; BLADDER BIOPSY WITH Bin Pickett;  Surgeon: Emanuel Lozada MD;  [facility];  Service: Urology    IA CYSTOURETHROSCOPY,URETER CATHETER Right 6/1/2016    Procedure: CYSTOSCOPY RETROGRADE PYELOGRAM WITH INSERTION STENT URETERAL;  Surgeon: Amber Patel MD;  [facility];  Service: Urology    IA EGD TRANSORAL BIOPSY SINGLE/MULTIPLE N/A 3/4/2016    Procedure: ESOPHAGOGASTRODUODENOSCOPY (EGD);   Surgeon: Neena Garcia MD;  [facility];  Service: Gastroenterology    VASCULAR SURGERY Right     AV fistula     Social History   History   Alcohol Use No     History   Drug Use No     History   Smoking Status    Former Smoker    Quit date: 1985   Smokeless Tobacco    Never Used     Family History   Problem Relation Age of Onset    Depression Mother     Diabetes Mother     Heart disease Mother     Hypertension Mother    Dora Alvarez Kidney disease Mother     Diabetes Father     Heart disease Father         Cardiac disorder    Hypertension Father     Stroke Father     Thyroid disease Sister     Cancer Maternal Grandmother        Meds/Allergies       Current Outpatient Prescriptions:     allopurinol (ZYLOPRIM) 100 mg tablet    ALPRAZolam (XANAX) 0 5 mg tablet    ampicillin (PRINCIPEN) 250 mg capsule    atorvastatin (LIPITOR) 20 mg tablet    B Complex-C-Folic Acid (TRIPHROCAPS) 1 MG CAPS    calcium acetate (PHOSLO) 667 mg capsule    Cholecalciferol (VITAMIN D-3 PO)    colchicine-probenecid 0 5-500 MG per tablet    entecavir (BARACLUDE) 0 5 MG tablet    fexofenadine (ALLEGRA) 30 MG tablet    gemfibrozil (LOPID) 600 mg tablet    guaiFENesin 200 MG tablet    metoprolol tartrate (LOPRESSOR) 25 mg tablet    omeprazole (PriLOSEC) 40 MG capsule    ondansetron (ZOFRAN) 4 mg tablet    oxybutynin (DITROPAN-XL) 5 mg 24 hr tablet    oxyCODONE (ROXICODONE) 5 mg immediate release tablet    PARoxetine (PAXIL) 30 mg tablet    Allergies   Allergen Reactions    Acetaminophen      Due to LFT elevation    Heparin      Avoids due to eye conditions;can't have any med that may cause bleeding    Lovenox [Enoxaparin Sodium]      Avoids due to eye conditions;can't have any med that may cause bleeding in eyes    Nsaids      Avoids due to renal failure    Cardura [Doxazosin] Palpitations           Objective     Blood pressure 142/84, pulse 90, temperature 97 5 °F (36 4 °C), temperature source Tympanic, height 5' 9" (1 753 m), weight 77 1 kg (170 lb)  Body mass index is 25 1 kg/m²  PHYSICAL EXAM:      General Appearance:   Alert, cooperative, no distress   HEENT:   Normocephalic, atraumatic, anicteric      Neck:  Supple, symmetrical, trachea midline   Lungs:   Clear to auscultation bilaterally; no rales, rhonchi or wheezing; respirations unlabored    Heart[de-identified]   Regular rate and rhythm; no murmur, rub, or gallop     Abdomen:   Soft, non-tender, non-distended; normal bowel sounds; no masses, no organomegaly    Genitalia:   Deferred    Rectal:   Deferred    Extremities:  No cyanosis, clubbing or edema    Pulses:  2+ and symmetric    Skin:  No jaundice, rashes, or lesions, AV fistula on the right arm  Lymph nodes:  No palpable cervical lymphadenopathy        Lab Results:   No visits with results within 1 Day(s) from this visit     Latest known visit with results is:   Appointment on 10/07/2018   Component Date Value    WBC 10/07/2018 12 53*    RBC 10/07/2018 4 19     Hemoglobin 10/07/2018 14 0     Hematocrit 10/07/2018 41 4     MCV 10/07/2018 99*    MCH 10/07/2018 33 4     MCHC 10/07/2018 33 8     RDW 10/07/2018 14 7     MPV 10/07/2018 10 5     Platelets 49/64/3594 261     nRBC 10/07/2018 0     Neutrophils Relative 10/07/2018 78*    Immat GRANS % 10/07/2018 0     Lymphocytes Relative 10/07/2018 10*    Monocytes Relative 10/07/2018 8     Eosinophils Relative 10/07/2018 3     Basophils Relative 10/07/2018 1     Neutrophils Absolute 10/07/2018 9 87*    Immature Grans Absolute 10/07/2018 0 04     Lymphocytes Absolute 10/07/2018 1 25     Monocytes Absolute 10/07/2018 0 98     Eosinophils Absolute 10/07/2018 0 31     Basophils Absolute 10/07/2018 0 08     Sodium 10/07/2018 138     Potassium 10/07/2018 4 5     Chloride 10/07/2018 94*    CO2 10/07/2018 31     ANION GAP 10/07/2018 13     BUN 10/07/2018 55*    Creatinine 10/07/2018 8 11*    Glucose, Fasting 10/07/2018 130*    Calcium 10/07/2018 10 2*    eGFR 10/07/2018 6     Total Bilirubin 10/07/2018 0 90     Bilirubin, Direct 10/07/2018 0 39*    Alkaline Phosphatase 10/07/2018 102     AST 10/07/2018 23     ALT 10/07/2018 25     Total Protein 10/07/2018 7 7     Albumin 10/07/2018 3 6     Protime 10/07/2018 13 4     INR 10/07/2018 1 08     Hep B Viral DNA IU/ML 10/07/2018 50     Hep B Viral DNA Copies/ML 10/07/2018 1 699     HEP B TEST INFORMATION 10/07/2018 Comment     Hepatitis D Ab, Total 10/07/2018 Negative     Albumin 10/07/2018 3 6     Corrected Calcium 10/07/2018 10 5*    CALCIUM FOR CA/ALB 10/07/2018 10 2*         Radiology Results:   No results found

## 2018-11-15 ENCOUNTER — DOCTOR'S OFFICE (OUTPATIENT)
Dept: URBAN - NONMETROPOLITAN AREA CLINIC 1 | Facility: CLINIC | Age: 68
Setting detail: OPHTHALMOLOGY
End: 2018-11-15
Payer: COMMERCIAL

## 2018-11-15 DIAGNOSIS — H04.123: ICD-10-CM

## 2018-11-15 DIAGNOSIS — H33.301: ICD-10-CM

## 2018-11-15 DIAGNOSIS — H43.812: ICD-10-CM

## 2018-11-15 DIAGNOSIS — H35.61: ICD-10-CM

## 2018-11-15 DIAGNOSIS — H35.373: ICD-10-CM

## 2018-11-15 DIAGNOSIS — H43.811: ICD-10-CM

## 2018-11-15 DIAGNOSIS — E11.3393: ICD-10-CM

## 2018-11-15 PROBLEM — H01.001 BLEPHARITIS; RIGHT UPPER LID, LEFT UPPER LID: Status: RESOLVED | Noted: 2017-03-20 | Resolved: 2018-11-15

## 2018-11-15 PROBLEM — H01.004 BLEPHARITIS; RIGHT UPPER LID, LEFT UPPER LID: Status: RESOLVED | Noted: 2017-03-20 | Resolved: 2018-11-15

## 2018-11-15 PROCEDURE — 92226 OPHTHALMOSCOPY EXT SUBSEQUENT: CPT | Performed by: OPHTHALMOLOGY

## 2018-11-15 PROCEDURE — 99024 POSTOP FOLLOW-UP VISIT: CPT | Performed by: OPHTHALMOLOGY

## 2018-11-15 PROCEDURE — 83861 MICROFLUID ANALY TEARS: CPT | Performed by: OPHTHALMOLOGY

## 2018-11-15 PROCEDURE — 92134 CPTRZ OPH DX IMG PST SGM RTA: CPT | Performed by: OPHTHALMOLOGY

## 2018-11-15 ASSESSMENT — REFRACTION_CURRENTRX
OS_OVR_VA: 20/
OS_OVR_VA: 20/
OD_OVR_VA: 20/
OS_OVR_VA: 20/

## 2018-11-15 ASSESSMENT — LID EXAM ASSESSMENTS
OD_BLEPHARITIS: RUL 2+
OS_BLEPHARITIS: LUL 2+

## 2018-11-15 ASSESSMENT — REFRACTION_MANIFEST
OD_VA3: 20/
OU_VA: 20/
OS_VA3: 20/
OS_VA1: 20/
OS_VA2: 20/
OD_VA1: 20/
OU_VA: 20/
OS_VA2: 20/
OD_VA2: 20/
OS_VA3: 20/
OD_VA1: 20/
OS_VA1: 20/
OD_VA3: 20/
OD_VA2: 20/

## 2018-11-15 ASSESSMENT — CONFRONTATIONAL VISUAL FIELD TEST (CVF)
OD_FINDINGS: FULL
OS_FINDINGS: FULL

## 2018-11-15 ASSESSMENT — VISUAL ACUITY
OD_BCVA: CF 4FT
OS_BCVA: 20/25+2

## 2018-12-05 ENCOUNTER — HOSPITAL ENCOUNTER (OUTPATIENT)
Dept: CT IMAGING | Facility: HOSPITAL | Age: 68
Discharge: HOME | End: 2018-12-05
Attending: UROLOGY
Payer: COMMERCIAL

## 2018-12-05 DIAGNOSIS — C67.9 MALIGNANT NEOPLASM OF URINARY BLADDER, UNSPECIFIED SITE (HCC): ICD-10-CM

## 2018-12-05 PROCEDURE — 71270 CT THORAX DX C-/C+: CPT

## 2018-12-05 PROCEDURE — 74178 CT ABD&PLV WO CNTR FLWD CNTR: CPT

## 2018-12-05 RX ADMIN — IOHEXOL 100 ML: 350 INJECTION, SOLUTION INTRAVENOUS at 08:47

## 2018-12-06 ENCOUNTER — PROCEDURE VISIT (OUTPATIENT)
Dept: UROLOGY | Facility: CLINIC | Age: 68
End: 2018-12-06
Payer: COMMERCIAL

## 2018-12-06 VITALS
BODY MASS INDEX: 25.1 KG/M2 | SYSTOLIC BLOOD PRESSURE: 142 MMHG | DIASTOLIC BLOOD PRESSURE: 98 MMHG | HEART RATE: 100 BPM | WEIGHT: 170 LBS

## 2018-12-06 DIAGNOSIS — C67.9 MALIGNANT NEOPLASM OF URINARY BLADDER, UNSPECIFIED SITE (HCC): Primary | ICD-10-CM

## 2018-12-06 PROCEDURE — 88112 CYTOPATH CELL ENHANCE TECH: CPT | Performed by: PATHOLOGY

## 2018-12-06 PROCEDURE — 99213 OFFICE O/P EST LOW 20 MIN: CPT | Performed by: UROLOGY

## 2018-12-06 PROCEDURE — 52000 CYSTOURETHROSCOPY: CPT | Performed by: UROLOGY

## 2018-12-06 NOTE — PROGRESS NOTES
UROLOGY ROUTINE FOLLOW UP NOTE     CHIEF COMPLAINT   Branden Hester is a 76 y o  male with a complaint of   Chief Complaint   Patient presents with    Bladder Cancer    Cystoscopy       History of Present Illness:     Julianne Figueroa is a 76 y o  male with a history of high-grade T1 bladder cancer  Patient underwent induction 6 weeks of BCG in our office  His routine SWOG maintenance course of BCG was complicated by multiple infections requiring treatment with ampicillin  The patient developed hematuria and clot retention  Ultimately, we decided that the patient should receive maintenance therapy with mitomycin in lieu of BCG given the patient's issues with recurrent infections and the need to delay his treatment  He underwent a course of 3 weeks of maintenance in August 2017  He underwent an additional course of mitomycin in February 2018  Patient did have nausea and vomiting throughout  Both BCG and mitomycin have been extremely morbid for this patient    He and his wife decided to abort intravesicle therapy given his poor tolerance  He is not a candidate for cystectomy at this time  We instead opted for surveillance imaging and cystoscopy  Since we have aborted attempts at intravesical therapy, the patient feels very well  He is not having urinary symptoms  The patient's wife to give him a single dose of antibiotics which we have agreed to prior to manipulation of the urinary system      Past Medical History:     Past Medical History:   Diagnosis Date    AAA (abdominal aortic aneurysm) (HCC)     Abnormal liver function test     Anemia     Anxiety     Arthritis     Cancer (HCC)     bladder    Cardiac disorder     Chronic kidney disease     renal failure    Chronic pain disorder     back and legs    Depression     [facility]     Dialysis patient ([facility]     Fracture of carpal bone     Fractures     spinal compression fx,closed fx of wrist & ankle    GERD (gastroesophageal reflux disease)     Gout     Hearing difficulty of both ears     Hepatitis A     A - remission since 1970's    Hyperlipidemia     Hyperlipidemia     Hypertension     Kidney stone     Migraine     Neuropathy     Nicotine dependence     Pancreatitis     Vitamin D deficiency     Wears glasses        PAST SURGICAL HISTORY:     Past Surgical History:   Procedure Laterality Date    BLADDER FULGURATION  06/17/2016    Cystoscopy with fulguration medium lesion (2-5 cm)    CATARACT EXTRACTION Bilateral     congenital    COLONOSCOPY      CYSTOSCOPY      Diagnostic 5/31/17, 10/10/17    CYSTOSCOPY N/A 6/14/2017    Procedure: CYSTOSCOPY WITH BLADDER  BIOPSIES WITH FULGURATION;  Surgeon: Nico Miller MD;  [facility];  Service: Urology    [address] [place] [address] Right 06/01/2016    CYSTOSCOPY W/ URETEROSCOPY  06/17/2016    With removal of calculus    CYSTOSTOMY W/ BLADDER BIOPSY      9/6/26, 2/6/17    DENTAL SURGERY      ESOPHAGOGASTRODUODENOSCOPY      EYE SURGERY      catatact    EYE SURGERY Bilateral     Left eye detachment R eye retinal tear    HERNIA REPAIR      HERNIA REPAIR      L inguinal w/mesh;umbilical herniorraphy    MI ANASTOMOSIS,AV,ANY SITE Right 6/29/2016    Procedure: LOWER FOREARM AV FISTULA;  Surgeon: Jed Ahumada MD;  [facility];  Service: Vascular    MI COLONOSCOPY FLX DX W/COLLJ SPEC WHEN PFRMD N/A 3/4/2016    Procedure: COLONOSCOPY;  Surgeon: Gil Minaya MD;  [facility];  Service: Gastroenterology    MI CYSTO/URETERO W/LITHOTRIPSY &INDWELL STENT INSRT Right 6/17/2016    Procedure: CYSTOSCOPY URETEROSCOPY WITH LITHOTRIPSY HOLMIUM LASER,BASKET STONE EXTRACTION, RETROGRADE PYELOGRAM AND INSERTION STENT URETERAL;  Surgeon: Becca Tee MD;  Location:  MAIN OR;  Service: Urology    MI CYSTOURETHROSCOPY N/A 7/13/2017    Procedure: CYSTOSCOPY;  Surgeon: Nico Miller MD;  [facility];  Service: Urology    MI CYSTOURETHROSCOPY W/IRRIG & EVAC CLOTS N/A 7/13/2017    Procedure: CYSTOSCOPY EVACUATION OF CLOTS,POSSIBLE FULGURATION;  Surgeon: Verdell Gilford, MD;  [facility];  Service: Urology    AK CYSTOURETHROSCOPY,BIOPSY N/A 8/15/2016    Procedure: CYSTOSCOPY WITH RE BIOPSY OF BLADDER;  Surgeon: Son Buenrostro MD;  [facility];  Service: Urology    AK CYSTOURETHROSCOPY,FULGUR 0 5-2 CM LESN N/A 2/6/2017    Procedure: TRANSURETHRAL RESECTION OF BLADDER TUMOR (TURBT); Surgeon: Verdell Gilford, MD;  [facility];  Service: Urology    AK CYSTOURETHROSCOPY,FULGUR 0 5-2 CM LESN N/A 6/14/2017    Procedure: TRANSURETHRAL RESECTION OF BLADDER TUMOR (TURBT); Surgeon: Verdell Gilford, MD;  [facility];  Service: Urology    AK CYSTOURETHROSCOPY,FULGUR <0 5 CM LESN N/A 2/6/2017    Procedure: Alvia Orts; BLADDER BIOPSY WITH Francy Agent;  Surgeon: Verdell Gilford, MD;  [facility];  Service: Urology    AK CYSTOURETHROSCOPY,URETER CATHETER Right 6/1/2016    Procedure: CYSTOSCOPY RETROGRADE PYELOGRAM WITH INSERTION STENT URETERAL;  Surgeon: Ok Hogan MD;  Location:  MAIN OR;  Service: Urology    AK EGD TRANSORAL BIOPSY SINGLE/MULTIPLE N/A 3/4/2016    Procedure: ESOPHAGOGASTRODUODENOSCOPY (EGD); Surgeon: Marek Wells MD;  [facility];  Service: Gastroenterology    VASCULAR SURGERY Right     AV fistula       CURRENT MEDICATIONS:     Current Outpatient Prescriptions   Medication Sig Dispense Refill    allopurinol (ZYLOPRIM) 100 mg tablet Take 1 tablet (100 mg total) by mouth daily 90 tablet 3    ALPRAZolam (XANAX) 0 5 mg tablet Take 0 5 mg by mouth daily as needed for anxiety        ampicillin (PRINCIPEN) 250 mg capsule Take 250 mg by mouth every 12 (twelve) hours Current treatment for UTI      atorvastatin (LIPITOR) 20 mg tablet Take 1 tablet (20 mg total) by mouth daily 90 tablet 3    B Complex-C-Folic Acid (TRIPHROCAPS) 1 MG CAPS Take 1 mg by mouth daily      calcium acetate (PHOSLO) 667 mg capsule Take 667 mg by mouth daily   Cholecalciferol (VITAMIN D-3 PO) Take 1 capsule by mouth daily        colchicine-probenecid 0 5-500 MG per tablet Take 1 tablet by mouth daily 90 tablet 3    entecavir (BARACLUDE) 0 5 MG tablet Take 1 tablet (0 5 mg total) by mouth daily for 90 days (Patient taking differently: Take 0 5 mg by mouth once a week  ) 90 tablet 1    fexofenadine (ALLEGRA) 30 MG tablet Take 30 mg by mouth daily as needed      gemfibrozil (LOPID) 600 mg tablet Take 600 mg by mouth 2 (two) times a day before meals   guaiFENesin 200 MG tablet Take 400 mg by mouth 2 (two) times a day      metoprolol tartrate (LOPRESSOR) 25 mg tablet 1/2 tab po BID every other day 90 tablet 3    omeprazole (PriLOSEC) 40 MG capsule Take 20 mg by mouth daily        ondansetron (ZOFRAN) 4 mg tablet Take 4 mg by mouth 4 (four) times a day as needed for nausea or vomiting   oxybutynin (DITROPAN-XL) 5 mg 24 hr tablet Take 1 tablet by mouth daily (Patient taking differently: Take 5 mg by mouth as needed  ) 10 tablet 0    oxyCODONE (ROXICODONE) 5 mg immediate release tablet Take 1 tablet (5 mg total) by mouth 3 (three) times a day as needed for moderate pain Max Daily Amount: 15 mg 90 tablet 0    PARoxetine (PAXIL) 30 mg tablet Take 1 tablet (30 mg total) by mouth daily 90 tablet 3     No current facility-administered medications for this visit          ALLERGIES:     Allergies   Allergen Reactions    Acetaminophen      Due to LFT elevation    Heparin      Avoids due to eye conditions;can't have any med that may cause bleeding    Lovenox [Enoxaparin Sodium]      Avoids due to eye conditions;can't have any med that may cause bleeding in eyes    Nsaids      Avoids due to renal failure    Cardura [Doxazosin] Palpitations       SOCIAL HISTORY:     Social History     Social History    Marital status: /Civil Union     Spouse name: N/A    Number of children: N/A    Years of education: N/A     Social History Main Topics    Smoking status: Former Smoker     Quit date: 1985    Smokeless tobacco: Never Used    Alcohol use No    Drug use: No    Sexual activity: Yes     Partners: Female     Other Topics Concern    None     Social History Narrative    Advance directive on file    Copy of advanced directive obtained from patient    History of no living will    Patient has living will       SOCIAL HISTORY:     Family History   Problem Relation Age of Onset    Depression Mother     Diabetes Mother     Heart disease Mother     Hypertension Mother     Kidney disease Mother     Diabetes Father     Heart disease Father         Cardiac disorder    Hypertension Father     Stroke Father     Thyroid disease Sister     Cancer Maternal Grandmother        REVIEW OF SYSTEMS:     Review of Systems   Constitutional: Negative for fatigue (Improved)  HENT: Negative  Respiratory: Negative for chest tightness and shortness of breath  Cardiovascular: Negative for chest pain  Gastrointestinal: Negative  Genitourinary: Negative for hematuria, penile pain and urgency  Musculoskeletal: Positive for arthralgias, back pain and gait problem  Skin: Negative  Psychiatric/Behavioral: Negative  PHYSICAL EXAM:     /98   Pulse 100   Wt 77 1 kg (170 lb)   BMI 25 10 kg/m²     General:   Middle-age male, appears older than stated age, improvement in vitality since last visit  Has some jaundice  Hard of hearing  HEENT:  Normocephalic, atraumatic  Neck is supple without any palpable lymphadenopathy  Cardiovascular:  Patient has normal palpable distal radial pulses  There is no significant peripheral edema  No JVD is noted  Respiratory:  Patient has course respirations  There is audible wheeze  Abdomen:  Abdomen is obese but not distended or tender  : Circumcised, testicles descended    Musculoskeletal:  Shuffling gait  Dermatologic:  Patient has no skin abnormalities or rashes  LABS:     CBC:   Lab Results   Component Value Date    WBC 12 53 (H) 10/07/2018    HGB 14 0 10/07/2018    HCT 41 4 10/07/2018    MCV 99 (H) 10/07/2018     10/07/2018       BMP:   Lab Results   Component Value Date    GLUCOSE 152 (H) 09/14/2016    CALCIUM 10 2 (H) 10/07/2018     01/06/2016    K 4 5 10/07/2018    CO2 31 10/07/2018    CL 94 (L) 10/07/2018    BUN 55 (H) 10/07/2018    CREATININE 8 11 (H) 10/07/2018       PATHOLOGY:     12/5/18  CT CHEST, ABDOMEN AND PELVIS WITH AND WITHOUT IV CONTRAST     INDICATION:   History of bladder cancer  Surveillance      COMPARISON:  CT of the chest, abdomen, and pelvis dated 7/20/2018 and 9/14/2016  Chest CT dated 10/3/2017  Abdominal MRI dated 5/2/2018     TECHNIQUE: Initial CT of the abdomen and pelvis was performed without intravenous contrast   Subsequent CT evaluation of the chest, abdomen and pelvis was performed after the administration of intravenous contrast in corticomedullary phase  Finally,   delayed urographic phase postcontrast CT evaluation of the abdomen and pelvis was performed  Axial, sagittal, and coronal 2D reformatted images were created from the source data and submitted for interpretation       Radiation dose length product (DLP) for this visit:  1853 79 mGy-cm   This examination, like all CT scans performed in the [facility], was performed utilizing techniques to minimize radiation dose exposure, including the use of   iterative reconstruction and automated exposure control      IV Contrast:  100 mL of iohexol (OMNIPAQUE)  Enteric Contrast:  Enteric contrast was not administered      FINDINGS:     LUNGS:  Mild dependent atelectasis  Mild paraseptal emphysema at the upper lobes  No acute consolidation  No interstitial thickening  Trace mucoid debris in the trachea  No endobronchial masses    No suspicious lung parenchymal nodules or masses      PLEURA: Unremarkable      HEART/GREAT VESSELS:  Mild cardiomegaly  Coronary atherosclerosis  No pericardial thickening or effusion  Thoracic aortic tortuosity without aneurysm      MEDIASTINUM AND ROSAS:  Anterior mediastinal fat stranding and shotty lymph nodes are unchanged from 2017 and decreased from 2016  No pathologically enlarged lymphadenopathy or enlarging nodes      CHEST WALL AND LOWER NECK:   Unremarkable      ABDOMEN     RIGHT KIDNEY AND URETER:  Hypodense right lower pole lesion shows no significant enhancement between pre and postcontrast images (17 HU vs 25 HU, respectively)  Other hypoenhancing foci are too small to characterize  No suspicious renal mass  Limited excretion of contrast on delayed phase  No hydronephrosis, ureterectasis, or stranding around the collecting system to suggest mass  Vascular calcifications at the renal hilum  No urinary tract calculi  No perinephric collection      LEFT KIDNEY AND URETER:  Subcentimeter hypoenhancing foci are too small to characterize, but likely represent cysts  No suspicious renal mass  Limited excretion of contrast on delayed phase  No hydronephrosis, ureterectasis, or stranding around the collecting system to suggest mass  Vascular calcifications at the renal hilum  No urinary tract calculi  No perinephric collection      URINARY BLADDER:  Impression on the bladder base from nodular prostatic hypertrophy  Sessile posterior wall calcifications in the bladder on series 2, image 153 and series 2, 162  Calcifications appear new since July  No associated abnormal wall enhancement on   postcontrast imaging  No polypoid masses      LIVER/BILIARY TREE:  Unremarkable      GALLBLADDER:  No calcified gallstones  No pericholecystic inflammatory change      SPLEEN:  Unremarkable      PANCREAS:  Pancreatic cystic lesion seen on prior MRI are not well demonstrated    No concerning masses or pancreatic ductal dilatation      ADRENAL GLANDS: Bilateral adrenal hypertrophy--unchanged from priors  No focal masses     STOMACH AND BOWEL:  There is colonic diverticulosis without evidence of acute diverticulitis      ABDOMINOPELVIC CAVITY:  Shotty periportal lymph nodes are unchanged  No pathologically enlarged mesenteric, retroperitoneal, or pelvic sidewall lymphadenopathy      VESSELS:  Unremarkable for patient's age      PELVIS     REPRODUCTIVE ORGANS:  Nodular prostatic hypertrophy measuring 5 2 cm x 4 0 cm x 4 8 cm      APPENDIX: A normal appendix was visualized      ABDOMINAL WALL/INGUINAL REGIONS:  Diastasis recti  Plugging material from prior left inguinal hernia repair  Small amount of recurrent fat-containing left inguinal canal   Moderate-sized fat-containing right inguinal hernia      OSSEOUS STRUCTURES:  No acute fracture or destructive osseous lesion      IMPRESSION:     1  Posterior wall calcifications at the urinary bladder appear new since July  This could represent evolving dystrophic calcifications related to prior treatment  However, cystoscopy should be considered to exclude recurrent calcifying sessile mass      2  No suspicious renal parenchymal masses  Limited opacification of the collecting system without gross evidence for upper tract urothelial mass      3  No evidence for locoregional or distant tatiana spread  No evidence for metastasis in the chest, abdomen, or pelvis      The study was marked in EPIC for significant notification  CYTOLOGY:     1/18/18  Final Diagnosis   A  Urine, Other (ThinPrep):  Negative for high grade urothelial carcinoma ([address]) - see comment  2/6/18  Final Diagnosis   A  Urine, Cystoscopic:  Negative for high grade urothelial carcinoma ([address]) - see comment  9/4/18  A  Urine, Bladder Wash, :  Negative for high grade urothelial carcinoma ([address]) - see comment  Benign urothelial cells, benign squamous cells, and  Many neutrophils  PATHOLOGY:     6/14/17  Final Diagnosis   A   Left lateral bladder tumor (transurethral resection):     - Denuded urothelium with mild cystitis      B  Left lateral bladder wall (biopsy):     - Partially denuded urothelium with cystitis and non-necrotizing granulomatous reaction      - Microorganism studies pending      C  Posterior bladder (biopsy):     - Partially denuded urothelium with mild cystitis      D  Right bladder wall (biopsy):     - Denuded urothelium with cystitis  2/6/17  Final Diagnosis   A  Left lateral bladder (biopsy):     - Partially denuded urothelial mucosa  - No malignancy identified      B  Right lateral bladder (biopsy):     - Urothelium with reactive-type changes       - No malignancy identified       C  Posterior bladder (biopsy):     - Urothelium with reactive-type changes       - No malignancy identified  8/15/16  Final Diagnosis   A  Bladder, left lateral wall tumor, transurethral resection:  -  Ulcerated and severely inflamed urothelial tissue with cytologic atypia, cannot entirely exclude residual/recurrent tumor  6/17/16  Final Diagnosis   A  Urinary bladder tumor:     - Invasive low to focally high grade papillary urothelial carcinoma  - Carcinoma invades lamina propria  - No muscularis propria is identified in the submitted tissue  PROCEDURE:     SEE NOTE    ASSESSMENT:     76 y o  male with HG T1 bladder cancer who underwent stunted intravesicle treatments between 8165-8209 complicated by comorbidities, infection, and poor tolerance of BCG and mitomycin    PLAN:     At this point time, the patient and his wife understand that he had a previous diagnosis of low to high-grade invasive T1 bladder cancer  Although there was no muscle initially in the specimen, repeat resections have not demonstrated any muscle invasive disease  The patient failed BCG therapy due to recurrent infections and his weakened immunocompromised state from his multiple other medical comorbidities    We then transitioned him to mitomycin  The patient has had significant morbidity from this as well including hematuria and bladder irritation  At this point time, he has refused additional mitomycin or intravesical treatments  He instead opted for surveillance imaging/cystoscopy  Patient and his wife understand that by losing out on the opportunity to follow the standard protocol, there is certainly a risk of disease progression or metastasis  They are comfortable with this risk  The patient appears overall improved with the decision to abort it additional intravesical therapy  He is not having any bladder symptoms and is overall fatigue has gotten better  Patient continues to have some mild dystrophic calcification along the left side of his bladder wall  No other abnormalities or concerns  Cytology was sent    We will plan cystoscopy in 3 months, imaging will be deferred

## 2018-12-06 NOTE — PROGRESS NOTES
Cystoscopy  Date/Time: 12/6/2018 11:10 AM  Performed by: Suzie Burton  Authorized by: Suzie Burton     Procedure details: cystoscopy    Patient tolerance: Patient tolerated the procedure well with no immediate complications    Additional Procedure Details:   Cystoscopy procedure note:    Risk and benefits of flexible cystoscopy were discussed  Informed consent was obtained  Urine dip was adequate for cystoscopy  The patient was placed in the supine position  His genitalia was prepped with Betadine and draped in a sterile fashion  Viscous 2% lidocaine jelly was instilled into the urethra and allowed dwell time for local anesthesia  Flexible cystoscopy was then performed using a 16F scope  The distal urethra and prostatic urethra were evaluated and were normal in course and caliber  Patient has some mild bilobar hypertrophy of his prostate  Once inside the bladder, it was carefully inspected in a 360 degree fashion  There was no evidence of mucosal abnormalities, lesions, diverticula or stones  He has dystrophic calcifications along the left side of the bladder wall  Both ureteral orifices in their orthotopic location were visualized with clear efflux of urine  Retroflexion for evaluation of the bladder neck was normal      Overall this was a negative cystoscopy with the exception distress of calcifications  After the completion of the cystoscopy, 16 Mauritian coude tip catheter was placed for drainage of the patient's bladder  Urine cytology was obtained

## 2018-12-10 ENCOUNTER — TELEPHONE (OUTPATIENT)
Dept: UROLOGY | Facility: CLINIC | Age: 68
End: 2018-12-10

## 2018-12-10 DIAGNOSIS — E11.9 DIABETES MELLITUS TYPE 2 IN NONOBESE (HCC): Primary | Chronic | ICD-10-CM

## 2018-12-10 DIAGNOSIS — K85.80 OTHER ACUTE PANCREATITIS, UNSPECIFIED COMPLICATION STATUS: Primary | ICD-10-CM

## 2018-12-10 RX ORDER — OMEPRAZOLE 40 MG/1
20 CAPSULE, DELAYED RELEASE ORAL DAILY
Qty: 90 CAPSULE | Refills: 3 | OUTPATIENT
Start: 2018-12-10

## 2018-12-10 RX ORDER — OMEPRAZOLE 20 MG/1
CAPSULE, DELAYED RELEASE ORAL
Qty: 90 CAPSULE | Refills: 3 | Status: SHIPPED | OUTPATIENT
Start: 2018-12-10 | End: 2019-08-21 | Stop reason: SDUPTHER

## 2018-12-10 RX ORDER — GEMFIBROZIL 600 MG/1
600 TABLET, FILM COATED ORAL
Qty: 90 TABLET | Refills: 3 | Status: SHIPPED | OUTPATIENT
Start: 2018-12-10 | End: 2019-07-23

## 2018-12-10 NOTE — TELEPHONE ENCOUNTER
----- Message from Alicia Wan MD sent at 12/10/2018  1:51 PM EST -----  Please let Boone Burdickt know results

## 2018-12-20 ENCOUNTER — DOCTOR'S OFFICE (OUTPATIENT)
Dept: URBAN - NONMETROPOLITAN AREA CLINIC 1 | Facility: CLINIC | Age: 68
Setting detail: OPHTHALMOLOGY
End: 2018-12-20
Payer: COMMERCIAL

## 2018-12-20 DIAGNOSIS — H04.123: ICD-10-CM

## 2018-12-20 DIAGNOSIS — E11.3393: ICD-10-CM

## 2018-12-20 DIAGNOSIS — H43.812: ICD-10-CM

## 2018-12-20 DIAGNOSIS — H33.301: ICD-10-CM

## 2018-12-20 DIAGNOSIS — H43.811: ICD-10-CM

## 2018-12-20 DIAGNOSIS — H35.373: ICD-10-CM

## 2018-12-20 PROCEDURE — 92226 OPHTHALMOSCOPY EXT SUBSEQUENT: CPT | Performed by: OPHTHALMOLOGY

## 2018-12-20 PROCEDURE — 92134 CPTRZ OPH DX IMG PST SGM RTA: CPT | Performed by: OPHTHALMOLOGY

## 2018-12-20 PROCEDURE — 83861 MICROFLUID ANALY TEARS: CPT | Performed by: OPHTHALMOLOGY

## 2018-12-20 PROCEDURE — 99024 POSTOP FOLLOW-UP VISIT: CPT | Performed by: OPHTHALMOLOGY

## 2018-12-20 ASSESSMENT — CONFRONTATIONAL VISUAL FIELD TEST (CVF)
OS_FINDINGS: FULL
OD_FINDINGS: FULL

## 2018-12-20 ASSESSMENT — REFRACTION_MANIFEST
OS_VA3: 20/
OS_VA3: 20/
OU_VA: 20/
OD_VA3: 20/
OD_VA3: 20/
OD_VA2: 20/
OD_VA1: 20/
OU_VA: 20/
OS_VA2: 20/
OS_VA2: 20/
OD_VA2: 20/
OS_VA1: 20/
OS_VA1: 20/
OD_VA1: 20/

## 2018-12-20 ASSESSMENT — REFRACTION_CURRENTRX
OD_OVR_VA: 20/
OS_OVR_VA: 20/

## 2018-12-20 ASSESSMENT — LID EXAM ASSESSMENTS
OS_BLEPHARITIS: LUL 2+
OD_BLEPHARITIS: RUL 2+

## 2018-12-20 ASSESSMENT — VISUAL ACUITY
OS_BCVA: 20/25+2
OD_BCVA: CF 4FT

## 2018-12-31 DIAGNOSIS — C67.9 MALIGNANT NEOPLASM OF URINARY BLADDER, UNSPECIFIED SITE (HCC): ICD-10-CM

## 2018-12-31 RX ORDER — OXYCODONE HYDROCHLORIDE 5 MG/1
5 TABLET ORAL 3 TIMES DAILY PRN
Qty: 90 TABLET | Refills: 0 | Status: SHIPPED | OUTPATIENT
Start: 2018-12-31 | End: 2019-03-14 | Stop reason: SDUPTHER

## 2019-01-31 LAB
LEFT EYE DIABETIC RETINOPATHY: NORMAL
RIGHT EYE DIABETIC RETINOPATHY: NORMAL

## 2019-01-31 PROCEDURE — 3072F LOW RISK FOR RETINOPATHY: CPT | Performed by: FAMILY MEDICINE

## 2019-02-14 ENCOUNTER — DOCTOR'S OFFICE (OUTPATIENT)
Dept: URBAN - NONMETROPOLITAN AREA CLINIC 1 | Facility: CLINIC | Age: 69
Setting detail: OPHTHALMOLOGY
End: 2019-02-14
Payer: COMMERCIAL

## 2019-02-14 DIAGNOSIS — H43.812: ICD-10-CM

## 2019-02-14 DIAGNOSIS — E11.3393: ICD-10-CM

## 2019-02-14 DIAGNOSIS — H35.373: ICD-10-CM

## 2019-02-14 DIAGNOSIS — H33.301: ICD-10-CM

## 2019-02-14 DIAGNOSIS — H43.813: ICD-10-CM

## 2019-02-14 DIAGNOSIS — H35.61: ICD-10-CM

## 2019-02-14 DIAGNOSIS — H04.123: ICD-10-CM

## 2019-02-14 DIAGNOSIS — H43.811: ICD-10-CM

## 2019-02-14 PROCEDURE — 92134 CPTRZ OPH DX IMG PST SGM RTA: CPT | Performed by: OPHTHALMOLOGY

## 2019-02-14 PROCEDURE — 83861 MICROFLUID ANALY TEARS: CPT | Performed by: OPHTHALMOLOGY

## 2019-02-14 PROCEDURE — 92014 COMPRE OPH EXAM EST PT 1/>: CPT | Performed by: OPHTHALMOLOGY

## 2019-02-14 PROCEDURE — 92226 OPHTHALMOSCOPY EXT SUBSEQUENT: CPT | Performed by: OPHTHALMOLOGY

## 2019-02-14 ASSESSMENT — REFRACTION_CURRENTRX
OD_OVR_VA: 20/
OD_OVR_VA: 20/
OS_OVR_VA: 20/
OS_OVR_VA: 20/
OD_OVR_VA: 20/
OS_OVR_VA: 20/

## 2019-02-14 ASSESSMENT — REFRACTION_MANIFEST
OD_VA1: 20/
OU_VA: 20/
OD_VA2: 20/
OS_VA3: 20/
OD_VA3: 20/
OS_VA2: 20/
OD_VA3: 20/
OS_VA2: 20/
OS_VA3: 20/
OS_VA1: 20/
OD_VA2: 20/
OD_VA1: 20/
OS_VA1: 20/
OU_VA: 20/

## 2019-02-14 ASSESSMENT — CONFRONTATIONAL VISUAL FIELD TEST (CVF)
OS_FINDINGS: FULL
OD_FINDINGS: FULL

## 2019-02-14 ASSESSMENT — LID EXAM ASSESSMENTS
OS_BLEPHARITIS: LUL 2+
OD_BLEPHARITIS: RUL 2+

## 2019-02-14 ASSESSMENT — VISUAL ACUITY
OD_BCVA: CF 4FT
OS_BCVA: 20/25+2

## 2019-03-07 ENCOUNTER — PROCEDURE VISIT (OUTPATIENT)
Dept: UROLOGY | Facility: CLINIC | Age: 69
End: 2019-03-07
Payer: COMMERCIAL

## 2019-03-07 VITALS
SYSTOLIC BLOOD PRESSURE: 140 MMHG | WEIGHT: 165 LBS | DIASTOLIC BLOOD PRESSURE: 80 MMHG | BODY MASS INDEX: 24.37 KG/M2 | HEART RATE: 84 BPM

## 2019-03-07 DIAGNOSIS — C67.9 MALIGNANT NEOPLASM OF URINARY BLADDER, UNSPECIFIED SITE (HCC): Primary | ICD-10-CM

## 2019-03-07 PROBLEM — N18.4 TYPE 2 DM WITH CKD STAGE 4 AND HYPERTENSION (HCC): Status: ACTIVE | Noted: 2019-03-07

## 2019-03-07 PROBLEM — E11.22 TYPE 2 DM WITH CKD STAGE 4 AND HYPERTENSION (HCC): Status: ACTIVE | Noted: 2019-03-07

## 2019-03-07 PROBLEM — E11.3393 TYPE 2 DIABETES MELLITUS WITH MODERATE NONPROLIFERATIVE DIABETIC RETINOPATHY OF BOTH EYES WITHOUT MACULAR EDEMA (HCC): Status: ACTIVE | Noted: 2019-03-07

## 2019-03-07 PROBLEM — I12.9 TYPE 2 DM WITH CKD STAGE 4 AND HYPERTENSION (HCC): Status: ACTIVE | Noted: 2019-03-07

## 2019-03-07 PROCEDURE — 88112 CYTOPATH CELL ENHANCE TECH: CPT | Performed by: PATHOLOGY

## 2019-03-07 PROCEDURE — 52000 CYSTOURETHROSCOPY: CPT | Performed by: UROLOGY

## 2019-03-07 NOTE — PROGRESS NOTES
Cystoscopy  Date/Time: 3/7/2019 11:30 AM  Performed by: Robert Guerrero MD  Authorized by: Robert Guerrero MD     Procedure details: cystoscopy    Patient tolerance: Patient tolerated the procedure well with no immediate complications    Additional Procedure Details:   Cystoscopy procedure note:    Risk and benefits of flexible cystoscopy were discussed  Informed consent was obtained  Urine dip was adequate for cystoscopy  The patient was placed in the supine position  His genitalia was prepped with Betadine and draped in a sterile fashion  Viscous 2% lidocaine jelly was instilled into the urethra and allowed dwell time for local anesthesia  Flexible cystoscopy was then performed using a 16F scope  The distal urethra and prostatic urethra were evaluated and were normal in course and caliber  Once inside the bladder, it was carefully inspected in a 360 degree fashion  There was no evidence of mucosal abnormalities, lesions, diverticula or stones  In the left lateral surface and left posterior bladder there were 2 areas of prior biopsy site with some dystrophic calcifications that continues show signs of healing  There does not appear to be any papillary tumor growth  Both ureteral orifices in their orthotopic location were visualized with clear efflux of urine  Retroflexion for evaluation of the bladder neck was normal    Urine was sampled through the cystoscope for cytology  Overall this was a negative cystoscopy

## 2019-03-12 ENCOUNTER — TELEPHONE (OUTPATIENT)
Dept: UROLOGY | Facility: CLINIC | Age: 69
End: 2019-03-12

## 2019-03-12 NOTE — TELEPHONE ENCOUNTER
----- Message from Richa Levy MD sent at 3/12/2019  2:45 PM EDT -----  Please let Luisa Cazenovia know good news on cytology  TY!

## 2019-03-14 ENCOUNTER — OFFICE VISIT (OUTPATIENT)
Dept: FAMILY MEDICINE CLINIC | Facility: CLINIC | Age: 69
End: 2019-03-14
Payer: COMMERCIAL

## 2019-03-14 VITALS
HEIGHT: 69 IN | SYSTOLIC BLOOD PRESSURE: 130 MMHG | BODY MASS INDEX: 24.56 KG/M2 | WEIGHT: 165.8 LBS | DIASTOLIC BLOOD PRESSURE: 82 MMHG

## 2019-03-14 DIAGNOSIS — C67.9 MALIGNANT NEOPLASM OF URINARY BLADDER, UNSPECIFIED SITE (HCC): ICD-10-CM

## 2019-03-14 DIAGNOSIS — I10 ESSENTIAL HYPERTENSION: ICD-10-CM

## 2019-03-14 DIAGNOSIS — E78.5 HYPERLIPIDEMIA, UNSPECIFIED HYPERLIPIDEMIA TYPE: ICD-10-CM

## 2019-03-14 DIAGNOSIS — E11.3393 TYPE 2 DIABETES MELLITUS WITH BOTH EYES AFFECTED BY MODERATE NONPROLIFERATIVE RETINOPATHY WITHOUT MACULAR EDEMA, WITHOUT LONG-TERM CURRENT USE OF INSULIN (HCC): Primary | ICD-10-CM

## 2019-03-14 PROCEDURE — 99213 OFFICE O/P EST LOW 20 MIN: CPT | Performed by: FAMILY MEDICINE

## 2019-03-14 PROCEDURE — 3079F DIAST BP 80-89 MM HG: CPT | Performed by: FAMILY MEDICINE

## 2019-03-14 PROCEDURE — 3008F BODY MASS INDEX DOCD: CPT | Performed by: FAMILY MEDICINE

## 2019-03-14 PROCEDURE — 3075F SYST BP GE 130 - 139MM HG: CPT | Performed by: FAMILY MEDICINE

## 2019-03-14 PROCEDURE — 1036F TOBACCO NON-USER: CPT | Performed by: FAMILY MEDICINE

## 2019-03-14 RX ORDER — OXYCODONE HYDROCHLORIDE 5 MG/1
5 TABLET ORAL 3 TIMES DAILY PRN
Qty: 90 TABLET | Refills: 0 | Status: SHIPPED | OUTPATIENT
Start: 2019-03-14 | End: 2019-07-23

## 2019-03-14 NOTE — PROGRESS NOTES
Assessment/Plan:  Blood work ordered on him  I refilled his Percocet  He is tolerating it well  We will see him back in 4 months or p r n  No problem-specific Assessment & Plan notes found for this encounter  Diagnoses and all orders for this visit:    Type 2 diabetes mellitus with both eyes affected by moderate nonproliferative retinopathy without macular edema, without long-term current use of insulin (HCC)  -     Hemoglobin A1C  -     Lipid Panel with Direct LDL reflex  -     Microalbumin / creatinine urine ratio  -     TSH, 3rd generation with Free T4 reflex    Essential hypertension  -     TSH, 3rd generation with Free T4 reflex    Malignant neoplasm of urinary bladder, unspecified site (HCC)  -     oxyCODONE (ROXICODONE) 5 mg immediate release tablet; Take 1 tablet (5 mg total) by mouth 3 (three) times a day as needed for moderate painMax Daily Amount: 15 mg    Hyperlipidemia, unspecified hyperlipidemia type  -     Lipid Panel with Direct LDL reflex  -     TSH, 3rd generation with Free T4 reflex          Subjective:      Patient ID: Breanna Trimble is a 76 y o  male  Patient here today for follow-up with his wife  Patient denies any chest pain or shortness of breath  Patient still on hemodialysis  He has been stable  Needs a refill on his Percocet  He takes it rarely for his arthritic and bladder pain  Diabetes   He presents for his follow-up diabetic visit  He has type 2 diabetes mellitus  His disease course has been stable  There are no hypoglycemic associated symptoms  There are no diabetic associated symptoms  There are no hypoglycemic complications  Diabetic complications include nephropathy  Risk factors for coronary artery disease include diabetes mellitus, dyslipidemia, hypertension, male sex and sedentary lifestyle  Current diabetic treatment includes diet  He is compliant with treatment all of the time  His weight is stable  He is following a generally healthy diet   When asked about meal planning, he reported none  He never participates in exercise  There is no change in his home blood glucose trend  An ACE inhibitor/angiotensin II receptor blocker is contraindicated  The following portions of the patient's history were reviewed and updated as appropriate:   He  has a past medical history of AAA (abdominal aortic aneurysm) ([place] [address] ), Abnormal liver function test, Anemia, Anxiety, Arthritis, Cancer ([place] [address] ), Cardiac disorder, Chronic kidney disease, Chronic pain disorder, Depression, Diabetes mellitus ([place] [address] ), Dialysis patient ([place] [address] ), Fracture of carpal bone, Fractures, GERD (gastroesophageal reflux disease), Gout, Hearing difficulty of both ears, Hepatitis A, Hyperlipidemia, Hyperlipidemia, Hypertension, Kidney stone, Migraine, Neuropathy, Nicotine dependence, Pancreatitis, Vitamin D deficiency, and Wears glasses  He   Patient Active Problem List    Diagnosis Date Noted    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 ([place] [address] ) 03/07/2019    Type 2 diabetes mellitus with both eyes affected by moderate nonproliferative retinopathy without macular edema, without long-term current use of insulin ([place] [address] ) 03/07/2019    Sprain of scapholunate ligament 05/18/2018    Chronic viral hepatitis B without delta agent and without coma ([place] [address] ) 04/20/2018    Acute UTI 03/22/2017    Ascending aortic aneurysm ([place] [address] ) 10/03/2016    Malignant tumor of urinary bladder ([place] [address] ) 07/07/2016    End stage renal disease ([place] [address] ) 06/29/2016    Diabetes mellitus type 2 in nonobese ([place] [address] ) 06/05/2016    CKD (chronic kidney disease) stage 4, GFR 15-29 ml/min ([facility]) 06/03/2016    History of anemia due to chronic kidney disease 06/01/2016    Acute pancreatitis 04/26/2016    Essential hypertension 04/26/2016    HLD (hyperlipidemia) 04/26/2016    Depression with anxiety 10/21/2015     He  has a past surgical history that includes Hernia repair; Eye surgery;  Hernia repair; pr cystourethroscopy,fulgur <0 5 cm yobany (N/A, 2/6/2017); pr cystourethroscopy,fulgur 0 5-2 cm lesn (N/A, 2/6/2017); pr cystourethroscopy,biopsy (N/A, 8/15/2016); pr anastomosis,av,any site (Right, 6/29/2016); pr cysto/uretero w/lithotripsy &indwell stent insrt (Right, 6/17/2016); pr cystourethroscopy,ureter catheter (Right, 6/1/2016); pr colonoscopy flx dx w/collj spec when pfrmd (N/A, 3/4/2016); pr egd transoral biopsy single/multiple (N/A, 3/4/2016); Vascular surgery (Right); Eye surgery (Bilateral); Cataract extraction (Bilateral); Cystoscopy; Esophagogastroduodenoscopy; Colonoscopy; Dental surgery; pr cystourethroscopy,fulgur 0 5-2 cm lesn (N/A, 6/14/2017); CYSTOSCOPY (N/A, 6/14/2017); pr cystourethroscopy (N/A, 7/13/2017); pr cystourethroscopy w/irrig & evac clots (N/A, 7/13/2017); Cystostomy w/ bladder biopsy; Bladder fulguration (06/17/2016); Cystoscopy w/ ureteral stent placement (Right, 06/01/2016); Cystoscopy w/ ureteroscopy (06/17/2016); and Cystoscopy (12/06/2018)  His family history includes Cancer in his maternal grandmother; Depression in his mother; Diabetes in his father and mother; Heart disease in his father and mother; Hypertension in his father and mother; Kidney disease in his mother; Stroke in his father; Thyroid disease in his sister  He  reports that he quit smoking about 34 years ago  He has never used smokeless tobacco  He reports that he does not drink alcohol or use drugs  Current Outpatient Medications   Medication Sig Dispense Refill    allopurinol (ZYLOPRIM) 100 mg tablet Take 1 tablet (100 mg total) by mouth daily 90 tablet 3    ALPRAZolam (XANAX) 0 5 mg tablet Take 0 5 mg by mouth daily as needed for anxiety        ampicillin (PRINCIPEN) 250 mg capsule Take 250 mg by mouth every 12 (twelve) hours Current treatment for UTI      atorvastatin (LIPITOR) 20 mg tablet Take 1 tablet (20 mg total) by mouth daily 90 tablet 3    B Complex-C-Folic Acid (TRIPHROCAPS) 1 MG CAPS Take 1 mg by mouth daily      Cholecalciferol (VITAMIN D-3 PO) Take 1 capsule by mouth daily        colchicine-probenecid 0 5-500 MG per tablet Take 1 tablet by mouth daily 90 tablet 3    entecavir (BARACLUDE) 0 5 MG tablet Take 1 tablet (0 5 mg total) by mouth daily for 90 days (Patient taking differently: Take 0 5 mg by mouth once a week  ) 90 tablet 1    fexofenadine (ALLEGRA) 30 MG tablet Take 30 mg by mouth daily as needed      gemfibrozil (LOPID) 600 mg tablet Take 1 tablet (600 mg total) by mouth 2 (two) times a day before meals 90 tablet 3    guaiFENesin 200 MG tablet Take 400 mg by mouth 2 (two) times a day      metoprolol tartrate (LOPRESSOR) 25 mg tablet 1/2 tab po BID every other day 90 tablet 3    omeprazole (PriLOSEC) 20 mg delayed release capsule TAKE 1 CAPSULE DAILY 90 capsule 3    oxybutynin (DITROPAN-XL) 5 mg 24 hr tablet Take 1 tablet by mouth daily (Patient taking differently: Take 5 mg by mouth as needed  ) 10 tablet 0    oxyCODONE (ROXICODONE) 5 mg immediate release tablet Take 1 tablet (5 mg total) by mouth 3 (three) times a day as needed for moderate painMax Daily Amount: 15 mg 90 tablet 0    PARoxetine (PAXIL) 30 mg tablet Take 1 tablet (30 mg total) by mouth daily 90 tablet 3    calcium acetate (PHOSLO) 667 mg capsule Take 667 mg by mouth daily   omeprazole (PriLOSEC) 40 MG capsule Take 20 mg by mouth daily        ondansetron (ZOFRAN) 4 mg tablet Take 4 mg by mouth 4 (four) times a day as needed for nausea or vomiting  No current facility-administered medications for this visit  Current Outpatient Medications on File Prior to Visit   Medication Sig    allopurinol (ZYLOPRIM) 100 mg tablet Take 1 tablet (100 mg total) by mouth daily    ALPRAZolam (XANAX) 0 5 mg tablet Take 0 5 mg by mouth daily as needed for anxiety      ampicillin (PRINCIPEN) 250 mg capsule Take 250 mg by mouth every 12 (twelve) hours Current treatment for UTI    atorvastatin (LIPITOR) 20 mg tablet Take 1 tablet (20 mg total) by mouth daily    B Complex-C-Folic Acid (TRIPHROCAPS) 1 MG CAPS Take 1 mg by mouth daily    Cholecalciferol (VITAMIN D-3 PO) Take 1 capsule by mouth daily      colchicine-probenecid 0 5-500 MG per tablet Take 1 tablet by mouth daily    entecavir (BARACLUDE) 0 5 MG tablet Take 1 tablet (0 5 mg total) by mouth daily for 90 days (Patient taking differently: Take 0 5 mg by mouth once a week  )    fexofenadine (ALLEGRA) 30 MG tablet Take 30 mg by mouth daily as needed    gemfibrozil (LOPID) 600 mg tablet Take 1 tablet (600 mg total) by mouth 2 (two) times a day before meals    guaiFENesin 200 MG tablet Take 400 mg by mouth 2 (two) times a day    metoprolol tartrate (LOPRESSOR) 25 mg tablet 1/2 tab po BID every other day    omeprazole (PriLOSEC) 20 mg delayed release capsule TAKE 1 CAPSULE DAILY    oxybutynin (DITROPAN-XL) 5 mg 24 hr tablet Take 1 tablet by mouth daily (Patient taking differently: Take 5 mg by mouth as needed  )    PARoxetine (PAXIL) 30 mg tablet Take 1 tablet (30 mg total) by mouth daily    [DISCONTINUED] oxyCODONE (ROXICODONE) 5 mg immediate release tablet Take 1 tablet (5 mg total) by mouth 3 (three) times a day as needed for moderate pain Max Daily Amount: 15 mg    calcium acetate (PHOSLO) 667 mg capsule Take 667 mg by mouth daily   omeprazole (PriLOSEC) 40 MG capsule Take 20 mg by mouth daily      ondansetron (ZOFRAN) 4 mg tablet Take 4 mg by mouth 4 (four) times a day as needed for nausea or vomiting  No current facility-administered medications on file prior to visit  He is allergic to acetaminophen; heparin; lovenox [enoxaparin sodium]; nsaids; and cardura [doxazosin]       Review of Systems   Constitutional: Negative  Respiratory: Negative  Cardiovascular: Negative  Gastrointestinal: Negative  Genitourinary: Negative  Musculoskeletal: Positive for arthralgias           Objective:      /82   Ht 5' 9" (1 753 m)   Wt 75 2 kg (165 lb 12 8 oz)   BMI 24 48 kg/m²          Physical Exam Constitutional: He is oriented to person, place, and time  He appears well-developed and well-nourished  No distress  HENT:   Head: Normocephalic and atraumatic  Eyes: Conjunctivae are normal    Cardiovascular: Normal rate, regular rhythm and normal heart sounds  Exam reveals no gallop and no friction rub  No murmur heard  Pulmonary/Chest: Effort normal and breath sounds normal  No respiratory distress  He has no wheezes  He has no rales  Musculoskeletal: He exhibits no edema  Neurological: He is alert and oriented to person, place, and time  Skin: He is not diaphoretic  Psychiatric: He has a normal mood and affect  His behavior is normal  Judgment and thought content normal    Vitals reviewed

## 2019-04-11 ENCOUNTER — DOCTOR'S OFFICE (OUTPATIENT)
Dept: URBAN - NONMETROPOLITAN AREA CLINIC 1 | Facility: CLINIC | Age: 69
Setting detail: OPHTHALMOLOGY
End: 2019-04-11
Payer: COMMERCIAL

## 2019-04-11 DIAGNOSIS — H35.63: ICD-10-CM

## 2019-04-11 DIAGNOSIS — H35.373: ICD-10-CM

## 2019-04-11 DIAGNOSIS — E11.3393: ICD-10-CM

## 2019-04-11 DIAGNOSIS — H04.123: ICD-10-CM

## 2019-04-11 DIAGNOSIS — H33.301: ICD-10-CM

## 2019-04-11 PROCEDURE — 92134 CPTRZ OPH DX IMG PST SGM RTA: CPT | Performed by: OPHTHALMOLOGY

## 2019-04-11 PROCEDURE — 83861 MICROFLUID ANALY TEARS: CPT | Performed by: OPHTHALMOLOGY

## 2019-04-11 PROCEDURE — 92014 COMPRE OPH EXAM EST PT 1/>: CPT | Performed by: OPHTHALMOLOGY

## 2019-04-11 ASSESSMENT — REFRACTION_MANIFEST
OU_VA: 20/
OD_VA1: 20/
OD_VA1: 20/
OD_VA3: 20/
OD_VA3: 20/
OD_VA2: 20/
OS_VA2: 20/
OS_VA3: 20/
OS_VA3: 20/
OS_VA1: 20/
OS_VA1: 20/
OS_VA2: 20/
OD_VA2: 20/
OU_VA: 20/

## 2019-04-11 ASSESSMENT — REFRACTION_CURRENTRX
OS_OVR_VA: 20/
OD_OVR_VA: 20/
OS_OVR_VA: 20/
OS_OVR_VA: 20/

## 2019-04-11 ASSESSMENT — CONFRONTATIONAL VISUAL FIELD TEST (CVF)
OS_FINDINGS: FULL
OD_FINDINGS: FULL

## 2019-04-11 ASSESSMENT — LID EXAM ASSESSMENTS
OS_BLEPHARITIS: LUL 2+
OD_BLEPHARITIS: RUL 2+

## 2019-04-11 ASSESSMENT — VISUAL ACUITY
OS_BCVA: 20/25+2
OD_BCVA: CF 3FT

## 2019-05-07 ENCOUNTER — APPOINTMENT (OUTPATIENT)
Dept: LAB | Facility: MEDICAL CENTER | Age: 69
End: 2019-05-07
Payer: COMMERCIAL

## 2019-05-07 DIAGNOSIS — B19.10 HEPATITIS B: ICD-10-CM

## 2019-05-07 LAB
ALBUMIN SERPL BCP-MCNC: 3.9 G/DL (ref 3.5–5)
ALP SERPL-CCNC: 89 U/L (ref 46–116)
ALT SERPL W P-5'-P-CCNC: 23 U/L (ref 12–78)
ANION GAP SERPL CALCULATED.3IONS-SCNC: 12 MMOL/L (ref 4–13)
AST SERPL W P-5'-P-CCNC: 22 U/L (ref 5–45)
BASOPHILS # BLD AUTO: 0.08 THOUSANDS/ΜL (ref 0–0.1)
BASOPHILS NFR BLD AUTO: 1 % (ref 0–1)
BILIRUB DIRECT SERPL-MCNC: 0.27 MG/DL (ref 0–0.2)
BILIRUB SERPL-MCNC: 0.86 MG/DL (ref 0.2–1)
BUN SERPL-MCNC: 40 MG/DL (ref 5–25)
CALCIUM SERPL-MCNC: 10.1 MG/DL (ref 8.3–10.1)
CHLORIDE SERPL-SCNC: 96 MMOL/L (ref 100–108)
CHOLEST SERPL-MCNC: 179 MG/DL (ref 50–200)
CO2 SERPL-SCNC: 30 MMOL/L (ref 21–32)
CREAT SERPL-MCNC: 6.89 MG/DL (ref 0.6–1.3)
CREAT UR-MCNC: 48.2 MG/DL
EOSINOPHIL # BLD AUTO: 0.17 THOUSAND/ΜL (ref 0–0.61)
EOSINOPHIL NFR BLD AUTO: 2 % (ref 0–6)
ERYTHROCYTE [DISTWIDTH] IN BLOOD BY AUTOMATED COUNT: 15.1 % (ref 11.6–15.1)
EST. AVERAGE GLUCOSE BLD GHB EST-MCNC: 128 MG/DL
GFR SERPL CREATININE-BSD FRML MDRD: 7 ML/MIN/1.73SQ M
GLUCOSE P FAST SERPL-MCNC: 135 MG/DL (ref 65–99)
HBA1C MFR BLD: 6.1 % (ref 4.2–6.3)
HCT VFR BLD AUTO: 46.8 % (ref 36.5–49.3)
HDLC SERPL-MCNC: 71 MG/DL (ref 40–60)
HGB BLD-MCNC: 15.8 G/DL (ref 12–17)
IMM GRANULOCYTES # BLD AUTO: 0.03 THOUSAND/UL (ref 0–0.2)
IMM GRANULOCYTES NFR BLD AUTO: 0 % (ref 0–2)
INR PPP: 0.99 (ref 0.86–1.17)
LDLC SERPL CALC-MCNC: 82 MG/DL (ref 0–100)
LYMPHOCYTES # BLD AUTO: 1.38 THOUSANDS/ΜL (ref 0.6–4.47)
LYMPHOCYTES NFR BLD AUTO: 14 % (ref 14–44)
MCH RBC QN AUTO: 34.6 PG (ref 26.8–34.3)
MCHC RBC AUTO-ENTMCNC: 33.8 G/DL (ref 31.4–37.4)
MCV RBC AUTO: 102 FL (ref 82–98)
MICROALBUMIN UR-MCNC: 1160 MG/L (ref 0–20)
MICROALBUMIN/CREAT 24H UR: 2407 MG/G CREATININE (ref 0–30)
MONOCYTES # BLD AUTO: 0.94 THOUSAND/ΜL (ref 0.17–1.22)
MONOCYTES NFR BLD AUTO: 10 % (ref 4–12)
NEUTROPHILS # BLD AUTO: 7.01 THOUSANDS/ΜL (ref 1.85–7.62)
NEUTS SEG NFR BLD AUTO: 73 % (ref 43–75)
NRBC BLD AUTO-RTO: 0 /100 WBCS
PLATELET # BLD AUTO: 269 THOUSANDS/UL (ref 149–390)
PMV BLD AUTO: 11.1 FL (ref 8.9–12.7)
POTASSIUM SERPL-SCNC: 4.7 MMOL/L (ref 3.5–5.3)
PROT SERPL-MCNC: 8.3 G/DL (ref 6.4–8.2)
PROTHROMBIN TIME: 13.2 SECONDS (ref 11.8–14.2)
RBC # BLD AUTO: 4.57 MILLION/UL (ref 3.88–5.62)
SODIUM SERPL-SCNC: 138 MMOL/L (ref 136–145)
TRIGL SERPL-MCNC: 132 MG/DL
TSH SERPL DL<=0.05 MIU/L-ACNC: 1.34 UIU/ML (ref 0.36–3.74)
WBC # BLD AUTO: 9.61 THOUSAND/UL (ref 4.31–10.16)

## 2019-05-07 PROCEDURE — 85025 COMPLETE CBC W/AUTO DIFF WBC: CPT

## 2019-05-07 PROCEDURE — 84443 ASSAY THYROID STIM HORMONE: CPT | Performed by: FAMILY MEDICINE

## 2019-05-07 PROCEDURE — 82570 ASSAY OF URINE CREATININE: CPT | Performed by: FAMILY MEDICINE

## 2019-05-07 PROCEDURE — 36415 COLL VENOUS BLD VENIPUNCTURE: CPT | Performed by: FAMILY MEDICINE

## 2019-05-07 PROCEDURE — 82043 UR ALBUMIN QUANTITATIVE: CPT | Performed by: FAMILY MEDICINE

## 2019-05-07 PROCEDURE — 83036 HEMOGLOBIN GLYCOSYLATED A1C: CPT | Performed by: FAMILY MEDICINE

## 2019-05-07 PROCEDURE — 80061 LIPID PANEL: CPT | Performed by: FAMILY MEDICINE

## 2019-05-07 PROCEDURE — 85610 PROTHROMBIN TIME: CPT

## 2019-05-07 PROCEDURE — 80048 BASIC METABOLIC PNL TOTAL CA: CPT

## 2019-05-07 PROCEDURE — 87517 HEPATITIS B DNA QUANT: CPT

## 2019-05-07 PROCEDURE — 80076 HEPATIC FUNCTION PANEL: CPT

## 2019-05-09 LAB
HBV DNA SERPL NAA+PROBE-ACNC: <10 IU/ML
HBV DNA SERPL NAA+PROBE-LOG IU: NORMAL LOG10 IU/ML
REF LAB TEST REF RANGE: NORMAL

## 2019-05-13 ENCOUNTER — TELEPHONE (OUTPATIENT)
Dept: GASTROENTEROLOGY | Facility: AMBULARY SURGERY CENTER | Age: 69
End: 2019-05-13

## 2019-05-21 RX ORDER — LEVOFLOXACIN 500 MG/1
TABLET, FILM COATED ORAL
COMMUNITY
End: 2019-08-21 | Stop reason: ALTCHOICE

## 2019-05-21 RX ORDER — PAROXETINE 30 MG/1
TABLET, FILM COATED ORAL
COMMUNITY
Start: 2019-05-01 | End: 2019-07-23

## 2019-05-21 RX ORDER — PENICILLIN V POTASSIUM 500 MG/1
TABLET ORAL
COMMUNITY
End: 2019-08-21 | Stop reason: ALTCHOICE

## 2019-05-21 RX ORDER — GUAIFENESIN AND CODEINE PHOSPHATE 100; 10 MG/5ML; MG/5ML
SOLUTION ORAL
COMMUNITY
End: 2019-08-21 | Stop reason: ALTCHOICE

## 2019-05-21 RX ORDER — HYDROCODONE BITARTRATE AND ACETAMINOPHEN 5; 300 MG/1; MG/1
TABLET ORAL
COMMUNITY
End: 2019-08-21 | Stop reason: ALTCHOICE

## 2019-05-21 RX ORDER — AZITHROMYCIN 250 MG/1
TABLET, FILM COATED ORAL
COMMUNITY
End: 2019-08-21 | Stop reason: ALTCHOICE

## 2019-05-21 RX ORDER — AMOXICILLIN 500 MG/1
TABLET, FILM COATED ORAL
COMMUNITY
End: 2019-08-21 | Stop reason: ALTCHOICE

## 2019-05-21 RX ORDER — CARVEDILOL 12.5 MG/1
TABLET ORAL
COMMUNITY
End: 2019-08-21 | Stop reason: ALTCHOICE

## 2019-05-21 RX ORDER — HYDRALAZINE HYDROCHLORIDE 100 MG/1
TABLET, FILM COATED ORAL
COMMUNITY
End: 2019-08-21 | Stop reason: ALTCHOICE

## 2019-05-21 RX ORDER — DOXAZOSIN 2 MG/1
TABLET ORAL
COMMUNITY
End: 2019-08-21 | Stop reason: ALTCHOICE

## 2019-05-21 RX ORDER — HYDRALAZINE HYDROCHLORIDE 50 MG/1
TABLET, FILM COATED ORAL
COMMUNITY
End: 2019-08-21 | Stop reason: ALTCHOICE

## 2019-05-21 RX ORDER — SEVELAMER CARBONATE 800 MG/1
800 TABLET, FILM COATED ORAL
COMMUNITY
Start: 2019-04-18 | End: 2019-10-17 | Stop reason: ALTCHOICE

## 2019-05-21 RX ORDER — CARVEDILOL 6.25 MG/1
TABLET ORAL
COMMUNITY
End: 2019-08-21 | Stop reason: ALTCHOICE

## 2019-05-21 RX ORDER — PYRITHIONE ZINC 0.25 %
SPRAY, NON-AEROSOL (ML) TOPICAL
COMMUNITY
End: 2019-08-21 | Stop reason: ALTCHOICE

## 2019-05-21 RX ORDER — LISINOPRIL 10 MG/1
TABLET ORAL
COMMUNITY
End: 2019-08-21 | Stop reason: ALTCHOICE

## 2019-05-21 RX ORDER — NIFEDIPINE 90 MG/1
TABLET, FILM COATED, EXTENDED RELEASE ORAL
COMMUNITY
End: 2019-08-21 | Stop reason: ALTCHOICE

## 2019-05-21 RX ORDER — INSULIN GLARGINE 100 [IU]/ML
INJECTION, SOLUTION SUBCUTANEOUS
COMMUNITY
End: 2019-08-21 | Stop reason: ALTCHOICE

## 2019-05-23 ENCOUNTER — OFFICE VISIT (OUTPATIENT)
Dept: GASTROENTEROLOGY | Facility: HOSPITAL | Age: 69
End: 2019-05-23
Payer: COMMERCIAL

## 2019-05-23 VITALS
DIASTOLIC BLOOD PRESSURE: 81 MMHG | HEIGHT: 69 IN | WEIGHT: 165.6 LBS | BODY MASS INDEX: 24.53 KG/M2 | SYSTOLIC BLOOD PRESSURE: 147 MMHG | TEMPERATURE: 97 F | HEART RATE: 84 BPM

## 2019-05-23 DIAGNOSIS — R19.5 LOOSE STOOLS: ICD-10-CM

## 2019-05-23 DIAGNOSIS — B18.1 CHRONIC VIRAL HEPATITIS B WITHOUT DELTA AGENT AND WITHOUT COMA (HCC): Primary | ICD-10-CM

## 2019-05-23 DIAGNOSIS — Z12.11 SCREENING FOR COLON CANCER: ICD-10-CM

## 2019-05-23 PROCEDURE — 99214 OFFICE O/P EST MOD 30 MIN: CPT | Performed by: PHYSICIAN ASSISTANT

## 2019-05-23 RX ORDER — SEVELAMER HYDROCHLORIDE 400 MG/1
400 TABLET, FILM COATED ORAL
COMMUNITY
End: 2019-08-21 | Stop reason: DRUGHIGH

## 2019-07-18 ENCOUNTER — DOCTOR'S OFFICE (OUTPATIENT)
Dept: URBAN - NONMETROPOLITAN AREA CLINIC 1 | Facility: CLINIC | Age: 69
Setting detail: OPHTHALMOLOGY
End: 2019-07-18
Payer: COMMERCIAL

## 2019-07-18 DIAGNOSIS — H43.812: ICD-10-CM

## 2019-07-18 DIAGNOSIS — H04.123: ICD-10-CM

## 2019-07-18 DIAGNOSIS — H33.301: ICD-10-CM

## 2019-07-18 DIAGNOSIS — H35.63: ICD-10-CM

## 2019-07-18 DIAGNOSIS — H43.811: ICD-10-CM

## 2019-07-18 DIAGNOSIS — E11.3393: ICD-10-CM

## 2019-07-18 DIAGNOSIS — H35.373: ICD-10-CM

## 2019-07-18 PROCEDURE — 92250 FUNDUS PHOTOGRAPHY W/I&R: CPT | Performed by: OPHTHALMOLOGY

## 2019-07-18 PROCEDURE — 92235 FLUORESCEIN ANGRPH MLTIFRAME: CPT | Performed by: OPHTHALMOLOGY

## 2019-07-18 PROCEDURE — 83861 MICROFLUID ANALY TEARS: CPT | Performed by: OPHTHALMOLOGY

## 2019-07-18 PROCEDURE — 99214 OFFICE O/P EST MOD 30 MIN: CPT | Performed by: OPHTHALMOLOGY

## 2019-07-18 PROCEDURE — 92226 OPHTHALMOSCOPY EXT SUBSEQUENT: CPT | Performed by: OPHTHALMOLOGY

## 2019-07-18 ASSESSMENT — LID EXAM ASSESSMENTS
OS_BLEPHARITIS: LUL 2+
OD_BLEPHARITIS: RUL 2+

## 2019-07-18 ASSESSMENT — REFRACTION_CURRENTRX
OD_OVR_VA: 20/
OS_OVR_VA: 20/
OD_OVR_VA: 20/
OD_OVR_VA: 20/
OS_OVR_VA: 20/
OS_OVR_VA: 20/

## 2019-07-18 ASSESSMENT — REFRACTION_MANIFEST
OD_VA3: 20/
OS_VA2: 20/
OS_VA2: 20/
OD_VA2: 20/
OD_VA3: 20/
OS_VA3: 20/
OD_VA1: 20/
OS_VA1: 20/
OU_VA: 20/
OD_VA1: 20/
OU_VA: 20/
OS_VA3: 20/
OS_VA1: 20/
OD_VA2: 20/

## 2019-07-18 ASSESSMENT — VISUAL ACUITY
OD_BCVA: CF 3FT
OS_BCVA: 20/25+1

## 2019-07-18 ASSESSMENT — CONFRONTATIONAL VISUAL FIELD TEST (CVF)
OD_FINDINGS: FULL
OS_FINDINGS: FULL

## 2019-07-23 ENCOUNTER — OFFICE VISIT (OUTPATIENT)
Dept: FAMILY MEDICINE CLINIC | Facility: CLINIC | Age: 69
End: 2019-07-23
Payer: COMMERCIAL

## 2019-07-23 VITALS
SYSTOLIC BLOOD PRESSURE: 134 MMHG | HEIGHT: 69 IN | DIASTOLIC BLOOD PRESSURE: 92 MMHG | WEIGHT: 166.6 LBS | BODY MASS INDEX: 24.68 KG/M2

## 2019-07-23 DIAGNOSIS — N18.4 TYPE 2 DM WITH CKD STAGE 4 AND HYPERTENSION (HCC): Primary | ICD-10-CM

## 2019-07-23 DIAGNOSIS — M10.9 GOUT, UNSPECIFIED CAUSE, UNSPECIFIED CHRONICITY, UNSPECIFIED SITE: ICD-10-CM

## 2019-07-23 DIAGNOSIS — I10 ESSENTIAL HYPERTENSION: ICD-10-CM

## 2019-07-23 DIAGNOSIS — E11.22 TYPE 2 DM WITH CKD STAGE 4 AND HYPERTENSION (HCC): Primary | ICD-10-CM

## 2019-07-23 DIAGNOSIS — E11.9 DIABETES MELLITUS TYPE 2 IN NONOBESE (HCC): Chronic | ICD-10-CM

## 2019-07-23 DIAGNOSIS — F41.8 DEPRESSION WITH ANXIETY: ICD-10-CM

## 2019-07-23 DIAGNOSIS — C67.9 MALIGNANT NEOPLASM OF URINARY BLADDER, UNSPECIFIED SITE (HCC): ICD-10-CM

## 2019-07-23 DIAGNOSIS — I12.9 TYPE 2 DM WITH CKD STAGE 4 AND HYPERTENSION (HCC): Primary | ICD-10-CM

## 2019-07-23 PROCEDURE — 3008F BODY MASS INDEX DOCD: CPT | Performed by: FAMILY MEDICINE

## 2019-07-23 PROCEDURE — 99214 OFFICE O/P EST MOD 30 MIN: CPT | Performed by: FAMILY MEDICINE

## 2019-07-23 PROCEDURE — 1160F RVW MEDS BY RX/DR IN RCRD: CPT | Performed by: FAMILY MEDICINE

## 2019-07-23 RX ORDER — GEMFIBROZIL 600 MG/1
600 TABLET, FILM COATED ORAL
Qty: 180 TABLET | Refills: 3 | Status: SHIPPED | OUTPATIENT
Start: 2019-07-23 | End: 2019-09-03

## 2019-07-23 RX ORDER — ESCITALOPRAM OXALATE 10 MG/1
10 TABLET ORAL DAILY
Qty: 90 TABLET | Refills: 3 | Status: SHIPPED | OUTPATIENT
Start: 2019-07-23

## 2019-07-23 RX ORDER — ALLOPURINOL 100 MG/1
100 TABLET ORAL DAILY
Qty: 90 TABLET | Refills: 3 | Status: SHIPPED | OUTPATIENT
Start: 2019-07-23 | End: 2019-07-29 | Stop reason: SDUPTHER

## 2019-07-23 RX ORDER — OXYCODONE HYDROCHLORIDE 5 MG/1
5 TABLET ORAL 3 TIMES DAILY PRN
Qty: 90 TABLET | Refills: 0 | Status: SHIPPED | OUTPATIENT
Start: 2019-07-23 | End: 2019-10-18 | Stop reason: SDUPTHER

## 2019-07-23 NOTE — PROGRESS NOTES
Assessment/Plan:  1st depression with anxiety, we will change Paxil to Lexapro  Follow up with other specialists as scheduled  We filled his medications  Follow-up in 4 months or p r n  Problem List Items Addressed This Visit        Endocrine    Diabetes mellitus type 2 in nonobese (HCC) (Chronic)    Relevant Medications    gemfibrozil (LOPID) 600 mg tablet    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 ([facility] ) - Primary       Cardiovascular and Mediastinum    Essential hypertension       Genitourinary    Malignant tumor of urinary bladder (HCC)    Relevant Medications    oxyCODONE (ROXICODONE) 5 mg immediate release tablet       Other    Depression with anxiety    Relevant Medications    escitalopram (LEXAPRO) 10 mg tablet      Other Visit Diagnoses     Gout, unspecified cause, unspecified chronicity, unspecified site        Relevant Medications    allopurinol (ZYLOPRIM) 100 mg tablet           Diagnoses and all orders for this visit:    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 ([facility] )    Essential hypertension    Depression with anxiety  -     escitalopram (LEXAPRO) 10 mg tablet; Take 1 tablet (10 mg total) by mouth daily    Gout, unspecified cause, unspecified chronicity, unspecified site  -     allopurinol (ZYLOPRIM) 100 mg tablet; Take 1 tablet (100 mg total) by mouth daily    Diabetes mellitus type 2 in nonobese (HCC)  -     gemfibrozil (LOPID) 600 mg tablet; Take 1 tablet (600 mg total) by mouth 2 (two) times a day before meals    Malignant neoplasm of urinary bladder, unspecified site (HCC)  -     oxyCODONE (ROXICODONE) 5 mg immediate release tablet; Take 1 tablet (5 mg total) by mouth 3 (three) times a day as needed for moderate painMax Daily Amount: 15 mg        No problem-specific Assessment & Plan notes found for this encounter  Subjective:      Patient ID: Jeana Ayala is a 71 y o  male  Patient here today for follow-up  Patient denies any chest pain or shortness of breath    Patient does not feel the Paxil is helping his depression much  Diabetes   He presents for his follow-up diabetic visit  He has type 2 diabetes mellitus  His disease course has been stable  There are no hypoglycemic associated symptoms  There are no diabetic associated symptoms  There are no hypoglycemic complications  Risk factors for coronary artery disease include diabetes mellitus, hypertension, sedentary lifestyle, male sex and dyslipidemia  Current diabetic treatment includes diet  He is compliant with treatment all of the time  His weight is stable  He is following a generally healthy diet  When asked about meal planning, he reported none  He has not had a previous visit with a dietitian  There is no change in his home blood glucose trend  An ACE inhibitor/angiotensin II receptor blocker is contraindicated  Depression   This is a chronic problem  The problem occurs daily  The problem has been unchanged  Nothing aggravates the symptoms  Treatments tried: Paxil  The treatment provided mild relief  The following portions of the patient's history were reviewed and updated as appropriate:   He has a past medical history of AAA (abdominal aortic aneurysm) ([facility] ), Abnormal liver function test, Anemia, Anxiety, Arthritis, Cancer ([facility] ), Cardiac disorder, Chronic kidney disease, Chronic pain disorder, Depression, Diabetes mellitus ([facility] ), Dialysis patient ([facility] ), Fracture of carpal bone, Fractures, GERD (gastroesophageal reflux disease), Gout, Hearing difficulty of both ears, Hepatitis A, Hyperlipidemia, Hyperlipidemia, Hypertension, Kidney stone, Migraine, Neuropathy, Nicotine dependence, Pancreatitis, Vitamin D deficiency, and Wears glasses  ,  does not have any pertinent problems on file  ,   has a past surgical history that includes Hernia repair; Eye surgery;  Hernia repair; pr cystourethroscopy,fulgur <0 5 cm lesn (N/A, 2/6/2017); pr cystourethroscopy,fulgur 0 5-2 cm lesn (N/A, 2/6/2017); pr cystourethroscopy,biopsy (N/A, 8/15/2016); pr anastomosis,av,any site (Right, 6/29/2016); pr cysto/uretero w/lithotripsy &indwell stent insrt (Right, 6/17/2016); pr cystourethroscopy,ureter catheter (Right, 6/1/2016); pr colonoscopy flx dx w/collj spec when pfrmd (N/A, 3/4/2016); pr egd transoral biopsy single/multiple (N/A, 3/4/2016); Vascular surgery (Right); Eye surgery (Bilateral); Cataract extraction (Bilateral); Cystoscopy; Esophagogastroduodenoscopy; Colonoscopy; Dental surgery; pr cystourethroscopy,fulgur 0 5-2 cm lesn (N/A, 6/14/2017); CYSTOSCOPY (N/A, 6/14/2017); pr cystourethroscopy (N/A, 7/13/2017); pr cystourethroscopy w/irrig & evac clots (N/A, 7/13/2017); Cystostomy w/ bladder biopsy; Bladder fulguration (06/17/2016); Cystoscopy w/ ureteral stent placement (Right, 06/01/2016); Cystoscopy w/ ureteroscopy (06/17/2016); and Cystoscopy (12/06/2018)  ,  family history includes Cancer in his maternal grandmother; Depression in his mother; Diabetes in his father and mother; Heart disease in his father and mother; Hypertension in his father and mother; Kidney disease in his mother; Stroke in his father; Thyroid disease in his sister  ,   reports that he quit smoking about 34 years ago  He has never used smokeless tobacco  He reports that he does not drink alcohol or use drugs  ,  is allergic to acetaminophen; heparin; lovenox [enoxaparin sodium]; nsaids; and cardura [doxazosin]     Current Outpatient Medications   Medication Sig Dispense Refill    allopurinol (ZYLOPRIM) 100 mg tablet Take 1 tablet (100 mg total) by mouth daily 90 tablet 3    ALPRAZolam (XANAX) 0 5 mg tablet Take 0 5 mg by mouth daily as needed for anxiety        atorvastatin (LIPITOR) 20 mg tablet Take 1 tablet (20 mg total) by mouth daily 90 tablet 3    B Complex-C-Folic Acid (TRIPHROCAPS) 1 MG CAPS Take 1 mg by mouth daily      calcium acetate (PHOSLO) 667 mg capsule Take 667 mg by mouth 3 (three) times a day with meals       cholecalciferol (VITAMIN D3) 1,000 units tablet Vitamin D3      colchicine-probenecid 0 5-500 MG per tablet Take 1 tablet by mouth daily 90 tablet 3    entecavir (BARACLUDE) 0 5 MG tablet Take 1 tablet (0 5 mg total) by mouth daily for 90 days (Patient taking differently: Take 0 5 mg by mouth once a week  ) 90 tablet 1    fexofenadine (ALLEGRA) 30 MG tablet Take 30 mg by mouth daily as needed      gemfibrozil (LOPID) 600 mg tablet Take 1 tablet (600 mg total) by mouth 2 (two) times a day before meals 180 tablet 3    guaiFENesin 200 MG tablet Take 400 mg by mouth 2 (two) times a day      metoprolol tartrate (LOPRESSOR) 25 mg tablet 1/2 tab po BID every other day 90 tablet 3    omeprazole (PriLOSEC) 40 MG capsule Take 20 mg by mouth daily        oxybutynin (DITROPAN-XL) 5 mg 24 hr tablet Take 1 tablet by mouth daily (Patient taking differently: Take 5 mg by mouth as needed  ) 10 tablet 0    oxyCODONE (ROXICODONE) 5 mg immediate release tablet Take 1 tablet (5 mg total) by mouth 3 (three) times a day as needed for moderate painMax Daily Amount: 15 mg 90 tablet 0    sevelamer carbonate (RENVELA) 800 mg tablet       amLODIPine Besylate (NORVASC PO) Norvasc      amoxicillin (AMOXIL) 500 MG tablet amoxicillin 500 mg capsule   TAKE 1 CAPSULE FOUR TIMES A DAY UNTIL FINISHED      ampicillin (PRINCIPEN) 250 mg capsule Take 250 mg by mouth every 12 (twelve) hours Current treatment for UTI      azithromycin (ZITHROMAX) 250 mg tablet azithromycin 250 mg tablet   TAKE 2 TABLETS BY MOUTH TODAY, THEN TAKE 1 TABLET DAILY FOR 4 DAYS      Biotin 5 MG/ML LIQD biotin      carvedilol (COREG) 12 5 mg tablet carvedilol 12 5 mg tablet   TAKE 1 TABLET BY MOUTH TWICE A DAY      carvedilol (COREG) 6 25 mg tablet carvedilol 6 25 mg tablet   TAKE 1 TABLET BY MOUTH TWICE A DAY      Cholecalciferol (VITAMIN D-3 PO) Take 1 capsule by mouth daily        Clobetasol Propionate (CLOBETASOL 17 PROPIONATE) 0 5 % POWD clobetasol      doxazosin (CARDURA) 2 mg tablet doxazosin 2 mg tablet   TAKE 1 TABLET BY MOUTH AT BEDTIME      escitalopram (LEXAPRO) 10 mg tablet Take 1 tablet (10 mg total) by mouth daily 90 tablet 3    guaifenesin-codeine (CHERATUSSIN AC) 100-10 MG/5ML liquid Cheratussin AC 10 mg-100 mg/5 mL oral liquid   TAKE 10 ML BY MOUTH THREE TIMES DAILY AS NEEDED FOR COUGH      hydrALAZINE (APRESOLINE) 100 MG tablet hydralazine 100 mg tablet   TAKE 1 TABLET 3 TIMES A DAY      hydrALAZINE (APRESOLINE) 50 mg tablet hydralazine 50 mg tablet   TAKE 1 TABLET THREE TIMES A DAY      HYDROcodone-acetaminophen (VICODIN) 5-300 MG per tablet Vicodin      insulin glargine (LANTUS) 100 units/mL subcutaneous injection Lantus U-100 Insulin      levofloxacin (LEVAQUIN) 500 mg tablet levofloxacin 500 mg tablet   TAKE 1 TABLET DAILY      lisinopril (ZESTRIL) 10 mg tablet lisinopril      NIFEdipine (ADALAT CC) 90 mg 24 hr tablet nifedipine ER 90 mg tablet,extended release   TAKE 1 TABLET BY MOUTH EVERY DAY      Nizatidine (AXID AR PO) Axid      nystatin (MYCOSTATIN) 500,000 units/5 mL suspension nystatin 100,000 unit/mL oral suspension   SWISH AND SWALLOW 5 ML BY MOUTH 4 TIMES DAILY FOR 7-10 DAYS      omeprazole (PriLOSEC) 20 mg delayed release capsule TAKE 1 CAPSULE DAILY (Patient not taking: Reported on 7/23/2019) 90 capsule 3    ondansetron (ZOFRAN) 4 mg tablet Take 4 mg by mouth 4 (four) times a day as needed for nausea or vomiting   penicillin V potassium (VEETID) 500 mg tablet penicillin V potassium 500 mg tablet   TAKE 1 TABLET BY MOUTH 4 TIMES A DAY UNTIL GONE      PROBENECID PO probenecid      sevelamer (RENAGEL) 400 mg tablet Take 400 mg by mouth 3 (three) times a day with meals      sitaGLIPtin (JANUVIA) 50 mg tablet Januvia 50 mg tablet   TAKE 1 TABLET BY MOUTH EVERY DAY       No current facility-administered medications for this visit  Review of Systems   Constitutional: Negative  Respiratory: Negative  Cardiovascular: Negative  Gastrointestinal: Negative  Genitourinary: Negative  Psychiatric/Behavioral: Positive for depression and dysphoric mood  Negative for suicidal ideas  Objective:  Vitals:    07/23/19 1222   BP: 134/92   Weight: 75 6 kg (166 lb 9 6 oz)   Height: 5' 9" (1 753 m)     Body mass index is 24 6 kg/m²  Physical Exam   Constitutional: He is oriented to person, place, and time  He appears well-developed and well-nourished  No distress  HENT:   Head: Normocephalic and atraumatic  Eyes: Conjunctivae are normal    Cardiovascular: Normal rate, regular rhythm and normal heart sounds  Exam reveals no gallop and no friction rub  No murmur heard  Pulmonary/Chest: Effort normal and breath sounds normal  No respiratory distress  He has no wheezes  He has no rales  Musculoskeletal: He exhibits no edema  Neurological: He is alert and oriented to person, place, and time  Skin: He is not diaphoretic  Psychiatric: He has a normal mood and affect  His behavior is normal  Judgment and thought content normal    Vitals reviewed

## 2019-07-29 DIAGNOSIS — M10.9 GOUT, UNSPECIFIED CAUSE, UNSPECIFIED CHRONICITY, UNSPECIFIED SITE: ICD-10-CM

## 2019-07-29 RX ORDER — ALLOPURINOL 100 MG/1
100 TABLET ORAL DAILY
Qty: 90 TABLET | Refills: 3 | Status: SHIPPED | OUTPATIENT
Start: 2019-07-29

## 2019-08-15 ENCOUNTER — DOCTOR'S OFFICE (OUTPATIENT)
Dept: URBAN - NONMETROPOLITAN AREA CLINIC 1 | Facility: CLINIC | Age: 69
Setting detail: OPHTHALMOLOGY
End: 2019-08-15
Payer: COMMERCIAL

## 2019-08-15 DIAGNOSIS — H43.813: ICD-10-CM

## 2019-08-15 DIAGNOSIS — E11.3393: ICD-10-CM

## 2019-08-15 DIAGNOSIS — H35.373: ICD-10-CM

## 2019-08-15 DIAGNOSIS — H43.812: ICD-10-CM

## 2019-08-15 DIAGNOSIS — H04.122: ICD-10-CM

## 2019-08-15 DIAGNOSIS — H43.811: ICD-10-CM

## 2019-08-15 DIAGNOSIS — H04.123: ICD-10-CM

## 2019-08-15 DIAGNOSIS — H35.63: ICD-10-CM

## 2019-08-15 PROCEDURE — 83861 MICROFLUID ANALY TEARS: CPT | Performed by: OPHTHALMOLOGY

## 2019-08-15 PROCEDURE — 92014 COMPRE OPH EXAM EST PT 1/>: CPT | Performed by: OPHTHALMOLOGY

## 2019-08-15 PROCEDURE — 92226 OPHTHALMOSCOPY EXT SUBSEQUENT: CPT | Performed by: OPHTHALMOLOGY

## 2019-08-15 PROCEDURE — 92134 CPTRZ OPH DX IMG PST SGM RTA: CPT | Performed by: OPHTHALMOLOGY

## 2019-08-15 ASSESSMENT — REFRACTION_MANIFEST
OD_VA2: 20/
OS_VA1: 20/
OD_VA1: 20/
OU_VA: 20/
OS_VA3: 20/
OS_VA2: 20/
OU_VA: 20/
OD_VA1: 20/
OD_VA3: 20/
OD_VA3: 20/
OS_VA1: 20/
OS_VA3: 20/
OD_VA2: 20/
OS_VA2: 20/

## 2019-08-15 ASSESSMENT — CONFRONTATIONAL VISUAL FIELD TEST (CVF)
OD_FINDINGS: FULL
OS_FINDINGS: FULL

## 2019-08-15 ASSESSMENT — LID EXAM ASSESSMENTS
OD_BLEPHARITIS: RUL 2+
OS_BLEPHARITIS: LUL 2+

## 2019-08-15 ASSESSMENT — REFRACTION_CURRENTRX
OS_OVR_VA: 20/
OD_OVR_VA: 20/
OS_OVR_VA: 20/
OS_OVR_VA: 20/
OD_OVR_VA: 20/
OD_OVR_VA: 20/

## 2019-08-15 ASSESSMENT — VISUAL ACUITY
OD_BCVA: CF 3FT
OS_BCVA: 20/25+1

## 2019-08-20 ENCOUNTER — TELEPHONE (OUTPATIENT)
Dept: FAMILY MEDICINE CLINIC | Facility: CLINIC | Age: 69
End: 2019-08-20

## 2019-08-20 DIAGNOSIS — J32.9 CHRONIC SINUSITIS, UNSPECIFIED LOCATION: Primary | ICD-10-CM

## 2019-08-20 DIAGNOSIS — R23.8 EASY BRUISING: Primary | ICD-10-CM

## 2019-08-20 DIAGNOSIS — R06.02 SOB (SHORTNESS OF BREATH): ICD-10-CM

## 2019-08-20 NOTE — TELEPHONE ENCOUNTER
C/O BRUISING AND HAD A PROBLEM WITH COAGULATION AT DIALYSIS  CAN YOU ORDER PT, PTT, LFT, AND CMP  THEY DID A HEMOGLOBIN ON HIM WHICH WAS FINE

## 2019-08-21 ENCOUNTER — APPOINTMENT (EMERGENCY)
Dept: NON INVASIVE DIAGNOSTICS | Facility: HOSPITAL | Age: 69
End: 2019-08-21
Payer: COMMERCIAL

## 2019-08-21 ENCOUNTER — TELEPHONE (OUTPATIENT)
Dept: FAMILY MEDICINE CLINIC | Facility: CLINIC | Age: 69
End: 2019-08-21

## 2019-08-21 ENCOUNTER — APPOINTMENT (EMERGENCY)
Dept: CT IMAGING | Facility: HOSPITAL | Age: 69
End: 2019-08-21
Payer: COMMERCIAL

## 2019-08-21 ENCOUNTER — HOSPITAL ENCOUNTER (INPATIENT)
Facility: HOSPITAL | Age: 69
LOS: 6 days | Discharge: HOME HEALTH | DRG: 040 | End: 2019-08-27
Attending: INTERNAL MEDICINE | Admitting: INTERNAL MEDICINE
Payer: MEDICARE

## 2019-08-21 ENCOUNTER — HOSPITAL ENCOUNTER (EMERGENCY)
Facility: HOSPITAL | Age: 69
End: 2019-08-21
Attending: EMERGENCY MEDICINE
Payer: COMMERCIAL

## 2019-08-21 ENCOUNTER — APPOINTMENT (INPATIENT)
Dept: DIALYSIS | Facility: HOSPITAL | Age: 69
DRG: 040 | End: 2019-08-21
Payer: MEDICARE

## 2019-08-21 VITALS
HEART RATE: 109 BPM | WEIGHT: 175.04 LBS | BODY MASS INDEX: 25.93 KG/M2 | HEIGHT: 69 IN | OXYGEN SATURATION: 91 % | TEMPERATURE: 98.9 F | SYSTOLIC BLOOD PRESSURE: 139 MMHG | RESPIRATION RATE: 24 BRPM | DIASTOLIC BLOOD PRESSURE: 88 MMHG

## 2019-08-21 DIAGNOSIS — I50.82 BIVENTRICULAR HEART FAILURE (HCC): ICD-10-CM

## 2019-08-21 DIAGNOSIS — I12.9 TYPE 2 DM WITH CKD STAGE 4 AND HYPERTENSION (HCC): ICD-10-CM

## 2019-08-21 DIAGNOSIS — C67.9 MALIGNANT NEOPLASM OF URINARY BLADDER, UNSPECIFIED SITE (HCC): ICD-10-CM

## 2019-08-21 DIAGNOSIS — I48.91 ATRIAL FIBRILLATION (HCC): ICD-10-CM

## 2019-08-21 DIAGNOSIS — N18.6 END STAGE RENAL DISEASE (HCC): ICD-10-CM

## 2019-08-21 DIAGNOSIS — N18.6 END STAGE RENAL DISEASE (HCC): Primary | ICD-10-CM

## 2019-08-21 DIAGNOSIS — R77.8 ELEVATED TROPONIN: ICD-10-CM

## 2019-08-21 DIAGNOSIS — R06.00 DYSPNEA: Primary | ICD-10-CM

## 2019-08-21 DIAGNOSIS — I60.9 SUBARACHNOID HEMORRHAGE (HCC): ICD-10-CM

## 2019-08-21 DIAGNOSIS — E11.22 TYPE 2 DM WITH CKD STAGE 4 AND HYPERTENSION (HCC): ICD-10-CM

## 2019-08-21 DIAGNOSIS — I48.0 PAROXYSMAL ATRIAL FIBRILLATION (HCC): ICD-10-CM

## 2019-08-21 DIAGNOSIS — I62.9 INTRACRANIAL HEMORRHAGE (HCC): ICD-10-CM

## 2019-08-21 DIAGNOSIS — N18.4 TYPE 2 DM WITH CKD STAGE 4 AND HYPERTENSION (HCC): ICD-10-CM

## 2019-08-21 PROBLEM — E87.70 VOLUME OVERLOAD: Status: ACTIVE | Noted: 2019-08-21

## 2019-08-21 PROBLEM — I61.9 CEREBRAL HEMORRHAGE (HCC): Status: ACTIVE | Noted: 2019-08-21

## 2019-08-21 LAB
ALBUMIN SERPL BCP-MCNC: 2.5 G/DL (ref 3.5–5)
ALP SERPL-CCNC: 93 U/L (ref 46–116)
ALT SERPL W P-5'-P-CCNC: 18 U/L (ref 12–78)
ANION GAP SERPL CALCULATED.3IONS-SCNC: 17 MMOL/L (ref 4–13)
APTT PPP: 36 SECONDS (ref 23–37)
AST SERPL W P-5'-P-CCNC: 33 U/L (ref 5–45)
BASOPHILS # BLD AUTO: 0.01 THOUSANDS/ΜL (ref 0–0.1)
BASOPHILS NFR BLD AUTO: 0 % (ref 0–1)
BILIRUB SERPL-MCNC: 1.4 MG/DL (ref 0.2–1)
BUN SERPL-MCNC: 53 MG/DL (ref 5–25)
CALCIUM SERPL-MCNC: 9 MG/DL (ref 8.3–10.1)
CHLORIDE SERPL-SCNC: 93 MMOL/L (ref 100–108)
CO2 SERPL-SCNC: 29 MMOL/L (ref 21–32)
CREAT SERPL-MCNC: 8.75 MG/DL (ref 0.6–1.3)
EOSINOPHIL # BLD AUTO: 0 THOUSAND/ΜL (ref 0–0.61)
EOSINOPHIL NFR BLD AUTO: 0 % (ref 0–6)
ERYTHROCYTE [DISTWIDTH] IN BLOOD BY AUTOMATED COUNT: 14.6 % (ref 11.6–15.1)
GFR SERPL CREATININE-BSD FRML MDRD: 6 ML/MIN/1.73SQ M
GLUCOSE SERPL-MCNC: 218 MG/DL (ref 65–140)
HCT VFR BLD AUTO: 34.9 % (ref 36.5–49.3)
HGB BLD-MCNC: 11.3 G/DL (ref 12–17)
IMM GRANULOCYTES # BLD AUTO: 0.09 THOUSAND/UL (ref 0–0.2)
IMM GRANULOCYTES NFR BLD AUTO: 1 % (ref 0–2)
INR PPP: 1.24 (ref 0.84–1.19)
LYMPHOCYTES # BLD AUTO: 0.39 THOUSANDS/ΜL (ref 0.6–4.47)
LYMPHOCYTES NFR BLD AUTO: 3 % (ref 14–44)
MAGNESIUM SERPL-MCNC: 1.9 MG/DL (ref 1.6–2.6)
MCH RBC QN AUTO: 33.6 PG (ref 26.8–34.3)
MCHC RBC AUTO-ENTMCNC: 32.4 G/DL (ref 31.4–37.4)
MCV RBC AUTO: 104 FL (ref 82–98)
MONOCYTES # BLD AUTO: 1.07 THOUSAND/ΜL (ref 0.17–1.22)
MONOCYTES NFR BLD AUTO: 8 % (ref 4–12)
NEUTROPHILS # BLD AUTO: 11.8 THOUSANDS/ΜL (ref 1.85–7.62)
NEUTS SEG NFR BLD AUTO: 88 % (ref 43–75)
NRBC BLD AUTO-RTO: 0 /100 WBCS
PLATELET # BLD AUTO: 361 THOUSANDS/UL (ref 149–390)
PMV BLD AUTO: 10.5 FL (ref 8.9–12.7)
POTASSIUM SERPL-SCNC: 4.5 MMOL/L (ref 3.5–5.3)
PROT SERPL-MCNC: 8.3 G/DL (ref 6.4–8.2)
PROTHROMBIN TIME: 15.7 SECONDS (ref 11.6–14.5)
RBC # BLD AUTO: 3.36 MILLION/UL (ref 3.88–5.62)
SODIUM SERPL-SCNC: 139 MMOL/L (ref 136–145)
TROPONIN I SERPL-MCNC: 2.51 NG/ML
TROPONIN I SERPL-MCNC: 2.79 NG/ML
TROPONIN I SERPL-MCNC: 3.01 NG/ML
TROPONIN I SERPL-MCNC: 3.12 NG/ML
WBC # BLD AUTO: 13.36 THOUSAND/UL (ref 4.31–10.16)

## 2019-08-21 PROCEDURE — 93970 EXTREMITY STUDY: CPT

## 2019-08-21 PROCEDURE — 85025 COMPLETE CBC W/AUTO DIFF WBC: CPT | Performed by: EMERGENCY MEDICINE

## 2019-08-21 PROCEDURE — 99255 IP/OBS CONSLTJ NEW/EST HI 80: CPT | Performed by: INTERNAL MEDICINE

## 2019-08-21 PROCEDURE — 99285 EMERGENCY DEPT VISIT HI MDM: CPT | Performed by: EMERGENCY MEDICINE

## 2019-08-21 PROCEDURE — 84484 ASSAY OF TROPONIN QUANT: CPT | Performed by: EMERGENCY MEDICINE

## 2019-08-21 PROCEDURE — 93005 ELECTROCARDIOGRAM TRACING: CPT

## 2019-08-21 PROCEDURE — 71250 CT THORAX DX C-: CPT

## 2019-08-21 PROCEDURE — 99223 1ST HOSP IP/OBS HIGH 75: CPT | Performed by: INTERNAL MEDICINE

## 2019-08-21 PROCEDURE — 99285 EMERGENCY DEPT VISIT HI MDM: CPT

## 2019-08-21 PROCEDURE — 84484 ASSAY OF TROPONIN QUANT: CPT | Performed by: INTERNAL MEDICINE

## 2019-08-21 PROCEDURE — 80053 COMPREHEN METABOLIC PANEL: CPT | Performed by: EMERGENCY MEDICINE

## 2019-08-21 PROCEDURE — 85730 THROMBOPLASTIN TIME PARTIAL: CPT | Performed by: EMERGENCY MEDICINE

## 2019-08-21 PROCEDURE — 85610 PROTHROMBIN TIME: CPT | Performed by: EMERGENCY MEDICINE

## 2019-08-21 PROCEDURE — 5A1D70Z PERFORMANCE OF URINARY FILTRATION, INTERMITTENT, LESS THAN 6 HOURS PER DAY: ICD-10-PCS | Performed by: INTERNAL MEDICINE

## 2019-08-21 PROCEDURE — 36415 COLL VENOUS BLD VENIPUNCTURE: CPT | Performed by: EMERGENCY MEDICINE

## 2019-08-21 PROCEDURE — 93970 EXTREMITY STUDY: CPT | Performed by: SURGERY

## 2019-08-21 PROCEDURE — 70450 CT HEAD/BRAIN W/O DYE: CPT

## 2019-08-21 PROCEDURE — 83735 ASSAY OF MAGNESIUM: CPT | Performed by: EMERGENCY MEDICINE

## 2019-08-21 RX ORDER — CINACALCET 30 MG/1
30 TABLET, FILM COATED ORAL
Status: ON HOLD | COMMUNITY
End: 2019-11-13 | Stop reason: ALTCHOICE

## 2019-08-21 RX ORDER — SEVELAMER HYDROCHLORIDE 800 MG/1
800 TABLET, FILM COATED ORAL
Status: DISCONTINUED | OUTPATIENT
Start: 2019-08-21 | End: 2019-08-27 | Stop reason: HOSPADM

## 2019-08-21 RX ORDER — MELATONIN
1000 DAILY
Status: DISCONTINUED | OUTPATIENT
Start: 2019-08-22 | End: 2019-08-27 | Stop reason: HOSPADM

## 2019-08-21 RX ORDER — ALLOPURINOL 100 MG/1
100 TABLET ORAL DAILY
Status: DISCONTINUED | OUTPATIENT
Start: 2019-08-22 | End: 2019-08-27 | Stop reason: HOSPADM

## 2019-08-21 RX ORDER — PROBENECID AND COLCHICINE 500; .5 MG/1; MG/1
1 TABLET ORAL DAILY
Status: DISCONTINUED | OUTPATIENT
Start: 2019-08-22 | End: 2019-08-21

## 2019-08-21 RX ORDER — CHOLECALCIFEROL (VITAMIN D3) 10 MCG
1 TABLET ORAL
Status: DISCONTINUED | OUTPATIENT
Start: 2019-08-21 | End: 2019-08-27 | Stop reason: HOSPADM

## 2019-08-21 RX ORDER — ATORVASTATIN CALCIUM 20 MG/1
20 TABLET, FILM COATED ORAL
Status: DISCONTINUED | OUTPATIENT
Start: 2019-08-21 | End: 2019-08-27 | Stop reason: HOSPADM

## 2019-08-21 RX ORDER — OXYCODONE HYDROCHLORIDE 5 MG/1
5 TABLET ORAL 3 TIMES DAILY PRN
Status: DISCONTINUED | OUTPATIENT
Start: 2019-08-21 | End: 2019-08-27 | Stop reason: HOSPADM

## 2019-08-21 RX ORDER — PANTOPRAZOLE SODIUM 40 MG/1
40 TABLET, DELAYED RELEASE ORAL
Status: DISCONTINUED | OUTPATIENT
Start: 2019-08-22 | End: 2019-08-27 | Stop reason: HOSPADM

## 2019-08-21 RX ORDER — GEMFIBROZIL 600 MG/1
600 TABLET, FILM COATED ORAL
Status: DISCONTINUED | OUTPATIENT
Start: 2019-08-21 | End: 2019-08-21

## 2019-08-21 RX ORDER — METOPROLOL TARTRATE 5 MG/5ML
5 INJECTION INTRAVENOUS EVERY 6 HOURS
Status: DISCONTINUED | OUTPATIENT
Start: 2019-08-21 | End: 2019-08-26

## 2019-08-21 RX ORDER — ALPRAZOLAM 0.5 MG/1
0.5 TABLET ORAL DAILY PRN
Status: DISCONTINUED | OUTPATIENT
Start: 2019-08-21 | End: 2019-08-27 | Stop reason: HOSPADM

## 2019-08-21 RX ORDER — CINACALCET 30 MG/1
30 TABLET, FILM COATED ORAL
Status: DISCONTINUED | OUTPATIENT
Start: 2019-08-21 | End: 2019-08-27 | Stop reason: HOSPADM

## 2019-08-21 RX ORDER — ESCITALOPRAM OXALATE 10 MG/1
10 TABLET ORAL
Status: DISCONTINUED | OUTPATIENT
Start: 2019-08-21 | End: 2019-08-27 | Stop reason: HOSPADM

## 2019-08-21 RX ADMIN — ESCITALOPRAM OXALATE 10 MG: 10 TABLET ORAL at 22:15

## 2019-08-21 RX ADMIN — METOPROLOL TARTRATE 5 MG: 5 INJECTION, SOLUTION INTRAVENOUS at 20:40

## 2019-08-21 RX ADMIN — METOPROLOL TARTRATE 12.5 MG: 25 TABLET ORAL at 22:15

## 2019-08-21 RX ADMIN — SEVELAMER HYDROCHLORIDE 800 MG: 800 TABLET, FILM COATED PARENTERAL at 20:40

## 2019-08-21 RX ADMIN — ATORVASTATIN CALCIUM 20 MG: 20 TABLET, FILM COATED ORAL at 22:15

## 2019-08-21 RX ADMIN — CALCIUM ACETATE 667 MG: 667 CAPSULE ORAL at 20:40

## 2019-08-21 RX ADMIN — GUAIFENESIN 400 MG: 100 SOLUTION ORAL at 20:39

## 2019-08-21 RX ADMIN — CINACALCET HYDROCHLORIDE 30 MG: 30 TABLET, FILM COATED ORAL at 20:40

## 2019-08-21 NOTE — CASE MANAGEMENT
I self referred the patient to discuss resources that might be available to him  He denied needing any help at this time  The patient lives with his wife in a one story home  He uses a cane  He does not receive meals on wheels or home health services at this time  He takes care of his own ADL's and he drives  He uses [facility] in [facility] and has no trouble getting or paying for his medicines  He has [employer] for [employer]  His wife spoke with his PCP yesterday but not today

## 2019-08-21 NOTE — ASSESSMENT & PLAN NOTE
Lab Results   Component Value Date    HGBA1C 6 1 05/07/2019       No results for input(s): POCGLU in the last 72 hours      Blood Sugar Average: Last 72 hrs:  · Controlled as evidenced by A1c of 6 1% in May of 2019  · Will obtain or updated A1c  · Fasting glucose today 151, will defer sliding scale for now  · Monitor on renal diet

## 2019-08-21 NOTE — PROGRESS NOTES
Called by dialysis nurse to evaluate for tachycardia, irregular heart rhythm  Appears to be in atrial fibrillation on the monitor  Pt is asymptomatic at this time  Will check EKG to confirm  Will also check TSH and echocardiogram  Add IV metoprolol for rate control  Unable to anticoagulate at this time due to [address]

## 2019-08-21 NOTE — EMTALA/ACUTE CARE TRANSFER
[address] [facility] EMERGENCY DEPARTMENT  [address] [facility] [address]  Dept: [address] TRANSFER CONSENT    NAME Yoko Walton                                         1950                              MRN [id number]    I have been informed of my rights regarding examination, treatment, and transfer   by Dr Eamon Downs DO    Benefits: Specialized equipment and/or services available at the receiving facility (Include comment)________________________    Risks: Potential for delay in receiving treatment      Consent for Transfer:  I acknowledge that my medical condition has been evaluated and explained to me by the emergency department physician or other qualified medical person and/or my attending physician, who has recommended that I be transferred to the service of  Accepting Physician: Dr Long Murphy  at [address] [facility] Name, [address] : [facility]   The above potential benefits of such transfer, the potential risks associated with such transfer, and the probable risks of not being transferred have been explained to me, and I fully understand them  The doctor has explained that, in my case, the benefits of transfer outweigh the risks  I agree to be transferred  I authorize the performance of emergency medical procedures and treatments upon me in both transit and upon arrival at the receiving facility  Additionally, I authorize the release of any and all medical records to the receiving facility and request they be transported with me, if possible  I understand that the safest mode of transportation during a medical emergency is an ambulance and that the Hospital advocates the use of this mode of transport  Risks of traveling to the receiving facility by car, including absence of medical control, life sustaining equipment, such as oxygen, and medical personnel has been explained to me and I fully understand them      (EMIR CORRECT BOX BELOW)  [  ]  I consent to the stated transfer and to be transported by ambulance/helicopter  [  ]  I consent to the stated transfer, but refuse transportation by ambulance and accept full responsibility for my transportation by car  I understand the risks of non-ambulance transfers and I exonerate the Hospital and its staff from any deterioration in my condition that results from this refusal     X___________________________________________    DATE  19  TIME________  Signature of patient or legally responsible individual signing on patient behalf           RELATIONSHIP TO PATIENT_________________________          Provider Certification    NAME Donavan Albarado                                         1950                              MRN [id number]    A medical screening exam was performed on the above named patient  Based on the examination:    Condition Necessitating Transfer The primary encounter diagnosis was Dyspnea  Diagnoses of Elevated troponin, Intracranial hemorrhage (HCC), 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 ([facility] [address] ), and End stage renal disease ([facility] [address] ) were also pertinent to this visit      Patient Condition: The patient has been stabilized such that within reasonable medical probability, no material deterioration of the patient condition or the condition of the unborn child(emre) is likely to result from the transfer    Reason for Transfer: Level of Care needed not available at this facility    Transfer Requirements: [address]    · Space available and qualified personnel available for treatment as acknowledged by    · Agreed to accept transfer and to provide appropriate medical treatment as acknowledged by       Dr Rufus Kirkland   · Appropriate medical records of the examination and treatment of the patient are provided at the time of transfer   [address] _______  · Transfer will be performed by qualified personnel from    and appropriate transfer equipment as required, including the use of necessary and appropriate life support measures  Provider Certification: I have examined the patient and explained the following risks and benefits of being transferred/refusing transfer to the patient/family:  General risk, such as traffic hazards, adverse weather conditions, rough terrain or turbulence, possible failure of equipment (including vehicle or aircraft), or consequences of actions of persons outside the control of the transport personnel      Based on these reasonable risks and benefits to the patient and/or the unborn child(emre), and based upon the information available at the time of the patients examination, I certify that the medical benefits reasonably to be expected from the provision of appropriate medical treatments at another medical facility outweigh the increasing risks, if any, to the individuals medical condition, and in the case of labor to the unborn child, from effecting the transfer      X____________________________________________ DATE 08/21/19        TIME_______      ORIGINAL - SEND TO MEDICAL RECORDS   COPY - SEND WITH PATIENT DURING TRANSFER

## 2019-08-21 NOTE — ASSESSMENT & PLAN NOTE
· Blood pressure systolic ranging [id number] Heart rate tachycardic, telemetry NSR with episode of Afib  · Denies CP, palpitations   · Continue with Lopressor 12 5 mg q 12 hours and IV lopressor per cardiology  · Cardiology consult in progress  · Echo pending

## 2019-08-21 NOTE — ED PROCEDURE NOTE
PROCEDURE  ECG 12 Lead Documentation Only  Date/Time: 8/21/2019 11:11 AM  Performed by: Priscilla Seo DO  Authorized by: Priscilla Seo DO     Indications / Diagnosis:  Short of breath   ECG reviewed by me, the ED Provider: yes    Patient location:  ED  Previous ECG:     Previous ECG comparison: Compared to EKG from June 5, 2017 nonspecific changes    Interpretation:     Interpretation: normal    Rate:     ECG rate:  113    ECG rate assessment: tachycardic    Rhythm:     Rhythm: sinus tachycardia           Priscilla Seo DO  08/21/19 1112

## 2019-08-21 NOTE — PLAN OF CARE
Problem: METABOLIC, FLUID AND ELECTROLYTES - ADULT  Goal: Electrolytes maintained within normal limits  Description  INTERVENTIONS:  - Monitor labs and assess patient for signs and symptoms of electrolyte imbalances  - Administer electrolyte replacement as ordered  - Monitor response to electrolyte replacements, including repeat lab results as appropriate  - Instruct patient on fluid and nutrition as appropriate  Outcome: Progressing  Goal: Fluid balance maintained  Description  INTERVENTIONS:  - Monitor labs   - Monitor I/O and WT  - Instruct patient on fluid and nutrition as appropriate  - Assess for signs & symptoms of volume excess or deficit  Outcome: Progressing  Goal: Glucose maintained within target range  Description  INTERVENTIONS:  - Monitor Blood Glucose as ordered  - Assess for signs and symptoms of hyperglycemia and hypoglycemia  - Administer ordered medications to maintain glucose within target range  - Assess nutritional intake and initiate nutrition service referral as needed  Outcome: Progressing  2 hour emergent dialysis planned with 2 liters fluid removal for optimal fluid and electrolyte balance

## 2019-08-21 NOTE — ED PROVIDER NOTES
History  Chief Complaint   Patient presents with    Shortness of Breath     Shortness of breath since the weekend, last dialysis Monday     71year-old male presents with shortness of breath since last Friday  Wife states that while at dialysis on Friday patient lost significant amount of blood after dialysis  She states that he has had a dry cough med short of breath all weekend  Patient did get dialysis on Monday was found to have a normal hemoglobin  She states that last night he began with more shortness of breath  Patient's oxygen saturation upon arrival he was 85% on room air  History provided by:  Patient  Shortness of Breath   Severity:  Severe  Onset quality:  Gradual  Timing:  Intermittent  Progression:  Waxing and waning  Chronicity:  New  Context: activity    Context: not animal exposure, not emotional upset and not fumes    Relieved by:  Nothing  Worsened by:  Nothing  Ineffective treatments:  None tried  Associated symptoms: cough    Associated symptoms: no abdominal pain, no chest pain, no claudication, no diaphoresis and no ear pain    Risk factors: no recent alcohol use        Prior to Admission Medications   Prescriptions Last Dose Informant Patient Reported? Taking? ALPRAZolam (XANAX) 0 5 mg tablet  Self Yes Yes   Sig: Take 0 5 mg by mouth daily as needed for anxiety     B Complex-C-Folic Acid (TRIPHROCAPS) 1 MG CAPS 8/21/2019 at Unknown time Self Yes Yes   Sig: Take 1 mg by mouth daily   Cholecalciferol (VITAMIN D-3 PO) 8/21/2019 at Unknown time Spouse/Significant Other Yes Yes   Sig: Take 1 capsule by mouth daily     allopurinol (ZYLOPRIM) 100 mg tablet 8/21/2019 at Unknown time  No Yes   Sig: Take 1 tablet (100 mg total) by mouth daily   ampicillin (PRINCIPEN) 250 mg capsule  Self Yes Yes   Sig: Take 250 mg by mouth every 12 (twelve) hours Current treatment for UTI  PRE PROCEDURE   atorvastatin (LIPITOR) 20 mg tablet 8/20/2019 at Unknown time Self No Yes   Sig: Take 1 tablet (20 mg total) by mouth daily   Patient taking differently: Take 20 mg by mouth daily at bedtime    calcium acetate (PHOSLO) 667 mg capsule 2019 at Unknown time Spouse/Significant Other Yes Yes   Sig: Take 667 mg by mouth 3 (three) times a day with meals    colchicine-probenecid 0 5-500 MG per tablet 2019 at Unknown time Self No Yes   Sig: Take 1 tablet by mouth daily   entecavir (BARACLUDE) 0 5 MG tablet Past Week at Unknown time Spouse/Significant Other No Yes   Sig: Take 1 tablet (0 5 mg total) by mouth daily for 90 days   Patient taking differently: Take 0 5 mg by mouth once a week     escitalopram (LEXAPRO) 10 mg tablet 2019 at Unknown time  No Yes   Sig: Take 1 tablet (10 mg total) by mouth daily   Patient taking differently: Take 10 mg by mouth daily at bedtime    fexofenadine (ALLEGRA) 30 MG tablet  Self Yes Yes   Sig: Take 30 mg by mouth daily as needed   gemfibrozil (LOPID) 600 mg tablet 2019 at Unknown time  No Yes   Sig: Take 1 tablet (600 mg total) by mouth 2 (two) times a day before meals   guaiFENesin 200 MG tablet 2019 at Unknown time Self Yes Yes   Sig: Take 400 mg by mouth 2 (two) times a day   metoprolol tartrate (LOPRESSOR) 25 mg tablet 2019 at Unknown time Spouse/Significant Other No Yes   Si/2 tab po BID every other day   Patient taking differently: 1/2 tab po BID every other day, Tuesday, Thursday, Saturday, and    omeprazole (PriLOSEC) 40 MG capsule 2019 at Unknown time Self Yes Yes   Sig: Take 20 mg by mouth daily at bedtime    oxyCODONE (ROXICODONE) 5 mg immediate release tablet   No Yes   Sig: Take 1 tablet (5 mg total) by mouth 3 (three) times a day as needed for moderate painMax Daily Amount: 15 mg   oxybutynin (DITROPAN-XL) 5 mg 24 hr tablet  Self No Yes   Sig: Take 1 tablet by mouth daily   Patient taking differently: Take 5 mg by mouth as needed     sevelamer carbonate (RENVELA) 800 mg tablet 2019 at Unknown time Self Yes Yes   Sig: Take 800 mg by mouth 3 (three) times a day with meals       Facility-Administered Medications: None       Past Medical History:   Diagnosis Date    AAA (abdominal aortic aneurysm) (HCC)     Abnormal liver function test     Anemia     Anxiety     Arthritis     Cancer (HCC)     bladder    Cardiac disorder     Chronic kidney disease     renal failure    Chronic pain disorder     back and legs    Depression     [facility] [address] [facility]     Dialysis patient [facility] [address] [facility]     Fracture of carpal bone     Fractures     spinal compression fx,closed fx of wrist & ankle    GERD (gastroesophageal reflux disease)     Gout     Hearing difficulty of both ears     Hepatitis A     A - remission since 1970's    Hyperlipidemia     Hyperlipidemia     Hypertension     Kidney stone     Migraine     Neuropathy     Nicotine dependence     Pancreatitis     Vitamin D deficiency     Wears glasses        Past Surgical History:   Procedure Laterality Date    BLADDER FULGURATION  06/17/2016    Cystoscopy with fulguration medium lesion (2-5 cm)    CATARACT EXTRACTION Bilateral     congenital    COLONOSCOPY      CYSTOSCOPY      Diagnostic 5/31/17, 10/10/17    CYSTOSCOPY N/A 6/14/2017    Procedure: CYSTOSCOPY WITH BLADDER  BIOPSIES WITH FULGURATION;  Surgeon: Cristian Banks MD;  [facility];  Service: Urology    CYSTOSCOPY  12/06/2018    CYSTOSCOPY W/ URETERAL STENT PLACEMENT Right 06/01/2016    CYSTOSCOPY W/ URETEROSCOPY  06/17/2016    With removal of calculus    CYSTOSTOMY W/ BLADDER BIOPSY      9/6/26, 2/6/17    DENTAL SURGERY      ESOPHAGOGASTRODUODENOSCOPY      EYE SURGERY      catatact    EYE SURGERY Bilateral     Left eye detachment R eye retinal tear    HERNIA REPAIR      HERNIA REPAIR      L inguinal w/mesh;umbilical herniorraphy    NJ ANASTOMOSIS,AV,ANY SITE Right 6/29/2016    Procedure: LOWER FOREARM AV FISTULA;  Surgeon: Tam Rizzo MD;  [facility];  Service: Vascular    [place] COLONOSCOPY FLX DX W/COLLJ SPEC WHEN PFRMD N/A 3/4/2016    Procedure: COLONOSCOPY;  Surgeon: Rossana Hanna MD;  [facility];  Service: Gastroenterology    WI CYSTO/URETERO W/LITHOTRIPSY &INDWELL STENT INSRT Right 6/17/2016    Procedure: CYSTOSCOPY URETEROSCOPY WITH LITHOTRIPSY HOLMIUM LASER,BASKET STONE EXTRACTION, RETROGRADE PYELOGRAM AND INSERTION STENT URETERAL;  Surgeon: Román Garcia MD;  [facility];  Service: Urology    WI CYSTOURETHROSCOPY N/A 7/13/2017    Procedure: Vickie Jones;  Surgeon: Cely Hogan MD;  [facility];  Service: Urology    WI CYSTOURETHROSCOPY W/IRRIG & EVAC CLOTS N/A 7/13/2017    Procedure: CYSTOSCOPY EVACUATION OF CLOTS,POSSIBLE FULGURATION;  Surgeon: Cely Hogan MD;  [facility];  Service: Urology    WI CYSTOURETHROSCOPY,BIOPSY N/A 8/15/2016    Procedure: CYSTOSCOPY WITH RE BIOPSY OF BLADDER;  Surgeon: Benito Giron MD;  [facility];  Service: Urology    WI CYSTOURETHROSCOPY,FULGUR 0 5-2 CM LESN N/A 2/6/2017    Procedure: TRANSURETHRAL RESECTION OF BLADDER TUMOR (TURBT); Surgeon: Cely Hogan MD;  [facility];  Service: Urology    WI CYSTOURETHROSCOPY,FULGUR 0 5-2 CM LESN N/A 6/14/2017    Procedure: TRANSURETHRAL RESECTION OF BLADDER TUMOR (TURBT); Surgeon: Cely Hogan MD;  [facility];  Service: Urology    WI CYSTOURETHROSCOPY,FULGUR <0 5 CM LESN N/A 2/6/2017    Procedure: Vickie Jones; BLADDER BIOPSY WITH Deatrice Bolds;  Surgeon: Cely Hogan MD;  [facility];  Service: Urology    WI CYSTOURETHROSCOPY,URETER CATHETER Right 6/1/2016    Procedure: CYSTOSCOPY RETROGRADE PYELOGRAM WITH INSERTION STENT URETERAL;  Surgeon: Román Garcia MD;  Location:  MAIN OR;  Service: Urology    WI EGD TRANSORAL BIOPSY SINGLE/MULTIPLE N/A 3/4/2016    Procedure: ESOPHAGOGASTRODUODENOSCOPY (EGD);   Surgeon: Rossana Hanna MD;  [facility];  Service: Gastroenterology  VASCULAR SURGERY Right     AV fistula       Family History   Problem Relation Age of Onset    Depression Mother     Diabetes Mother     Heart disease Mother     Hypertension Mother     Kidney disease Mother     Diabetes Father     Heart disease Father         Cardiac disorder    Hypertension Father     Stroke Father     Thyroid disease Sister     Cancer Maternal Grandmother      I have reviewed and agree with the history as documented  Social History     Tobacco Use    Smoking status: Former Smoker     Last attempt to quit:      Years since quittin 6    Smokeless tobacco: Never Used   Substance Use Topics    Alcohol use: No    Drug use: No        Review of Systems   Constitutional: Negative for diaphoresis  HENT: Negative for ear pain  Eyes: Negative  Respiratory: Positive for cough and shortness of breath  Cardiovascular: Negative for chest pain and claudication  Gastrointestinal: Negative for abdominal pain  Endocrine: Negative  Genitourinary: Negative  Musculoskeletal: Negative  Skin: Negative  Allergic/Immunologic: Negative  Neurological: Negative  Hematological: Negative  Psychiatric/Behavioral: Negative  Physical Exam  Physical Exam   Constitutional: He appears well-developed and well-nourished  HENT:   Head: Normocephalic  Mouth/Throat: Oropharynx is clear and moist    Eyes: Pupils are equal, round, and reactive to light  Neck: Normal range of motion  No tracheal deviation present  No thyromegaly present  Cardiovascular: Normal rate and normal heart sounds  Pulmonary/Chest: Effort normal  No respiratory distress  He has decreased breath sounds  He exhibits no mass and no tenderness  Abdominal: He exhibits no distension  There is no tenderness  Musculoskeletal:        Right lower leg: He exhibits edema  Neurological: He is alert  Skin: Capillary refill takes less than 2 seconds  No rash noted  No erythema     Vitals reviewed        Vital Signs  ED Triage Vitals   Temperature Pulse Respirations Blood Pressure SpO2   08/21/19 1056 08/21/19 1056 08/21/19 1056 08/21/19 1056 08/21/19 1056   98 9 °F (37 2 °C) (!) 121 (!) 24 140/87 (!) 85 %      Temp Source Heart Rate Source Patient Position - Orthostatic VS BP Location FiO2 (%)   08/21/19 1056 08/21/19 1135 08/21/19 1056 08/21/19 1056 --   Temporal Monitor Lying Left arm       Pain Score       08/21/19 1056       No Pain           Vitals:    08/21/19 1145 08/21/19 1200 08/21/19 1230 08/21/19 1306   BP: 142/79 143/85 132/83 138/88   Pulse: (!) 111 (!) 111 (!) 115 (!) 111   Patient Position - Orthostatic VS:             Visual Acuity  Visual Acuity      Most Recent Value   L Pupil Size (mm)  2   R Pupil Size (mm)  2          ED Medications  Medications - No data to display    Diagnostic Studies  Results Reviewed     Procedure Component Value Units Date/Time    Troponin I [[id number]]  (Abnormal) Collected:  08/21/19 1116    Lab Status:  Final result Specimen:  Blood from Arm, Left Updated:  08/21/19 1200     Troponin I 3 01 ng/mL     Comprehensive metabolic panel [[id number]]  (Abnormal) Collected:  08/21/19 1117    Lab Status:  Final result Specimen:  Blood from Arm, Left Updated:  08/21/19 1139     Sodium 139 mmol/L      Potassium 4 5 mmol/L      Chloride 93 mmol/L      CO2 29 mmol/L      ANION GAP 17 mmol/L      BUN 53 mg/dL      Creatinine 8 75 mg/dL      Glucose 218 mg/dL      Calcium 9 0 mg/dL      AST 33 U/L      ALT 18 U/L      Alkaline Phosphatase 93 U/L      Total Protein 8 3 g/dL      Albumin 2 5 g/dL      Total Bilirubin 1 40 mg/dL      eGFR 6 ml/min/1 73sq m     Narrative:       [facility] guidelines for Chronic Kidney Disease (CKD):     Stage 1 with normal or high GFR (GFR > 90 mL/min/1 73 square meters)    Stage 2 Mild CKD (GFR = 60-89 mL/min/1 73 square meters)    Stage 3A Moderate CKD (GFR = 45-59 mL/min/1 73 square meters)    Stage 3B Moderate CKD (GFR = 30-44 mL/min/1 73 square meters)    Stage 4 Severe CKD (GFR = 15-29 mL/min/1 73 square meters)    Stage 5 End Stage CKD (GFR <15 mL/min/1 73 square meters)  Note: GFR calculation is accurate only with a steady state creatinine    Protime-INR [[id number]]  (Abnormal) Collected:  08/21/19 1116    Lab Status:  Final result Specimen:  Blood from Arm, Left Updated:  08/21/19 1134     Protime 15 7 seconds      INR 1 24    APTT [[id number]]  (Normal) Collected:  08/21/19 1116    Lab Status:  Final result Specimen:  Blood from Arm, Left Updated:  08/21/19 1134     PTT 36 seconds     Magnesium [[id number]]  (Normal) Collected:  08/21/19 1117    Lab Status:  Final result Specimen:  Blood from Arm, Left Updated:  08/21/19 1133     Magnesium 1 9 mg/dL     CBC and differential [[id number]]  (Abnormal) Collected:  08/21/19 1116    Lab Status:  Final result Specimen:  Blood from Arm, Left Updated:  08/21/19 1123     WBC 13 36 Thousand/uL      RBC 3 36 Million/uL      Hemoglobin 11 3 g/dL      Hematocrit 34 9 %       fL      MCH 33 6 pg      MCHC 32 4 g/dL      RDW 14 6 %      MPV 10 5 fL      Platelets 818 Thousands/uL      nRBC 0 /100 WBCs      Neutrophils Relative 88 %      Immat GRANS % 1 %      Lymphocytes Relative 3 %      Monocytes Relative 8 %      Eosinophils Relative 0 %      Basophils Relative 0 %      Neutrophils Absolute 11 80 Thousands/µL      Immature Grans Absolute 0 09 Thousand/uL      Lymphocytes Absolute 0 39 Thousands/µL      Monocytes Absolute 1 07 Thousand/µL      Eosinophils Absolute 0 00 Thousand/µL      Basophils Absolute 0 01 Thousands/µL     UA w Reflex to Microscopic w Reflex to Culture [[id number]]     Lab Status:  No result Specimen:  Urine                  CT chest without contrast   Final Result by Kimberly Govea MD (08/21 1315)      1  Findings of volume overload with bilateral pulmonary edema and small layering effusions  2   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  Workstation performed: [id number]         CT head without contrast   Final Result by Kimberly Govea MD (08/21 1309)      New small foci of subarachnoid hemorrhage underlying the left frontal operculum and within the anterior-inferior of the interhemispheric fissure  New punctate focus of hyperdensity concerning for petechial hemorrhage in the subcortical right frontal lobe  Recommend further evaluation with CTA of the head  Note:  I personally discussed this study with Alex Valera on 8/21/2019 at 1:00 PM                   Workstation performed: [id number]         VAS lower limb venous duplex study, complete bilateral    (Results Pending)              Procedures  Procedures       ED Course         HEART Risk Score      Most Recent Value   History  1 Filed at: 08/21/2019 1111   ECG  1 Filed at: 08/21/2019 1111   Age  2 Filed at: 08/21/2019 1111   Risk Factors  1 Filed at: 08/21/2019 1111   Troponin  0 Filed at: 08/21/2019 1111   Heart Score Risk Calculator   History  1 Filed at: 08/21/2019 1111   ECG  1 Filed at: 08/21/2019 1111   Age  2 Filed at: 08/21/2019 1111   Risk Factors  1 Filed at: 08/21/2019 1111   Troponin  0 Filed at: 08/21/2019 1111   HEART Score  5 Filed at: 08/21/2019 1111   HEART Score  5 Filed at: 08/21/2019 1111                            MDM  Number of Diagnoses or Management Options  Dyspnea:   Elevated troponin:   Diagnosis management comments: Differential diagnosis 1  Volume overload 2 pneumonia 3 DVT 4 anemia 5 electrolyte imbalance 6 CVA    Patient's oxygen saturation low 90s on 2 L nasal cannula  Venous duplex negative bilaterally    12:03  Trop elevated   Confirmed with family patient has not had chest pain  Await CT results     [facility]     13:18  I spoke with Neurosurgery who states that there are no surgical intervention  Physical bleeding is related to dialysis and heparin during dialysis    Would like patient admitted to [facility]          Amount and/or Complexity of Data Reviewed  Clinical lab tests: ordered and reviewed  Tests in the radiology section of CPT®: ordered and reviewed  Tests in the medicine section of CPT®: reviewed and ordered    Risk of Complications, Morbidity, and/or Mortality  Presenting problems: high  Diagnostic procedures: high  Management options: high        Disposition  Final diagnoses:   Dyspnea   Elevated troponin   Intracranial hemorrhage (HCC)   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 ([facility] [address] )   End stage renal disease ([facility] [address] )     Time reflects when diagnosis was documented in both MDM as applicable and the Disposition within this note     Time User Action Codes Description Comment    8/21/2019 12:04 PM Сергей Whitt [R06 00] Dyspnea     8/21/2019 12:04 PM Сергей Whitt [R74 8] Elevated troponin     8/21/2019  1:12 PM Сергей Whitt [I62 9] Intracranial hemorrhage ([facility] [address] )     8/21/2019  1:29 PM Сергей Whitt [E11 22,  I12 9,  N18 4] 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

## 2019-08-21 NOTE — H&P
H&P- Presley Allred 1950, 71 y o  male MRN: [id number]    Unit/Bed#: [facility] 633-01 Encounter: [id number]    Primary Care Provider: Darrick Maldonado DO   Date and time admitted to hospital: 8/21/2019  4:24 PM        * Subarachnoid hemorrhage [facility])  Assessment & Plan  Neurologically intact at this time  Monitor neuro checks  Hold heparin/antiplatelets  Neurosurgery evaluation    Cerebral hemorrhage [facility])  Assessment & Plan  As above    Volume overload  Assessment & Plan  HD for volume management per nephrology    End stage renal disease ([facility] [address] )  Assessment & Plan  HD per nephrology  Continue nephrocaps, phoslo, renegel    Essential hypertension  Assessment & Plan  Off meds recently due to hypotension from dialysis    HLD (hyperlipidemia)  Assessment & Plan  Continue atovastatin    Type 2 diabetes mellitus with both eyes affected by moderate nonproliferative retinopathy without macular edema, without long-term current use of insulin (HCC)  Assessment & Plan  Lab Results   Component Value Date    HGBA1C 6 1 05/07/2019       No results for input(s): POCGLU in the last 72 hours  Blood Sugar Average: Last 72 hrs:  most recent A1c normal  Monitor on renal diet     Elevated troponin:  Assessment and plan:  Suspect due to volume overload, no no ST segment changes, will continue to trend  Avoid aspirin at this time secondary to subarachnoid and cerebral hemorrhage  History of Present Illness     HPI:  Presley Allred is a 71 y o  male who presents with shortness of breath  The patient was initially seen at the [facility] ED for shortness of breath  He has not had his dialysis today, last dialysis was 2 days ago  During his ER evaluation was found to be volume overloaded and with petechial hemorrhages subarachnoid intercerebral   He was transferred to [facility] for Neurosurgery evaluation  Currently reports that he his still short of breath and has cough with mild yellow sputum  Denies any fever, chills, sweats  Reports poor appetite recently  Denies any nausea, vomiting, abdominal pain, diarrhea  Review of Systems   All other systems reviewed and are negative        Historical Information   Past Medical History:   Diagnosis Date    AAA (abdominal aortic aneurysm) (HCC)     Abnormal liver function test     Anemia     Anxiety     Arthritis     Cancer (HCC)     bladder    Cardiac disorder     Chronic kidney disease     renal failure    Chronic pain disorder     back and legs    Depression     Diabetes mellitus ([facility] [address] )     Dialysis patient ([facility] [address] )     Fracture of carpal bone     Fractures     spinal compression fx,closed fx of wrist & ankle    GERD (gastroesophageal reflux disease)     Gout     Hearing difficulty of both ears     Hepatitis A     A - remission since 1970's    Hyperlipidemia     Hyperlipidemia     Hypertension     Kidney stone     Migraine     Neuropathy     Nicotine dependence     Pancreatitis     Vitamin D deficiency     Wears glasses      Past Surgical History:   Procedure Laterality Date    BLADDER FULGURATION  06/17/2016    Cystoscopy with fulguration medium lesion (2-5 cm)    CATARACT EXTRACTION Bilateral     congenital    COLONOSCOPY      CYSTOSCOPY      Diagnostic 5/31/17, 10/10/17    CYSTOSCOPY N/A 6/14/2017    Procedure: CYSTOSCOPY WITH BLADDER  BIOPSIES WITH FULGURATION;  Surgeon: Tricia Abebe MD;  [facility];  Service: Urology    CYSTOSCOPY  12/06/2018    CYSTOSCOPY W/ URETERAL STENT PLACEMENT Right 06/01/2016    CYSTOSCOPY W/ URETEROSCOPY  06/17/2016    With removal of calculus    CYSTOSTOMY W/ BLADDER BIOPSY      9/6/26, 2/6/17    DENTAL SURGERY      ESOPHAGOGASTRODUODENOSCOPY      EYE SURGERY      catatact    EYE SURGERY Bilateral     Left eye detachment R eye retinal tear    HERNIA REPAIR      HERNIA REPAIR      L inguinal w/mesh;umbilical herniorraphy    [place] ANASTOMOSIS,AV,ANY SITE Right 6/29/2016 Procedure: LOWER FOREARM AV FISTULA;  Surgeon: Daysi Hall MD;  [facility];  Service: Vascular    [place] COLONOSCOPY FLX DX W/COLLJ SPEC WHEN PFRMD N/A 3/4/2016    Procedure: COLONOSCOPY;  Surgeon: Tyrell Sánchez MD;  [facility];  Service: Gastroenterology    [place] CYSTO/URETERO W/LITHOTRIPSY &INDWELL STENT INSRT Right 6/17/2016    Procedure: CYSTOSCOPY URETEROSCOPY WITH LITHOTRIPSY HOLMIUM LASER,BASKET STONE EXTRACTION, RETROGRADE PYELOGRAM AND INSERTION STENT URETERAL;  Surgeon: Kylie Dixon MD;  [facility];  Service: Urology    [place] CYSTOURETHROSCOPY N/A 7/13/2017    Procedure: Jesus Deems;  Surgeon: Cathy Trimble MD;  [facility];  Service: Urology    [place] CYSTOURETHROSCOPY W/IRRIG & EVAC CLOTS N/A 7/13/2017    Procedure: CYSTOSCOPY EVACUATION OF CLOTS,POSSIBLE FULGURATION;  Surgeon: Cathy Trimble MD;  [facility];  Service: Urology    [place] CYSTOURETHROSCOPY,BIOPSY N/A 8/15/2016    Procedure: CYSTOSCOPY WITH RE BIOPSY OF BLADDER;  Surgeon: Bon Simon MD;  [facility];  Service: Urology    [place] CYSTOURETHROSCOPY,FULGUR 0 5-2 CM LESN N/A 2/6/2017    Procedure: TRANSURETHRAL RESECTION OF BLADDER TUMOR (TURBT); Surgeon: Cathy Trimble MD;  [facility];  Service: Urology    [place] CYSTOURETHROSCOPY,FULGUR 0 5-2 CM LESN N/A 6/14/2017    Procedure: TRANSURETHRAL RESECTION OF BLADDER TUMOR (TURBT);   Surgeon: Cathy Trimble MD;  [facility];  Service: Urology    [place] CYSTOURETHROSCOPY,FULGUR <0 5 CM LESN N/A 2/6/2017    Procedure: Tingley Delouisa; BLADDER BIOPSY WITH Aviva Beaver;  Surgeon: Cathy Trimble MD;  [facility];  Service: Urology    [place] CYSTOURETHROSCOPY,URETER CATHETER Right 6/1/2016    Procedure: CYSTOSCOPY RETROGRADE PYELOGRAM WITH INSERTION STENT URETERAL;  Surgeon: Kylie Dixon MD;  Location:  MAIN OR;  Service: Urology    [place] EGD TRANSORAL BIOPSY SINGLE/MULTIPLE N/A 3/4/2016    Procedure: ESOPHAGOGASTRODUODENOSCOPY (EGD); Surgeon: Chelsea Katz MD;  [facility];  Service: Gastroenterology    VASCULAR SURGERY Right     AV fistula     Social History   Social History     Substance and Sexual Activity   Alcohol Use No     Social History     Substance and Sexual Activity   Drug Use No     Social History     Tobacco Use   Smoking Status Former Smoker    Last attempt to quit: [facility] Years since quittin 6   Smokeless Tobacco Never Used     Family History: non-contributory    Meds/Allergies   all medications and allergies reviewed  Allergies   Allergen Reactions    Acetaminophen      Due to LFT elevation    Heparin      Avoids due to eye conditions;can't have any med that may cause bleeding    Lovenox [Enoxaparin Sodium]      Avoids due to eye conditions;can't have any med that may cause bleeding in eyes    Nsaids      Avoids due to renal failure    Cardura [Doxazosin] Palpitations       Objective   Vitals: There were no vitals taken for this visit  No intake or output data in the 24 hours ending 19 1711    Invasive Devices     Peripheral Intravenous Line            Peripheral IV 19 Left Antecubital less than 1 day                Physical Exam   Constitutional: He is oriented to person, place, and time  He appears well-developed and well-nourished  No distress  HENT:   Head: Normocephalic and atraumatic  Eyes: EOM are normal    Neck: Neck supple  Cardiovascular: Normal rate and regular rhythm  Pulmonary/Chest: He has no wheezes  He has rales  Abdominal: Soft  He exhibits no distension  There is no tenderness  Hepatomegaly present   Musculoskeletal: Normal range of motion  He exhibits no edema  Neurological: He is alert and oriented to person, place, and time  No facial asymmetry  Tongue is midline  Speech is fluent  No pronator drift     Skin: Skin is warm and dry  Lab Results: I have personally reviewed pertinent reports      Imaging: I have personally reviewed pertinent reports  EKG, Pathology, and Other Studies: I have personally reviewed pertinent reports  Code Status: Level 1 - Full Code  Advance Directive and Living Will: Yes    Power of :    POLST:      Counseling / Coordination of Care  Total floor / unit time spent today 45 minutes  Greater than 50% of total time was spent with the patient and / or family counseling and / or coordination of care  A description of the counseling / coordination of care:  Discussed plan of care with the patient and his wife was at the bedside

## 2019-08-21 NOTE — PROGRESS NOTES
Progress Note Braulio Whitman 1950, 71 y o  male MRN: [id number]    Unit/Bed#: [facility] 633-01 Encounter: [id number]    Primary Care Provider: Jessica Mederos DO   Date and time admitted to hospital: 8/21/2019  4:24 PM        * Subarachnoid hemorrhage [facility])  Assessment & Plan  Neurologically intact at this time  Monitor neuro checks  Hold heparin/antiplatelets  Neurosurgery evaluation    Cerebral hemorrhage [facility])  Assessment & Plan  As above    Volume overload  Assessment & Plan  HD for volume management per nephrology    End stage renal disease ([facility] [address] )  Assessment & Plan  HD per nephrology  Continue nephrocaps, phoslo, renegel    Essential hypertension  Assessment & Plan  Off meds recently due to hypotension from dialysis    HLD (hyperlipidemia)  Assessment & Plan  Continue atovastatin    Type 2 diabetes mellitus with both eyes affected by moderate nonproliferative retinopathy without macular edema, without long-term current use of insulin (HCC)  Assessment & Plan  Lab Results   Component Value Date    HGBA1C 6 1 05/07/2019       No results for input(s): POCGLU in the last 72 hours  Blood Sugar Average: Last 72 hrs:  most recent A1c normal  Monitor on renal diet     Elevated troponin:  Assessment and plan:  Suspect due to volume overload, no no ST segment changes, will continue to trend  Avoid aspirin at this time secondary to subarachnoid and cerebral hemorrhage  History of Present Illness     HPI:  Jeana Ayala is a 71 y o  male who presents with shortness of breath  The patient was initially seen at the [facility] ED for shortness of breath  He has not had his dialysis today, last dialysis was 2 days ago  During his ER evaluation was found to be volume overloaded and with petechial hemorrhages subarachnoid intercerebral   He was transferred to [facility] for Neurosurgery evaluation    Currently reports that he his still short of breath and has cough with mild yellow sputum  Denies any fever, chills, sweats  Reports poor appetite recently  Denies any nausea, vomiting, abdominal pain, diarrhea  Review of Systems   All other systems reviewed and are negative        Historical Information   Past Medical History:   Diagnosis Date    AAA (abdominal aortic aneurysm) (HCC)     Abnormal liver function test     Anemia     Anxiety     Arthritis     Cancer (HCC)     bladder    Cardiac disorder     Chronic kidney disease     renal failure    Chronic pain disorder     back and legs    Depression     Diabetes mellitus ([facility] [address] )     Dialysis patient ([facility] [address] )     Fracture of carpal bone     Fractures     spinal compression fx,closed fx of wrist & ankle    GERD (gastroesophageal reflux disease)     Gout     Hearing difficulty of both ears     Hepatitis A     A - remission since 1970's    Hyperlipidemia     Hyperlipidemia     Hypertension     Kidney stone     Migraine     Neuropathy     Nicotine dependence     Pancreatitis     Vitamin D deficiency     Wears glasses      Past Surgical History:   Procedure Laterality Date    BLADDER FULGURATION  06/17/2016    Cystoscopy with fulguration medium lesion (2-5 cm)    CATARACT EXTRACTION Bilateral     congenital    COLONOSCOPY      CYSTOSCOPY      Diagnostic 5/31/17, 10/10/17    CYSTOSCOPY N/A 6/14/2017    Procedure: CYSTOSCOPY WITH BLADDER  BIOPSIES WITH FULGURATION;  Surgeon: Yennifer Mills MD;  [facility];  Service: Urology    CYSTOSCOPY  12/06/2018    CYSTOSCOPY W/ URETERAL STENT PLACEMENT Right 06/01/2016    CYSTOSCOPY W/ URETEROSCOPY  06/17/2016    With removal of calculus    CYSTOSTOMY W/ BLADDER BIOPSY      9/6/26, 2/6/17    DENTAL SURGERY      ESOPHAGOGASTRODUODENOSCOPY      EYE SURGERY      catatact    EYE SURGERY Bilateral     Left eye detachment R eye retinal tear    HERNIA REPAIR      HERNIA REPAIR      L inguinal w/mesh;umbilical herniorraphy    [place] ANASTOMOSIS,AV,ANY SITE Right 6/29/2016    Procedure: LOWER FOREARM AV FISTULA;  Surgeon: Emery Mcadams MD;  [facility];  Service: Vascular    [place] COLONOSCOPY FLX DX W/COLLJ SPEC WHEN PFRMD N/A 3/4/2016    Procedure: COLONOSCOPY;  Surgeon: Deepthi Ramirez MD;  [facility];  Service: Gastroenterology    [place] CYSTO/URETERO W/LITHOTRIPSY &INDWELL STENT INSRT Right 6/17/2016    Procedure: CYSTOSCOPY URETEROSCOPY WITH LITHOTRIPSY HOLMIUM LASER,BASKET STONE EXTRACTION, RETROGRADE PYELOGRAM AND INSERTION STENT URETERAL;  Surgeon: Eula Redding MD;  [facility];  Service: Urology    [place] CYSTOURETHROSCOPY N/A 7/13/2017    Procedure: Garrett Henry;  Surgeon: Darian Cisneros MD;  [facility];  Service: Urology    MA CYSTOURETHROSCOPY W/IRRIG & EVAC CLOTS N/A 7/13/2017    Procedure: CYSTOSCOPY EVACUATION OF CLOTS,POSSIBLE FULGURATION;  Surgeon: Darian Cisneros MD;  [facility];  Service: Urology    [place] CYSTOURETHROSCOPY,BIOPSY N/A 8/15/2016    Procedure: CYSTOSCOPY WITH RE BIOPSY OF BLADDER;  Surgeon: Michell Vallecillo MD;  [facility];  Service: Urology    [place] CYSTOURETHROSCOPY,FULGUR 0 5-2 CM LESN N/A 2/6/2017    Procedure: TRANSURETHRAL RESECTION OF BLADDER TUMOR (TURBT); Surgeon: Darian Cisneros MD;  [facility];  Service: Urology    [place] CYSTOURETHROSCOPY,FULGUR 0 5-2 CM LESN N/A 6/14/2017    Procedure: TRANSURETHRAL RESECTION OF BLADDER TUMOR (TURBT);   Surgeon: Darian Cisneros MD;  [facility];  Service: Urology    MA CYSTOURETHROSCOPY,FULGUR <0 5 CM LESN N/A 2/6/2017    Procedure: Garrett Henry; BLADDER BIOPSY WITH Gene Anglin;  Surgeon: Darian Cisneros MD;  [facility];  Service: Urology    [place] CYSTOURETHROSCOPY,URETER CATHETER Right 6/1/2016    Procedure: CYSTOSCOPY RETROGRADE PYELOGRAM WITH INSERTION STENT URETERAL;  Surgeon: Eula Redding MD;  [facility];  Service: Urology    MA EGD TRANSORAL BIOPSY SINGLE/MULTIPLE N/A 3/4/2016 Procedure: ESOPHAGOGASTRODUODENOSCOPY (EGD); Surgeon: Harry Mckeon MD;  [facility];  Service: Gastroenterology    VASCULAR SURGERY Right     AV fistula     Social History   Social History     Substance and Sexual Activity   Alcohol Use No     Social History     Substance and Sexual Activity   Drug Use No     Social History     Tobacco Use   Smoking Status Former Smoker    Last attempt to quit: [facility] Years since quittin 6   Smokeless Tobacco Never Used     Family History: non-contributory    Meds/Allergies   all medications and allergies reviewed  Allergies   Allergen Reactions    Acetaminophen      Due to LFT elevation    Heparin      Avoids due to eye conditions;can't have any med that may cause bleeding    Lovenox [Enoxaparin Sodium]      Avoids due to eye conditions;can't have any med that may cause bleeding in eyes    Nsaids      Avoids due to renal failure    Cardura [Doxazosin] Palpitations       Objective   Vitals: There were no vitals taken for this visit  No intake or output data in the 24 hours ending 19 1711    Invasive Devices     Peripheral Intravenous Line            Peripheral IV 19 Left Antecubital less than 1 day                Physical Exam   Constitutional: He is oriented to person, place, and time  He appears well-developed and well-nourished  No distress  HENT:   Head: Normocephalic and atraumatic  Eyes: EOM are normal    Neck: Neck supple  Cardiovascular: Normal rate and regular rhythm  Pulmonary/Chest: He has no wheezes  He has rales  Abdominal: Soft  He exhibits no distension  There is no tenderness  Hepatomegaly present   Musculoskeletal: Normal range of motion  He exhibits no edema  Neurological: He is alert and oriented to person, place, and time  No facial asymmetry  Tongue is midline  Speech is fluent  No pronator drift     Skin: Skin is warm and dry  Lab Results: I have personally reviewed pertinent reports      Imaging: I have personally reviewed pertinent reports  EKG, Pathology, and Other Studies: I have personally reviewed pertinent reports  Code Status: Level 1 - Full Code  Advance Directive and Living Will: Yes    Power of :    POLST:      Counseling / Coordination of Care  Total floor / unit time spent today 45 minutes  Greater than 50% of total time was spent with the patient and / or family counseling and / or coordination of care  A description of the counseling / coordination of care:  Discussed plan of care with the patient and his wife was at the bedside

## 2019-08-21 NOTE — ASSESSMENT & PLAN NOTE
· CAT Scan of the head with new small foci of subarachnoid hemorrhage underlying left frontal operculum and within the anterior-inferiorclear of the interhemispheric fissure  · Patient stated he fell and hit the back of his head about a month ago  · Discussed with neurosurgery, consult appreciated  · Unclear if acute versus chronic  · No keppra  · Keep SBP <160  · Repeat CTH in 48 hours  · Neuro checks stable  · Continue to hold heparin and anti-platelet agents

## 2019-08-21 NOTE — ASSESSMENT & PLAN NOTE
· CT chest findings of volume overload with bilateral pulmonary edema and small layering effusions  · Management per HD/Nephrology  · S/p HD today

## 2019-08-21 NOTE — CONSULTS
Consultation - Nephrology   Sunny Hauser 71 y o  male MRN: [id number]  Unit/Bed#: [facility] 633-01 Encounter: [id number]    ASSESSMENT AND PLAN:  Patient is 59-year-old male with prior history of bladder cancer, status post surgery, diabetes, hypertension, ESRD on HD on MWF schedule, presented with worsening dyspnea and was found to have subarachnoid hemorrhage  We are consulted for dialysis management  ESRD on HD on MWF schedule at [facility]  -last HD on Monday  Due to pulmonary edema, respiratory failure, will do 2 hours HD today with UF removal 2 L as blood pressure tolerated  Will reassess again tomorrow for any need for dialysis  -outpatient dry weight 74 kg    Access, upper extremity AV fistula with good bruit and thrill present    CKD anemia, hemoglobin 11 3 overall at goal   No need for Epogen for now  CKD BMD, continue to monitor phosphorus and PTH  -check serum phosphorus in a m  Pulmonary edema/hypoxic respiratory failure  -currently requiring 3 L oxygen via nasal cannula at the time of my encounter  Will do UF removal on dialysis today   -salt restricted diet    Small subarachnoid hemorrhage, petechial hemorrhage in the subcortical right frontal lobe, neurosurgical evaluation pending   -currently mentating okay and answering all questions  Discussed above plan in detail with Dr Maikel Moore:  Requesting Physician: Charles Marquez MD  Reason for Consult:  ESRD    Sunny Hauser is a 71y o  year old male who was admitted to [facility] after presenting with worsening shortness of breath  A renal consultation is requested today for assistance in the management of ESRD  Old medical records were reviewed  Outpatient dialysis unit records and dialysis orders were reviewed  Patient is on HD on MWF schedule and had his last dialysis full treatment on Monday    He did not go for dialysis today due to not feeling well, worsening shortness of breath and presented to the emergency room at outside [facility] and eventually was transferred to [facility] for further evaluation as he was also found to have subarachnoid hemorrhage  Patient is mentating okay and oriented x3 and answering all questions at the time of my encounter  Patient's wife who is also ICU nurse present at bedside  Patient currently on 3 L oxygen via nasal cannula although does not use any oxygen at home  Denies any chest pain  Denies any nausea or vomiting  He has been having off and on coughing      PAST MEDICAL HISTORY:  Past Medical History:   Diagnosis Date    AAA (abdominal aortic aneurysm) (HCC)     Abnormal liver function test     Anemia     Anxiety     Arthritis     Cancer (HCC)     bladder    Cardiac disorder     Chronic kidney disease     renal failure    Chronic pain disorder     back and legs    Depression     Diabetes mellitus [facility]     Dialysis patient ([facility]     Fracture of carpal bone     Fractures     spinal compression fx,closed fx of wrist & ankle    GERD (gastroesophageal reflux disease)     Gout     Hearing difficulty of both ears     Hepatitis A     A - remission since 1970's    Hyperlipidemia     Hyperlipidemia     Hypertension     Kidney stone     Migraine     Neuropathy     Nicotine dependence     Pancreatitis     Vitamin D deficiency     Wears glasses        PAST SURGICAL HISTORY:  Past Surgical History:   Procedure Laterality Date    BLADDER FULGURATION  06/17/2016    Cystoscopy with fulguration medium lesion (2-5 cm)    CATARACT EXTRACTION Bilateral     congenital    COLONOSCOPY      CYSTOSCOPY      Diagnostic 5/31/17, 10/10/17    CYSTOSCOPY N/A 6/14/2017    Procedure: CYSTOSCOPY WITH BLADDER  BIOPSIES WITH FULGURATION;  Surgeon: Gita Valdez MD;  Location: [facility];  Service: Urology    CYSTOSCOPY  12/06/2018    CYSTOSCOPY W/ URETERAL STENT PLACEMENT Right 06/01/2016    CYSTOSCOPY W/ URETEROSCOPY  06/17/2016    With removal of calculus    CYSTOSTOMY W/ BLADDER BIOPSY      9/6/26, 2/6/17    DENTAL SURGERY      ESOPHAGOGASTRODUODENOSCOPY      EYE SURGERY      catatact    EYE SURGERY Bilateral     Left eye detachment R eye retinal tear    HERNIA REPAIR      HERNIA REPAIR      L inguinal w/mesh;umbilical herniorraphy    MN ANASTOMOSIS,AV,ANY SITE Right 6/29/2016    Procedure: LOWER FOREARM AV FISTULA;  Surgeon: Jenniffer Rollins MD;  [facility]: [facility];  Service: Vascular    MN COLONOSCOPY FLX DX W/COLLJ SPEC WHEN PFRMD N/A 3/4/2016    Procedure: COLONOSCOPY;  Surgeon: Keaton Kirkland MD;  [facility];  Service: Gastroenterology    MN CYSTO/URETERO W/LITHOTRIPSY &INDWELL STENT INSRT Right 6/17/2016    Procedure: CYSTOSCOPY URETEROSCOPY WITH LITHOTRIPSY HOLMIUM LASER,BASKET STONE EXTRACTION, RETROGRADE PYELOGRAM AND INSERTION STENT URETERAL;  Surgeon: Edna Guerrero MD;  Location:  MAIN OR;  Service: Urology    MN CYSTOURETHROSCOPY N/A 7/13/2017    Procedure: CYSTOSCOPY;  Surgeon: Destiny Bliss MD;  [facility];  Service: Urology    MN CYSTOURETHROSCOPY W/IRRIG & EVAC CLOTS N/A 7/13/2017    Procedure: CYSTOSCOPY EVACUATION OF CLOTS,POSSIBLE FULGURATION;  Surgeon: Destiny Bliss MD;  [facility];  Service: Urology    MN CYSTOURETHROSCOPY,BIOPSY N/A 8/15/2016    Procedure: CYSTOSCOPY WITH RE BIOPSY OF BLADDER;  Surgeon: Jerson Allred MD;  [facility];  Service: Urology    MN CYSTOURETHROSCOPY,FULGUR 0 5-2 CM LESN N/A 2/6/2017    Procedure: TRANSURETHRAL RESECTION OF BLADDER TUMOR (TURBT); Surgeon: Destiny Bliss MD;  [facility];  Service: Urology    MN CYSTOURETHROSCOPY,FULGUR 0 5-2 CM LESN N/A 6/14/2017    Procedure: TRANSURETHRAL RESECTION OF BLADDER TUMOR (TURBT);   Surgeon: Destiny Bliss MD;  [facility];  Service: Urology    MN CYSTOURETHROSCOPY,FULGUR <0 5 CM LESN N/A 2/6/2017    Procedure: Oscar Limber; BLADDER BIOPSY WITH Mara Yanez; Surgeon: Sheela Hugo MD;  [facility];  Service: Urology    IA CYSTOURETHROSCOPY,URETER CATHETER Right 2016    Procedure: CYSTOSCOPY RETROGRADE PYELOGRAM WITH INSERTION STENT URETERAL;  Surgeon: Eufemia Dunbar MD;  Location:  MAIN OR;  Service: Urology    IA EGD TRANSORAL BIOPSY SINGLE/MULTIPLE N/A 3/4/2016    Procedure: ESOPHAGOGASTRODUODENOSCOPY (EGD);   Surgeon: Chelsea Katz MD;  [facility];  Service: Gastroenterology    VASCULAR SURGERY Right     AV fistula       ALLERGIES:  Allergies   Allergen Reactions    Acetaminophen      Due to LFT elevation    Heparin      Avoids due to eye conditions;can't have any med that may cause bleeding    Lovenox [Enoxaparin Sodium]      Avoids due to eye conditions;can't have any med that may cause bleeding in eyes    Nsaids      Avoids due to renal failure    Cardura [Doxazosin] Palpitations       SOCIAL HISTORY:  Social History     Substance and Sexual Activity   Alcohol Use No     Social History     Substance and Sexual Activity   Drug Use No     Social History     Tobacco Use   Smoking Status Former Smoker    Last attempt to quit: Chris Burger Years since quittin 6   Smokeless Tobacco Never Used       FAMILY HISTORY:  Family History   Problem Relation Age of Onset    Depression Mother     Diabetes Mother     Heart disease Mother     Hypertension Mother     Kidney disease Mother     Diabetes Father     Heart disease Father         Cardiac disorder    Hypertension Father     Stroke Father     Thyroid disease Sister     Cancer Maternal Grandmother        MEDICATIONS:    Current Facility-Administered Medications:     [START ON 2019] allopurinol (ZYLOPRIM) tablet 100 mg, 100 mg, Oral, Daily, Yevgeniy Dean MD    ALPRAZolam Virgene Reeve) tablet 0 5 mg, 0 5 mg, Oral, Daily PRN, Yevgeniy Dean MD    atorvastatin (LIPITOR) tablet 20 mg, 20 mg, Oral, HS, Yevgeiny Dean MD    b complex-vitamin C-folic acid (NEPHROCAPS) capsule 1 capsule, 1 capsule, Oral, Daily With Cherylene Mae, MD    calcium acetate (PHOSLO) capsule 667 mg, 667 mg, Oral, TID With Meals, MD Stephany Greenberg  [START ON 8/22/2019] cholecalciferol (VITAMIN D3) tablet 1,000 Units, 1,000 Units, Oral, Daily, Barbara Nobles MD    cinacalcet (SENSIPAR) tablet 30 mg, 30 mg, Oral, Daily With Cherylene Mae, MD    [START ON 8/22/2019] colchicine-probenecid 0 5-500 MG per tablet 1 tablet, 1 tablet, Oral, Daily, Barbara Nobles MD    escitalopram (LEXAPRO) tablet 10 mg, 10 mg, Oral, HS, Barbara Nobles MD    gemfibrozil (LOPID) tablet 600 mg, 600 mg, Oral, BID AC, Barbara Nobles MD    guaiFENesin tablet 400 mg, 400 mg, Oral, BID, Barbara Nobles MD    metoprolol tartrate (LOPRESSOR) partial tablet 12 5 mg, 12 5 mg, Oral, Q12H [facility], Barbara Nobles MD    oxyCODONE (ROXICODONE) IR tablet 5 mg, 5 mg, Oral, TID PRN, MD Stephany Greenberg  [START ON 8/22/2019] pantoprazole (PROTONIX) EC tablet 40 mg, 40 mg, Oral, Early Morning, Barbara Nobles MD    sevelamer (RENAGEL) tablet 800 mg, 800 mg, Oral, TID With Meals, Barbara Nobles MD    REVIEW OF SYSTEMS:  Complete 10 point review of systems were obtained and discussed in length with the patient  Complete review of systems were negative / unremarkable except mentioned in the HPI section  PHYSICAL EXAM:  Current Weight:    First Weight:    There were no vitals filed for this visit    No intake or output data in the 24 hours ending 08/21/19 1650  Wt Readings from Last 3 Encounters:   08/21/19 79 4 kg (175 lb 0 7 oz)   07/23/19 75 6 kg (166 lb 9 6 oz)   05/23/19 75 1 kg (165 lb 9 6 oz)     Temp Readings from Last 3 Encounters:   08/21/19 98 9 °F (37 2 °C) (Temporal)   05/23/19 (!) 97 °F (36 1 °C) (Tympanic)   11/13/18 97 5 °F (36 4 °C) (Tympanic)     BP Readings from Last 3 Encounters:   08/21/19 139/88   07/23/19 134/92   05/23/19 147/81     Pulse Readings from Last 3 Encounters:   08/21/19 (!) 109   05/23/19 84 03/07/19 84        Physical Examination:  General:  Lying in bed, no acute distress  Eyes:  Mild conjunctival pallor present, sclerae anicteric  ENT:  External examination of ears and nose unremarkable  Neck:  Supple  Respiratory:  Bilateral inspiratory crackles present more on right side  CVS:  S1, S2 present  GI:  Soft, nontender, nondistended  CNS:  Active alert oriented x3  Extremities:  No significant edema in legs  Psych:  Conscious, coherent, oriented  Skin:  No new rash in legs    Invasive Devices:      Lab Results:   Results from last 7 days   Lab Units 08/21/19  1117 08/21/19  1116   WBC Thousand/uL  --  13 36*   HEMOGLOBIN g/dL  --  11 3*   HEMATOCRIT %  --  34 9*   PLATELETS Thousands/uL  --  361   POTASSIUM mmol/L 4 5  --    CHLORIDE mmol/L 93*  --    CO2 mmol/L 29  --    BUN mg/dL 53*  --    CREATININE mg/dL 8 75*  --    CALCIUM mg/dL 9 0  --    MAGNESIUM mg/dL 1 9  --        Other Studies:   No orders to display     CT scan of chest images personally reviewed which shows pulmonary congestion  Portions of the record may have been created with voice recognition software  Occasional wrong word or "sound a like" substitutions may have occurred due to the inherent limitations of voice recognition software  Read the chart carefully and recognize, using context, where substitutions have occurred

## 2019-08-22 ENCOUNTER — APPOINTMENT (INPATIENT)
Dept: DIALYSIS | Facility: HOSPITAL | Age: 69
DRG: 040 | End: 2019-08-22
Attending: INTERNAL MEDICINE
Payer: MEDICARE

## 2019-08-22 ENCOUNTER — APPOINTMENT (INPATIENT)
Dept: NON INVASIVE DIAGNOSTICS | Facility: HOSPITAL | Age: 69
DRG: 040 | End: 2019-08-22
Payer: MEDICARE

## 2019-08-22 LAB
ALBUMIN SERPL BCP-MCNC: 3 G/DL (ref 3.5–5)
ALBUMIN SERPL ELPH-MCNC: 3.62 G/DL (ref 3.5–5)
ALBUMIN SERPL ELPH-MCNC: 53.3 % (ref 52–65)
ALP SERPL-CCNC: 92 U/L (ref 46–116)
ALPHA1 GLOB SERPL ELPH-MCNC: 0.58 G/DL (ref 0.1–0.4)
ALPHA1 GLOB SERPL ELPH-MCNC: 8.6 % (ref 2.5–5)
ALPHA2 GLOB SERPL ELPH-MCNC: 1.26 G/DL (ref 0.4–1.2)
ALPHA2 GLOB SERPL ELPH-MCNC: 18.5 % (ref 7–13)
ALT SERPL W P-5'-P-CCNC: 56 U/L (ref 12–78)
ANION GAP SERPL CALCULATED.3IONS-SCNC: 13 MMOL/L (ref 4–13)
AST SERPL W P-5'-P-CCNC: 83 U/L (ref 5–45)
ATRIAL RATE: 113 BPM
ATRIAL RATE: 97 BPM
BASOPHILS # BLD AUTO: 0.01 THOUSANDS/ΜL (ref 0–0.1)
BASOPHILS NFR BLD AUTO: 0 % (ref 0–1)
BETA GLOB ABNORMAL SERPL ELPH-MCNC: 0.34 G/DL (ref 0.4–0.8)
BETA1 GLOB SERPL ELPH-MCNC: 5 % (ref 5–13)
BETA2 GLOB SERPL ELPH-MCNC: 6.5 % (ref 2–8)
BETA2+GAMMA GLOB SERPL ELPH-MCNC: 0.44 G/DL (ref 0.2–0.5)
BILIRUB DIRECT SERPL-MCNC: 0.68 MG/DL (ref 0–0.2)
BILIRUB SERPL-MCNC: 1.14 MG/DL (ref 0.2–1)
BUN SERPL-MCNC: 43 MG/DL (ref 5–25)
CALCIUM SERPL-MCNC: 9 MG/DL (ref 8.3–10.1)
CHLORIDE SERPL-SCNC: 94 MMOL/L (ref 100–108)
CO2 SERPL-SCNC: 27 MMOL/L (ref 21–32)
CREAT SERPL-MCNC: 6.76 MG/DL (ref 0.6–1.3)
EOSINOPHIL # BLD AUTO: 0.02 THOUSAND/ΜL (ref 0–0.61)
EOSINOPHIL NFR BLD AUTO: 0 % (ref 0–6)
ERYTHROCYTE [DISTWIDTH] IN BLOOD BY AUTOMATED COUNT: 14.6 % (ref 11.6–15.1)
GAMMA GLOB ABNORMAL SERPL ELPH-MCNC: 0.55 G/DL (ref 0.5–1.6)
GAMMA GLOB SERPL ELPH-MCNC: 8.1 % (ref 12–22)
GFR SERPL CREATININE-BSD FRML MDRD: 8 ML/MIN/1.73SQ M
GLUCOSE SERPL-MCNC: 151 MG/DL (ref 65–140)
HCT VFR BLD AUTO: 31.8 % (ref 36.5–49.3)
HGB BLD-MCNC: 10.5 G/DL (ref 12–17)
IGG/ALB SER: 1.14 {RATIO} (ref 1.1–1.8)
IMM GRANULOCYTES # BLD AUTO: 0.05 THOUSAND/UL (ref 0–0.2)
IMM GRANULOCYTES NFR BLD AUTO: 0 % (ref 0–2)
LYMPHOCYTES # BLD AUTO: 0.69 THOUSANDS/ΜL (ref 0.6–4.47)
LYMPHOCYTES NFR BLD AUTO: 6 % (ref 14–44)
MCH RBC QN AUTO: 33.3 PG (ref 26.8–34.3)
MCHC RBC AUTO-ENTMCNC: 33 G/DL (ref 31.4–37.4)
MCV RBC AUTO: 101 FL (ref 82–98)
MONOCYTES # BLD AUTO: 1.2 THOUSAND/ΜL (ref 0.17–1.22)
MONOCYTES NFR BLD AUTO: 10 % (ref 4–12)
NEUTROPHILS # BLD AUTO: 10.42 THOUSANDS/ΜL (ref 1.85–7.62)
NEUTS SEG NFR BLD AUTO: 84 % (ref 43–75)
NRBC BLD AUTO-RTO: 0 /100 WBCS
P AXIS: -24 DEGREES
P AXIS: 42 DEGREES
PHOSPHATE SERPL-MCNC: 4.7 MG/DL (ref 2.3–4.1)
PLATELET # BLD AUTO: 338 THOUSANDS/UL (ref 149–390)
PMV BLD AUTO: 10.9 FL (ref 8.9–12.7)
POTASSIUM SERPL-SCNC: 4.6 MMOL/L (ref 3.5–5.3)
PR INTERVAL: 172 MS
PR INTERVAL: 176 MS
PROCALCITONIN SERPL-MCNC: 1.38 NG/ML
PROT PATTERN SERPL ELPH-IMP: ABNORMAL
PROT SERPL-MCNC: 6.8 G/DL (ref 6.4–8.2)
PROT SERPL-MCNC: 7.6 G/DL (ref 6.4–8.2)
QRS AXIS: -10 DEGREES
QRS AXIS: -14 DEGREES
QRSD INTERVAL: 100 MS
QRSD INTERVAL: 88 MS
QT INTERVAL: 354 MS
QT INTERVAL: 384 MS
QTC INTERVAL: 485 MS
QTC INTERVAL: 487 MS
RBC # BLD AUTO: 3.15 MILLION/UL (ref 3.88–5.62)
SODIUM SERPL-SCNC: 134 MMOL/L (ref 136–145)
T WAVE AXIS: 38 DEGREES
T WAVE AXIS: 81 DEGREES
TROPONIN I SERPL-MCNC: 2.88 NG/ML
TROPONIN I SERPL-MCNC: 2.98 NG/ML
TROPONIN I SERPL-MCNC: 3.15 NG/ML
TSH SERPL DL<=0.05 MIU/L-ACNC: 0.97 UIU/ML (ref 0.36–3.74)
VENTRICULAR RATE: 113 BPM
VENTRICULAR RATE: 97 BPM
WBC # BLD AUTO: 12.39 THOUSAND/UL (ref 4.31–10.16)

## 2019-08-22 PROCEDURE — 99255 IP/OBS CONSLTJ NEW/EST HI 80: CPT | Performed by: NEUROLOGICAL SURGERY

## 2019-08-22 PROCEDURE — 84443 ASSAY THYROID STIM HORMONE: CPT | Performed by: INTERNAL MEDICINE

## 2019-08-22 PROCEDURE — 80048 BASIC METABOLIC PNL TOTAL CA: CPT | Performed by: INTERNAL MEDICINE

## 2019-08-22 PROCEDURE — 85025 COMPLETE CBC W/AUTO DIFF WBC: CPT | Performed by: INTERNAL MEDICINE

## 2019-08-22 PROCEDURE — 84165 PROTEIN E-PHORESIS SERUM: CPT | Performed by: PATHOLOGY

## 2019-08-22 PROCEDURE — 84100 ASSAY OF PHOSPHORUS: CPT | Performed by: INTERNAL MEDICINE

## 2019-08-22 PROCEDURE — 99255 IP/OBS CONSLTJ NEW/EST HI 80: CPT | Performed by: INTERNAL MEDICINE

## 2019-08-22 PROCEDURE — 93010 ELECTROCARDIOGRAM REPORT: CPT | Performed by: INTERNAL MEDICINE

## 2019-08-22 PROCEDURE — 84484 ASSAY OF TROPONIN QUANT: CPT | Performed by: PHYSICIAN ASSISTANT

## 2019-08-22 PROCEDURE — 80076 HEPATIC FUNCTION PANEL: CPT | Performed by: INTERNAL MEDICINE

## 2019-08-22 PROCEDURE — 93306 TTE W/DOPPLER COMPLETE: CPT

## 2019-08-22 PROCEDURE — 99233 SBSQ HOSP IP/OBS HIGH 50: CPT | Performed by: NURSE PRACTITIONER

## 2019-08-22 PROCEDURE — NC001 PR NO CHARGE: Performed by: INTERNAL MEDICINE

## 2019-08-22 PROCEDURE — 93306 TTE W/DOPPLER COMPLETE: CPT | Performed by: INTERNAL MEDICINE

## 2019-08-22 PROCEDURE — 90935 HEMODIALYSIS ONE EVALUATION: CPT | Performed by: INTERNAL MEDICINE

## 2019-08-22 PROCEDURE — 84165 PROTEIN E-PHORESIS SERUM: CPT | Performed by: INTERNAL MEDICINE

## 2019-08-22 PROCEDURE — 87040 BLOOD CULTURE FOR BACTERIA: CPT | Performed by: PHYSICIAN ASSISTANT

## 2019-08-22 PROCEDURE — 5A1D70Z PERFORMANCE OF URINARY FILTRATION, INTERMITTENT, LESS THAN 6 HOURS PER DAY: ICD-10-PCS | Performed by: INTERNAL MEDICINE

## 2019-08-22 PROCEDURE — 84145 PROCALCITONIN (PCT): CPT | Performed by: INTERNAL MEDICINE

## 2019-08-22 RX ADMIN — METOPROLOL TARTRATE 5 MG: 5 INJECTION, SOLUTION INTRAVENOUS at 00:26

## 2019-08-22 RX ADMIN — CINACALCET HYDROCHLORIDE 30 MG: 30 TABLET, FILM COATED ORAL at 16:29

## 2019-08-22 RX ADMIN — SEVELAMER HYDROCHLORIDE 800 MG: 800 TABLET, FILM COATED PARENTERAL at 16:28

## 2019-08-22 RX ADMIN — ESCITALOPRAM OXALATE 10 MG: 10 TABLET ORAL at 22:02

## 2019-08-22 RX ADMIN — PANTOPRAZOLE SODIUM 40 MG: 40 TABLET, DELAYED RELEASE ORAL at 06:32

## 2019-08-22 RX ADMIN — GUAIFENESIN 400 MG: 100 SOLUTION ORAL at 09:40

## 2019-08-22 RX ADMIN — GUAIFENESIN 400 MG: 100 SOLUTION ORAL at 17:00

## 2019-08-22 RX ADMIN — ATORVASTATIN CALCIUM 20 MG: 20 TABLET, FILM COATED ORAL at 22:01

## 2019-08-22 RX ADMIN — VITAMIN D, TAB 1000IU (100/BT) 1000 UNITS: 25 TAB at 09:41

## 2019-08-22 RX ADMIN — METOPROLOL TARTRATE 12.5 MG: 25 TABLET ORAL at 22:00

## 2019-08-22 RX ADMIN — METOPROLOL TARTRATE 5 MG: 5 INJECTION, SOLUTION INTRAVENOUS at 06:28

## 2019-08-22 RX ADMIN — ALLOPURINOL 100 MG: 100 TABLET ORAL at 09:42

## 2019-08-22 RX ADMIN — METOPROLOL TARTRATE 5 MG: 5 INJECTION, SOLUTION INTRAVENOUS at 20:59

## 2019-08-22 RX ADMIN — Medication 1 CAPSULE: at 16:29

## 2019-08-22 RX ADMIN — SEVELAMER HYDROCHLORIDE 800 MG: 800 TABLET, FILM COATED PARENTERAL at 09:42

## 2019-08-22 RX ADMIN — METOPROLOL TARTRATE 5 MG: 5 INJECTION, SOLUTION INTRAVENOUS at 14:31

## 2019-08-22 RX ADMIN — METOPROLOL TARTRATE 12.5 MG: 25 TABLET ORAL at 09:41

## 2019-08-22 RX ADMIN — CALCIUM ACETATE 667 MG: 667 CAPSULE ORAL at 09:42

## 2019-08-22 RX ADMIN — CALCIUM ACETATE 667 MG: 667 CAPSULE ORAL at 16:28

## 2019-08-22 NOTE — PLAN OF CARE
Problem: Potential for Falls  Goal: Patient will remain free of falls  Description  INTERVENTIONS:  - Assess patient frequently for physical needs  -  Identify cognitive and physical deficits and behaviors that affect risk of falls  -  [place] fall precautions as indicated by assessment   - Educate patient/family on patient safety including physical limitations  - Instruct patient to call for assistance with activity based on assessment  - Modify environment to reduce risk of injury  - Consider OT/PT consult to assist with strengthening/mobility  Outcome: Progressing     Problem: METABOLIC, FLUID AND ELECTROLYTES - ADULT  Goal: Electrolytes maintained within normal limits  Description  INTERVENTIONS:  - Monitor labs and assess patient for signs and symptoms of electrolyte imbalances  - Administer electrolyte replacement as ordered  - Monitor response to electrolyte replacements, including repeat lab results as appropriate  - Instruct patient on fluid and nutrition as appropriate  Outcome: Progressing  Goal: Fluid balance maintained  Description  INTERVENTIONS:  - Monitor labs   - Monitor I/O and WT  - Instruct patient on fluid and nutrition as appropriate  - Assess for signs & symptoms of volume excess or deficit  Outcome: Progressing  Goal: Glucose maintained within target range  Description  INTERVENTIONS:  - Monitor Blood Glucose as ordered  - Assess for signs and symptoms of hyperglycemia and hypoglycemia  - Administer ordered medications to maintain glucose within target range  - Assess nutritional intake and initiate nutrition service referral as needed  Outcome: Progressing     Problem: Nutrition/Hydration-ADULT  Goal: Nutrient/Hydration intake appropriate for improving, restoring or maintaining nutritional needs  Description  Monitor and assess patient's nutrition/hydration status for malnutrition  Collaborate with interdisciplinary team and initiate plan and interventions as ordered    Monitor patient's weight and dietary intake as ordered or per policy  Utilize nutrition screening tool and intervene as necessary  Determine patient's food preferences and provide high-protein, high-caloric foods as appropriate  INTERVENTIONS:  - Monitor oral intake, urinary output, labs, and treatment plans  - Assess nutrition and hydration status and recommend course of action  - Evaluate amount of meals eaten  - Assist patient with eating if necessary   - Allow adequate time for meals  - Recommend/ encourage appropriate diets, oral nutritional supplements, and vitamin/mineral supplements  - Order, calculate, and assess calorie counts as needed  - Recommend, monitor, and adjust tube feedings and TPN/PPN based on assessed needs  - Assess need for intravenous fluids  - Provide specific nutrition/hydration education as appropriate  - Include patient/family/caregiver in decisions related to nutrition  Outcome: Progressing     Problem: NEUROSENSORY - ADULT  Goal: Achieves stable or improved neurological status  Description  INTERVENTIONS  - Monitor and report changes in neurological status  - Monitor vital signs such as temperature, blood pressure, glucose, and any other labs ordered   - Initiate measures to prevent increased intracranial pressure  - Monitor for seizure activity and implement precautions if appropriate      Outcome: Progressing  Goal: Achieves maximal functionality and self care  Description  INTERVENTIONS  - Monitor swallowing and airway patency with patient fatigue and changes in neurological status  - Encourage and assist patient to increase activity and self care     - Encourage visually impaired, hearing impaired and aphasic patients to use assistive/communication devices  Outcome: Progressing     Problem: CARDIOVASCULAR - ADULT  Goal: Absence of cardiac dysrhythmias or at baseline rhythm  Description  INTERVENTIONS:  - Continuous cardiac monitoring, vital signs, obtain 12 lead EKG if ordered  - Administer antiarrhythmic and heart rate control medications as ordered  - Monitor electrolytes and administer replacement therapy as ordered  Outcome: Progressing     Problem: RESPIRATORY - ADULT  Goal: Achieves optimal ventilation and oxygenation  Description  INTERVENTIONS:  - Assess for changes in respiratory status  - Assess for changes in mentation and behavior  - Position to facilitate oxygenation and minimize respiratory effort  - Oxygen administered by appropriate delivery if ordered  - Initiate smoking cessation education as indicated  - Encourage broncho-pulmonary hygiene including cough, deep breathe, Incentive Spirometry  - Assess the need for suctioning and aspirate as needed  - Assess and instruct to report SOB or any respiratory difficulty  - Respiratory Therapy support as indicated  Outcome: Progressing

## 2019-08-22 NOTE — ASSESSMENT & PLAN NOTE
· Imaging reviewed personally with attending, results are as follows:  · CT head without contrast 08/21/2019:  New small foci of subarachnoid hemorrhage overlying the left frontal operculum and within the anterior inferior of the interhemispheric fissure  New punctate focus of hyperdensity concerning for petechial hemorrhage in the subcortical right frontal lobe  · Repeat CT head in 48 hours to assess stability  · Repeat CT head stat should GCS decline within 2 points in 1 hour  · Systolic blood pressure less than 160  · Hold all anticoagulation/antiplatelet therapy - recommend holding heparin during dialysis treatments  · Defer use of Keppra at this time given size of subarachnoid hemorrhage  · DVT prophylaxis:  SCDs, hold pharmacological DVT prophylaxis until stability scan in 48 hours  · Mobilize as tolerated with assistance, PT/OT evaluation  · Neurosurgery will follow as needed during hospitalization and review imaging once completed  Please call with questions or concerns, signed off

## 2019-08-22 NOTE — HEMODIALYSIS
Pt completed 3hr UF only treatment  2 5kg removed  Pt tolerated treatment well     Pre wt: 73kg  Post wt: 70 5kg

## 2019-08-22 NOTE — ASSESSMENT & PLAN NOTE
· Medical management per primary team  · Systolic blood pressure less than 160  · Currently on metoprolol

## 2019-08-22 NOTE — UTILIZATION REVIEW
Initial Clinical Review    Admission: Date/Time/Statement: Inpatient Admission Orders (From admission, onward)     Ordered        08/21/19 1643  Inpatient Admission  Once                   Orders Placed This Encounter   Procedures    Inpatient Admission     Standing Status:   Standing     Number of Occurrences:   1     Order Specific Question:   Admitting Physician     Answer:   Marion Rodriguez [[id number]]     Order Specific Question:   Level of Care     Answer:   Med Surg [16]     Order Specific Question:   Estimated length of stay     Answer:   More than 2 Midnights     Order Specific Question:   Certification     Answer:   I certify that inpatient services are medically necessary for this patient for a duration of greater than two midnights  See H&P and MD Progress Notes for additional information about the patient's course of treatment  Assessment/Plan: 71year old male transferred from [address] [place] [address] ED directly to inpatient floor at [facility] for subarachnoid hemorrhage, elevated troponin, CHF requiring neurosurgical evaluation  Initially went to [facility] ED for shortness of breath and found to be in volume overload and petechial hemorrhages subarachnoid intercerebral  Hypoxic upon arrival to ED at [facility] with a pulse ox of 85% on room air  Tolerating w2LNC with pulse ox 96%  Bibasilar rales  Nephrology consult for management of chronic KD/hemodialysis  GCS=15   Episode of tachy cardia on 8/21  Heart rate in 150s, appears to be Rapid afib  Give IV metoprolol as needed for rate  Critical care consult  Check TSH, ECHO  Unable to anticoagulate at this time due to [address]    Temperature Pulse Respirations Blood Pressure SpO2   08/21/19 1745 08/21/19 1745 08/21/19 1745 08/21/19 1745 08/21/19 2040   98 3 °F (36 8 °C) (!) 113 22 144/93 96 %      Temp Source Heart Rate Source Patient Position - Orthostatic VS BP Location FiO2 (%)   08/21/19 1745 08/21/19 1745 08/21/19 1745 08/21/19 1745 --   Tympanic Monitor Lying Left arm       Pain Score       08/21/19 2030       No Pain        Wt Readings from Last 1 Encounters:   08/22/19 78 kg (171 lb 15 3 oz)     Additional Vital Signs:   Date/Time Temp Pulse Resp BP MAP (mmHg) SpO2 O2 Device Patient Position - Orthostatic VS   08/22/19 1200  87 18 137/89  94 % Nasal cannula    08/22/19 1130  89 20 143/92       08/22/19 1100  90 20 136/83       08/22/19 1023  87 20 128/80       08/22/19 1015 98 2 °F (36 8 °C) 92 20 132/90    Lying   08/22/19 0737 97 3 °F (36 3 °C)Abnormal  90 18 141/88 106 94 %     08/22/19 06:26:24 98 1 °F (36 7 °C) 98  140/89 106 92 %     08/22/19 0626 98 1 °F (36 7 °C) 94 25Abnormal  140/89 106 93 %     08/22/19 03:15:57 97 5 °F (36 4 °C) 93 17 138/88 105 95 %     08/22/19 0025  102  155/101Abnormal    94 %     08/21/19 1930  128Abnormal  22 164/97    Lying   08/21/19 1900  133Abnormal   22 131/87    Lying   08/21/19 1847  117Abnormal   22 153/98    Lying   08/21/19 1844  133Abnormal   22 140/95    Lying   08/21/19 1830  100 22 141/95    Lying   08/21/19 1755  111Abnormal  22 132/94    Lying   08/21/19 1745 98 3 °F (36 8 °C) 113Abnormal  22 144/9       Neurological/Vitals     Row Name  08/22/19  1200    Level of Consciousness  Alert/awake    Orientation Level  Oriented X4    Cognition  Appropriate  judgement; Foll-  ows commands    Extraocular Movements  Full    Right Upper Visual Fields  Intact    Left Upper Visual Fields  Intact    Right Lower Visual Fields  Intact    Left Lower Visual Fields  Intact    Speech  Clear    Facial Symmetry  Symmetrical    Tongue Deviation  Midline    R Pupil Size (mm)  3    R Pupil Reaction  Brisk    L Pupil Size (mm)  3    L Pupil Reaction  Brisk    R Hand   Moderate    L Hand   Moderate    RUE Muscle Strength  5- Normal stre-  ngth    LUE Muscle Strength  5- Normal stre-  ngth    RLE Muscle Strength  5- Normal stre-  ngth    LLE Muscle Strength  5- Normal stre-  ngth    RUE Sensation  Full sensation    LUE Sensation  Full sensation    RLE Sensation  Full sensation    LLE Sensation  Full sensation    Headache      Pain Score  No Pain    Pain Location  Generalized    Dizziness      Eye Opening  4    Best Verbal Response  5    Best Motor Response  6    Stacey Coma Scale Score  15      Pertinent Labs/Diagnostic Test Results:   Vas lower limb 8/21: Impression:  RIGHT LOWER LIMB:  No evidence of acute or chronic deep vein thrombosis  No evidence of superficial thrombophlebitis noted  Doppler evaluation shows a normal response to augmentation maneuvers  Popliteal, posterior tibial and anterior tibial arterial Doppler waveforms are  triphasic  LEFT LOWER LIMB:  No evidence of acute or chronic deep vein thrombosis  No evidence of superficial thrombophlebitis noted  Doppler evaluation shows a normal response to augmentation maneuvers  Popliteal, posterior tibial and anterior tibial arterial Doppler waveforms are  triphasic  EKG 8/21:  Sinus tachycardia  Nonspecific ST and T wave abnormality  Abnormal ECG  ST now depressed in Lateral leads  Nonspecific T wave abnormality now evident in Lateral leads  CT chest 8/21 :  Findings of volume overload with bilateral pulmonary edema and small layering effusions  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  CT head 8/21: New small foci of subarachnoid hemorrhage underlying the left frontal operculum and within the anterior-inferior of the interhemispheric fissure    New punctate focus of hyperdensity concerning for petechial hemorrhage in the subcortical right frontal lobe     Results from last 7 days   Lab Units 08/22/19  0416 08/21/19  1116   WBC Thousand/uL 12 39* 13 36*   HEMOGLOBIN g/dL 10 5* 11 3*   HEMATOCRIT % 31 8* 34 9*   PLATELETS Thousands/uL 338 361   NEUTROS ABS Thousands/µL 10 42* 11 80*         Results from last 7 days   Lab Units 08/22/19  0416 08/21/19  1117   SODIUM mmol/L 134* 139   POTASSIUM mmol/L 4 6 4  5   CHLORIDE mmol/L 94* 93*   CO2 mmol/L 27 29   ANION GAP mmol/L 13 17*   BUN mg/dL 43* 53*   CREATININE mg/dL 6 76* 8 75*   EGFR ml/min/1 73sq m 8 6   CALCIUM mg/dL 9 0 9 0   MAGNESIUM mg/dL  --  1 9   PHOSPHORUS mg/dL 4 7*  --      Results from last 7 days   Lab Units 08/22/19  0416 08/21/19  1117   AST U/L 83* 33   ALT U/L 56 18   ALK PHOS U/L 92 93   TOTAL PROTEIN g/dL 7 6 8 3*   ALBUMIN g/dL 3 0* 2 5*   TOTAL BILIRUBIN mg/dL 1 14* 1 40*   BILIRUBIN DIRECT mg/dL 0 68*  --          Results from last 7 days   Lab Units 08/22/19  0416 08/21/19  1117   GLUCOSE RANDOM mg/dL 151* 218*         Results from last 7 days   Lab Units 08/22/19  0746 08/22/19  0416 08/22/19  0057 08/21/19  2131 08/21/19  1800 08/21/19  1420 08/21/19  1116   TROPONIN I ng/mL 2 88* 2 98* 3 15* 3 12* 2 51* 2 79* 3 01*         Results from last 7 days   Lab Units 08/21/19  1116   PROTIME seconds 15 7*   INR  1 24*   PTT seconds 36     Results from last 7 days   Lab Units 08/22/19  0416   TSH 3RD GENERATON uIU/mL 0 966     Results from last 7 days   Lab Units 08/22/19  0416   PROCALCITONIN ng/ml 1 38*         Past Medical History:   Diagnosis Date    AAA (abdominal aortic aneurysm) (HCC)     Abnormal liver function test     Anemia     Anxiety     Arthritis     Cancer (HCC)     bladder    Cardiac disorder     Chronic kidney disease     renal failure    Chronic pain disorder     back and legs    Depression     Diabetes mellitus [facility] [address] [facility]     Dialysis patient ([facility] [address] )     Fracture of carpal bone     Fractures     spinal compression fx,closed fx of wrist & ankle    GERD (gastroesophageal reflux disease)     Gout     Hearing difficulty of both ears     Hepatitis A     A - remission since 1970's    Hyperlipidemia     Hyperlipidemia     Hypertension     Kidney stone     Migraine     Neuropathy     Nicotine dependence     Pancreatitis     Vitamin D deficiency     Wears glasses          Admitting Diagnosis: Dyspnea [R06 00]  Age/Sex: 71 y o  male  Admission Orders:  SCDs  Tele  Neuro checks every 4 hours  CT head  Current Facility-Administered Medications:  allopurinol 100 mg Oral Daily   ALPRAZolam 0 5 mg Oral Daily PRN   atorvastatin 20 mg Oral HS   b complex-vitamin C-folic acid 1 capsule Oral Daily With Dinner   calcium acetate 667 mg Oral TID With Meals   cholecalciferol 1,000 Units Oral Daily   cinacalcet 30 mg Oral Daily With Dinner   escitalopram 10 mg Oral HS   guaiFENesin 400 mg Oral BID   metoprolol 5 mg Intravenous Q6H x3   metoprolol tartrate 12 5 mg Oral Q12H [address]   oxyCODONE 5 mg Oral TID PRN   pantoprazole 40 mg Oral Early Morning   sevelamer 800 mg Oral TID With Meals       IP CONSULT TO NEPHROLOGY  IP CONSULT TO CASE MANAGEMENT  IP CONSULT TO NEUROSURGERY  IP CONSULT TO CARDIOLOGY    Network Utilization Review Department  Phone: [phone]; Fax [phone]  [email or web address]  ATTENTION: Please call with any questions or concerns to [phone]  and carefully listen to the prompts so that you are directed to the right person  Send all requests for admission clinical reviews, approved or denied determinations and any other requests to fax [phone]   All voicemails are confidential

## 2019-08-22 NOTE — QUICK NOTE
QUICK NOTE - Deterioration Index  Jonathan Ferrell 71 y o  male MRN: [id number]  Unit/Bed#: [facility] 633-01 Encounter: [id number]    Date Paged: 19  Time Paged:   Room #: 896  Primary RN: Nnamdi Marquez  Arrival Time:   Deterioration index score at time of page: 60 9  %  Spoke with Vinay Ochoa PA-C from primary team  Need to escalate level of care: no     PROBLEMS resulting in high DI score:    24% Pulse is 153   19% Age is 71   18% Supplemental oxygen is nasal cannula     PLAN:     Patient recently received 5 mg IV lopressor and HR now 100 in NSR   PO lopressor initiated by primary team   Continue telemetry monitoring     Please call [phone] with any questions       Vitals:   Vitals:    19 1900 19 1930 19   BP: 131/87 164/97 142/81 (!) 172/103   BP Location: Left arm Left arm Left arm    Pulse: (!) 133 (!) 128 (!) 119 (!) 153   Resp:     Temp:   97 5 °F (36 4 °C)    TempSrc:   Tympanic    SpO2:    96%   Weight:       Height:           Respiratory:   SpO2: SpO2: 96 %, SpO2 Device: Nasal cannula, 2 L/min       Temperature: Temp (24hrs), Av 2 °F (36 8 °C), Min:97 5 °F (36 4 °C), Max:98 9 °F (37 2 °C)  Current: Temperature: 97 5 °F (36 4 °C)    Labs:   Results from last 7 days   Lab Units 19  1116   WBC Thousand/uL 13 36*   HEMOGLOBIN g/dL 11 3*   HEMATOCRIT % 34 9*   PLATELETS Thousands/uL 361   NEUTROS PCT % 88*   MONOS PCT % 8     Results from last 7 days   Lab Units 19  1117   SODIUM mmol/L 139   POTASSIUM mmol/L 4 5   CHLORIDE mmol/L 93*   CO2 mmol/L 29   BUN mg/dL 53*   CREATININE mg/dL 8 75*   CALCIUM mg/dL 9 0   ALK PHOS U/L 93   ALT U/L 18   AST U/L 33     Results from last 7 days   Lab Units 19  1117   MAGNESIUM mg/dL 1 9         Results from last 7 days   Lab Units 19  1800 19  1420 19  1116   TROPONIN I ng/mL 2 51* 2 79* 3 01*           Code Status: Level 1 - Full Code

## 2019-08-22 NOTE — PROGRESS NOTES
NEPHROLOGY PROGRESS NOTE   Geraldo Robledo 71 y o  male MRN: [id number]  Unit/Bed#: [facility] 633-01 Encounter: [id number]      ASSESSMENT/PLAN:  1  End-stage renal disease: On hemodialysis Monday, Wednesday and Friday at [facility]  · Patient only had 2 hours of HD yesterday and did leave a little below his dry weight at 73 7 kg  · Estimated dry weight 74 kg  · Will do 3 hour UF today with goal of 2 5-3 kg to try and help his breathing  · Will monitor his heart rate and blood pressure closely  · Access:  Right lower arm AV fistula  2  Anemia of CKD:  Hemoglobin is at goal and no need for Epogen  3  Mineral bone disease of CKD/secondary hyperparathyroidism:  Phosphorus 4 7 today   · On phoslo 1 tid with meals and renagel 1 tid with meals (Confirmed with outpatient HD orders)  · Continue sensipar 30mg daily   4  Pulmonary edema/hypoxic respiratory failure:  Patient remains on 3 L nasal cannula  · Extra UF today to try and improve respiratory function  · ECHO pending   5  Small subarachnoid hemorrhage:  Mental status currently stable  6  Hypertension:  Monitor blood pressure with UF on dialysis  7  Mild hyponatremia:  Will trend with volume removal on dialysis  Continue oral fluid restriction       SUBJECTIVE:  Patient complains that his breathing is still labored this morning  No current chest pain, nausea, vomiting or diarrhea      OBJECTIVE:  Current Weight: Weight - Scale: 79 4 kg (175 lb)  Vitals:    08/22/19 0737   BP: 141/88   Pulse: 90   Resp: 18   Temp: (!) 97 3 °F (36 3 °C)   SpO2: 94%       Intake/Output Summary (Last 24 hours) at 8/22/2019 1891  Last data filed at 8/22/2019 0600  Gross per 24 hour   Intake 680 ml   Output 2502 ml   Net -1822 ml       General:  No acute distress  Skin:  No rash  Eyes:  Sclerae anicteric  ENT:  Moist mucous membranes  Neck:  Supple, symmetric  Chest:  Bilateral wheezes and crackles   CVS:  Regular rate and rhythm  Abdomen:  Soft, nontender, nondistended  Extremities:  No edema   Neuro:  Awake and alert  Psych:  Appropriate affect      Medications:  Scheduled Meds:  Current Facility-Administered Medications:  allopurinol 100 mg Oral Daily Charles Marquez MD   ALPRAZolam 0 5 mg Oral Daily PRN Charles Marquez MD   atorvastatin 20 mg Oral HS Charles Marquez MD   b complex-vitamin C-folic acid 1 capsule Oral Daily With Radha Dumas MD   calcium acetate 667 mg Oral TID With Meals Charles Marquez MD   cholecalciferol 1,000 Units Oral Daily Charles Marquez MD   cinacalcet 30 mg Oral Daily With Radha Dumas MD   escitalopram 10 mg Oral HS Charles Marquez MD   guaiFENesin 400 mg Oral BID Charles Marquez MD   metoprolol 5 mg Intravenous Q6H Charles Marquez MD   metoprolol tartrate 12 5 mg Oral Q12H [address] Charles Marquez MD   oxyCODONE 5 mg Oral TID PRN Charles Marquez MD   pantoprazole 40 mg Oral Early Morning Charles Marquez MD   sevelamer 800 mg Oral TID With Meals Charles Marquez MD       PRN Meds:  ALPRAZolam    oxyCODONE    Continuous Infusions:     Laboratory Results:  Results from last 7 days   Lab Units 08/22/19  0416 08/21/19  1117 08/21/19  1116   WBC Thousand/uL 12 39*  --  13 36*   HEMOGLOBIN g/dL 10 5*  --  11 3*   HEMATOCRIT % 31 8*  --  34 9*   PLATELETS Thousands/uL 338  --  361   SODIUM mmol/L 134* 139  --    POTASSIUM mmol/L 4 6 4 5  --    CHLORIDE mmol/L 94* 93*  --    CO2 mmol/L 27 29  --    BUN mg/dL 43* 53*  --    CREATININE mg/dL 6 76* 8 75*  --    CALCIUM mg/dL 9 0 9 0  --    MAGNESIUM mg/dL  --  1 9  --    PHOSPHORUS mg/dL 4 7*  --   --           Radiology Results:   No orders to display

## 2019-08-22 NOTE — CONSULTS
[address] [place] [address] [address]   Jairo Curling 71 y o  male MRN: [id number]  Unit/Bed#: [facility] 633-01 Encounter: [id number]    Code Status: Level 1 - Full Code  POA:      Reason for Admission / Principal Problem:  Subarachnoid hemorrhage  Reason for Consult:  New onset atrial fibrillation    Impression:  Patient Active Problem List   Diagnosis    Acute pancreatitis    Essential hypertension    HLD (hyperlipidemia)    History of anemia due to chronic kidney disease    CKD (chronic kidney disease) stage 4, GFR 15-29 ml/min (HCC)    Diabetes mellitus type 2 in nonobese ([facility] )    End stage renal disease ([facility] )    Acute UTI    Malignant tumor of urinary bladder ([facility] )    Ascending aortic aneurysm ([facility] )    Depression with anxiety    Chronic viral hepatitis B without delta agent and without coma ([facility] )    Sprain of scapholunate ligament    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 ([facility] )    Type 2 diabetes mellitus with both eyes affected by moderate nonproliferative retinopathy without macular edema, without long-term current use of insulin (HCC)    Subarachnoid hemorrhage (HCC)    Cerebral hemorrhage (HCC)    Volume overload    Elevated troponin       A/P:  1  New onset paroxysmal atrial fibrillation  - patient reportedly experienced episode during hemodialysis last night at roughly 7:00 p m   - follow-up EKG performed at 9:09 p m  Revealed normal sinus rhythm  - telemetry monitoring revealed short episode of atrial fibrillation from initiation of monitoring around 8:55 p m  Followed by a short run of PACs  Episode ended at roughly 9:17 p m  At which point patient returned to normal sinus rhythm  - patient denies any chest pain or palpitations during this admission  - echocardiogram pending  - continue scheduled and p r n  Labetalol  - continue to monitor closely on telemetry    2   Non MI troponin elevation   - 3 01 -> 2 79 -> 2 51 -> 3 12 -> 3 15 -> 2 98 -> 2 88  - patient denies chest pain throughout this admission  - no significant ST segment or T-wave changes on EKG or telemetry monitoring  - troponins down trending x2, can discontinue serial monitoring    HPI: Maikol Gurrola is a 71 y o  male with past medical history significant for bladder cancer status post resection, type 2 diabetes mellitus, hypertension, hyperlipidemia, ESRD on HD on Monday Wednesday Friday schedule who presented initially 08/21/2019 to [address] [facility] with shortness of breath  Patient initially reported feeling short of breath with dry cough x1 day on arrival to the emergency department first noticed at hemodialysis following an episode of bleeding  CT chest significant for findings of volume overload with bilateral pulmonary edema and small layering effusion, stable 4 3 cm ascending aortic aneurysm  CT head significant for new small interhemispheric subarachnoid hemorrhage and new petechial hemorrhage in right frontal lobe  Labs at that time significant for elevated troponin of 3 01 and elevated white count with neutrophilic prominence  Initial EKG reveals sinus tachycardia  Transferred at that time to [facility] for neurosurgical evaluation  After arrival to [facility], patient was sent for dialysis given presentation of volume overload  During dialysis session, patient reportedly experienced episode of tachycardia into the 130s to 140s about 1 hour into treatment  Appeared to be in atrial fibrillation on monitor at that time  Patient was asymptomatic during this episode  Follow-up EKG performed yesterday following HD reveals normal sinus rhythm  TSH was checked following this episode, normal at 0 966  Echocardiogram performed, read pending  Troponins continued to be elevated into this morning, peak at midnight last night 3 15    Following this episode, patient has been placed on metoprolol p o  12 5 b i d  And metoprolol 5 mg injection Q 6  Patient seen and examined this morning  Vital signs stable, satting well on nasal cannula  Patient admits to shortness of breath and cough productive of scant clear/yellow sputum  Patient denies any chest pain or palpitations  Patient also denies any orthopnea, pedal edema, abdominal pain, nausea, vomiting, diarrhea, constipation, joint aches or pains  Previous echocardiogram performed 07/23/2015 revealed normal LV systolic function, ejection fraction estimated 55-60%, mild hypokinesis of basal mid inferior and basal inferior lateral walls, grade 1 diastolic dysfunction, and trace tricuspid regurg  Current weight 78 kg  Patient down 1 8 L in last 24 hours  Most recent serum creatinine 6 76, before hemodialysis today  Renal diet  History obtained from patient and chart review      PMH:  Past Medical History:   Diagnosis Date    AAA (abdominal aortic aneurysm) (HCC)     Abnormal liver function test     Anemia     Anxiety     Arthritis     Cancer (HCC)     bladder    Cardiac disorder     Chronic kidney disease     renal failure    Chronic pain disorder     back and legs    Depression     Diabetes mellitus ([facility] )     Dialysis patient ([facility] )     Fracture of carpal bone     Fractures     spinal compression fx,closed fx of wrist & ankle    GERD (gastroesophageal reflux disease)     Gout     Hearing difficulty of both ears     Hepatitis A     A - remission since 1970's    Hyperlipidemia     Hyperlipidemia     Hypertension     Kidney stone     Migraine     Neuropathy     Nicotine dependence     Pancreatitis     Vitamin D deficiency     Wears glasses         PSH:  Past Surgical History:   Procedure Laterality Date    BLADDER FULGURATION  06/17/2016    Cystoscopy with fulguration medium lesion (2-5 cm)    CATARACT EXTRACTION Bilateral     congenital    COLONOSCOPY      CYSTOSCOPY      Diagnostic 5/31/17, 10/10/17    CYSTOSCOPY N/A 6/14/2017    Procedure: CYSTOSCOPY WITH BLADDER  BIOPSIES WITH FULGURATION;  Surgeon: Cathy Trimble MD;  [facility];  Service: Urology    CYSTOSCOPY  12/06/2018    CYSTOSCOPY W/ URETERAL STENT PLACEMENT Right 06/01/2016    CYSTOSCOPY W/ URETEROSCOPY  06/17/2016    With removal of calculus    CYSTOSTOMY W/ BLADDER BIOPSY      9/6/26, 2/6/17    DENTAL SURGERY      ESOPHAGOGASTRODUODENOSCOPY      EYE SURGERY      catatact    EYE SURGERY Bilateral     Left eye detachment R eye retinal tear    HERNIA REPAIR      HERNIA REPAIR      L inguinal w/mesh;umbilical herniorraphy    LA ANASTOMOSIS,AV,ANY SITE Right 6/29/2016    Procedure: LOWER FOREARM AV FISTULA;  Surgeon: Daysi Hall MD;  [facility];  Service: Vascular    LA COLONOSCOPY FLX DX W/COLLJ SPEC WHEN PFRMD N/A 3/4/2016    Procedure: COLONOSCOPY;  Surgeon: Tyrell Sánchez MD;  [facility];  Service: Gastroenterology    LA CYSTO/URETERO W/LITHOTRIPSY &INDWELL STENT INSRT Right 6/17/2016    Procedure: CYSTOSCOPY URETEROSCOPY WITH LITHOTRIPSY HOLMIUM LASER,BASKET STONE EXTRACTION, RETROGRADE PYELOGRAM AND INSERTION STENT URETERAL;  Surgeon: Kylie Dixon MD;  Location:  MAIN OR;  Service: Urology    LA CYSTOURETHROSCOPY N/A 7/13/2017    Procedure: CYSTOSCOPY;  Surgeon: Cathy Trimble MD;  [facility];  Service: Urology    LA CYSTOURETHROSCOPY W/IRRIG & EVAC CLOTS N/A 7/13/2017    Procedure: CYSTOSCOPY EVACUATION OF CLOTS,POSSIBLE FULGURATION;  Surgeon: Cathy Trimble MD;  [facility];  Service: Urology    LA CYSTOURETHROSCOPY,BIOPSY N/A 8/15/2016    Procedure: CYSTOSCOPY WITH RE BIOPSY OF BLADDER;  Surgeon: Bon Simon MD;  [facility];  Service: Urology    LA CYSTOURETHROSCOPY,FULGUR 0 5-2 CM LESN N/A 2/6/2017    Procedure: TRANSURETHRAL RESECTION OF BLADDER TUMOR (TURBT);   Surgeon: Cathy Trimble MD;  [facility];  Service: Urology    LA CYSTOURETHROSCOPY,FULGUR 0 5-2 CM LESN N/A 2017    Procedure: TRANSURETHRAL RESECTION OF BLADDER TUMOR (TURBT); Surgeon: Carly Sánchez MD;  [facility];  Service: Urology    IL CYSTOURETHROSCOPY,FULGUR <0 5 CM LESN N/A 2017    Procedure: Quilla Deric; BLADDER BIOPSY WITH Mary Bowden;  Surgeon: Carly Sánchez MD;  [facility];  Service: Urology    IL CYSTOURETHROSCOPY,URETER CATHETER Right 2016    Procedure: CYSTOSCOPY RETROGRADE PYELOGRAM WITH INSERTION STENT URETERAL;  Surgeon: Cristobal Biswas MD;  [facility];  Service: Urology    IL EGD TRANSORAL BIOPSY SINGLE/MULTIPLE N/A 3/4/2016    Procedure: ESOPHAGOGASTRODUODENOSCOPY (EGD); Surgeon: Lisa Syed MD;  [facility];  Service: Gastroenterology    VASCULAR SURGERY Right     AV fistula       Allergies:    Allergies   Allergen Reactions    Acetaminophen      Due to LFT elevation    Heparin      Avoids due to eye conditions;can't have any med that may cause bleeding    Lovenox [Enoxaparin Sodium]      Avoids due to eye conditions;can't have any med that may cause bleeding in eyes    Nsaids      Avoids due to renal failure    Cardura [Doxazosin] Palpitations       Family History:   Family History   Problem Relation Age of Onset    Depression Mother     Diabetes Mother     Heart disease Mother     Hypertension Mother     Kidney disease Mother     Diabetes Father     Heart disease Father         Cardiac disorder    Hypertension Father     Stroke Father     Thyroid disease Sister     Cancer Maternal Grandmother        Social History:   Social History     Tobacco Use   Smoking Status Former Smoker    Last attempt to quit:     Years since quittin 6   Smokeless Tobacco Never Used      Social History     Substance and Sexual Activity   Alcohol Use Yes    Frequency: 2-4 times a month    Drinks per session: 1 or 2    Binge frequency: Never      Social History     Substance and Sexual Activity   Drug Use No        Home Medications:   Prior to Admission medications    Medication Sig Start Date End Date Taking?  Authorizing Provider   allopurinol (ZYLOPRIM) 100 mg tablet Take 1 tablet (100 mg total) by mouth daily 7/29/19  Yes Siomara Montgomery, DO   atorvastatin (LIPITOR) 20 mg tablet Take 1 tablet (20 mg total) by mouth daily  Patient taking differently: Take 20 mg by mouth daily at bedtime  9/10/18  Yes Siomara Montgomery DO   B Complex-C-Folic Acid (TRIPHROCAPS) 1 MG CAPS Take 1 mg by mouth daily   Yes Historical Provider, MD   calcium acetate (PHOSLO) 667 mg capsule Take 667 mg by mouth 3 (three) times a day with meals    Yes Historical Provider, MD   Cholecalciferol (VITAMIN D-3 PO) Take 1 capsule by mouth daily     Yes Historical Provider, MD   cinacalcet (SENSIPAR) 30 mg tablet Take 30 mg by mouth daily with dinner   Yes Historical Provider, MD   colchicine-probenecid 0 5-500 MG per tablet Take 1 tablet by mouth daily 11/8/18  Yes Siomara Montgomery, DO   escitalopram (LEXAPRO) 10 mg tablet Take 1 tablet (10 mg total) by mouth daily  Patient taking differently: Take 10 mg by mouth daily at bedtime  7/23/19  Yes Siomara Montgomery, DO   gemfibrozil (LOPID) 600 mg tablet Take 1 tablet (600 mg total) by mouth 2 (two) times a day before meals 7/23/19  Yes Siomara Montgomery, DO   guaiFENesin 200 MG tablet Take 400 mg by mouth 2 (two) times a day   Yes Historical Provider, MD   metoprolol tartrate (LOPRESSOR) 25 mg tablet 1/2 tab po BID every other day  Patient taking differently: 1/2 tab po BID every other day, Tuesday, Thursday, Saturday, and sunday 11/8/18  Yes Siomara Montgomery,    omeprazole (PriLOSEC) 40 MG capsule Take 20 mg by mouth daily at bedtime    Yes Historical Provider, MD   sevelamer carbonate (RENVELA) 800 mg tablet Take 800 mg by mouth 3 (three) times a day with meals  4/18/19  Yes Historical Provider, MD   ALPRAZolam Virgene Reeve) 0 5 mg tablet Take 0 5 mg by mouth daily as needed for anxiety  Historical Provider, MD   ampicillin (PRINCIPEN) 250 mg capsule Take 250 mg by mouth every 12 (twelve) hours Current treatment for UTI  PRE PROCEDURE    Historical Provider, MD   entecavir (BARACLUDE) 0 5 MG tablet Take 1 tablet (0 5 mg total) by mouth daily for 90 days  Patient taking differently: Take 0 5 mg by mouth once a week   11/1/18 8/21/19  Pebbles Borges PA-C   fexofenadine (ALLEGRA) 30 MG tablet Take 30 mg by mouth daily as needed    Historical Provider, MD   oxybutynin (DITROPAN-XL) 5 mg 24 hr tablet Take 1 tablet by mouth daily  Patient taking differently: Take 5 mg by mouth as needed   7/13/17   Cathy Trimble MD   oxyCODONE (ROXICODONE) 5 mg immediate release tablet Take 1 tablet (5 mg total) by mouth 3 (three) times a day as needed for moderate painMax Daily Amount: 15 mg 7/23/19   Gilberto Louise DO       ROS:   Review of Systems   Constitutional: Negative for chills, fatigue and fever  HENT: Negative for congestion, hearing loss, rhinorrhea, sore throat and voice change  Eyes: Negative for visual disturbance  Respiratory: Positive for cough and shortness of breath  Negative for wheezing  See HPI   Cardiovascular: Negative for chest pain, palpitations and leg swelling  Gastrointestinal: Negative for abdominal pain, constipation, diarrhea, nausea and vomiting  Genitourinary: Negative for dysuria, frequency and urgency  Musculoskeletal: Negative for arthralgias and myalgias  Skin: Negative for pallor, rash and wound  Neurological: Negative for dizziness, speech difficulty, weakness, light-headedness, numbness and headaches         Vitals:   Vitals:    08/22/19 0315 08/22/19 0626 08/22/19 0626 08/22/19 0737   BP: 138/88 140/89 140/89 141/88   Pulse: 93 94 98 90   Resp: 17 (!) 25  18   Temp: 97 5 °F (36 4 °C) 98 1 °F (36 7 °C) 98 1 °F (36 7 °C) (!) 97 3 °F (36 3 °C)   TempSrc:       SpO2: 95% 93% 92% 94%   Weight:       Height:         Temp  Min: 97 3 °F (36 3 °C)  Max: 98 9 °F (37 2 °C)  IBW: 70 7 kg  Body mass index is 25 84 kg/m²  PHYSICAL EXAM:  Physical Exam   Constitutional: He is oriented to person, place, and time  He appears well-developed and well-nourished  He appears distressed  HENT:   Head: Normocephalic and atraumatic  Mouth/Throat: No oropharyngeal exudate  Patient is mildly hard of hearing  Eyes: Pupils are equal, round, and reactive to light  Conjunctivae and EOM are normal  No scleral icterus  Neck: Neck supple  Cardiovascular: Normal rate, regular rhythm, normal heart sounds and intact distal pulses  Exam reveals no gallop and no friction rub  No murmur heard  Pulmonary/Chest: Effort normal  No stridor  No respiratory distress  He has no wheezes  He has rales (Mild wet crackles at the bases bilaterally)  Abdominal: Soft  Bowel sounds are normal  He exhibits no distension and no mass  There is no tenderness  There is no guarding  Musculoskeletal: He exhibits no edema, tenderness or deformity  Lymphadenopathy:     He has no cervical adenopathy  Neurological: He is alert and oriented to person, place, and time  Skin: Skin is warm and dry  Capillary refill takes less than 2 seconds  No rash noted  He is not diaphoretic  No pallor  Psychiatric: He has a normal mood and affect  His behavior is normal    Nursing note and vitals reviewed        Labs:   Results from last 7 days   Lab Units 08/22/19  0416 08/21/19  1116   WBC Thousand/uL 12 39* 13 36*   HEMOGLOBIN g/dL 10 5* 11 3*   HEMATOCRIT % 31 8* 34 9*   PLATELETS Thousands/uL 338 361   NEUTROS PCT % 84* 88*   MONOS PCT % 10 8     Results from last 7 days   Lab Units 08/22/19  0416 08/21/19  1117   POTASSIUM mmol/L 4 6 4 5   CHLORIDE mmol/L 94* 93*   CO2 mmol/L 27 29   BUN mg/dL 43* 53*   CREATININE mg/dL 6 76* 8 75*   CALCIUM mg/dL 9 0 9 0   ALK PHOS U/L 92 93   ALT U/L 56 18   AST U/L 83* 33     Results from last 7 days   Lab Units 08/21/19  1117   MAGNESIUM mg/dL 1 9 Lab Results   Component Value Date    PHOS 4 7 (H) 08/22/2019    PHOS 3 7 11/07/2016    PHOS 5 1 (H) 10/09/2016    PHOS 4 7 (H) 01/06/2016    PHOS 4 4 (H) 12/01/2015    PHOS 3 7 07/10/2014      Results from last 7 days   Lab Units 08/21/19  1116   INR  1 24*   PTT seconds 36     No results found for: TROPONINT  ABG:No results found for: PHART, RMD6VIS, PO2ART, NWZ0UUG, S9RJAMME, BEART, SOURCE    Imaging:  CT head 08/21/2019 revealing for small interhemispheric SAH and hyperdense punctate focus concerning for petechial hemorrhage in right frontal lobe  CT chest revealing for prominent central ground-glass opacities with apical emphysema, bilateral pulmonary edema with small effusions bilaterally, and stable ascending aortic aneurysm measuring 4 3 cm  I have personally reviewed pertinent films in PACS  EKG:  EKG performed 11:00 a m  [place] [facility] [place] revealed sinus tach without over evidence of ischemic changes  EKG performed at 9:00 p m  At bedtime [place] revealed normal sinus rhythm without evidence of overt ischemic changes  This was personally reviewed by myself  Micro:  Blood Culture:   Lab Results   Component Value Date    BLOODCX No Growth After 5 Days  06/01/2016    BLOODCX No Growth After 5 Days   06/01/2016     Urine Culture:   Lab Results   Component Value Date    URINECX 10,000-19,000 cfu/ml Enterococcus faecalis 06/26/2017    URINECX >100,000 cfu/ml Enterococcus faecalis 06/05/2017    URINECX  03/22/2017     >100,000 cfu/ml Alpha Hemolytic Streptococcus NOT Enterococcus     Sputum Culture: No components found for: SPUTUMCX  Wound Culure: No results found for: WOUNDCULT    VTE Pharmacologic Prophylaxis: Reason for no pharmacologic prophylaxis Not indicated in setting of [facility]  VTE Mechanical Prophylaxis: sequential compression device    Ernie Garcia DO  8/22/2019 8:57 AM

## 2019-08-22 NOTE — ASSESSMENT & PLAN NOTE
· Non-MI troponin elevation, suspect secondary to volume overload, with underlying history of end-stage renal disease on hemodialysis  · Noted trend down, patient without chest pain

## 2019-08-22 NOTE — PROGRESS NOTES
Progress Note Darius Zapata 1950, 71 y o  male MRN: [id number]    Unit/Bed#: [facility] 633-01 Encounter: [id number]    Primary Care Provider: Parker Beck DO   Date and time admitted to hospital: 8/21/2019  4:24 PM        * Subarachnoid hemorrhage ([facility] [address] )  Assessment & Plan  · CAT Scan of the head with new small foci of subarachnoid hemorrhage underlying left frontal operculum and within the anterior-inferiorclear of the interhemispheric fissure  · Patient stated he fell and hit the back of his head about a month ago  · Discussed with neurosurgery, consult appreciated  · Unclear if acute versus chronic  · No keppra  · Keep SBP <160  · Repeat CTH in 48 hours  · Neuro checks stable  · Continue to hold heparin and anti-platelet agents    Elevated troponin  Assessment & Plan  · Non-MI troponin elevation, suspect secondary to volume overload, with underlying history of end-stage renal disease on hemodialysis  · Noted trend down, patient without chest pain    Volume overload  Assessment & Plan  · CT chest findings of volume overload with bilateral pulmonary edema and small layering effusions  · Management per HD/Nephrology  · S/p HD today    Cerebral hemorrhage ([facility] [address] )  Assessment & Plan  As above    Type 2 diabetes mellitus with both eyes affected by moderate nonproliferative retinopathy without macular edema, without long-term current use of insulin ([facility])  Assessment & Plan  Lab Results   Component Value Date    HGBA1C 6 1 05/07/2019       No results for input(s): POCGLU in the last 72 hours      Blood Sugar Average: Last 72 hrs:  · Controlled as evidenced by A1c of 6 1% in May of 2019  · Will obtain or updated A1c  · Fasting glucose today 151, will defer sliding scale for now  · Monitor on renal diet    End stage renal disease (HCC)  Assessment & Plan  · HD per nephrology  · Continue nephrocaps, phoslo, renegel    HLD (hyperlipidemia)  Assessment & Plan  · Continue atovastatin    Essential hypertension  Assessment & Plan  · Blood pressure systolic ranging 088-230 Heart rate tachycardic, telemetry NSR with episode of Afib  · Denies CP, palpitations   · Continue with Lopressor 12 5 mg q 12 hours and IV lopressor per cardiology  · Cardiology consult in progress  · Echo pending      VTE Pharmacologic Prophylaxis:   Pharmacologic: Pharmacologic VTE Prophylaxis contraindicated due to subarachnoid bleed  Mechanical VTE Prophylaxis in Place: Yes    Patient Centered Rounds: I have performed bedside rounds with nursing staff today  Discussions with Specialists or Other Care Team Provider: [address] [place] [address]    Education and Discussions with Family / Patient: patient    Time Spent for Care: 30 minutes  More than 50% of total time spent on counseling and coordination of care as described above  Current Length of Stay: 1 day(s)    Current Patient Status: Inpatient   Certification Statement: The patient will continue to require additional inpatient hospital stay due to need for workup and close monitoring for subarachnoid bleed and mgmt of volume overload    Discharge Plan: not medically stable will likely need another 48-72 hours    Code Status: Level 1 - Full Code      Subjective:   Patient denies headache, CP, palpitations  No leg swelling  Did report a cough and ANGELES but no SOB a rest    Objective:     Vitals:   Temp (24hrs), Av 7 °F (36 5 °C), Min:96 6 °F (35 9 °C), Max:98 3 °F (36 8 °C)    Temp:  [96 6 °F (35 9 °C)-98 3 °F (36 8 °C)] 96 6 °F (35 9 °C)  HR:  [] 96  Resp:  [17-30] 18  BP: (128-172)/() 142/94  SpO2:  [92 %-99 %] 97 %  Body mass index is 25 39 kg/m²  Input and Output Summary (last 24 hours): Intake/Output Summary (Last 24 hours) at 2019 1441  Last data filed at 2019 1325  Gross per 24 hour   Intake 1720 ml   Output 5002 ml   Net -3282 ml       Physical Exam:     Physical Exam   Constitutional: He is oriented to person, place, and time  He appears well-developed   No distress  Chronically ill appearing   HENT:   Head: Normocephalic and atraumatic  Mouth/Throat: Oropharynx is clear and moist    Neck: Neck supple  Cardiovascular: Normal rate and regular rhythm  Pulmonary/Chest: Effort normal  No respiratory distress  He has decreased breath sounds  He has rales in the right lower field and the left lower field  Abdominal: Soft  Bowel sounds are normal  He exhibits no distension  There is no tenderness  Musculoskeletal: Normal range of motion  He exhibits no edema  Neurological: He is alert and oriented to person, place, and time  Skin: Skin is warm and dry  Psychiatric: He has a normal mood and affect  His behavior is normal    Vitals reviewed  Additional Data:     Labs:    Results from last 7 days   Lab Units 08/22/19  0416   WBC Thousand/uL 12 39*   HEMOGLOBIN g/dL 10 5*   HEMATOCRIT % 31 8*   PLATELETS Thousands/uL 338   NEUTROS PCT % 84*   LYMPHS PCT % 6*   MONOS PCT % 10   EOS PCT % 0     Results from last 7 days   Lab Units 08/22/19  0416   POTASSIUM mmol/L 4 6   CHLORIDE mmol/L 94*   CO2 mmol/L 27   BUN mg/dL 43*   CREATININE mg/dL 6 76*   CALCIUM mg/dL 9 0   ALK PHOS U/L 92   ALT U/L 56   AST U/L 83*     Results from last 7 days   Lab Units 08/21/19  1116   INR  1 24*       * I Have Reviewed All Lab Data Listed Above  * Additional Pertinent Lab Tests Reviewed:  [address] [facility] Admission Reviewed    Imaging:    Imaging Reports Reviewed Today Include: CT chest  Imaging Personally Reviewed by Myself Includes:  none    Recent Cultures (last 7 days):           Last 24 Hours Medication List:     Current Facility-Administered Medications:  allopurinol 100 mg Oral Daily Gary Marie MD   ALPRAZolam 0 5 mg Oral Daily PRN Gary Marie MD   atorvastatin 20 mg Oral HS Gary Marie MD   b complex-vitamin C-folic acid 1 capsule Oral Daily With Joanne Cullen MD   calcium acetate 667 mg Oral TID With Meals Gary Marie MD cholecalciferol 1,000 Units Oral Daily Good Woodard MD   cinacalcet 30 mg Oral Daily With Matthew Smith MD   escitalopram 10 mg Oral HS Good Woodard MD   guaiFENesin 400 mg Oral BID Good Woodard MD   metoprolol 5 mg Intravenous Q6H Good Woodard MD   metoprolol tartrate 12 5 mg Oral Q12H Delmar Clay MD   oxyCODONE 5 mg Oral TID PRN Good Woodard MD   pantoprazole 40 mg Oral Early Morning Good Woodard MD   sevelamer 800 mg Oral TID With Meals Good Woodard MD        Today, Patient Was Seen By: ESPERANZA Perez    ** Please Note: Dictation voice to text software may have been used in the creation of this document   **

## 2019-08-22 NOTE — PHYSICAL THERAPY NOTE
Physical Therapy Cancellation Note    PT orders received  Chart review completed  Patient off the floor at HD  Will continue to follow as appropriate      Delmar [place], PT, DPT

## 2019-08-22 NOTE — HEMODIALYSIS
Patient completed 2 hour HD using right lower arm AVF  No access issues, cannulated easily, able to achieve and maintain prescribed BFR  Pre treatment weight was 79 4 kg and post treatment weight was 73 7kg (pre treatment weight likely inaccurate)  UF goal set to remove 2L  Patient mildly tachycardic upon treatment intiation (ranging up to 115)  About an hour into treatment, HR went up and stayed mainly in the 130's - 140's; rhythm also irregular  Dr Ranjeet Sanchez notified  Primary medical doctor, Dr Niko Goodman also contacted  Dr Niko Goodman did come to bedside in HD to evaluate  Per Dr Niko Goodman, HD treatment continued maintaining ordered UF of 2L  Patient remained asymptomatic throughout treatment  Orders placed by Dr Niko Goodman to address HR/rhythm to be carried out when pt returns to the floor (per Dr Niko Goodman)

## 2019-08-22 NOTE — OCCUPATIONAL THERAPY NOTE
OCCUPATIONAL THERAPY CANCEL NOTE:    ORDERS RECEIVED  CHART REVIEW COMPLETED  PT OFF THE FLOOR AT - WILL CONT TO FOLLOW AS APPROPRIATE      Brian [place]hot[place], MOT, OTR/L

## 2019-08-22 NOTE — CONSULTS
ConsultDaelmer Mitch 1950, 71 y o  male MRN: [id number]    Unit/Bed#: [facility] [place] 633-01 Encounter: [id number]    Primary Care Provider: Adrienne Cerda DO   Date and time admitted to hospital: 8/21/2019  4:24 PM      Inpatient consult to Neurosurgery  Consult performed by: Aliya Cheek PA-C  Consult ordered by: Good Woodard MD      Consult completed on 8/22/2019 at 1430    * Subarachnoid hemorrhage [facility])  Assessment & Plan  · Imaging reviewed personally with attending, results are as follows:  · CT head without contrast 08/21/2019:  New small foci of subarachnoid hemorrhage overlying the left frontal operculum and within the anterior inferior of the interhemispheric fissure  New punctate focus of hyperdensity concerning for petechial hemorrhage in the subcortical right frontal lobe  · Repeat CT head in 48 hours to assess stability  · Repeat CT head stat should GCS decline within 2 points in 1 hour  · Systolic blood pressure less than 160  · Hold all anticoagulation/antiplatelet therapy - recommend holding heparin during dialysis treatments  · Defer use of Keppra at this time given size of subarachnoid hemorrhage  · DVT prophylaxis:  SCDs, hold pharmacological DVT prophylaxis until stability scan in 48 hours  · Mobilize as tolerated with assistance, PT/OT evaluation  · Neurosurgery will follow as needed during hospitalization and review imaging once completed  Please call with questions or concerns, signed off      Cerebral hemorrhage [facility])  Assessment & Plan  See plan above    End stage renal disease ([facility] [address] [facility] )  Assessment & Plan  · Recommend patient does not use heparin during dialysis sessions secondary to subarachnoid hemorrhage on CT head  · Nephrology following, appreciate input  · Patient on hemodialysis Monday Wednesday and Friday  · Did not fully complete dialysis session yesterday, will undergo dialysis today for 3 hours    Type 2 diabetes mellitus with both eyes affected by moderate nonproliferative retinopathy without macular edema, without long-term current use of insulin (HCC)  Assessment & Plan  Lab Results   Component Value Date    HGBA1C 6 1 05/07/2019       No results for input(s): POCGLU in the last 72 hours  Blood Sugar Average: Last 72 hrs:     · Management per primary team    HLD (hyperlipidemia)  Assessment & Plan  · Medical management per primary team  · Currently on Lipitor    Essential hypertension  Assessment & Plan  · Medical management per primary team  · Systolic blood pressure less than 160  · Currently on metoprolol    History of Present Illness     HPI: Ze Storm is a 71y o  year old male with PMH including ESRD on HD, hx of bladder cancer about 1 year ago with resection of mass and possible chemo / radiation, HTN, HLD, DM type II who presents as a transfer for evaluation of small SAH found on CTH  Per records patient was experiencing shortness of breath since last Friday that worsened on Monday  Wife reported while at dialysis on Friday he lost a significant amount of blood  He was brought to the emergency room at [facility]  CT head was obtained for presumed weakness? and found small subarachnoid hemorrhage and he was therefore transferred for neurosurgical evaluation  At this time patient denies headache, dizziness, change in vision or hearing, numbness/tingling/weakness, bowel or bladder issues, saddle anesthesia, neck or back pain  Patient has been on hemodialysis for about 3 or 4 years  He has a history of bladder cancer, diagnosed 1 year ago when they thought he had a kidney stone  He recalls getting chemo or radiation afterwards but is unsure of which  At baseline patient ambulates with a cane for about 1 year due to feeling unbalanced  Review of Systems   Constitutional: Negative for chills and fever  HENT: Negative for hearing loss and trouble swallowing  Eyes: Negative for visual disturbance     Respiratory: Positive for shortness of breath  Negative for chest tightness  Cardiovascular: Negative for chest pain  Gastrointestinal: Negative for abdominal pain, constipation, diarrhea, nausea and vomiting  Genitourinary: Negative for difficulty urinating  Musculoskeletal: Negative for back pain and neck pain  Skin: Negative for wound  Allergic/Immunologic: Negative for environmental allergies and food allergies  Neurological: Positive for light-headedness  Negative for dizziness, facial asymmetry, speech difficulty, weakness, numbness and headaches  Hematological: Does not bruise/bleed easily  Psychiatric/Behavioral: Negative for confusion         Historical Information   Past Medical History:   Diagnosis Date    AAA (abdominal aortic aneurysm) (HCC)     Abnormal liver function test     Anemia     Anxiety     Arthritis     Cancer (HCC)     bladder    Cardiac disorder     Chronic kidney disease     renal failure    Chronic pain disorder     back and legs    Depression     [facility] mellitus ([email or web address]     Dialysis patient ([facility] )     Fracture of carpal bone     Fractures     spinal compression fx,closed fx of wrist & ankle    GERD (gastroesophageal reflux disease)     Gout     Hearing difficulty of both ears     Hepatitis A     A - remission since 1970's    Hyperlipidemia     Hyperlipidemia     Hypertension     Kidney stone     Migraine     Neuropathy     Nicotine dependence     Pancreatitis     Vitamin D deficiency     Wears glasses      Past Surgical History:   Procedure Laterality Date    BLADDER FULGURATION  06/17/2016    Cystoscopy with fulguration medium lesion (2-5 cm)    CATARACT EXTRACTION Bilateral     congenital    COLONOSCOPY      CYSTOSCOPY      Diagnostic 5/31/17, 10/10/17    CYSTOSCOPY N/A 6/14/2017    Procedure: CYSTOSCOPY WITH BLADDER  BIOPSIES WITH FULGURATION;  Surgeon: Hans Espinoza MD;  Location: [facility];  Service: Urology    CYSTOSCOPY  12/06/2018    CYSTOSCOPY W/ URETERAL STENT PLACEMENT Right 06/01/2016    CYSTOSCOPY W/ URETEROSCOPY  06/17/2016    With removal of calculus    CYSTOSTOMY W/ BLADDER BIOPSY      9/6/26, 2/6/17    DENTAL SURGERY      ESOPHAGOGASTRODUODENOSCOPY      EYE SURGERY      catatact    EYE SURGERY Bilateral     Left eye detachment R eye retinal tear    HERNIA REPAIR      HERNIA REPAIR      L inguinal w/mesh;umbilical herniorraphy    WY ANASTOMOSIS,AV,ANY SITE Right 6/29/2016    Procedure: LOWER FOREARM AV FISTULA;  Surgeon: Adam Owusu MD;  [facility]: [facility];  Service: Vascular    WY COLONOSCOPY FLX DX W/COLLJ SPEC WHEN PFRMD N/A 3/4/2016    Procedure: COLONOSCOPY;  Surgeon: Ruby Rowell MD;  [facility];  Service: Gastroenterology    WY CYSTO/URETERO W/LITHOTRIPSY &INDWELL STENT INSRT Right 6/17/2016    Procedure: CYSTOSCOPY URETEROSCOPY WITH LITHOTRIPSY HOLMIUM LASER,BASKET STONE EXTRACTION, RETROGRADE PYELOGRAM AND INSERTION STENT URETERAL;  Surgeon: Tariq Nina MD;  Location:  MAIN OR;  Service: Urology    WY CYSTOURETHROSCOPY N/A 7/13/2017    Procedure: CYSTOSCOPY;  Surgeon: Milan Gallardo MD;  [facility];  Service: Urology    WY CYSTOURETHROSCOPY W/IRRIG & EVAC CLOTS N/A 7/13/2017    Procedure: CYSTOSCOPY EVACUATION OF CLOTS,POSSIBLE FULGURATION;  Surgeon: Milan Gallardo MD;  [facility];  Service: Urology    WY CYSTOURETHROSCOPY,BIOPSY N/A 8/15/2016    Procedure: CYSTOSCOPY WITH RE BIOPSY OF BLADDER;  Surgeon: Isael Hilton MD;  [facility];  Service: Urology    WY CYSTOURETHROSCOPY,FULGUR 0 5-2 CM LESN N/A 2/6/2017    Procedure: TRANSURETHRAL RESECTION OF BLADDER TUMOR (TURBT); Surgeon: Milan Gallardo MD;  [facility];  Service: Urology    WY CYSTOURETHROSCOPY,FULGUR 0 5-2 CM LESN N/A 6/14/2017    Procedure: TRANSURETHRAL RESECTION OF BLADDER TUMOR (TURBT);   Surgeon: Milan Gallardo MD;  [facility];  Service: Urology    WY CYSTOURETHROSCOPY,FULGUR <0 5 CM LESN N/A 2017    Procedure: Thurnell Livers; BLADDER BIOPSY WITH Ivett Live;  Surgeon: Jeffy Escamilla MD;  [facility];  Service: Urology    ME CYSTOURETHROSCOPY,URETER CATHETER Right 2016    Procedure: CYSTOSCOPY RETROGRADE PYELOGRAM WITH INSERTION STENT URETERAL;  Surgeon: Susie Cole MD;  Location:  MAIN OR;  Service: Urology    ME EGD TRANSORAL BIOPSY SINGLE/MULTIPLE N/A 3/4/2016    Procedure: ESOPHAGOGASTRODUODENOSCOPY (EGD);   Surgeon: Merlin Lulas, MD;  [facility]: [facility];  Service: Gastroenterology    VASCULAR SURGERY Right     AV fistula     Social History     Substance and Sexual Activity   Alcohol Use Yes    Frequency: 2-4 times a month    Drinks per session: 1 or 2    Binge frequency: Never     Social History     Substance and Sexual Activity   Drug Use No     Social History     Tobacco Use   Smoking Status Former Smoker    Last attempt to quit: Leona Huang Years since quittin 6   Smokeless Tobacco Never Used     Family History   Problem Relation Age of Onset    Depression Mother     Diabetes Mother     Heart disease Mother     Hypertension Mother     Kidney disease Mother     Diabetes Father     Heart disease Father         Cardiac disorder    Hypertension Father     Stroke Father     Thyroid disease Sister     Cancer Maternal Grandmother        Meds/Allergies   all current active meds have been reviewed, current meds:   Current Facility-Administered Medications   Medication Dose Route Frequency    allopurinol (ZYLOPRIM) tablet 100 mg  100 mg Oral Daily    ALPRAZolam (XANAX) tablet 0 5 mg  0 5 mg Oral Daily PRN    atorvastatin (LIPITOR) tablet 20 mg  20 mg Oral HS    b complex-vitamin C-folic acid (NEPHROCAPS) capsule 1 capsule  1 capsule Oral Daily With Dinner    calcium acetate (PHOSLO) capsule 667 mg  667 mg Oral TID With Meals    cholecalciferol (VITAMIN D3) tablet 1,000 Units  1,000 Units Oral Daily    cinacalcet (SENSIPAR) tablet 30 mg  30 mg Oral Daily With Dinner    escitalopram (LEXAPRO) tablet 10 mg  10 mg Oral HS    guaiFENesin (ROBITUSSIN) oral liquid 400 mg  400 mg Oral BID    metoprolol (LOPRESSOR) injection 5 mg  5 mg Intravenous Q6H    metoprolol tartrate (LOPRESSOR) partial tablet 12 5 mg  12 5 mg Oral Q12H DORY    oxyCODONE (ROXICODONE) IR tablet 5 mg  5 mg Oral TID PRN    pantoprazole (PROTONIX) EC tablet 40 mg  40 mg Oral Early Morning    sevelamer (RENAGEL) tablet 800 mg  800 mg Oral TID With Meals    and PTA meds:   Prior to Admission Medications   Prescriptions Last Dose Informant Patient Reported? Taking? ALPRAZolam (XANAX) 0 5 mg tablet More than a month at Unknown time Self Yes No   Sig: Take 0 5 mg by mouth daily as needed for anxiety     B Complex-C-Folic Acid (TRIPHROCAPS) 1 MG CAPS 8/21/2019 at Unknown time Self Yes Yes   Sig: Take 1 mg by mouth daily   Cholecalciferol (VITAMIN D-3 PO) 8/21/2019 at Unknown time Spouse/Significant Other Yes Yes   Sig: Take 1 capsule by mouth daily     allopurinol (ZYLOPRIM) 100 mg tablet 8/21/2019 at Unknown time  No Yes   Sig: Take 1 tablet (100 mg total) by mouth daily   ampicillin (PRINCIPEN) 250 mg capsule  Self Yes No   Sig: Take 250 mg by mouth every 12 (twelve) hours Current treatment for UTI  PRE PROCEDURE   atorvastatin (LIPITOR) 20 mg tablet 8/20/2019 at Unknown time Self No Yes   Sig: Take 1 tablet (20 mg total) by mouth daily   Patient taking differently: Take 20 mg by mouth daily at bedtime    calcium acetate (PHOSLO) 667 mg capsule 8/21/2019 at Unknown time Spouse/Significant Other Yes Yes   Sig: Take 667 mg by mouth 3 (three) times a day with meals    cinacalcet (SENSIPAR) 30 mg tablet 8/20/2019 at Unknown time  Yes Yes   Sig: Take 30 mg by mouth daily with dinner   colchicine-probenecid 0 5-500 MG per tablet 8/21/2019 at Unknown time Self No Yes   Sig: Take 1 tablet by mouth daily   entecavir (BARACLUDE) 0 5 MG tablet 8/19/2019 Spouse/Significant Other No No   Sig: Take 1 tablet (0 5 mg total) by mouth daily for 90 days   Patient taking differently: Take 0 5 mg by mouth once a week     escitalopram (LEXAPRO) 10 mg tablet 2019 at Unknown time  No Yes   Sig: Take 1 tablet (10 mg total) by mouth daily   Patient taking differently: Take 10 mg by mouth daily at bedtime    fexofenadine (ALLEGRA) 30 MG tablet  Self Yes No   Sig: Take 30 mg by mouth daily as needed   gemfibrozil (LOPID) 600 mg tablet 2019 at Unknown time  No Yes   Sig: Take 1 tablet (600 mg total) by mouth 2 (two) times a day before meals   guaiFENesin 200 MG tablet 2019 at Unknown time Self Yes Yes   Sig: Take 400 mg by mouth 2 (two) times a day   metoprolol tartrate (LOPRESSOR) 25 mg tablet 2019 at Unknown time Spouse/Significant Other No Yes   Si/2 tab po BID every other day   Patient taking differently: 1/2 tab po BID every other day, Tuesday, Thursday, Saturday, and    omeprazole (PriLOSEC) 40 MG capsule 2019 at Unknown time Self Yes Yes   Sig: Take 20 mg by mouth daily at bedtime    oxyCODONE (ROXICODONE) 5 mg immediate release tablet   No No   Sig: Take 1 tablet (5 mg total) by mouth 3 (three) times a day as needed for moderate painMax Daily Amount: 15 mg   oxybutynin (DITROPAN-XL) 5 mg 24 hr tablet  Self No No   Sig: Take 1 tablet by mouth daily   Patient taking differently: Take 5 mg by mouth as needed     sevelamer carbonate (RENVELA) 800 mg tablet 2019 at Unknown time Self Yes Yes   Sig: Take 800 mg by mouth 3 (three) times a day with meals       Facility-Administered Medications: None     Allergies   Allergen Reactions    Acetaminophen      Due to LFT elevation    Heparin      Avoids due to eye conditions;can't have any med that may cause bleeding    Lovenox [Enoxaparin Sodium]      Avoids due to eye conditions;can't have any med that may cause bleeding in eyes    Nsaids      Avoids due to renal failure    Cardura [Doxazosin] Palpitations       Objective   I/O       08/20 0701 - 08/21 0700 08/21 0701 - 08/22 0700 08/22 0701 - 08/23 0700    P  O   180     I V  (mL/kg)  500 (6 3) 200 (2 6)    Total Intake(mL/kg)  680 (8 6) 200 (2 6)    Other  2500     Stool  2     Total Output  2502     Net  -1822 +200                 Physical Exam   Constitutional: He is oriented to person, place, and time  He appears well-developed and well-nourished  He is cooperative  Nasal cannula in place  HENT:   Head: Normocephalic and atraumatic  Eyes: Pupils are equal, round, and reactive to light  Conjunctivae and EOM are normal    Neck: No spinous process tenderness and no muscular tenderness present  Cardiovascular: Normal rate  Pulmonary/Chest: Effort normal  No respiratory distress  Musculoskeletal: Normal range of motion  Cervical back: He exhibits no tenderness  Thoracic back: He exhibits no tenderness  Lumbar back: He exhibits no tenderness  Neurological: He is alert and oriented to person, place, and time  He has a normal Finger-Nose-Finger Test    Reflex Scores:       Bicep reflexes are 2+ on the right side and 2+ on the left side  Brachioradialis reflexes are 2+ on the right side and 2+ on the left side  Patellar reflexes are 2+ on the right side and 2+ on the left side  Achilles reflexes are 2+ on the right side and 2+ on the left side  Skin: Skin is warm, dry and intact  Psychiatric: He has a normal mood and affect  His speech is normal and behavior is normal  Judgment and thought content normal  Cognition and memory are normal      Neurologic Exam     Mental Status   Oriented to person, place, and time  Registration: recalls 3 of 3 objects  Recall at 5 minutes: recalls 2 of 3 objects  Follows 1 step commands  Attention: normal  Concentration: normal    Speech: speech is normal   Level of consciousness: alert  Knowledge: good  Able to perform simple calculations     Able to name object  Able to repeat  Normal comprehension  Cranial Nerves     CN II   Right visual field deficit: none  Left visual field deficit: none     CN III, IV, VI   Pupils are equal, round, and reactive to light  Extraocular motions are normal    CN III: no CN III palsy  CN VI: no CN VI palsy  Nystagmus: none   Diplopia: none  Ophthalmoparesis: none  Upgaze: normal  Downgaze: normal  Conjugate gaze: present    CN V   Right facial sensation deficit: none  Left facial sensation deficit: none    CN VII   Right facial weakness: none  Left facial weakness: none    CN VIII   Hearing: intact    CN IX, X   CN IX normal    CN X normal      CN XI   Right trapezius strength: normal  Left trapezius strength: normal    CN XII   CN XII normal      Motor Exam   Muscle bulk: normal  Overall muscle tone: normal  Right arm pronator drift: absent  Left arm pronator drift: absent    Strength   Strength 5/5 except as noted  RLE:  4+/5 DF  LLE:  4-/5 DF, does not fully dorsiflex foot upward     Sensory Exam   Light touch normal    Proprioception normal    DST intact     Gait, Coordination, and Reflexes     Coordination   Finger to nose coordination: normal    Tremor   Resting tremor: absent  Intention tremor: absent  Action tremor: absent    Reflexes   Right brachioradialis: 2+  Left brachioradialis: 2+  Right biceps: 2+  Left biceps: 2+  Right patellar: 2+  Left patellar: 2+  Right achilles: 2+  Left achilles: 2+  Right : 2+  Left : 2+  Right Quintana: absent  Left Quintana: absent  Right ankle clonus: absent  Left ankle clonus: absent      Vitals:Blood pressure 142/94, pulse 96, temperature (!) 96 6 °F (35 9 °C), temperature source Axillary, resp  rate 18, height 5' 9" (1 753 m), weight 78 kg (171 lb 15 3 oz), SpO2 97 %  ,Body mass index is 25 39 kg/m²       Lab Results:   Results from last 7 days   Lab Units 08/22/19  0416 08/21/19  1116   WBC Thousand/uL 12 39* 13 36*   HEMOGLOBIN g/dL 10 5* 11 3*   HEMATOCRIT % 31 8* 34 9* PLATELETS Thousands/uL 338 361   NEUTROS PCT % 84* 88*   MONOS PCT % 10 8     Results from last 7 days   Lab Units 08/22/19  0416 08/21/19  1117   POTASSIUM mmol/L 4 6 4 5   CHLORIDE mmol/L 94* 93*   CO2 mmol/L 27 29   BUN mg/dL 43* 53*   CREATININE mg/dL 6 76* 8 75*   CALCIUM mg/dL 9 0 9 0   ALK PHOS U/L 92 93   ALT U/L 56 18   AST U/L 83* 33     Results from last 7 days   Lab Units 08/21/19  1117   MAGNESIUM mg/dL 1 9     Results from last 7 days   Lab Units 08/22/19  0416   PHOSPHORUS mg/dL 4 7*     Results from last 7 days   Lab Units 08/21/19  1116   INR  1 24*   PTT seconds 36       Imaging Studies: I have personally reviewed pertinent reports  and I have personally reviewed pertinent films in PACS    Ct Head Without Contrast    Result Date: 8/21/2019  Impression: New small foci of subarachnoid hemorrhage underlying the left frontal operculum and within the anterior-inferior of the interhemispheric fissure  New punctate focus of hyperdensity concerning for petechial hemorrhage in the subcortical right frontal lobe  Recommend further evaluation with CTA of the head  Note:  I personally discussed this study with Anna Barnes on 8/21/2019 at 1:00 PM  Workstation performed: [id number]     Ct Chest Without Contrast    Result Date: 8/21/2019  Impression: 1  Findings of volume overload with bilateral pulmonary edema and small layering effusions  2   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  Workstation performed: [id number]     EKG, Pathology, and Other Studies: I have personally reviewed pertinent reports  VTE Prophylaxis: Sequential compression device (Venodyne)  and Reason for no pharmacologic prophylaxis - SAH    Code Status: Level 1 - Full Code  Advance Directive and Living Will: Yes    Power of :    POLST:      Counseling / Coordination of Care  I spent 30 minutes with the patient

## 2019-08-22 NOTE — ASSESSMENT & PLAN NOTE
Lab Results   Component Value Date    HGBA1C 6 1 05/07/2019       No results for input(s): POCGLU in the last 72 hours      Blood Sugar Average: Last 72 hrs:     · Management per primary team

## 2019-08-22 NOTE — ASSESSMENT & PLAN NOTE
· Recommend patient does not use heparin during dialysis sessions secondary to subarachnoid hemorrhage on CT head  · Nephrology following, appreciate input  · Patient on hemodialysis Monday Wednesday and Friday  · Did not fully complete dialysis session yesterday, will undergo dialysis today for 3 hours

## 2019-08-22 NOTE — CONSULTS
Cardiology   Michelle Rainey 71 y o  male MRN: [id number]  Unit/Bed#: [facility] 633-01 Encounter: [id number]      Reason for Consult / Principal Problem: Atrial Fibrillation     Physician Requesting Consult:  Ihsan Carrasco MD    Cardiologist: Dr Angelito Gomez        Assessment/Plan:    New onset Atrial Fibrillation with RVR  -Episode occurred last night 1 hour into dialysis  -Patient was no longer in Afib when ECG was performed following dialysis  -Episode was most likely related to other underlying medical conditions (ESRD, Cerebral hemorrhage, pulmonary edema)  -Continue 5 mg IV Metoprolol Q6 and 12 5 mg Metoprolol PO BID  -Hold anticoagulation due to subarachnoid hemorrhage   -Echo ordered today  -Continue telemetry  -Continue to monitor vitals    HTN:  -Continue Metoprolol PO ad IV  -Continue to monitor vitals    HLD:  -Continue Lipitor 20 mg daily    CC: SOB    HPI: Michelle Rainey 71y o  year old male with a past medical history of HLD, HTN, DM2, bladder cancer and ESRD on dialysis who presented to the [facility] yesterday with complaints of shortness of breath over the last week  In the ED he was found to be dyspneic, tachycardic () and volume overloaded with mild elevations in Tropin (3 01)  CT of his head showed petechial subarachnoid hemorrhages and he was transferred to [facility]  Last night, while undergoing dialysis, the patient had an episode of asymptomatic new onset A-Fib  He was given IV metoprolol and an ECG, Echo and TSH was ordered  Today, he feels well  His only complaint is continued dyspnea on exertion  He denies chest pain, palpitations, orthopnea, PND, pedal edema, syncope, presyncope, diaphoresis, nausea/vomiting  Remainder of ROS done and negative    Telemetry: Remained NSR over night  HR remained in the 90's    Net fluid balance: -1382    Weight: 78 kg    EKG:   Sinus tachycardia  Nonspecific ST and T wave abnormality  Abnormal ECG  When compared with ECG of 05-JUN-2017 17:55,  Vent  rate has increased BY  39 BPM  ST now depressed in Lateral leads  Nonspecific T wave abnormality now evident in Lateral leads  Confirmed by Rey Maldonado ([id number]) on 8/22/2019 9:17:51 AM    Normal sinus rhythm  Voltage criteria for left ventricular hypertrophy  Nonspecific ST and T wave abnormality  Prolonged QT  Abnormal ECG  When compared with ECG of 21-AUG-2019 11:08, (unconfirmed)  No significant change was found  Confirmed by Rhea Lozano ([id number]) on 8/22/2019 12:45:56 PM    ECHO: Completed today, awaiting results      Family History:   Historical Information   Past Medical History:   Diagnosis Date    AAA (abdominal aortic aneurysm) ([facility] )     Abnormal liver function test     Anemia     Anxiety     Arthritis     Cancer ([facility] )     bladder    Cardiac disorder     Chronic kidney disease     renal failure    Chronic pain disorder     back and legs    Depression     Diabetes mellitus ([facility] )     Dialysis patient ([facility] )     Fracture of carpal bone     Fractures     spinal compression fx,closed fx of wrist & ankle    GERD (gastroesophageal reflux disease)     Gout     Hearing difficulty of both ears     Hepatitis A     A - remission since 1970's    Hyperlipidemia     Hyperlipidemia     Hypertension     Kidney stone     Migraine     Neuropathy     Nicotine dependence     Pancreatitis     Vitamin D deficiency     Wears glasses      Past Surgical History:   Procedure Laterality Date    BLADDER FULGURATION  06/17/2016    Cystoscopy with fulguration medium lesion (2-5 cm)    CATARACT EXTRACTION Bilateral     congenital    COLONOSCOPY      CYSTOSCOPY      Diagnostic 5/31/17, 10/10/17    CYSTOSCOPY N/A 6/14/2017    Procedure: CYSTOSCOPY WITH BLADDER  BIOPSIES WITH FULGURATION;  Surgeon: Liya Pedroza MD;  Location[facility];  Service: Urology    CYSTOSCOPY  12/06/2018    CYSTOSCOPY W/ URETERAL STENT PLACEMENT Right 06/01/2016    CYSTOSCOPY W/ URETEROSCOPY  06/17/2016    With removal of calculus    CYSTOSTOMY W/ BLADDER BIOPSY      9/6/26, 2/6/17    DENTAL SURGERY      ESOPHAGOGASTRODUODENOSCOPY      EYE SURGERY      catatact    EYE SURGERY Bilateral     Left eye detachment R eye retinal tear    HERNIA REPAIR      HERNIA REPAIR      L inguinal w/mesh;umbilical herniorraphy    MD ANASTOMOSIS,AV,ANY SITE Right 6/29/2016    Procedure: LOWER FOREARM AV FISTULA;  Surgeon: Emery Mcadams MD;  [facility];  Service: Vascular    MD COLONOSCOPY FLX DX W/COLLJ SPEC WHEN PFRMD N/A 3/4/2016    Procedure: COLONOSCOPY;  Surgeon: Deepthi Ramirez MD;  [facility];  Service: Gastroenterology    MD CYSTO/URETERO W/LITHOTRIPSY &INDWELL STENT INSRT Right 6/17/2016    Procedure: CYSTOSCOPY URETEROSCOPY WITH LITHOTRIPSY HOLMIUM LASER,BASKET STONE EXTRACTION, RETROGRADE PYELOGRAM AND INSERTION STENT URETERAL;  Surgeon: Eula Redding MD;  [facility];  Service: Urology    MD CYSTOURETHROSCOPY N/A 7/13/2017    Procedure: CYSTOSCOPY;  Surgeon: Darian Cisneros MD;  [facility];  Service: Urology    MD CYSTOURETHROSCOPY W/IRRIG & EVAC CLOTS N/A 7/13/2017    Procedure: CYSTOSCOPY EVACUATION OF CLOTS,POSSIBLE FULGURATION;  Surgeon: Darian Cisneros MD;  [facility];  Service: Urology    MD CYSTOURETHROSCOPY,BIOPSY N/A 8/15/2016    Procedure: CYSTOSCOPY WITH RE BIOPSY OF BLADDER;  Surgeon: Michell Vallecillo MD;  [facility];  Service: Urology    MD CYSTOURETHROSCOPY,FULGUR 0 5-2 CM LESN N/A 2/6/2017    Procedure: TRANSURETHRAL RESECTION OF BLADDER TUMOR (TURBT); Surgeon: Darian Cisneros MD;  [facility];  Service: Urology    MD CYSTOURETHROSCOPY,FULGUR 0 5-2 CM LESN N/A 6/14/2017    Procedure: TRANSURETHRAL RESECTION OF BLADDER TUMOR (TURBT);   Surgeon: Darian Cisneros MD;  [facility];  Service: Urology    MD CYSTOURETHROSCOPY,FULGUR <0 5 CM LESN N/A 2/6/2017    Procedure: Garrett Henry; BLADDER BIOPSY WITH Gene Anglin; Surgeon: Jeremiah Mike MD;  [facility];  Service: Urology    IA CYSTOURETHROSCOPY,URETER CATHETER Right 2016    Procedure: CYSTOSCOPY RETROGRADE PYELOGRAM WITH INSERTION STENT URETERAL;  Surgeon: Duncan Page MD;  Location:  MAIN OR;  Service: Urology    IA EGD TRANSORAL BIOPSY SINGLE/MULTIPLE N/A 3/4/2016    Procedure: ESOPHAGOGASTRODUODENOSCOPY (EGD);   Surgeon: Laurel Vasques MD;  [facility];  Service: Gastroenterology    VASCULAR SURGERY Right     AV fistula     Social History   Social History     Substance and Sexual Activity   Alcohol Use Yes    Frequency: 2-4 times a month    Drinks per session: 1 or 2    Binge frequency: Never     Social History     Substance and Sexual Activity   Drug Use No     Social History     Tobacco Use   Smoking Status Former Smoker    Last attempt to quit: Carmel Juarez Years since quittin 6   Smokeless Tobacco Never Used     Family History:   Family History   Problem Relation Age of Onset    Depression Mother     Diabetes Mother     Heart disease Mother     Hypertension Mother     Kidney disease Mother     Diabetes Father     Heart disease Father         Cardiac disorder    Hypertension Father     Stroke Father     Thyroid disease Sister     Cancer Maternal Grandmother        Scheduled Meds:    Current Facility-Administered Medications:  allopurinol 100 mg Oral Daily Barbara Nobles MD   ALPRAZolam 0 5 mg Oral Daily PRN Barbara Nobles MD   atorvastatin 20 mg Oral HS Barbara Nobles MD   b complex-vitamin C-folic acid 1 capsule Oral Daily With Dinner Barbara Nobles MD   calcium acetate 667 mg Oral TID With Meals Barbara Nobles MD   cholecalciferol 1,000 Units Oral Daily Barbara Nobles MD   cinacalcet 30 mg Oral Daily With Cherylene Mae, MD   escitalopram 10 mg Oral HS Barbara Nobles MD   guaiFENesin 400 mg Oral BID Barbara Nobles MD   metoprolol 5 mg Intravenous Q6H Barbara Nobles MD   metoprolol tartrate 12 5 mg Oral Q12H [address] Kingsley Aase, MD   oxyCODONE 5 mg Oral TID PRN Kingsley Aase, MD   pantoprazole 40 mg Oral Early Morning Kingsley Aase, MD   sevelamer 800 mg Oral TID With Meals Kingsley Aase, MD     Continuous Infusions:   PRN Meds:  ALPRAZolam    oxyCODONE      Allergies   Allergen Reactions    Acetaminophen      Due to LFT elevation    Heparin      Avoids due to eye conditions;can't have any med that may cause bleeding    Lovenox [Enoxaparin Sodium]      Avoids due to eye conditions;can't have any med that may cause bleeding in eyes    Nsaids      Avoids due to renal failure    Cardura [Doxazosin] Palpitations       Objective   Vitals: Blood pressure 141/88, pulse 90, temperature (!) 97 3 °F (36 3 °C), resp  rate 18, height 5' 9" (1 753 m), weight 78 kg (171 lb 15 3 oz), SpO2 94 %  , Body mass index is 25 39 kg/m² ,   Orthostatic Blood Pressures      Most Recent Value   Blood Pressure  141/88 filed at 08/22/2019 7519   Patient Position - Orthostatic VS  Lying filed at 08/21/2019 1955            Intake/Output Summary (Last 24 hours) at 8/22/2019 0959  Last data filed at 8/22/2019 0600  Gross per 24 hour   Intake 680 ml   Output 2502 ml   Net -1822 ml       Invasive Devices     Peripheral Intravenous Line            Peripheral IV 08/21/19 Left Antecubital less than 1 day          Line            Hemodialysis AV Fistula Right Forearm -- days                Physical Exam:    General:  AO x3, no acute distress  Cardiac:  S1-S2 normal  No murmurs, rubs or gallops, JVP: None  Lungs: Wheezes heard at the bases bilaterally  Abdomen:  Soft nontender nondistended, positive bowel sounds  Extremities:  Warm, well perfused, pulses palpable, no ulcers or rashes, no pedal edema  Neuro: Grossly nonfocal  Psych:  Normal affect      Lab Results:   Recent Results (from the past 24 hour(s))   ECG 12 lead    Collection Time: 08/21/19 11:08 AM   Result Value Ref Range    Ventricular Rate 113 BPM    Atrial Rate 113 BPM    CA Interval 172 ms QRSD Interval 88 ms    QT Interval 354 ms    QTC Interval 485 ms    P Axis 42 degrees    QRS Axis -10 degrees    T Wave Axis 81 degrees   CBC and differential    Collection Time: 08/21/19 11:16 AM   Result Value Ref Range    WBC 13 36 (H) 4 31 - 10 16 Thousand/uL    RBC 3 36 (L) 3 88 - 5 62 Million/uL    Hemoglobin 11 3 (L) 12 0 - 17 0 g/dL    Hematocrit 34 9 (L) 36 5 - 49 3 %     (H) 82 - 98 fL    MCH 33 6 26 8 - 34 3 pg    MCHC 32 4 31 4 - 37 4 g/dL    RDW 14 6 11 6 - 15 1 %    MPV 10 5 8 9 - 12 7 fL    Platelets 191 314 - 503 Thousands/uL    nRBC 0 /100 WBCs    Neutrophils Relative 88 (H) 43 - 75 %    Immat GRANS % 1 0 - 2 %    Lymphocytes Relative 3 (L) 14 - 44 %    Monocytes Relative 8 4 - 12 %    Eosinophils Relative 0 0 - 6 %    Basophils Relative 0 0 - 1 %    Neutrophils Absolute 11 80 (H) 1 85 - 7 62 Thousands/µL    Immature Grans Absolute 0 09 0 00 - 0 20 Thousand/uL    Lymphocytes Absolute 0 39 (L) 0 60 - 4 47 Thousands/µL    Monocytes Absolute 1 07 0 17 - 1 22 Thousand/µL    Eosinophils Absolute 0 00 0 00 - 0 61 Thousand/µL    Basophils Absolute 0 01 0 00 - 0 10 Thousands/µL   Protime-INR    Collection Time: 08/21/19 11:16 AM   Result Value Ref Range    Protime 15 7 (H) 11 6 - 14 5 seconds    INR 1 24 (H) 0 84 - 1 19   APTT    Collection Time: 08/21/19 11:16 AM   Result Value Ref Range    PTT 36 23 - 37 seconds   Troponin I    Collection Time: 08/21/19 11:16 AM   Result Value Ref Range    Troponin I 3 01 (HH) <=0 04 ng/mL   Comprehensive metabolic panel    Collection Time: 08/21/19 11:17 AM   Result Value Ref Range    Sodium 139 136 - 145 mmol/L    Potassium 4 5 3 5 - 5 3 mmol/L    Chloride 93 (L) 100 - 108 mmol/L    CO2 29 21 - 32 mmol/L    ANION GAP 17 (H) 4 - 13 mmol/L    BUN 53 (H) 5 - 25 mg/dL    Creatinine 8 75 (H) 0 60 - 1 30 mg/dL    Glucose 218 (H) 65 - 140 mg/dL    Calcium 9 0 8 3 - 10 1 mg/dL    AST 33 5 - 45 U/L    ALT 18 12 - 78 U/L    Alkaline Phosphatase 93 46 - 116 U/L    Total Protein 8 3 (H) 6 4 - 8 2 g/dL    Albumin 2 5 (L) 3 5 - 5 0 g/dL    Total Bilirubin 1 40 (H) 0 20 - 1 00 mg/dL    eGFR 6 ml/min/1 73sq m   Magnesium    Collection Time: 08/21/19 11:17 AM   Result Value Ref Range    Magnesium 1 9 1 6 - 2 6 mg/dL   Troponin I    Collection Time: 08/21/19  2:20 PM   Result Value Ref Range    Troponin I 2 79 (HH) <=0 04 ng/mL   Troponin I    Collection Time: 08/21/19  6:00 PM   Result Value Ref Range    Troponin I 2 51 (H) <=0 04 ng/mL   Troponin I    Collection Time: 08/21/19  9:31 PM   Result Value Ref Range    Troponin I 3 12 (H) <=0 04 ng/mL   Troponin I    Collection Time: 08/22/19 12:57 AM   Result Value Ref Range    Troponin I 3 15 (H) <=0 04 ng/mL   Troponin I    Collection Time: 08/22/19  4:16 AM   Result Value Ref Range    Troponin I 2 98 (H) <=0 04 ng/mL   Basic metabolic panel    Collection Time: 08/22/19  4:16 AM   Result Value Ref Range    Sodium 134 (L) 136 - 145 mmol/L    Potassium 4 6 3 5 - 5 3 mmol/L    Chloride 94 (L) 100 - 108 mmol/L    CO2 27 21 - 32 mmol/L    ANION GAP 13 4 - 13 mmol/L    BUN 43 (H) 5 - 25 mg/dL    Creatinine 6 76 (H) 0 60 - 1 30 mg/dL    Glucose 151 (H) 65 - 140 mg/dL    Calcium 9 0 8 3 - 10 1 mg/dL    eGFR 8 ml/min/1 73sq m   CBC and differential    Collection Time: 08/22/19  4:16 AM   Result Value Ref Range    WBC 12 39 (H) 4 31 - 10 16 Thousand/uL    RBC 3 15 (L) 3 88 - 5 62 Million/uL    Hemoglobin 10 5 (L) 12 0 - 17 0 g/dL    Hematocrit 31 8 (L) 36 5 - 49 3 %     (H) 82 - 98 fL    MCH 33 3 26 8 - 34 3 pg    MCHC 33 0 31 4 - 37 4 g/dL    RDW 14 6 11 6 - 15 1 %    MPV 10 9 8 9 - 12 7 fL    Platelets 171 718 - 983 Thousands/uL    nRBC 0 /100 WBCs    Neutrophils Relative 84 (H) 43 - 75 %    Immat GRANS % 0 0 - 2 %    Lymphocytes Relative 6 (L) 14 - 44 %    Monocytes Relative 10 4 - 12 %    Eosinophils Relative 0 0 - 6 %    Basophils Relative 0 0 - 1 %    Neutrophils Absolute 10 42 (H) 1 85 - 7 62 Thousands/µL    Immature Grans Absolute 0  05 0 00 - 0 20 Thousand/uL    Lymphocytes Absolute 0 69 0 60 - 4 47 Thousands/µL    Monocytes Absolute 1 20 0 17 - 1 22 Thousand/µL    Eosinophils Absolute 0 02 0 00 - 0 61 Thousand/µL    Basophils Absolute 0 01 0 00 - 0 10 Thousands/µL   Hepatic function panel    Collection Time: 08/22/19  4:16 AM   Result Value Ref Range    Total Bilirubin 1 14 (H) 0 20 - 1 00 mg/dL    Bilirubin, Direct 0 68 (H) 0 00 - 0 20 mg/dL    Alkaline Phosphatase 92 46 - 116 U/L    AST 83 (H) 5 - 45 U/L    ALT 56 12 - 78 U/L    Total Protein 7 6 6 4 - 8 2 g/dL    Albumin 3 0 (L) 3 5 - 5 0 g/dL   Phosphorus    Collection Time: 08/22/19  4:16 AM   Result Value Ref Range    Phosphorus 4 7 (H) 2 3 - 4 1 mg/dL   Procalcitonin    Collection Time: 08/22/19  4:16 AM   Result Value Ref Range    Procalcitonin 1 38 (H) <=0 25 ng/ml   TSH, 3rd generation with Free T4 reflex    Collection Time: 08/22/19  4:16 AM   Result Value Ref Range    TSH 3RD GENERATON 0 966 0 358 - 3 740 uIU/mL   Troponin I    Collection Time: 08/22/19  7:46 AM   Result Value Ref Range    Troponin I 2 88 (H) <=0 04 ng/mL       Imaging:     I have personally reviewed all pertinent laboratory/tests results  Imaging Studies: Ct Head Without Contrast    Result Date: 8/21/2019  Narrative: CT BRAIN - WITHOUT CONTRAST INDICATION:   Weakness  COMPARISON:  9/14/2016  TECHNIQUE:  CT examination of the brain was performed  In addition to axial images, coronal 2D reformatted images were created and submitted for interpretation  Radiation dose length product (DLP) for this visit:  901 8 mGy-cm   This examination, like all CT scans performed in the [place], was performed utilizing techniques to minimize radiation dose exposure, including the use of iterative reconstruction and automated exposure control  IMAGE QUALITY:  Diagnostic  FINDINGS: PARENCHYMA: Moderate patchy periventricular white matter hypodensities consistent with chronic microangiopathic changes    No acute infarct  No mass  Punctate parenchymal hyperdensity in the subcortical right frontal lobe (series 2, image 26) concerning for small parenchymal hemorrhage  Punctate hyperdensity at the left frontal arcual (series 2, image 25) appears to be within the sulcus on axial view, but coronal reformats may suggest cortical location  The finding is concerning for hemorrhage  Favor subarachnoid location  Punctate subarachnoid hyperdensity in the inferior aspect of the anterior interhemispheric fissure (series 400, image 25) also concerning for extra-axial hemorrhage  Areas of possible hemorrhage appear new from prior  VENTRICLES AND EXTRA-AXIAL SPACES:  Possible small foci of extra axial hemorrhage, discussed above  No intraventricular layering blood products  No hydrocephalus, herniation, or mass effect  VISUALIZED ORBITS AND PARANASAL SINUSES:  Prior bilateral lens replacements and left scleral banding  Paranasal sinuses are clear  Rightward nasal septal deviation  CALVARIUM AND EXTRACRANIAL SOFT TISSUES:  Normal      Impression: New small foci of subarachnoid hemorrhage underlying the left frontal operculum and within the anterior-inferior of the interhemispheric fissure  New punctate focus of hyperdensity concerning for petechial hemorrhage in the subcortical right frontal lobe  Recommend further evaluation with CTA of the head  Note:  I personally discussed this study with Zi Garcia on 8/21/2019 at 1:00 PM  Workstation performed: [id number]     Ct Chest Without Contrast    Result Date: 8/21/2019  Narrative: CT CHEST WITHOUT IV CONTRAST INDICATION:   Cough shortness of breath  COMPARISON:  Chest CT dated 12/5/2018 TECHNIQUE: CT examination of the chest was performed without intravenous contrast   Axial, sagittal, and coronal 2D reformatted images were created from the source data and submitted for interpretation  Radiation dose length product (DLP) for this visit:  261 25 mGy-cm     This examination, like all CT scans performed in the [facility], was performed utilizing techniques to minimize radiation dose exposure, including the use of iterative  reconstruction and automated exposure control  FINDINGS: LUNGS:  Straight interlobular septal thickening  Central predominant groundglass opacities in both lungs  Mild paraseptal emphysema at the apices  No suspicious pulmonary nodules  No endobronchial lesions  PLEURA:  Small layering pleural effusions  HEART/GREAT VESSELS:  Cardiomegaly  Extensive valvular and coronary calcifications  No pericardial effusion  Ectasia of the ascending aorta measuring 4 3 cm  No dissection  MEDIASTINUM AND ROSAS:  Shotty mediastinal lymph nodes are present  Number in the prevascular space/anterior mediastinum is greater than typically seen  However, none of the nodes appear enlarged or significantly changed since 2018  CHEST WALL AND LOWER NECK:   Unremarkable  VISUALIZED STRUCTURES IN THE UPPER ABDOMEN:  Incidental note of colonic diverticulosis without diverticulitis  Renal artery atherosclerosis  No acute findings in the upper abdomen  OSSEOUS STRUCTURES:  No acute fracture or destructive osseous lesion  Impression: 1  Findings of volume overload with bilateral pulmonary edema and small layering effusions  2   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  Workstation performed: [id number]     Vas Lower Limb Venous Duplex Study, Complete Bilateral    Result Date: 8/21/2019  Narrative:  [facility] REPORT CLINICAL: Indications: Shortness of breath  Denies leg pain  Operative History: congenital cataract detached retina left inguinal hernia umbilical hernia Risk Factors The patient has history of HTN, Diabetes (Yes) and CKD  He has no history of DVT    FINDINGS:  Segment  Right            Left              Impression       Impression       CFV      Normal (Patent)  Normal (Patent)     CONCLUSION: Impression: RIGHT LOWER LIMB: No evidence of acute or chronic deep vein thrombosis  No evidence of superficial thrombophlebitis noted  Doppler evaluation shows a normal response to augmentation maneuvers  Popliteal, posterior tibial and anterior tibial arterial Doppler waveforms are triphasic  LEFT LOWER LIMB: No evidence of acute or chronic deep vein thrombosis  No evidence of superficial thrombophlebitis noted  Doppler evaluation shows a normal response to augmentation maneuvers  Popliteal, posterior tibial and anterior tibial arterial Doppler waveforms are triphasic  Technical findings were given to Dr Thomas Cortes    SIGNATURE: Electronically Signed by: Simran Boyd MD, [address] on 2019-08-21 03:24:29 PM

## 2019-08-22 NOTE — PLAN OF CARE
Problem: METABOLIC, FLUID AND ELECTROLYTES - ADULT  Goal: Fluid balance maintained  Description  INTERVENTIONS:  - Monitor labs   - Monitor I/O and WT  - Instruct patient on fluid and nutrition as appropriate  - Assess for signs & symptoms of volume excess or deficit  Outcome: Progressing  Extra 3 hr UF tx today, 2 5 kg goal as tolerated  Plan of care reviewed with pt and Dr Sandrita Granado

## 2019-08-22 NOTE — SOCIAL WORK
CM met with patient to review the role of CM  Pt presented as alert during the conversation  Pt reported lives with wife Gilles Christiansen [phone] in 1 story house, no step  Pt reported being independent with ADLS, driving PTA  Pt retired  DME: cane  Pt denied any inpatient PT/OT, MH, substance abuse or [address] services  Pt reported PCP affiliated with [facility] and pharmacy of choice is [facility]  CM reviewed d/c planning process including the following: identifying help at home, patient preference for d/c planning needs, Discharge Lounge, [facility] Meds to Bed program, availability of treatment team to discuss questions or concerns patient and/or family may have regarding understanding medications and recognizing signs and symptoms once discharged  CM also encouraged patient to follow up with all recommended appointments after discharge  Patient advised of importance for patient and family to participate in managing patients medical well being

## 2019-08-23 ENCOUNTER — APPOINTMENT (INPATIENT)
Dept: DIALYSIS | Facility: HOSPITAL | Age: 69
DRG: 040 | End: 2019-08-23
Payer: MEDICARE

## 2019-08-23 LAB
ANION GAP SERPL CALCULATED.3IONS-SCNC: 18 MMOL/L (ref 4–13)
BASOPHILS # BLD AUTO: 0.02 THOUSANDS/ΜL (ref 0–0.1)
BASOPHILS NFR BLD AUTO: 0 % (ref 0–1)
BUN SERPL-MCNC: 81 MG/DL (ref 5–25)
CALCIUM SERPL-MCNC: 8.6 MG/DL (ref 8.3–10.1)
CHLORIDE SERPL-SCNC: 93 MMOL/L (ref 100–108)
CO2 SERPL-SCNC: 24 MMOL/L (ref 21–32)
CREAT SERPL-MCNC: 8.74 MG/DL (ref 0.6–1.3)
EOSINOPHIL # BLD AUTO: 0.07 THOUSAND/ΜL (ref 0–0.61)
EOSINOPHIL NFR BLD AUTO: 1 % (ref 0–6)
ERYTHROCYTE [DISTWIDTH] IN BLOOD BY AUTOMATED COUNT: 14.5 % (ref 11.6–15.1)
GFR SERPL CREATININE-BSD FRML MDRD: 6 ML/MIN/1.73SQ M
GLUCOSE SERPL-MCNC: 145 MG/DL (ref 65–140)
HCT VFR BLD AUTO: 35.7 % (ref 36.5–49.3)
HGB BLD-MCNC: 11.7 G/DL (ref 12–17)
IMM GRANULOCYTES # BLD AUTO: 0.1 THOUSAND/UL (ref 0–0.2)
IMM GRANULOCYTES NFR BLD AUTO: 1 % (ref 0–2)
LYMPHOCYTES # BLD AUTO: 0.76 THOUSANDS/ΜL (ref 0.6–4.47)
LYMPHOCYTES NFR BLD AUTO: 6 % (ref 14–44)
MCH RBC QN AUTO: 33.1 PG (ref 26.8–34.3)
MCHC RBC AUTO-ENTMCNC: 32.8 G/DL (ref 31.4–37.4)
MCV RBC AUTO: 101 FL (ref 82–98)
MONOCYTES # BLD AUTO: 1.13 THOUSAND/ΜL (ref 0.17–1.22)
MONOCYTES NFR BLD AUTO: 9 % (ref 4–12)
NEUTROPHILS # BLD AUTO: 10.87 THOUSANDS/ΜL (ref 1.85–7.62)
NEUTS SEG NFR BLD AUTO: 83 % (ref 43–75)
NRBC BLD AUTO-RTO: 0 /100 WBCS
PLATELET # BLD AUTO: 377 THOUSANDS/UL (ref 149–390)
PMV BLD AUTO: 10.6 FL (ref 8.9–12.7)
POTASSIUM SERPL-SCNC: 5.3 MMOL/L (ref 3.5–5.3)
RBC # BLD AUTO: 3.54 MILLION/UL (ref 3.88–5.62)
SODIUM SERPL-SCNC: 135 MMOL/L (ref 136–145)
WBC # BLD AUTO: 12.95 THOUSAND/UL (ref 4.31–10.16)

## 2019-08-23 PROCEDURE — 5A1D70Z PERFORMANCE OF URINARY FILTRATION, INTERMITTENT, LESS THAN 6 HOURS PER DAY: ICD-10-PCS | Performed by: INTERNAL MEDICINE

## 2019-08-23 PROCEDURE — 99232 SBSQ HOSP IP/OBS MODERATE 35: CPT | Performed by: NURSE PRACTITIONER

## 2019-08-23 PROCEDURE — 97167 OT EVAL HIGH COMPLEX 60 MIN: CPT

## 2019-08-23 PROCEDURE — G8978 MOBILITY CURRENT STATUS: HCPCS

## 2019-08-23 PROCEDURE — G8987 SELF CARE CURRENT STATUS: HCPCS

## 2019-08-23 PROCEDURE — G8988 SELF CARE GOAL STATUS: HCPCS

## 2019-08-23 PROCEDURE — G8979 MOBILITY GOAL STATUS: HCPCS

## 2019-08-23 PROCEDURE — 80048 BASIC METABOLIC PNL TOTAL CA: CPT | Performed by: NURSE PRACTITIONER

## 2019-08-23 PROCEDURE — 97163 PT EVAL HIGH COMPLEX 45 MIN: CPT

## 2019-08-23 PROCEDURE — 90935 HEMODIALYSIS ONE EVALUATION: CPT | Performed by: INTERNAL MEDICINE

## 2019-08-23 PROCEDURE — 85025 COMPLETE CBC W/AUTO DIFF WBC: CPT | Performed by: NURSE PRACTITIONER

## 2019-08-23 PROCEDURE — 99232 SBSQ HOSP IP/OBS MODERATE 35: CPT | Performed by: INTERNAL MEDICINE

## 2019-08-23 RX ADMIN — PANTOPRAZOLE SODIUM 40 MG: 40 TABLET, DELAYED RELEASE ORAL at 05:07

## 2019-08-23 RX ADMIN — METOPROLOL TARTRATE 5 MG: 5 INJECTION, SOLUTION INTRAVENOUS at 02:13

## 2019-08-23 RX ADMIN — METOPROLOL TARTRATE 12.5 MG: 25 TABLET ORAL at 23:23

## 2019-08-23 RX ADMIN — METOPROLOL TARTRATE 5 MG: 5 INJECTION, SOLUTION INTRAVENOUS at 10:00

## 2019-08-23 RX ADMIN — ESCITALOPRAM OXALATE 10 MG: 10 TABLET ORAL at 21:06

## 2019-08-23 RX ADMIN — SEVELAMER HYDROCHLORIDE 800 MG: 800 TABLET, FILM COATED PARENTERAL at 18:23

## 2019-08-23 RX ADMIN — ATORVASTATIN CALCIUM 20 MG: 20 TABLET, FILM COATED ORAL at 21:06

## 2019-08-23 RX ADMIN — CALCIUM ACETATE 667 MG: 667 CAPSULE ORAL at 18:23

## 2019-08-23 RX ADMIN — METOPROLOL TARTRATE 5 MG: 5 INJECTION, SOLUTION INTRAVENOUS at 21:06

## 2019-08-23 RX ADMIN — Medication 1 CAPSULE: at 18:23

## 2019-08-23 RX ADMIN — CINACALCET HYDROCHLORIDE 30 MG: 30 TABLET, FILM COATED ORAL at 18:23

## 2019-08-23 RX ADMIN — GUAIFENESIN 400 MG: 100 SOLUTION ORAL at 18:23

## 2019-08-23 RX ADMIN — METOPROLOL TARTRATE 5 MG: 5 INJECTION, SOLUTION INTRAVENOUS at 13:57

## 2019-08-23 NOTE — SOCIAL WORK
CM reviewed pt in care coordination rounds, pt is not medically stable for discharge  Pt is currently in Afib  Pt will need additional medical evaluation and treatment  CM to follow for medical clearance and discharge plan

## 2019-08-23 NOTE — PHYSICAL THERAPY NOTE
Physical Therapy Evaluation     Patient's Name: Willard Kaye    Admitting Diagnosis  Dyspnea [R06 00]    Problem List  Patient Active Problem List   Diagnosis    Acute pancreatitis    Essential hypertension    HLD (hyperlipidemia)    History of anemia due to chronic kidney disease    CKD (chronic kidney disease) stage 4, GFR 15-29 ml/min (HCC)    Diabetes mellitus type 2 in nonobese (HCC)    End stage renal disease ([facility] )    Acute UTI    Mal[facility] tumor of [facility] bladder [facility]    Ascending aortic aneurysm ([facility]    Depression with anxiety    Chronic viral hepatitis B without delta agent and without coma ([facility]    Sprain of scapholunate ligament    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 (HCC)    Type 2 diabetes mellitus with both eyes affected by moderate nonproliferative retinopathy without macular edema, without long-term current use of insulin (HCC)    Subarachnoid hemorrhage (HCC)    Cerebral hemorrhage (HCC)    Volume overload    Elevated troponin       Past Medical History  Past Medical History:   Diagnosis Date    AAA (abdominal aortic aneurysm) (HCC)     Abnormal liver function test     Anemia     Anxiety     Arthritis     Cancer (HCC)     bladder    Cardiac disorder     Chronic kidney disease     renal failure    Chronic pain disorder     back and legs    Depression     Diabetes mellitus ([facility]     Dialysis patient ([facility]     Fracture of carpal bone     Fractures     spinal compression fx,closed fx of wrist & ankle    GERD (gastroesophageal reflux disease)     Gout     Hearing difficulty of both ears     Hepatitis A     A - remission since 1970's    Hyperlipidemia     Hyperlipidemia     Hypertension     Kidney stone     Migraine     Neuropathy     Nicotine dependence     Pancreatitis     Vitamin D deficiency     Wears glasses        Past Surgical History  Past Surgical History:   Procedure Laterality Date    BLADDER FULGURATION  06/17/2016    Cystoscopy with fulguration medium lesion (2-5 cm)    CATARACT EXTRACTION Bilateral     congenital    COLONOSCOPY      CYSTOSCOPY      Diagnostic 5/31/17, 10/10/17    CYSTOSCOPY N/A 6/14/2017    Procedure: CYSTOSCOPY WITH BLADDER  BIOPSIES WITH FULGURATION;  Surgeon: Liya Pedroza MD;  [facility];  Service: Urology    CYSTOSCOPY  12/06/2018    CYSTOSCOPY W/ URETERAL STENT PLACEMENT Right 06/01/2016    CYSTOSCOPY W/ URETEROSCOPY  06/17/2016    With removal of calculus    CYSTOSTOMY W/ BLADDER BIOPSY      9/6/26, 2/6/17    DENTAL SURGERY      ESOPHAGOGASTRODUODENOSCOPY      EYE SURGERY      catatact    EYE SURGERY Bilateral     Left eye detachment R eye retinal tear    HERNIA REPAIR      HERNIA REPAIR      L inguinal w/mesh;umbilical herniorraphy    IN ANASTOMOSIS,AV,ANY SITE Right 6/29/2016    Procedure: LOWER FOREARM AV FISTULA;  Surgeon: Chicho Early MD;  [facility];  Service: Vascular    IN COLONOSCOPY FLX DX W/COLLJ SPEC WHEN PFRMD N/A 3/4/2016    Procedure: COLONOSCOPY;  Surgeon: Richa Giang MD;  [facility];  Service: Gastroenterology    IN CYSTO/URETERO W/LITHOTRIPSY &INDWELL STENT INSRT Right 6/17/2016    Procedure: CYSTOSCOPY URETEROSCOPY WITH LITHOTRIPSY HOLMIUM LASER,BASKET STONE EXTRACTION, RETROGRADE PYELOGRAM AND INSERTION STENT URETERAL;  Surgeon: Galina Mendez MD;  Location:  MAIN OR;  Service: Urology    IN CYSTOURETHROSCOPY N/A 7/13/2017    Procedure: CYSTOSCOPY;  Surgeon: Liya Pedroza MD;  [facility];  Service: Urology    IN CYSTOURETHROSCOPY W/IRRIG & EVAC CLOTS N/A 7/13/2017    Procedure: CYSTOSCOPY EVACUATION OF CLOTS,POSSIBLE FULGURATION;  Surgeon: Liya Pedroza MD;  [facility];  Service: Urology    IN CYSTOURETHROSCOPY,BIOPSY N/A 8/15/2016    Procedure: CYSTOSCOPY WITH RE BIOPSY OF BLADDER;  Surgeon: Laila Morrison MD;  [facility];  Service: Urology    IN CYSTOURETHROSCOPY,FULGUR 0 5-2 CM NISAN N/A 2/6/2017    Procedure: TRANSURETHRAL RESECTION OF BLADDER TUMOR (TURBT); Surgeon: Danyelle Miller MD;  [facility];  Service: Urology    MA CYSTOURETHROSCOPY,FULGUR 0 5-2 CM LESN N/A 6/14/2017    Procedure: TRANSURETHRAL RESECTION OF BLADDER TUMOR (TURBT); Surgeon: Danyelle Miller MD;  [facility];  Service: Urology    MA CYSTOURETHROSCOPY,FULGUR <0 5 CM LESN N/A 2/6/2017    Procedure: Lorena Turcios; BLADDER BIOPSY WITH [address];  Surgeon: Danyelle Miller MD;  [facility];  Service: Urology    MA CYSTOURETHROSCOPY,URETER CATHETER Right 6/1/2016    Procedure: CYSTOSCOPY RETROGRADE PYELOGRAM WITH INSERTION STENT URETERAL;  Surgeon: Anil Morrison MD;  Location:  MAIN OR;  Service: Urology    MA EGD TRANSORAL BIOPSY SINGLE/MULTIPLE N/A 3/4/2016    Procedure: ESOPHAGOGASTRODUODENOSCOPY (EGD); Surgeon: Rosendo Pineda MD;  [facility];  Service: Gastroenterology    VASCULAR SURGERY Right     AV fistula        08/23/19 1335   Note Type   Note type Eval/Treat   Pain Assessment   Pain Assessment No/denies pain   Pain Score No Pain   Pain Type Acute pain   Pain Location Generalized   Home Living   Type of [address] One level  (0 JOSS)   Home Equipment Cane   Prior Function   Level of [place] Independent with ADLs and functional mobility   Lives With Spouse   Receives Help From Family   ADL Assistance Independent   IADLs Independent   Falls in the last 6 months 1 to 4   Vocational Retired   Comments Wife works daily [employer] although putting in for [employer]   Restrictions/Precautions   Other Precautions Cognitive; Chair Alarm; Bed Alarm;WBS;Multiple lines;Telemetry; Fall Risk;Pain   General   Family/Caregiver Present Yes   Cognition   Orientation Level Oriented X4   RLE Assessment   RLE Assessment WFL   LLE Assessment   LLE Assessment WFL   Coordination   Movements are Fluid and Coordinated 0   Coordination and Movement Description slow and guarded   Bed Mobility   Supine to Sit 5  Supervision   Additional items Assist x 1; Increased time required   Sit to Supine Unable to assess  (pt left resting in chair, wife at bedside)   Additional Comments Wife reports she will attach chair alarm if she leaves   Transfers   Sit to Stand 4  Minimal assistance   Additional items Assist x 1; Increased time required   Stand to Sit 4  Minimal assistance   Additional items Assist x 1; Increased time required   Ambulation/Elevation   Gait pattern Excessively slow; Shuffling;Decreased foot clearance   Gait Assistance 3  Moderate assist   Additional items Assist x 1   Assistive Device Rolling walker   Distance 20   Balance   Static Sitting Fair +   Static Standing Fair -   Ambulatory Poor +   Endurance Deficit   Endurance Deficit Yes   Endurance Deficit Description limited by fatigue   Activity Tolerance   Activity Tolerance Patient limited by fatigue;Patient limited by pain   Medical Staff Made Aware Spoke to OT and CM for D/C planning   Nurse Made Aware yes, nsg gave clearance to work with pt   Assessment   Prognosis Good   Problem List Decreased strength;Decreased endurance; Impaired balance;Decreased mobility; Decreased safety awareness;Pain;Orthopedic restrictions   Assessment Pt is 71 y o  male seen for PT evaluation s/p admit to [facility] on 8/21/2019 w/ Subarachnoid hemorrhage ([facility] )  PT consulted to assess pt's functional mobility and d/c needs  Order placed for PT eval and tx, w/ activity as tolerated order  Comorbidities affecting pt's physical performance at time of assessment include: HD, AAA, anxiety, OA, bladder CA, chronic pain, depression, gout  PTA, pt was ambulates household distances and retired   Personal factors affecting pt at time of IE include: ambulating w/ assistive device, inability to navigate level surfaces w/o external assistance, limited home support, positive fall history, unable to perform physical activity, inability to perform IADLs and inability to perform ADLs  Please find objective findings from PT assessment regarding body systems outlined above with impairments and limitations including weakness, impaired balance, decreased endurance, impaired coordination, gait deviations, pain, decreased activity tolerance, decreased functional mobility tolerance, decreased safety awareness, fall risk and decreased cognition  Pt required increased time to complete bed mobility  Tolerated sitting EOB without reports of dizziness  Required hand placement instruction with new AD use  Ambulated with moderately unsteady gait with 2 LOB requiring therapist A to recover  High risk of additional falls with above noted deficits and decreased awareness of current limitations  The following objective measures performed on IE also reveal limitations: Barthel Index: 45/100  Pt's clinical presentation is currently unstable/unpredictable seen in pt's presentation of o2, pain  Pt to benefit from continued PT tx to address deficits as defined above and maximize level of functional independent mobility and consistency  From PT/mobility standpoint, recommendation at time of d/c would be STR vs  Home PT pending spouse FLMA and pending progress in order to facilitate return to PLOF  Barriers to Discharge Inaccessible home environment;Decreased caregiver support   Goals   Patient Goals To eat   STG Expiration Date 09/04/19   Short Term Goal #1 1  Complete bed mobility and transfers I to decrease need for caregiver in home  2  Ambulate 300' I to complete household and community mobility without A  3  Improve dynamic balance to good to decrease need for UE support during ambulation  4  I with self pacing to maintain SPo2 >90  Plan   Treatment/Interventions OT; Spoke to case management;Spoke to nursing;Gait training;Bed mobility; Patient/family training; Endurance training;LE strengthening/ROM; Functional transfer training   PT Frequency   (3-5x/wk)   Recommendation Recommendation   (STR vs HHPT pending spouse FMLA)   Equipment Recommended Walker   PT - OK to Discharge No   Barthel Index   Feeding 10   Bathing 0   Grooming Score 5   Dressing Score 5   Bladder Score 5   Bowels Score 5   Toilet Use Score 5   Transfers (Bed/Chair) Score 10   Mobility (Level Surface) Score 0   Stairs Score 0   Barthel Index Score 45           Tim Claire, FLOR

## 2019-08-23 NOTE — ASSESSMENT & PLAN NOTE
· CAT Scan of the head with new small foci of subarachnoid hemorrhage underlying left frontal operculum and within the anterior-inferiorclear of the interhemispheric fissure  · Patient stated he fell and hit the back of his head about a month ago  · Discussed with neurosurgery, consult appreciated  · Unclear if acute versus chronic  · No keppra  · Keep SBP <160  · Repeat CTH tomorrow  · MRI brain rule out amyloid  · Neuro checks stable  · Continue to hold heparin and anti-platelet agents

## 2019-08-23 NOTE — PLAN OF CARE
Problem: Potential for Falls  Goal: Patient will remain free of falls  Description  INTERVENTIONS:  - Assess patient frequently for physical needs  -  Identify cognitive and physical deficits and behaviors that affect risk of falls  -  [place] fall precautions as indicated by assessment   - Educate patient/family on patient safety including physical limitations  - Instruct patient to call for assistance with activity based on assessment  - Modify environment to reduce risk of injury  - Consider OT/PT consult to assist with strengthening/mobility  Outcome: Progressing     Problem: METABOLIC, FLUID AND ELECTROLYTES - ADULT  Goal: Electrolytes maintained within normal limits  Description  INTERVENTIONS:  - Monitor labs and assess patient for signs and symptoms of electrolyte imbalances  - Administer electrolyte replacement as ordered  - Monitor response to electrolyte replacements, including repeat lab results as appropriate  - Instruct patient on fluid and nutrition as appropriate  Outcome: Progressing  Goal: Fluid balance maintained  Description  INTERVENTIONS:  - Monitor labs   - Monitor I/O and WT  - Instruct patient on fluid and nutrition as appropriate  - Assess for signs & symptoms of volume excess or deficit  Outcome: Progressing  Goal: Glucose maintained within target range  Description  INTERVENTIONS:  - Monitor Blood Glucose as ordered  - Assess for signs and symptoms of hyperglycemia and hypoglycemia  - Administer ordered medications to maintain glucose within target range  - Assess nutritional intake and initiate nutrition service referral as needed  Outcome: Progressing     Problem: Nutrition/Hydration-ADULT  Goal: Nutrient/Hydration intake appropriate for improving, restoring or maintaining nutritional needs  Description  Monitor and assess patient's nutrition/hydration status for malnutrition  Collaborate with interdisciplinary team and initiate plan and interventions as ordered    Monitor patient's weight and dietary intake as ordered or per policy  Utilize nutrition screening tool and intervene as necessary  Determine patient's food preferences and provide high-protein, high-caloric foods as appropriate  INTERVENTIONS:  - Monitor oral intake, urinary output, labs, and treatment plans  - Assess nutrition and hydration status and recommend course of action  - Evaluate amount of meals eaten  - Assist patient with eating if necessary   - Allow adequate time for meals  - Recommend/ encourage appropriate diets, oral nutritional supplements, and vitamin/mineral supplements  - Order, calculate, and assess calorie counts as needed  - Recommend, monitor, and adjust tube feedings and TPN/PPN based on assessed needs  - Assess need for intravenous fluids  - Provide specific nutrition/hydration education as appropriate  - Include patient/family/caregiver in decisions related to nutrition  Outcome: Progressing     Problem: NEUROSENSORY - ADULT  Goal: Achieves stable or improved neurological status  Description  INTERVENTIONS  - Monitor and report changes in neurological status  - Monitor vital signs such as temperature, blood pressure, glucose, and any other labs ordered   - Initiate measures to prevent increased intracranial pressure  - Monitor for seizure activity and implement precautions if appropriate      Outcome: Progressing  Goal: Achieves maximal functionality and self care  Description  INTERVENTIONS  - Monitor swallowing and airway patency with patient fatigue and changes in neurological status  - Encourage and assist patient to increase activity and self care     - Encourage visually impaired, hearing impaired and aphasic patients to use assistive/communication devices  Outcome: Progressing     Problem: CARDIOVASCULAR - ADULT  Goal: Absence of cardiac dysrhythmias or at baseline rhythm  Description  INTERVENTIONS:  - Continuous cardiac monitoring, vital signs, obtain 12 lead EKG if ordered  - Administer antiarrhythmic and heart rate control medications as ordered  - Monitor electrolytes and administer replacement therapy as ordered  Outcome: Progressing     Problem: RESPIRATORY - ADULT  Goal: Achieves optimal ventilation and oxygenation  Description  INTERVENTIONS:  - Assess for changes in respiratory status  - Assess for changes in mentation and behavior  - Position to facilitate oxygenation and minimize respiratory effort  - Oxygen administered by appropriate delivery if ordered  - Initiate smoking cessation education as indicated  - Encourage broncho-pulmonary hygiene including cough, deep breathe, Incentive Spirometry  - Assess the need for suctioning and aspirate as needed  - Assess and instruct to report SOB or any respiratory difficulty  - Respiratory Therapy support as indicated  Outcome: Progressing     Problem: Prexisting or High Potential for Compromised Skin Integrity  Goal: Skin integrity is maintained or improved  Description  INTERVENTIONS:  - Identify patients at risk for skin breakdown  - Assess and monitor skin integrity  - Assess and monitor nutrition and hydration status  - Monitor labs   - Assess for incontinence   - Turn and reposition patient  - Assist with mobility/ambulation  - Relieve pressure over bony prominences  - Avoid friction and shearing  - Provide appropriate hygiene as needed including keeping skin clean and dry  - Evaluate need for skin moisturizer/barrier cream  - Collaborate with interdisciplinary team   - Patient/family teaching  - Consider wound care consult   Outcome: Progressing

## 2019-08-23 NOTE — PLAN OF CARE
Problem: OCCUPATIONAL THERAPY ADULT  Goal: Performs self-care activities at highest level of function for planned discharge setting  See evaluation for individualized goals  Description  Treatment Interventions: ADL retraining, Functional transfer training, Endurance training, Cognitive reorientation, Patient/family training, Equipment evaluation/education, Compensatory technique education, Energy conservation, Activityengagement  Equipment Recommended: Bedside commode, Tub seat with back       See flowsheet documentation for full assessment, interventions and recommendations  Note:   Limitation: Decreased ADL status, Decreased Safe judgement during ADL, Decreased cognition, Decreased endurance, Decreased self-care trans, Decreased high-level ADLs  Prognosis: Good  Assessment: 70 YO Male SEEN FOR INITIAL OCCUPATIONAL THERAPY EVALUATION FOLLOWING TXF FROM [facility]->SL[facility] WITH SOB  PT SOUND TO HAVE SAH ALONG WITH VOLUME OVERLOAD, AAA  ANXIETY, ARTHRITIS, BLADDER CA, CHRONIC PAIN, DEPRESSION, DM, GOUT, Ewiiaapaayp, HTN, AND ESRD ON HD  UPON ARRIVAL FOR EVAL FOR HD TREATMENT, PT PRESENTED FATIGUED HOWEVER PLEASANT AND AGREEABLE TO PARTICIPATE  PT IS FROM HOME WITH SUPPORTIVE SPOUSE WHO IS AN ICU NURSE  AT [facility]  SPOUSE REPORTS SHE PLANS TO TAKE FMLA TO BE HOME TO ASSIST- UNKNOWN TIMING  PT REPORTS BEING OVERALL INDEPENDENT WITH ADLS/LT IADLS/LIMTIED DRIVING (DRIVES TO/FROM HD) PTA  PT CURRENTLY REQUIRES OVERALL MIN-MOD A WITH ADLS, MIN A TRANSFERS AND MOD A FOR FUNCTIONAL MOBILITY WITH USE OF RW  PT IS LIMITED 2' PAIN, FATIGUE, IMPAIRED BALANCE, FALL RISK , LIMITED SAFETY AWARENESS/INSIGHT/JUDGEMENT, OVERALL WEAKNESS/DECONDITIONING , SOB, BASELINE "FORGETFULNESS", Ewiiaapaayp and OVERALL LIMITED ACTIVITY TOLERANCE   PT/SPOUSE EDUCATED ON DEEP BREATHING TECHNIQUES T/O ACTIVITY, SLOWING OF PACE, ENERGY CONSERVATION TECHNIQUES FOR CARRY OVER UPON D/C, INCREASED FAMILY SUPPORT and CONTINUE PARTICIPATION IN SELF-CARE/MOBILITY WITH STAFF [address]   FROM AN OCCUPATIONAL THERAPY PERSPECTIVE, CURRENT RECOMMENDATION FOR INPT REHAB VS RETURN HOME WITH SPOUSE ON FMLA WITH SPOUSE PROVIDING CURRENT LEVEL OF ASSIST  IF RETURING DIRECTLY HOME RECOMMEND BSC/SC AND AGREE WITH USE OF RW AT ALL TIMES  WILL CONT TO FOLLOW TO ADDRESS THE BELOW DESCRIBED GOALS  OT Discharge Recommendation: (SEE ABOVE )  OT - OK to Discharge:  Yes

## 2019-08-23 NOTE — OCCUPATIONAL THERAPY NOTE
[address] Evaluation     Patient Name: Yoko Walton  ICNHU'T Date: 8/23/2019  Problem List  Patient Active Problem List   Diagnosis    Acute pancreatitis    Essential hypertension    HLD (hyperlipidemia)    History of anemia due to chronic kidney disease    CKD (chronic kidney disease) stage 4, GFR 15-29 ml/min (HCC)    Diabetes mellitus type 2 in nonobese (HCC)    End stage renal disease ([facility] )    Acute UTI    Malignant tumor of [facility] bladder ([facility]    Ascending aortic aneurysm ([facility] )    Depression with anxiety    Chronic viral hepatitis B without delta agent and without coma ([facility] )    Sprain of scapholunate ligament    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 (HCC)    Type 2 diabetes mellitus with both eyes affected by moderate nonproliferative retinopathy without macular edema, without long-term current use of insulin (HCC)    Subarachnoid hemorrhage (HCC)    Cerebral hemorrhage (HCC)    Volume overload    Elevated troponin     Past Medical History  Past Medical History:   Diagnosis Date    AAA (abdominal aortic aneurysm) (HCC)     Abnormal liver function test     Anemia     Anxiety     Arthritis     Cancer (HCC)     bladder    Cardiac disorder     Chronic kidney disease     renal failure    Chronic pain disorder     back and legs    Depression     Diabetes mellitus ([facility] )     Dialysis patient ([facility] )     Fracture of carpal bone     Fractures     spinal compression fx,closed fx of wrist & ankle    GERD (gastroesophageal reflux disease)     Gout     Hearing difficulty of both ears     Hepatitis A     A - remission since 1970's    Hyperlipidemia     Hyperlipidemia     Hypertension     Kidney stone     Migraine     Neuropathy     Nicotine dependence     Pancreatitis     Vitamin D deficiency     Wears glasses      Past Surgical History  Past Surgical History:   Procedure Laterality Date    BLADDER FULGURATION  06/17/2016    Cystoscopy with fulguration medium lesion (2-5 cm)    CATARACT EXTRACTION Bilateral     congenital    COLONOSCOPY      CYSTOSCOPY      Diagnostic 5/31/17, 10/10/17    CYSTOSCOPY N/A 6/14/2017    Procedure: CYSTOSCOPY WITH BLADDER  BIOPSIES WITH FULGURATION;  Surgeon: Angela Stewart MD;  [facility];  Service: Urology    CYSTOSCOPY  12/06/2018    CYSTOSCOPY W/ URETERAL STENT PLACEMENT Right 06/01/2016    CYSTOSCOPY W/ URETEROSCOPY  06/17/2016    With removal of calculus    CYSTOSTOMY W/ BLADDER BIOPSY      9/6/26, 2/6/17    DENTAL SURGERY      ESOPHAGOGASTRODUODENOSCOPY      EYE SURGERY      catatact    EYE SURGERY Bilateral     Left eye detachment R eye retinal tear    HERNIA REPAIR      HERNIA REPAIR      L inguinal w/mesh;umbilical herniorraphy    MS ANASTOMOSIS,AV,ANY SITE Right 6/29/2016    Procedure: LOWER FOREARM AV FISTULA;  Surgeon: Rufus Clarke MD;  [facility];  Service: Vascular    MS COLONOSCOPY FLX DX W/COLLJ SPEC WHEN PFRMD N/A 3/4/2016    Procedure: COLONOSCOPY;  Surgeon: Lorenza Ardon MD;  [facility];  Service: Gastroenterology    MS CYSTO/URETERO W/LITHOTRIPSY &INDWELL STENT INSRT Right 6/17/2016    Procedure: CYSTOSCOPY URETEROSCOPY WITH LITHOTRIPSY HOLMIUM LASER,BASKET STONE EXTRACTION, RETROGRADE PYELOGRAM AND INSERTION STENT URETERAL;  Surgeon: Madhuri Garcia MD;  Location:  MAIN OR;  Service: Urology    MS CYSTOURETHROSCOPY N/A 7/13/2017    Procedure: CYSTOSCOPY;  Surgeon: Angela Stewart MD;  [facility];  Service: Urology    MS CYSTOURETHROSCOPY W/IRRIG & EVAC CLOTS N/A 7/13/2017    Procedure: CYSTOSCOPY EVACUATION OF CLOTS,POSSIBLE FULGURATION;  Surgeon: Angela Stewart MD;  [facility];  Service: Urology    MS CYSTOURETHROSCOPY,BIOPSY N/A 8/15/2016    Procedure: CYSTOSCOPY WITH RE BIOPSY OF BLADDER;  Surgeon: Kendall Lee MD;  [facility];  Service: Urology    MS CYSTOURETHROSCOPY,FULGUR 0 5-2 CM LESN N/A 2/6/2017 Procedure: TRANSURETHRAL RESECTION OF BLADDER TUMOR (TURBT); Surgeon: Erlinda Lawler MD;  [facility];  Service: Urology    PA CYSTOURETHROSCOPY,FULGUR 0 5-2 CM LESN N/A 6/14/2017    Procedure: TRANSURETHRAL RESECTION OF BLADDER TUMOR (TURBT); Surgeon: Erlinda Lawler MD;  [facility];  Service: Urology    PA CYSTOURETHROSCOPY,FULGUR <0 5 CM LESN N/A 2/6/2017    Procedure: Justin Eddy; BLADDER BIOPSY WITH Lara Go;  Surgeon: Erlinda Lawler MD;  [facility];  Service: Urology    PA CYSTOURETHROSCOPY,URETER CATHETER Right 6/1/2016    Procedure: CYSTOSCOPY RETROGRADE PYELOGRAM WITH INSERTION STENT URETERAL;  Surgeon: Carlos Mcconnell MD;  Location:  MAIN OR;  Service: Urology    PA EGD TRANSORAL BIOPSY SINGLE/MULTIPLE N/A 3/4/2016    Procedure: ESOPHAGOGASTRODUODENOSCOPY (EGD); Surgeon: Aracelis Ahumada MD;  [facility];  Service: Gastroenterology    VASCULAR SURGERY Right     AV fistula         08/23/19 1334   Note Type   Note type Eval/Treat   Restrictions/Precautions   Weight Bearing Precautions Per Order No   Other Precautions Cognitive; Chair Alarm; Bed Alarm; Fall Risk;Multiple lines;O2;Pain;Hard of hearing   Pain Assessment   Pain Assessment No/denies pain   Pain Score No Pain   Home Living   Type of Home House   Prior Function   Level of [place] Independent with ADLs and functional mobility   Lives With Spouse   Receives Help From Family   ADL Assistance Independent   IADLs Independent   Falls in the last 6 months 1 to 4  (3)   Vocational Retired   Lifestyle   Autonomy PT REPORTS BEING I WITH ADLS/IADLS/LIMITED DRIVING PTA    Reciprocal Relationships LIVES WITH SUPPORTIVE SPOUSE WHO WORKS AS AN ICU NURSE AT [facility]   SPOUSE WORKS EVENING SHIFT WITH LIMITED ADDITIONAL SUPPORT    Service to Others RETIRED   Intrinsic Gratification PT'S SPOUSE REPORTS PT SPENDS MOST OF HIS TIME SLEEPING    Psychosocial   Psychosocial (WDL) WDL   ADL   Eating Assistance [phone] 5  Supervision/Setup   UB Bathing Assistance 4  Minimal Assistance   LB Bathing Assistance 3  Moderate Assistance   UB Dressing Assistance 4  Minimal Assistance   LB Dressing Assistance 3  Moderate Assistance   Toileting Assistance  3  Moderate Assistance   Functional Assistance 3  Moderate Assistance   Bed Mobility   Supine to Sit 5  Supervision   Additional items Assist x 1; Increased time required;Verbal cues   Sit to Supine Unable to assess  (PT LEFT OOB WITH SPOUSE PRESENT )   Transfers   Sit to Stand 4  Minimal assistance   Additional items Assist x 1; Increased time required;Verbal cues   Stand to Sit 4  Minimal assistance   Additional items Assist x 1; Increased time required;Verbal cues   Functional Mobility   Functional Mobility 3  Moderate assistance   Additional Comments IMPAIRED/SHUFFLED GAIT WITH RISK FOR FALLS- SEE PT EVAL FOR FURTHER DETAILS  Additional items Rolling walker   Balance   Static Sitting Fair +   Static Standing Fair -   Ambulatory Poor +   Activity Tolerance   Activity Tolerance Patient limited by fatigue   Medical Staff Made Aware WILL F/U WITH CM REGARDING D/C PLAN    Nurse Made Aware APPROPRIATE TO SEE PER WALLACE ZURITA Assessment   RUE Assessment WFL   LUE Assessment   LUE Assessment WFL   Hand Function   Gross Motor Coordination Functional   Fine Motor Coordination Functional   Sensation   Light Touch No apparent deficits   Vision - Complex Assessment   Additional Comments PT REPORTS NO ACUTE VISUAL CHANGES    Cognition   Overall Cognitive Status Impaired   Arousal/Participation Responsive; Cooperative   Attention Attends with cues to redirect   Orientation Level Oriented X4   Memory Decreased short term memory;Decreased recall of recent events   Following Commands Follows one step commands without difficulty   Comments PT IS PLEASANT AND COOPERATIVE ALTHOUGH FATIGUED T/O SESSION  PT IS [facility]  SPOUSE REPORTS PT WITH BASELINE "FORGETFULNESS"   NO REPORTED ACUTE COGNITIVE CHANGES PER PT/SPOUSE  SPOUSE IS AN [facility] ICU RN- SPOUSE EDUCATED ON NEED FOR CHAIR ALARM ON WHEN SPOUSE IS NOT PRESENT-AGREEABLE  Assessment   Limitation Decreased ADL status; Decreased Safe judgement during ADL;Decreased cognition;Decreased endurance;Decreased self-care trans;Decreased high-level ADLs   Prognosis Good   Assessment 72 YO Male SEEN FOR INITIAL OCCUPATIONAL THERAPY EVALUATION FOLLOWING TXF FROM [facility]->SLB WITH SOB  PT SOUND TO HAVE SAH ALONG WITH VOLUME OVERLOAD, AAA  ANXIETY, ARTHRITIS, BLADDER CA, CHRONIC PAIN, DEPRESSION, DM, GOUT, [place], HTN, AND ESRD ON HD  UPON ARRIVAL FOR EVAL FOR HD TREATMENT, PT PRESENTED FATIGUED HOWEVER PLEASANT AND AGREEABLE TO PARTICIPATE  PT IS FROM HOME WITH SUPPORTIVE SPOUSE WHO IS AN ICU NURSE  AT [facility]  SPOUSE REPORTS SHE PLANS TO TAKE FMLA TO BE HOME TO ASSIST- UNKNOWN TIMING  PT REPORTS BEING OVERALL INDEPENDENT WITH ADLS/LT IADLS/LIMTIED DRIVING (DRIVES TO/FROM HD) PTA  PT CURRENTLY REQUIRES OVERALL MIN-MOD A WITH ADLS, MIN A TRANSFERS AND MOD A FOR FUNCTIONAL MOBILITY WITH USE OF RW  PT IS LIMITED 2' PAIN, FATIGUE, IMPAIRED BALANCE, FALL RISK , LIMITED SAFETY AWARENESS/INSIGHT/JUDGEMENT, OVERALL WEAKNESS/DECONDITIONING , SOB, BASELINE "FORGETFULNESS", [place] and OVERALL LIMITED ACTIVITY TOLERANCE  PT/SPOUSE EDUCATED ON DEEP BREATHING TECHNIQUES T/O ACTIVITY, SLOWING OF PACE, ENERGY CONSERVATION TECHNIQUES FOR CARRY OVER UPON D/C, INCREASED FAMILY SUPPORT and CONTINUE PARTICIPATION IN SELF-CARE/MOBILITY WITH STAFF Anuradha W Demetrio Porras   FROM AN OCCUPATIONAL THERAPY PERSPECTIVE, CURRENT RECOMMENDATION FOR INPT REHAB VS RETURN HOME WITH SPOUSE ON FMLA WITH SPOUSE PROVIDING CURRENT LEVEL OF ASSIST  IF RETURING DIRECTLY HOME RECOMMEND BSC/SC AND AGREE WITH USE OF RW AT ALL TIMES  WILL CONT TO FOLLOW TO ADDRESS THE BELOW DESCRIBED GOALS      Goals   Patient Goals TO EAT LUNCH    LTG Time Frame 10-14   Long Term Goal #1 SEE BELOW    Plan   Treatment Interventions ADL retraining;Functional transfer training; Endurance training;Cognitive reorientation;Patient/family training;Equipment evaluation/education; Compensatory technique education; Energy conservation; Activityengagement   Goal Expiration Date 09/06/19   OT Frequency 3-5x/wk   Recommendation   OT Discharge Recommendation   (SEE ABOVE )   Equipment Recommended Bedside commode;Tub seat with back   OT - OK to Discharge Yes   Barthel Index   Feeding 10   Bathing 0   Grooming Score 5   Dressing Score 5   Bladder Score 5  (URGENCY AT BASELINE PER SPOUSE)   Bowels Score 5  (URGENCY AT BASELINE PER SPOUSE)   Toilet Use Score 5   Transfers (Bed/Chair) Score 10   Mobility (Level Surface) Score 0   Stairs Score 0   Barthel Index Score 45   Modified Riley Scale   Modified Riley Scale 4       OCCUPATIONAL THERAPY GOALS TO BE MET WITHIN 10-14 DAYS:    -Pt will increase bed mobility to MOD I to participate in functional activities with G tolerance and balance  -Pt will improve functional mobility and transfers to MOD I on/off all surfaces w/ G balance/safety including toileting   -Pt will participate in lt grooming task with MOD I after set-up standing at sink ~3-5 minutes with G safety and balance  -Pt will increase independence in all ADLS to MOD I with G balance sitting upright in chair   -Pt will improve activity tolerance to G for 30 min txment sessions w/ G carry over of learned energy conservation techniques   -Pt will improve independence in lt homemaking activities to MOD I without requiring cues for safety   -Pt will demonstrate G carryover of learned safety techniques and proper body mechanics in functional and leisure activities with use of DME   -Pt will complete additional cognitive assessment with 100% attention to task in order to assist with safe d/c plan         Documentation completed by AARON Bull, OTR/L

## 2019-08-23 NOTE — ASSESSMENT & PLAN NOTE
· Continue atovastatin  · Has also elevated triglycerides and has been on Lopid for many years per discussion with patient's wife will check lipid profile in a m

## 2019-08-23 NOTE — HEMODIALYSIS
Pt completed 3 hr tx today per MD Steffanie Johnson  Pre-weight 70 7 kg  Post weight 70 2 kg  Removed 0 5  L today  Pt on tele during tx, noted in afib between 110s to 120s  AM dose of IV Lopressor given per order with good result  BP remained stable throughout the duration of tx  HR remained in 90s to 110s  Pt asymptomatic during this episode  Shut off UF after pt went into afib, and NS given for hypotension/tachycardia  EDW being reviewed by MD Steffanei Johnson today  Will cont to monitor

## 2019-08-23 NOTE — PLAN OF CARE
Problem: PHYSICAL THERAPY ADULT  Goal: Performs mobility at highest level of function for planned discharge setting  See evaluation for individualized goals  Description  Treatment/Interventions: OT, Spoke to case management, Spoke to nursing, Gait training, Bed mobility, Patient/family training, Endurance training, LE strengthening/ROM, Functional transfer training  Equipment Recommended: Joy Castellano       See flowsheet documentation for full assessment, interventions and recommendations  Note:   Prognosis: Good  Problem List: Decreased strength, Decreased endurance, Impaired balance, Decreased mobility, Decreased safety awareness, Pain, Orthopedic restrictions  Assessment: Pt is 71 y o  male seen for PT evaluation s/p admit to [facility] on 8/21/2019 w/ Subarachnoid hemorrhage ([address] )  PT consulted to assess pt's functional mobility and d/c needs  Order placed for PT eval and tx, w/ activity as tolerated order  Comorbidities affecting pt's physical performance at time of assessment include: HD, AAA, anxiety, OA, bladder CA, chronic pain, depression, gout  PTA, pt was ambulates household distances and retired  Personal factors affecting pt at time of IE include: ambulating w/ assistive device, inability to navigate level surfaces w/o external assistance, limited home support, positive fall history, unable to perform physical activity, inability to perform IADLs and inability to perform ADLs  Please find objective findings from PT assessment regarding body systems outlined above with impairments and limitations including weakness, impaired balance, decreased endurance, impaired coordination, gait deviations, pain, decreased activity tolerance, decreased functional mobility tolerance, decreased safety awareness, fall risk and decreased cognition  Pt required increased time to complete bed mobility  Tolerated sitting EOB without reports of dizziness  Required hand placement instruction with new AD use  Ambulated with moderately unsteady gait with 2 LOB requiring therapist A to recover  High risk of additional falls with above noted deficits and decreased awareness of current limitations  The following objective measures performed on IE also reveal limitations: Barthel Index: 45/100  Pt's clinical presentation is currently unstable/unpredictable seen in pt's presentation of o2, pain  Pt to benefit from continued PT tx to address deficits as defined above and maximize level of functional independent mobility and consistency  From PT/mobility standpoint, recommendation at time of d/c would be STR vs  Home PT pending spouse FLMA and pending progress in order to facilitate return to PLOF  Barriers to Discharge: Inaccessible home environment, Decreased caregiver support     Recommendation: (STR vs HHPT pending spouse FMLA)     PT - OK to Discharge: No    See flowsheet documentation for full assessment

## 2019-08-23 NOTE — ASSESSMENT & PLAN NOTE
Lab Results   Component Value Date    HGBA1C 6 1 05/07/2019       No results for input(s): POCGLU in the last 72 hours      Blood Sugar Average: Last 72 hrs:  · Controlled as evidenced by A1c of 6 1% in May of 2019  · Will obtain or updated A1c  · Fasting glucose today 145, will defer sliding scale for now  · Monitor on renal diet

## 2019-08-23 NOTE — PROGRESS NOTES
Progress Note Kalyani Earing 1950, 71 y o  male MRN: [id number]    Unit/Bed#: [facility] 633-01 Encounter: [id number]    Primary Care Provider: Nadia Eldridge DO   Date and time admitted to hospital: 8/21/2019  4:24 PM        * [facility] )  Assessment & Plan  · CAT Scan of the head with new small foci of subarachnoid hemorrhage underlying left frontal operculum and within the anterior-inferiorclear of the interhemispheric fissure  · Patient stated he fell and hit the back of his head about a month ago  · Discussed with neurosurgery, consult appreciated  · Unclear if acute versus chronic  · No keppra  · Keep SBP <160  · Repeat CTH tomorrow  · MRI brain rule out amyloid  · Neuro checks stable  · Continue to hold heparin and anti-platelet agents    Elevated troponin  Assessment & Plan  · Non-MI troponin elevation, suspect secondary to volume overload, with underlying history of end-stage renal disease on hemodialysis  · Noted trend down, patient without chest pain    Volume overload  Assessment & Plan  · CT chest findings of volume overload with bilateral pulmonary edema and small layering effusions  · Management per HD/Nephrology  · S/p HD today, did not undergo volume removal per Nephrology given hypotension and tachycardia    Type 2 diabetes mellitus with both eyes affected by moderate nonproliferative retinopathy without macular edema, without long-term current use of insulin ([facility] )  Assessment & Plan  Lab Results   Component Value Date    HGBA1C 6 1 05/07/2019       No results for input(s): POCGLU in the last 72 hours      Blood Sugar Average: Last 72 hrs:  · Controlled as evidenced by A1c of 6 1% in May of 2019  · Will obtain or updated A1c  · Fasting glucose today 145, will defer sliding scale for now  · Monitor on renal diet    End stage renal disease (HCC)  Assessment & Plan  · HD per nephrology  · Continue nephrocaps, phoslo, renegel  · Status post treatment today    HLD (hyperlipidemia)  Assessment & Plan  · Continue atovastatin  · Has also elevated triglycerides and has been on Lopid for many years per discussion with patient's wife will check lipid profile in a m  Essential hypertension  Assessment & Plan  · Blood pressure systolic ranging at baseline 100-120,  Heart rate 90s but can speak into the 120s, telemetry NSR with episode of Afib noted again today  · Denies CP, palpitations   · Continue with Lopressor 12 5 mg q 12 hours and IV lopressor per cardiology  · Patient was noted to have occasional heart rate into the 100 to 140s on hemodialysis with systolic pressure in the 69G at times, per Dr Kirkland Ours  · Cardiology, Dr Raleigh Guo aware  · Cardiology consult in progress  · Echo EF 25% severe diffuse hypokinesis with regional variations, aortic valve moderate stenosis  · Per Cardiology will require cardiac catheterization when stable from a neurologic standpoint        VTE Pharmacologic Prophylaxis:   Pharmacologic: Pharmacologic VTE Prophylaxis contraindicated due to Subarachnoid hemorrhage  Mechanical VTE Prophylaxis in Place: No    Patient Centered Rounds: I have performed bedside rounds with nursing staff today  Discussions with Specialists or Other Care Team Provider:  Nephrology    Education and Discussions with Family / Patient:  Patient and spoke with patient's wife reviewed chart diagnostic studies consultant's recommendations    Time Spent for Care: 45 minutes  More than 50% of total time spent on counseling and coordination of care as described above  Current Length of Stay: 2 day(s)    Current Patient Status: Inpatient   Certification Statement: The patient will continue to require additional inpatient hospital stay due to Not medically stable for discharge, still undergoing workup regarding subarachnoid hemorrhage, per discussion with patient's wife she will take him home when he is medically ready    Discharge Plan:   Will likely require another 48 hours need CT head and MRI brain and heart rate still with intermittent burst of AFib with RVR    Code Status: Level 1 - Full Code      Subjective:   Patient reports headache this morning but resolved  Denies dizziness  No chest pain shortness of breath nausea vomiting abdominal pain  Tolerating diet  Objective:     Vitals:   Temp (24hrs), Av 9 °F (36 6 °C), Min:97 5 °F (36 4 °C), Max:98 4 °F (36 9 °C)    Temp:  [97 5 °F (36 4 °C)-98 4 °F (36 9 °C)] 97 6 °F (36 4 °C)  HR:  [] 98  Resp:  [18-20] 20  BP: ()/() 118/70  SpO2:  [95 %-99 %] 99 %  Body mass index is 23 18 kg/m²  Input and Output Summary (last 24 hours): Intake/Output Summary (Last 24 hours) at 2019 1629  Last data filed at 2019 1500  Gross per 24 hour   Intake 1860 ml   Output 866 ml   Net 994 ml       Physical Exam:     Physical Exam   Constitutional: He is oriented to person, place, and time  He appears well-developed  No distress  Chronically ill-appearing   HENT:   Head: Normocephalic and atraumatic  Mouth/Throat: Oropharynx is clear and moist    Very hard hearing   Eyes: Pupils are equal, round, and reactive to light  Neck: Normal range of motion  Neck supple  Cardiovascular: Normal rate and regular rhythm  Pulmonary/Chest: Effort normal and breath sounds normal  No respiratory distress  Abdominal: Soft  Bowel sounds are normal  He exhibits no distension  There is no tenderness  Musculoskeletal: Normal range of motion  He exhibits no edema  Neurological: He is alert and oriented to person, place, and time  No cranial nerve deficit  Forgetful   Skin: Skin is warm and dry  There is erythema  Left lower shin with skin tear noted, wound bed pink small serous drainage   Psychiatric: He has a normal mood and affect  His behavior is normal    Vitals reviewed        Additional Data:     Labs:    Results from last 7 days   Lab Units 19  0553   WBC Thousand/uL 12 95*   HEMOGLOBIN g/dL 11 7* HEMATOCRIT % 35 7*   PLATELETS Thousands/uL 377   NEUTROS PCT % 83*   LYMPHS PCT % 6*   MONOS PCT % 9   EOS PCT % 1     Results from last 7 days   Lab Units 08/23/19  0553 08/22/19  0416   POTASSIUM mmol/L 5 3 4 6   CHLORIDE mmol/L 93* 94*   CO2 mmol/L 24 27   BUN mg/dL 81* 43*   CREATININE mg/dL 8 74* 6 76*   CALCIUM mg/dL 8 6 9 0   ALK PHOS U/L  --  92   ALT U/L  --  56   AST U/L  --  83*     Results from last 7 days   Lab Units 08/21/19  1116   INR  1 24*       * I Have Reviewed All Lab Data Listed Above  * Additional Pertinent Lab Tests Reviewed: All Labs Within Last 24 Hours Reviewed    Imaging:    Imaging Reports Reviewed Today Include:  None  Imaging Personally Reviewed by Myself Includes:  None    Recent Cultures (last 7 days):     Results from last 7 days   Lab Units 08/22/19  0056   BLOOD CULTURE  No Growth at 24 hrs  No Growth at 24 hrs  Last 24 Hours Medication List:     Current Facility-Administered Medications:  allopurinol 100 mg Oral Daily Darinel Juarez MD   ALPRAZolam 0 5 mg Oral Daily PRN Darinel Juarez MD   atorvastatin 20 mg Oral HS Darinel Juarez MD   b complex-vitamin C-folic acid 1 capsule Oral Daily With Bethany Webster MD   calcium acetate 667 mg Oral TID With Meals Darinel Juarez MD   cholecalciferol 1,000 Units Oral Daily Darinel Juarez MD   cinacalcet 30 mg Oral Daily With Bethany Webster MD   escitalopram 10 mg Oral HS Darinel Juarez MD   guaiFENesin 400 mg Oral BID Darinel Juarez MD   metoprolol 5 mg Intravenous Q6H Darinel Juarez MD   metoprolol tartrate 12 5 mg Oral Q12H [facility] Darinel Juarez MD   oxyCODONE 5 mg Oral TID PRN Darinel Juarez MD   pantoprazole 40 mg Oral Early Morning Darinel Juarez MD   sevelamer 800 mg Oral TID With Meals Darinel Juarez MD        Today, Patient Was Seen By: ESPERANZA Lobo    ** Please Note: Dictation voice to text software may have been used in the creation of this document   **

## 2019-08-23 NOTE — ASSESSMENT & PLAN NOTE
· Blood pressure systolic ranging at baseline 100-120,  Heart rate 90s but can speak into the 120s, telemetry NSR with episode of Afib noted again today  · Denies CP, palpitations   · Continue with Lopressor 12 5 mg q 12 hours and IV lopressor per cardiology  · Patient was noted to have occasional heart rate into the 100 to 140s on hemodialysis with systolic pressure in the 28R at times, per Dr Jahaira Carreon  · Cardiology, Dr Kat Jean-Baptiste aware  · Cardiology consult in progress  · Echo EF 25% severe diffuse hypokinesis with regional variations, aortic valve moderate stenosis  · Per Cardiology will require cardiac catheterization when stable from a neurologic standpoint

## 2019-08-23 NOTE — PROGRESS NOTES
Cardiology Progress Note - Sami Lopez 71 y o  male MRN: [id number]    Unit/Bed#: [facility] 633-01 Encounter: [id number]      Assessment:  Principal Problem:    Subarachnoid hemorrhage ([address] )  Active Problems:    Essential hypertension    HLD (hyperlipidemia)    End stage renal disease (HCC)    Type 2 diabetes mellitus with both eyes affected by moderate nonproliferative retinopathy without macular edema, without long-term current use of insulin (HCC)    Cerebral hemorrhage (HCC)    Volume overload    Elevated troponin      Plan:  Patient has no chest pain or significant dyspnea  He does have fatigue  He remains on oxygen  He is in sinus rhythm on telemetry  Patient had echocardiogram done July 2015 which demonstrated preserved LV systolic function with an inferior wall motion abnormality  At that time there was no significant valvular heart disease reported  Current echocardiogram done yesterday demonstrates severely reduced LV systolic function with an ejection fraction of 25%  Mild concentric left ventricular hypertrophy was noted  Fibrocalcific disease of the mitral valve was noted with mild mitral regurgitation  Fibrocalcific disease of the aortic valve was noted with moderate aortic valve stenosis with a valve mean gradient of 13 mmHg noted  Patient will require cardiac catheterization once stabilized from a neurologic point of view  Blood thinning medication is currently on hold  Will continue statin and beta-blocker  He has end-stage renal disease on hemodialysis  Subjective:   Patient seen and examined  No significant events overnight   negative  Objective:     Vitals: Blood pressure 124/71, pulse 87, temperature 97 6 °F (36 4 °C), resp  rate 18, height 5' 9" (1 753 m), weight 71 2 kg (156 lb 15 5 oz), SpO2 98 %  , Body mass index is 23 18 kg/m² ,   Orthostatic Blood Pressures      Most Recent Value   Blood Pressure  124/71 filed at 08/23/2019 0830   Patient Position - Orthostatic VS  Lying filed at 08/23/2019 0323      ,      Intake/Output Summary (Last 24 hours) at 8/23/2019 0849  Last data filed at 8/23/2019 0820  Gross per 24 hour   Intake 1580 ml   Output 2500 ml   Net -920 ml       No significant arrhythmias seen on telemetry review  Physical Exam:    GEN: [place]   NECK: supple, no carotid bruits, no JVD or HJR  HEART: normal rate, regular rhythm, normal S1 and S2, systolic murmur  LUNGS: clear to auscultation bilaterally; no wheezes, rales, or rhonchi   ABDOMEN: normal bowel sounds, soft, no tenderness, no distention  EXTREMITIES:  edema  SKIN: warm and well perfused, no suspicious lesions on exposed skin    Labs & Results:    Admission on 08/21/2019   Component Date Value    Troponin I 08/21/2019 2 51*    Troponin I 08/21/2019 3 12*    Blood Culture 08/22/2019 No Growth at 24 hrs   Blood Culture 08/22/2019 No Growth at 24 hrs       Troponin I 08/22/2019 3 15*    Troponin I 08/22/2019 2 98*    Sodium 08/22/2019 134*    Potassium 08/22/2019 4 6     Chloride 08/22/2019 94*    CO2 08/22/2019 27     ANION GAP 08/22/2019 13     BUN 08/22/2019 43*    Creatinine 08/22/2019 6 76*    Glucose 08/22/2019 151*    Calcium 08/22/2019 9 0     eGFR 08/22/2019 8     WBC 08/22/2019 12 39*    RBC 08/22/2019 3 15*    Hemoglobin 08/22/2019 10 5*    Hematocrit 08/22/2019 31 8*    MCV 08/22/2019 101*    MCH 08/22/2019 33 3     MCHC 08/22/2019 33 0     RDW 08/22/2019 14 6     MPV 08/22/2019 10 9     Platelets 74/40/3108 338     nRBC 08/22/2019 0     Neutrophils Relative 08/22/2019 84*    Immat GRANS % 08/22/2019 0     Lymphocytes Relative 08/22/2019 6*    Monocytes Relative 08/22/2019 10     Eosinophils Relative 08/22/2019 0     Basophils Relative 08/22/2019 0     Neutrophils Absolute 08/22/2019 10 42*    Immature Grans Absolute 08/22/2019 0 05     Lymphocytes Absolute 08/22/2019 0 69     Monocytes Absolute 08/22/2019 1 20     Eosinophils Absolute 08/22/2019 0 02  Basophils Absolute 08/22/2019 0 01     Total Bilirubin 08/22/2019 1 14*    Bilirubin, Direct 08/22/2019 0 68*    Alkaline Phosphatase 08/22/2019 92     AST 08/22/2019 83*    ALT 08/22/2019 56     Total Protein 08/22/2019 7 6     Albumin 08/22/2019 3 0*    Phosphorus 08/22/2019 4 7*    Procalcitonin 08/22/2019 1 38*    A/G Ratio 08/22/2019 1 14     Albumin Electrophoresis 08/22/2019 53 3     Albumin CONC 08/22/2019 3 62     Alpha 1 08/22/2019 8 6*    ALPHA 1 CONC 08/22/2019 0 58*    Alpha 2 08/22/2019 18 5*    ALPHA 2 CONC 08/22/2019 1 26*    Beta-1 08/22/2019 5 0     BETA 1 CONC 08/22/2019 0 34*    Beta-2 08/22/2019 6 5     BETA 2 CONC 08/22/2019 0 44     Gamma Globulin 08/22/2019 8 1*    GAMMA CONC 08/22/2019 0 55     Total Protein 08/22/2019 6 8     SPEP Interpretation 08/22/2019 No monoclonal bands noted  Reviewed by: Alisa [facility], MD, PhD ([id number]) **Electronic Signature**     TSH 3RD GENERATON 08/22/2019 0 966     Troponin I 08/22/2019 2 88*    Ventricular Rate 08/21/2019 97     Atrial Rate 08/21/2019 97     CT Interval 08/21/2019 176     QRSD Interval 08/21/2019 100     QT Interval 08/21/2019 384     QTC Interval 08/21/2019 487     P Axis 08/21/2019 -24     QRS [place] 08/21/2019 -14     T Wave Axis 08/21/2019 38     Sodium 08/23/2019 135*    Potassium 08/23/2019 5 3     Chloride 08/23/2019 93*    CO2 08/23/2019 24     ANION GAP 08/23/2019 18*    BUN 08/23/2019 81*    Creatinine 08/23/2019 8 74*    Glucose 08/23/2019 145*    Calcium 08/23/2019 8 6     eGFR 08/23/2019 6     WBC 08/23/2019 12 95*    RBC 08/23/2019 3 54*    Hemoglobin 08/23/2019 11 7*    Hematocrit 08/23/2019 35 7*    MCV 08/23/2019 101*    MCH 08/23/2019 33 1     MCHC 08/23/2019 32 8     RDW 08/23/2019 14 5     MPV 08/23/2019 10 6     Platelets 18/66/7135 377     nRBC 08/23/2019 0     Neutrophils Relative 08/23/2019 83*    Immat GRANS % 08/23/2019 1     Lymphocytes Relative 08/23/2019 6*    Monocytes Relative 08/23/2019 9     Eosinophils Relative 08/23/2019 1     Basophils Relative 08/23/2019 0     Neutrophils Absolute 08/23/2019 10 87*    Immature Grans Absolute 08/23/2019 0 10     Lymphocytes Absolute 08/23/2019 0 76     Monocytes Absolute 08/23/2019 1 13     Eosinophils Absolute 08/23/2019 0 07     Basophils Absolute 08/23/2019 0 02        Ct Head Without Contrast    Result Date: 8/21/2019  Narrative: CT BRAIN - WITHOUT CONTRAST INDICATION:   Weakness  COMPARISON:  9/14/2016  TECHNIQUE:  CT examination of the brain was performed  In addition to axial images, coronal 2D reformatted images were created and submitted for interpretation  Radiation dose length product (DLP) for this visit:  901 8 mGy-cm   This examination, like all CT scans performed in the [facility], was performed utilizing techniques to minimize radiation dose exposure, including the use of iterative reconstruction and automated exposure control  IMAGE QUALITY:  Diagnostic  FINDINGS: PARENCHYMA: Moderate patchy periventricular white matter hypodensities consistent with chronic microangiopathic changes  No acute infarct  No mass  Punctate parenchymal hyperdensity in the subcortical right frontal lobe (series 2, image 26) concerning for small parenchymal hemorrhage  Punctate hyperdensity at the left frontal arcual (series 2, image 25) appears to be within the sulcus on axial view, but coronal reformats may suggest cortical location  The finding is concerning for hemorrhage  Favor subarachnoid location  Punctate subarachnoid hyperdensity in the inferior aspect of the anterior interhemispheric fissure (series 400, image 25) also concerning for extra-axial hemorrhage  Areas of possible hemorrhage appear new from prior  VENTRICLES AND EXTRA-AXIAL SPACES:  Possible small foci of extra axial hemorrhage, discussed above  No intraventricular layering blood products    No hydrocephalus, herniation, or mass effect  VISUALIZED ORBITS AND PARANASAL SINUSES:  Prior bilateral lens replacements and left scleral banding  Paranasal sinuses are clear  Rightward nasal septal deviation  CALVARIUM AND EXTRACRANIAL SOFT TISSUES:  Normal      Impression: New small foci of subarachnoid hemorrhage underlying the left frontal operculum and within the anterior-inferior of the interhemispheric fissure  New punctate focus of hyperdensity concerning for petechial hemorrhage in the subcortical right frontal lobe  Recommend further evaluation with CTA of the head  Note:  I personally discussed this study with Susan Brantley on 8/21/2019 at 1:00 PM  Workstation performed: [id number]     Ct Chest Without Contrast    Result Date: 8/21/2019  Narrative: CT CHEST WITHOUT IV CONTRAST INDICATION:   Cough shortness of breath  COMPARISON:  Chest CT dated 12/5/2018 TECHNIQUE: CT examination of the chest was performed without intravenous contrast   Axial, sagittal, and coronal 2D reformatted images were created from the source data and submitted for interpretation  Radiation dose length product (DLP) for this visit:  261 25 mGy-cm   This examination, like all CT scans performed in the [place], was performed utilizing techniques to minimize radiation dose exposure, including the use of iterative  reconstruction and automated exposure control  FINDINGS: LUNGS:  Straight interlobular septal thickening  Central predominant groundglass opacities in both lungs  Mild paraseptal emphysema at the apices  No suspicious pulmonary nodules  No endobronchial lesions  PLEURA:  Small layering pleural effusions  HEART/GREAT VESSELS:  Cardiomegaly  Extensive valvular and coronary calcifications  No pericardial effusion  Ectasia of the ascending aorta measuring 4 3 cm  No dissection  MEDIASTINUM AND ROSAS:  Shotty mediastinal lymph nodes are present    Number in the prevascular space/anterior mediastinum is greater than typically seen  However, none of the nodes appear enlarged or significantly changed since 2018  CHEST WALL AND LOWER NECK:   Unremarkable  VISUALIZED STRUCTURES IN THE UPPER ABDOMEN:  Incidental note of colonic diverticulosis without diverticulitis  Renal artery atherosclerosis  No acute findings in the upper abdomen  OSSEOUS STRUCTURES:  No acute fracture or destructive osseous lesion  Impression: 1  Findings of volume overload with bilateral pulmonary edema and small layering effusions  2   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  Workstation performed: [id number]     Vas Lower Limb Venous Duplex Study, Complete Bilateral    Result Date: 8/21/2019  Narrative:  [facility] REPORT CLINICAL: Indications: Shortness of breath  Denies leg pain  Operative History: congenital cataract detached retina left inguinal hernia umbilical hernia Risk Factors The patient has history of HTN, Diabetes (Yes) and CKD  He has no history of DVT  FINDINGS:  Segment  Right            Left              Impression       Impression       CFV      Normal (Patent)  Normal (Patent)     CONCLUSION: Impression: RIGHT LOWER LIMB: No evidence of acute or chronic deep vein thrombosis  No evidence of superficial thrombophlebitis noted  Doppler evaluation shows a normal response to augmentation maneuvers  Popliteal, posterior tibial and anterior tibial arterial Doppler waveforms are triphasic  LEFT LOWER LIMB: No evidence of acute or chronic deep vein thrombosis  No evidence of superficial thrombophlebitis noted  Doppler evaluation shows a normal response to augmentation maneuvers  Popliteal, posterior tibial and anterior tibial arterial Doppler waveforms are triphasic  Technical findings were given to Dr Lauri Junior    SIGNATURE: Electronically Signed by: Simon Luke MD, RPVI on 2019-08-21 03:24:29 PM      EKG personally reviewed by Lawyer Koko MD      Counseling / Coordination of Care  Total floor / unit time spent today 30 minutes  Greater than 50% of total time was spent with the patient and / or family counseling and / or coordination of care

## 2019-08-23 NOTE — HEMODIALYSIS
Pt noted hypotensive and tachycardic  On tele, pt in A-fib  MD Francy Hicks notified  NS bolus given and hypotension resolved  Pt continues in afib with HR between 110s to 120s  Pt asymptomatic at this time  Per MD Francy Hicks if pt cont asymptomatic, continue HD and stop removing fluid  UF off at this time  Will cont to monitor

## 2019-08-23 NOTE — ASSESSMENT & PLAN NOTE
· CT chest findings of volume overload with bilateral pulmonary edema and small layering effusions  · Management per HD/Nephrology  · S/p HD today, did not undergo volume removal per Nephrology given hypotension and tachycardia

## 2019-08-23 NOTE — PROGRESS NOTES
NEPHROLOGY PROGRESS NOTE   Amador Rueda 71 y o  male MRN: [id number]  Unit/Bed#: [facility] 633-01 Encounter: [id number]  Reason for Consult: ESRD on HD    ASSESSMENT AND PLAN:  Patient is 77-year-old male with prior history of bladder cancer, status post surgery, diabetes, hypertension, ESRD on HD on MWF schedule, presented with worsening dyspnea and was found to have subarachnoid hemorrhage  We are consulted for dialysis management  ESRD on HD on MWF schedule at [address] today  Outpatient dry weight 74 kg  Today pre HD weight 71 2 kg   -not on heparin on dialysis  I saw and examined patient during hemodialysis treatment at 10:18 AM on 8/23/2019  The patient was receiving hemodialysis for treatment of end stage renal disease  I also reviewed vital signs, intake and output, lab results and recent events, and agree with dialysis order  HR ranging from 100-140s on dialysis with occasional lower SBP readings  Patient asymptomatic  UF removal reduced and now turned off  S/p small IV NS bolus with improvement in BP  Tolerating HD  BP lower readings at times although acceptable at the time of my encounter      Access, upper extremity AV fistula with good bruit and thrill present     CKD anemia, hemoglobin overall at goal   No need for Epogen for now      CKD BMD, continue to monitor phosphorus and PTH   -serum phosphorus 4 7 at goal      Pulmonary edema/hypoxic respiratory failure  -currently requiring 2 L oxygen via nasal cannula at the time of my encounter     -salt restricted diet  -patient remains below his dry weight  UF removal as blood pressure tolerated on dialysis  Mild hyperkalemia, serum potassium 5 3, 2K bath on dialysis today  Strict potassium restricted diet      Borderline hyponatremia, serum sodium 135, strict fluid restriction 1 2 L per day      Small subarachnoid hemorrhage, petechial hemorrhage in the subcortical right frontal lobe, neurosurgical follow-up  -repeat CT scan of head as per Neurosurgery  Systolic CHF, EF 00%, IVC dilated  -moderate aortic stenosis, eventual need for cardiac catheterization noted from Cardiology note  New onset AFib as per Cardiology note, close cardiology follow-up  Discussed above plan with primary team    SUBJECTIVE:  Patient seen and examined at bedside  No chest pain, better shortness of breath, denies nausea, vomiting, abdominal pain      OBJECTIVE:  Current Weight: Weight - Scale: 71 2 kg (156 lb 15 5 oz)  Vitals:    08/23/19 1000   BP: 113/71   Pulse: (!) 109   Resp: 18   Temp:    SpO2:        Intake/Output Summary (Last 24 hours) at 8/23/2019 1017  Last data filed at 8/23/2019 0945  Gross per 24 hour   Intake 1780 ml   Output 2500 ml   Net -720 ml     Wt Readings from Last 3 Encounters:   08/23/19 71 2 kg (156 lb 15 5 oz)   08/21/19 79 4 kg (175 lb 0 7 oz)   07/23/19 75 6 kg (166 lb 9 6 oz)     Temp Readings from Last 3 Encounters:   08/23/19 97 6 °F (36 4 °C)   08/21/19 98 9 °F (37 2 °C) (Temporal)   05/23/19 (!) 97 °F (36 1 °C) (Tympanic)     BP Readings from Last 3 Encounters:   08/23/19 113/71   08/21/19 139/88   07/23/19 134/92     Pulse Readings from Last 3 Encounters:   08/23/19 (!) 109   08/21/19 (!) 109   05/23/19 84        Physical Examination:  General:  Lying in bed, no acute distress   Eyes:  Mild conjunctival pallor present  ENT:  External examination of ears and nose unremarkable  Neck:  Supple  Respiratory:  Bilateral air entry present, decreased breath sound at bases  CVS:  S1, S2 present  GI:  Soft, nontender, nondistended  CNS:  Active alert oriented x3  Extremities:  No significant edema in legs  Skin:  No new rash in legs    Medications:    Current Facility-Administered Medications:     allopurinol (ZYLOPRIM) tablet 100 mg, 100 mg, Oral, Daily, Chin Warner MD, 100 mg at 08/22/19 2939    ALPRAZolam (XANAX) tablet 0 5 mg, 0 5 mg, Oral, Daily PRN, Chin Warner MD    atorvastatin (LIPITOR) tablet 20 mg, 20 mg, Oral, HS, Brooke Preciado MD, 20 mg at 08/22/19 2201    b complex-vitamin C-folic acid (NEPHROCAPS) capsule 1 capsule, 1 capsule, Oral, Daily With Heri Bush MD, 1 capsule at 08/22/19 1629    calcium acetate (PHOSLO) capsule 667 mg, 667 mg, Oral, TID With Meals, Brooke Preciado MD, 667 mg at 08/22/19 1628    cholecalciferol (VITAMIN D3) tablet 1,000 Units, 1,000 Units, Oral, Daily, Brooke Preciado MD, 1,000 Units at 08/22/19 0941    cinacalcet (SENSIPAR) tablet 30 mg, 30 mg, Oral, Daily With Heri Bush MD, 30 mg at 08/22/19 1629    escitalopram (LEXAPRO) tablet 10 mg, 10 mg, Oral, HS, Brooke Preciado MD, 10 mg at 08/22/19 2202    guaiFENesin (ROBITUSSIN) oral liquid 400 mg, 400 mg, Oral, BID, Brooke Preciado MD, 400 mg at 08/22/19 1700    metoprolol (LOPRESSOR) injection 5 mg, 5 mg, Intravenous, Q6H, Brooke Preciado MD, 5 mg at 08/23/19 0213    metoprolol tartrate (LOPRESSOR) partial tablet 12 5 mg, 12 5 mg, Oral, Q12H [facility], Brooke Preciado MD, 12 5 mg at 08/22/19 2200    oxyCODONE (ROXICODONE) IR tablet 5 mg, 5 mg, Oral, TID PRN, Brooke Preciado MD    pantoprazole (PROTONIX) EC tablet 40 mg, 40 mg, Oral, Early Morning, Brooke Preciado MD, 40 mg at 08/23/19 0507    sevelamer (RENAGEL) tablet 800 mg, 800 mg, Oral, TID With Meals, Brooke Preciado MD, 800 mg at 08/22/19 1628    Laboratory Results:  Results from last 7 days   Lab Units 08/23/19  0553 08/22/19  0416 08/21/19  1117 08/21/19  1116   WBC Thousand/uL 12 95* 12 39*  --  13 36*   HEMOGLOBIN g/dL 11 7* 10 5*  --  11 3*   HEMATOCRIT % 35 7* 31 8*  --  34 9*   PLATELETS Thousands/uL 377 338  --  361   SODIUM mmol/L 135* 134* 139  --    POTASSIUM mmol/L 5 3 4 6 4 5  --    CHLORIDE mmol/L 93* 94* 93*  --    CO2 mmol/L 24 27 29  --    BUN mg/dL 81* 43* 53*  --    CREATININE mg/dL 8 74* 6 76* 8 75*  --    CALCIUM mg/dL 8 6 9 0 9 0  --    MAGNESIUM mg/dL  --   --  1 9  --    PHOSPHORUS mg/dL  --  4 7*  --   --        CT head wo contrast    (Results Pending)       Portions of the record may have been created with voice recognition software  Occasional wrong word or "sound a like" substitutions may have occurred due to the inherent limitations of voice recognition software  Read the chart carefully and recognize, using context, where substitutions have occurred

## 2019-08-24 ENCOUNTER — APPOINTMENT (INPATIENT)
Dept: RADIOLOGY | Facility: HOSPITAL | Age: 69
DRG: 040 | End: 2019-08-24
Payer: MEDICARE

## 2019-08-24 LAB
ALBUMIN SERPL BCP-MCNC: 3 G/DL (ref 3.5–5)
ALP SERPL-CCNC: 99 U/L (ref 46–116)
ALT SERPL W P-5'-P-CCNC: 71 U/L (ref 12–78)
ANION GAP SERPL CALCULATED.3IONS-SCNC: 15 MMOL/L (ref 4–13)
AST SERPL W P-5'-P-CCNC: 52 U/L (ref 5–45)
BASOPHILS # BLD AUTO: 0.03 THOUSANDS/ΜL (ref 0–0.1)
BASOPHILS NFR BLD AUTO: 0 % (ref 0–1)
BILIRUB SERPL-MCNC: 0.85 MG/DL (ref 0.2–1)
BUN SERPL-MCNC: 54 MG/DL (ref 5–25)
CALCIUM SERPL-MCNC: 8.8 MG/DL (ref 8.3–10.1)
CHLORIDE SERPL-SCNC: 102 MMOL/L (ref 100–108)
CHOLEST SERPL-MCNC: 127 MG/DL (ref 50–200)
CO2 SERPL-SCNC: 23 MMOL/L (ref 21–32)
CREAT SERPL-MCNC: 6.24 MG/DL (ref 0.6–1.3)
EOSINOPHIL # BLD AUTO: 0.3 THOUSAND/ΜL (ref 0–0.61)
EOSINOPHIL NFR BLD AUTO: 3 % (ref 0–6)
ERYTHROCYTE [DISTWIDTH] IN BLOOD BY AUTOMATED COUNT: 14.6 % (ref 11.6–15.1)
EST. AVERAGE GLUCOSE BLD GHB EST-MCNC: 114 MG/DL
GFR SERPL CREATININE-BSD FRML MDRD: 8 ML/MIN/1.73SQ M
GLUCOSE SERPL-MCNC: 139 MG/DL (ref 65–140)
HBA1C MFR BLD: 5.6 % (ref 4.2–6.3)
HCT VFR BLD AUTO: 35.2 % (ref 36.5–49.3)
HDLC SERPL-MCNC: 45 MG/DL (ref 40–60)
HGB BLD-MCNC: 12 G/DL (ref 12–17)
IMM GRANULOCYTES # BLD AUTO: 0.07 THOUSAND/UL (ref 0–0.2)
IMM GRANULOCYTES NFR BLD AUTO: 1 % (ref 0–2)
LDLC SERPL CALC-MCNC: 49 MG/DL (ref 0–100)
LYMPHOCYTES # BLD AUTO: 1.01 THOUSANDS/ΜL (ref 0.6–4.47)
LYMPHOCYTES NFR BLD AUTO: 9 % (ref 14–44)
MCH RBC QN AUTO: 34.7 PG (ref 26.8–34.3)
MCHC RBC AUTO-ENTMCNC: 34.1 G/DL (ref 31.4–37.4)
MCV RBC AUTO: 102 FL (ref 82–98)
MONOCYTES # BLD AUTO: 1.04 THOUSAND/ΜL (ref 0.17–1.22)
MONOCYTES NFR BLD AUTO: 9 % (ref 4–12)
NEUTROPHILS # BLD AUTO: 8.79 THOUSANDS/ΜL (ref 1.85–7.62)
NEUTS SEG NFR BLD AUTO: 78 % (ref 43–75)
NONHDLC SERPL-MCNC: 82 MG/DL
NRBC BLD AUTO-RTO: 0 /100 WBCS
PLATELET # BLD AUTO: 347 THOUSANDS/UL (ref 149–390)
PMV BLD AUTO: 10.9 FL (ref 8.9–12.7)
POTASSIUM SERPL-SCNC: 4.2 MMOL/L (ref 3.5–5.3)
PROT SERPL-MCNC: 7.4 G/DL (ref 6.4–8.2)
RBC # BLD AUTO: 3.46 MILLION/UL (ref 3.88–5.62)
SODIUM SERPL-SCNC: 140 MMOL/L (ref 136–145)
TRIGL SERPL-MCNC: 163 MG/DL
WBC # BLD AUTO: 11.24 THOUSAND/UL (ref 4.31–10.16)

## 2019-08-24 PROCEDURE — 70450 CT HEAD/BRAIN W/O DYE: CPT

## 2019-08-24 PROCEDURE — 85025 COMPLETE CBC W/AUTO DIFF WBC: CPT | Performed by: NURSE PRACTITIONER

## 2019-08-24 PROCEDURE — 83036 HEMOGLOBIN GLYCOSYLATED A1C: CPT | Performed by: NURSE PRACTITIONER

## 2019-08-24 PROCEDURE — 80061 LIPID PANEL: CPT | Performed by: NURSE PRACTITIONER

## 2019-08-24 PROCEDURE — 99232 SBSQ HOSP IP/OBS MODERATE 35: CPT | Performed by: INTERNAL MEDICINE

## 2019-08-24 PROCEDURE — 80053 COMPREHEN METABOLIC PANEL: CPT | Performed by: NURSE PRACTITIONER

## 2019-08-24 PROCEDURE — 99232 SBSQ HOSP IP/OBS MODERATE 35: CPT | Performed by: NURSE PRACTITIONER

## 2019-08-24 RX ADMIN — CINACALCET HYDROCHLORIDE 30 MG: 30 TABLET, FILM COATED ORAL at 17:24

## 2019-08-24 RX ADMIN — METOPROLOL TARTRATE 12.5 MG: 25 TABLET ORAL at 22:27

## 2019-08-24 RX ADMIN — Medication 1 CAPSULE: at 17:24

## 2019-08-24 RX ADMIN — PANTOPRAZOLE SODIUM 40 MG: 40 TABLET, DELAYED RELEASE ORAL at 05:52

## 2019-08-24 RX ADMIN — ATORVASTATIN CALCIUM 20 MG: 20 TABLET, FILM COATED ORAL at 21:12

## 2019-08-24 RX ADMIN — METOPROLOL TARTRATE 5 MG: 5 INJECTION, SOLUTION INTRAVENOUS at 21:12

## 2019-08-24 RX ADMIN — METOPROLOL TARTRATE 5 MG: 5 INJECTION, SOLUTION INTRAVENOUS at 08:18

## 2019-08-24 RX ADMIN — SEVELAMER HYDROCHLORIDE 800 MG: 800 TABLET, FILM COATED PARENTERAL at 12:32

## 2019-08-24 RX ADMIN — ESCITALOPRAM OXALATE 10 MG: 10 TABLET ORAL at 21:12

## 2019-08-24 RX ADMIN — CALCIUM ACETATE 667 MG: 667 CAPSULE ORAL at 12:32

## 2019-08-24 RX ADMIN — METOPROLOL TARTRATE 5 MG: 5 INJECTION, SOLUTION INTRAVENOUS at 02:05

## 2019-08-24 RX ADMIN — CALCIUM ACETATE 667 MG: 667 CAPSULE ORAL at 08:21

## 2019-08-24 RX ADMIN — METOPROLOL TARTRATE 12.5 MG: 25 TABLET ORAL at 09:44

## 2019-08-24 RX ADMIN — SEVELAMER HYDROCHLORIDE 800 MG: 800 TABLET, FILM COATED PARENTERAL at 17:24

## 2019-08-24 RX ADMIN — GUAIFENESIN 400 MG: 100 SOLUTION ORAL at 08:18

## 2019-08-24 RX ADMIN — METOPROLOL TARTRATE 5 MG: 5 INJECTION, SOLUTION INTRAVENOUS at 14:26

## 2019-08-24 RX ADMIN — SEVELAMER HYDROCHLORIDE 800 MG: 800 TABLET, FILM COATED PARENTERAL at 08:21

## 2019-08-24 RX ADMIN — GUAIFENESIN 400 MG: 100 SOLUTION ORAL at 17:24

## 2019-08-24 RX ADMIN — VITAMIN D, TAB 1000IU (100/BT) 1000 UNITS: 25 TAB at 08:18

## 2019-08-24 RX ADMIN — CALCIUM ACETATE 667 MG: 667 CAPSULE ORAL at 17:27

## 2019-08-24 RX ADMIN — ALLOPURINOL 100 MG: 100 TABLET ORAL at 08:21

## 2019-08-24 NOTE — ASSESSMENT & PLAN NOTE
· HD per nephrology MWF  · Continue nephrocaps, phoslo, renegel  · Status post treatment Friday (received 3 treatments in a row)

## 2019-08-24 NOTE — PROGRESS NOTES
NEPHROLOGY PROGRESS NOTE   Haja oTth 71 y o  male MRN: [id number]  Unit/Bed#: [facility] 633-01 Encounter: [id number]  Reason for Consult: ESRD    ASSESSMENT AND PLAN:  Patient is 60-year-old male with prior history of bladder cancer, status post surgery, diabetes, hypertension, ESRD on HD on MWF schedule, presented with worsening dyspnea and was found to have subarachnoid hemorrhage   We are consulted for dialysis management      ESRD on HD on MWF schedule at [facility]  -HD on Monday  Outpatient dry weight 74 kg    last post HD weight 70 2 kg below his dry weight   -not on heparin on dialysis      Access, upper extremity AV fistula with good bruit and thrill present     CKD anemia, hemoglobin overall at goal   No need for Epogen for now      CKD BMD, continue to monitor phosphorus and PTH   -serum phosphorus 4 7 at goal      Pulmonary edema/hypoxic respiratory failure  -overall much improved  Now remains on room air at the time of my encounter    -salt restricted diet  -patient remains below his dry weight  UF removal as blood pressure tolerated on dialysis      Mild hyperkalemia, resolved with dialysis  Strict potassium restricted diet      Borderline hyponatremia, serum sodium improved with dialysis to 140 today      Small subarachnoid hemorrhage, petechial hemorrhage in the subcortical right frontal lobe, neurosurgical follow-up  -repeat CT scan of head as per Neurosurgery      Systolic CHF, EF 97%, IVC dilated  -moderate aortic stenosis, eventual need for cardiac catheterization noted from Cardiology note      New onset AFib as per Cardiology note, close cardiology follow-up  Discussed above plan with primary team    SUBJECTIVE:  Patient seen and examined at bedside   No chest pain, shortness of breath, nausea, vomiting, abdominal pain    OBJECTIVE:  Current Weight: Weight - Scale: 71 2 kg (156 lb 15 5 oz)  Vitals:    08/24/19 0750   BP: 139/83   Pulse: 89   Resp: 18   Temp: 98 8 °F (37 1 °C) SpO2: 95%       Intake/Output Summary (Last 24 hours) at 8/24/2019 0944  Last data filed at 8/24/2019 0700  Gross per 24 hour   Intake 1500 ml   Output 866 ml   Net 634 ml     Wt Readings from Last 3 Encounters:   08/23/19 71 2 kg (156 lb 15 5 oz)   08/23/19 70 8 kg (156 lb)   08/21/19 79 4 kg (175 lb 0 7 oz)     Temp Readings from Last 3 Encounters:   08/24/19 98 8 °F (37 1 °C)   08/21/19 98 9 °F (37 2 °C) (Temporal)   05/23/19 (!) 97 °F (36 1 °C) (Tympanic)     BP Readings from Last 3 Encounters:   08/24/19 139/83   08/21/19 139/88   07/23/19 134/92     Pulse Readings from Last 3 Encounters:   08/24/19 89   08/21/19 (!) 109   05/23/19 84        Physical Examination:  General:  Sitting in chair, no acute distress   Eyes:  No conjunctival pallor present  ENT:  External examination of ears and nose unremarkable  Neck:  Supple  Respiratory:  Bilateral air entry present  CVS:  S1, S2 present  GI:  Soft, nontender, nondistended  CNS:  Alert oriented x3  Extremities:  No significant edema in legs  Skin:  No new rash in legs    Medications:    Current Facility-Administered Medications:     allopurinol (ZYLOPRIM) tablet 100 mg, 100 mg, Oral, Daily, Yennifer Jim MD, 100 mg at 08/24/19 3443    ALPRAZolam (XANAX) tablet 0 5 mg, 0 5 mg, Oral, Daily PRN, Yennifer Jim MD    atorvastatin (LIPITOR) tablet 20 mg, 20 mg, Oral, HS, Yennifer Jim MD, 20 mg at 08/23/19 2106    b complex-vitamin C-folic acid (NEPHROCAPS) capsule 1 capsule, 1 capsule, Oral, Daily With Dinner, Yennifer Jim MD, 1 capsule at 08/23/19 1823    calcium acetate (PHOSLO) capsule 667 mg, 667 mg, Oral, TID With Meals, Yennifer Jim MD, 667 mg at 08/24/19 9718    cholecalciferol (VITAMIN D3) tablet 1,000 Units, 1,000 Units, Oral, Daily, Yennifer Jim MD, 1,000 Units at 08/24/19 0818    cinacalcet (SENSIPAR) tablet 30 mg, 30 mg, Oral, Daily With Dinner, Yennifer Jim MD, 30 mg at 08/23/19 1823    escitalopram (LEXAPRO) tablet 10 mg, 10 mg, Oral, HS, Darinel Juarez MD, 10 mg at 08/23/19 2106    guaiFENesin (ROBITUSSIN) oral liquid 400 mg, 400 mg, Oral, BID, Darinel Jaurez MD, 400 mg at 08/24/19 0818    metoprolol (LOPRESSOR) injection 5 mg, 5 mg, Intravenous, Q6H, Darinel Juarez MD, 5 mg at 08/24/19 0818    metoprolol tartrate (LOPRESSOR) partial tablet 12 5 mg, 12 5 mg, Oral, Q12H [facility], Darinel Juarez MD, 12 5 mg at 08/23/19 2323    oxyCODONE (ROXICODONE) IR tablet 5 mg, 5 mg, Oral, TID PRN, Darinel Juarez MD    pantoprazole (PROTONIX) EC tablet 40 mg, 40 mg, Oral, Early Morning, Darinel Juarez MD, 40 mg at 08/24/19 0552    sevelamer (RENAGEL) tablet 800 mg, 800 mg, Oral, TID With Meals, Darinel Juarez MD, 800 mg at 08/24/19 2516    Laboratory Results:  Results from last 7 days   Lab Units 08/24/19  0601 08/23/19  0553 08/22/19  0416 08/21/19  1117 08/21/19  1116   WBC Thousand/uL 11 24* 12 95* 12 39*  --  13 36*   HEMOGLOBIN g/dL 12 0 11 7* 10 5*  --  11 3*   HEMATOCRIT % 35 2* 35 7* 31 8*  --  34 9*   PLATELETS Thousands/uL 347 377 338  --  361   SODIUM mmol/L 140 135* 134* 139  --    POTASSIUM mmol/L 4 2 5 3 4 6 4 5  --    CHLORIDE mmol/L 102 93* 94* 93*  --    CO2 mmol/L 23 24 27 29  --    BUN mg/dL 54* 81* 43* 53*  --    CREATININE mg/dL 6 24* 8 74* 6 76* 8 75*  --    CALCIUM mg/dL 8 8 8 6 9 0 9 0  --    MAGNESIUM mg/dL  --   --   --  1 9  --    PHOSPHORUS mg/dL  --   --  4 7*  --   --        CT head wo contrast    (Results Pending)   MRI inpatient order    (Results Pending)       Portions of the record may have been created with voice recognition software  Occasional wrong word or "sound a like" substitutions may have occurred due to the inherent limitations of voice recognition software  Read the chart carefully and recognize, using context, where substitutions have occurred

## 2019-08-24 NOTE — ASSESSMENT & PLAN NOTE
· Blood pressure systolic ranging at baseline 117-142  Heart rate 70 to 80s, not noted to have elevations into the mid 100s telemetry NSR   · Denies CP, palpitations   · Continue with Lopressor 12 5 mg q 12 hours  · Patient was noted to have occasional heart rate into the 100 to 140s on hemodialysis yesterday with systolic pressure in the 47T at times, per Dr Francy Hicks  · Cardiology was made aware  · Cardiology following, recommendations noted  · Plan to defer ischemia testing to outpatient setting once subarachnoid hemorrhage resolved and patient able to take anti-platelet agents  · Echo EF 25% severe diffuse hypokinesis with regional variations, aortic valve moderate stenosis

## 2019-08-24 NOTE — PROGRESS NOTES
Advanced Heart Failure/Pulmonary Hypertension Service Progress Note - Alise Farias 71 y o  male MRN: [id number]    Unit/Bed#: [facility] 633-01 Encounter: [id number]  Assessment:    Principal Problem:    Subarachnoid hemorrhage (HCC)  Active Problems:    Essential hypertension    HLD (hyperlipidemia)    End stage renal disease (HCC)    Type 2 diabetes mellitus with both eyes affected by moderate nonproliferative retinopathy without macular edema, without long-term current use of insulin (HCC)    Cerebral hemorrhage (HCC)    Volume overload    Elevated troponin    # Newly diagnosed BiV HFrEF, LVEF ~25%, LVIDd 5 3 cm, NYHA II/III, ACC/AHA Stage C:  Diag:  --TTE 8/22/19: LVEF ~25%, LVIDd 5 3 cm, mild cLVH  Mildly reduced RVSF  Midl ANDREINA  Mild MR  Mod AS w/ mean grad of 13 mmHg  Obstructive index of 0 3  LORIN 1 3 cm2  Impression: Etiology unclear  Possible ischemic given RF +/- HTN +/- AS (although only mod by TTE) +/- AFib/tachy myopathy +/- myocarditis  Currently further workup precluded by [facility]  To do:  --Defer ischemia testing to outpatient setting once [facility] resolved and patient able to take anti-platelet agents    Therapeutic:  --Continue metoprolol tartrate 12 5 mg PO BID    # Newly diagnosed AFib: Currently NSR  No OAC given SAH  Can consider outpatient loop or 1 month monitor for burden assessment  --Continue BB; change to succinate prior to D/C given HFrEF    # SAH: Small focus of subarachnoid hemorrhage within the left posterior lateral frontal region on series 2 image 24 is slightly less apparent than the prior examination  # ESRD on HD MWF  # NSTEMI type II vs Non MI troponin elevation in the setting of CKD/ESRD w/ volume overload  --No antiplatelet agents for now  --Continue statin      Subjective:   Patient seen and examined  No significant events overnight  Objective:     [employer] (day, reason):   Doherty catheter (day, reason):    Vitals: Blood pressure 117/66, pulse 78, temperature 98 8 °F (37 1 °C), resp  rate 18, height 5' 9" (1 753 m), weight 71 2 kg (156 lb 15 5 oz), SpO2 95 %  , Body mass index is 23 18 kg/m² , I/O last 3 completed shifts: In: 2080 [P O :980; I V :600; Other:500]  Out: 866 [Other:866]  I/O this shift:  In: 300 [P O :300]  Out: -   Wt Readings from Last 3 Encounters:   08/23/19 71 2 kg (156 lb 15 5 oz)   08/23/19 70 8 kg (156 lb)   08/21/19 79 4 kg (175 lb 0 7 oz)       Intake/Output Summary (Last 24 hours) at 8/24/2019 1220  Last data filed at 8/24/2019 0944  Gross per 24 hour   Intake 1000 ml   Output 0 ml   Net 1000 ml     I/O last 3 completed shifts: In: 2080 [P O :980; I V :600; Other:500]  Out: 866 [Other:866]    No significant arrhythmias seen on telemetry review      Physical Exam:  Vitals:    08/24/19 0204 08/24/19 0318 08/24/19 0750 08/24/19 0944   BP: 143/96 136/86 139/83 117/66   Pulse: 100 83 89 78   Resp:  17 18    Temp:   98 8 °F (37 1 °C)    TempSrc:       SpO2: 96% 92% 95%    Weight:       Height:         GEN: Blanquita Haywood appears well, alert and oriented x 3, pleasant and cooperative   HEENT: pupils equal, round, and reactive to light; extraocular muscles intact  NECK: supple, no carotid bruits   HEART: regular rhythm, normal S1 and S2, no murmurs, clicks, gallops or rubs, JVP is    LUNGS: clear to auscultation bilaterally; no wheezes, rales, or rhonchi   ABDOMEN: normal bowel sounds, soft, no tenderness, no distention  EXTREMITIES: peripheral pulses normal; no clubbing, cyanosis, or edema  NEURO: no focal findings   SKIN: normal without suspicious lesions on exposed skin      Current Facility-Administered Medications:     allopurinol (ZYLOPRIM) tablet 100 mg, 100 mg, Oral, Daily, Alma Dhillon MD, 100 mg at 08/24/19 9646    ALPRAZolam (XANAX) tablet 0 5 mg, 0 5 mg, Oral, Daily PRN, Alma Dhillon MD    atorvastatin (LIPITOR) tablet 20 mg, 20 mg, Oral, HS, Alma Dhillon MD, 20 mg at 08/23/19 2105    b complex-vitamin C-folic acid (NEPHROCAPS) capsule 1 capsule, 1 capsule, Oral, Daily With Marlon Brennan MD, 1 capsule at 08/23/19 1823    calcium acetate (PHOSLO) capsule 667 mg, 667 mg, Oral, TID With Meals, Good Woodard MD, 667 mg at 08/24/19 5592    cholecalciferol (VITAMIN D3) tablet 1,000 Units, 1,000 Units, Oral, Daily, Good Woodard MD, 1,000 Units at 08/24/19 0818    cinacalcet (SENSIPAR) tablet 30 mg, 30 mg, Oral, Daily With Marlon Brennan MD, 30 mg at 08/23/19 1823    escitalopram (LEXAPRO) tablet 10 mg, 10 mg, Oral, HS, Good Woodard MD, 10 mg at 08/23/19 2106    guaiFENesin (ROBITUSSIN) oral liquid 400 mg, 400 mg, Oral, BID, Good Woodard MD, 400 mg at 08/24/19 0818    metoprolol (LOPRESSOR) injection 5 mg, 5 mg, Intravenous, Q6H, Good Woodard MD, 5 mg at 08/24/19 0818    metoprolol tartrate (LOPRESSOR) partial tablet 12 5 mg, 12 5 mg, Oral, Q12H [facility], Good Woodard MD, 12 5 mg at 08/24/19 0944    oxyCODONE (ROXICODONE) IR tablet 5 mg, 5 mg, Oral, TID PRN, Good Woodard MD    pantoprazole (PROTONIX) EC tablet 40 mg, 40 mg, Oral, Early Morning, Good Woodard MD, 40 mg at 08/24/19 0552    sevelamer (RENAGEL) tablet 800 mg, 800 mg, Oral, TID With Meals, Good Woodard MD, 800 mg at 08/24/19 7783    Labs & Results:    Results from last 7 days   Lab Units 08/22/19  0746 08/22/19  0416 08/22/19  0057   TROPONIN I ng/mL 2 88* 2 98* 3 15*     Results from last 7 days   Lab Units 08/24/19  0601 08/23/19  0553 08/22/19  0416   WBC Thousand/uL 11 24* 12 95* 12 39*   HEMOGLOBIN g/dL 12 0 11 7* 10 5*   HEMATOCRIT % 35 2* 35 7* 31 8*   PLATELETS Thousands/uL 347 377 338         Results from last 7 days   Lab Units 08/24/19  0601 08/23/19  0553 08/22/19  0416 08/21/19  1117   POTASSIUM mmol/L 4 2 5 3 4 6 4 5   CHLORIDE mmol/L 102 93* 94* 93*   CO2 mmol/L 23 24 27 29   BUN mg/dL 54* 81* 43* 53*   CREATININE mg/dL 6 24* 8 74* 6 76* 8 75*   CALCIUM mg/dL 8 8 8 6 9 0 9 0   ALK PHOS U/L 99  --  92 93   ALT U/L 71  --  56 18   AST U/L 52* --  83* 33     Results from last 7 days   Lab Units 08/21/19  1116   INR  1 24*     EKG personally reviewed by Brooke Wayne MD      Counseling / Coordination of Care  Total floor / unit time spent today 40 minutes  Greater than 50% of total time was spent with the patient and / or family counseling and / or coordination of care  A description of the counseling / coordination of care: 20 min  Thank you for the opportunity to participate in the care of this patient      Brooke Wayne MD, PhD   Michele Javier

## 2019-08-24 NOTE — ASSESSMENT & PLAN NOTE
· CT chest findings of volume overload with bilateral pulmonary edema and small layering effusions  · Management per HD/Nephrology  · S/p HD Friday, did not undergo volume removal per Nephrology given hypotension and tachycardia  · Patient does not appear volume overloaded on exam

## 2019-08-24 NOTE — ASSESSMENT & PLAN NOTE
Lab Results   Component Value Date    HGBA1C 6 1 05/07/2019       No results for input(s): POCGLU in the last 72 hours      Blood Sugar Average: Last 72 hrs:  · Controlled as evidenced by A1c of 6 1% in May of 2019  · Will obtain or updated A1c  · Fasting glucose today 139, will defer sliding scale for now  · Monitor on renal diet

## 2019-08-24 NOTE — ASSESSMENT & PLAN NOTE
· 8/21/19 CAT Scan of the head with new small foci of subarachnoid hemorrhage underlying left frontal operculum and within the anterior-inferiorclear of the interhemispheric fissure  · 8/24/19 Small focus of subarachnoid hemorrhage within the left posterior lateral frontal region on series 2 image 24 is slightly less apparent than the prior examination  Parenchymal hyperdensities are noted within the anterior medial frontal lobe, series 2 image 27 and within the posterior right thalamus on series 2 image 21 which are unchanged  These may represent dystrophic calcifications  · Patient stated he fell and hit the back of his head about a month ago, per discussion with patient's wife he has fallen more frequently than he admits with head trauma noted    · Neurosurgery following  · Unclear if acute versus chronic  · No keppra  · Keep SBP <160  · Repeat CT head stable results as noted above  · MRI brain rule out amyloid pending  · Neuro checks stable  · Continue to hold heparin and anti-platelet agents

## 2019-08-24 NOTE — PROGRESS NOTES
Progress Note [place] 1950, 71 y o  male MRN: [id number]    Unit/Bed#: [facility] 633-01 Encounter: [id number]    Primary Care Provider: Sourav Cole DO   Date and time admitted to hospital: 8/21/2019  4:24 PM        * Subarachnoid hemorrhage ([facility] [address] )  Assessment & Plan  · 8/21/19 CAT Scan of the head with new small foci of subarachnoid hemorrhage underlying left frontal operculum and within the anterior-inferiorclear of the interhemispheric fissure  · 8/24/19 Small focus of subarachnoid hemorrhage within the left posterior lateral frontal region on series 2 image 24 is slightly less apparent than the prior examination  Parenchymal hyperdensities are noted within the anterior medial frontal lobe, series 2 image 27 and within the posterior right thalamus on series 2 image 21 which are unchanged  These may represent dystrophic calcifications  · Patient stated he fell and hit the back of his head about a month ago, per discussion with patient's wife he has fallen more frequently than he admits with head trauma noted    · Neurosurgery following  · Unclear if acute versus chronic  · No keppra  · Keep SBP <160  · Repeat CT head stable results as noted above  · MRI brain rule out amyloid pending  · Neuro checks stable  · Continue to hold heparin and anti-platelet agents    Elevated troponin  Assessment & Plan  · Non-MI troponin elevation, suspect secondary to volume overload, with underlying history of end-stage renal disease on hemodialysis  · Noted trend down, patient without chest pain    Volume overload  Assessment & Plan  · CT chest findings of volume overload with bilateral pulmonary edema and small layering effusions  · Management per HD/Nephrology  · S/p HD Friday, did not undergo volume removal per Nephrology given hypotension and tachycardia  · Patient does not appear volume overloaded on exam    Type 2 diabetes mellitus with both eyes affected by moderate nonproliferative retinopathy without macular edema, without long-term current use of insulin [facility])  Assessment & Plan  Lab Results   Component Value Date    HGBA1C 6 1 05/07/2019       No results for input(s): POCGLU in the last 72 hours  Blood Sugar Average: Last 72 hrs:  · Controlled as evidenced by A1c of 6 1% in May of 2019  · Will obtain or updated A1c  · Fasting glucose today 139, will defer sliding scale for now  · Monitor on renal diet    End stage renal disease (HCC)  Assessment & Plan  · HD per nephrology MWF  · Continue nephrocaps, phoslo, renegel  · Status post treatment Friday (received 3 treatments in a row)    HLD (hyperlipidemia)  Assessment & Plan  · Continue atorvastatin  · Triglycerides 163, HDL 45, LDL 49    Essential hypertension  Assessment & Plan  · Blood pressure systolic ranging at baseline 117-142  Heart rate 70 to 80s, not noted to have elevations into the mid 100s telemetry NSR   · Denies CP, palpitations   · Continue with Lopressor 12 5 mg q 12 hours  · Patient was noted to have occasional heart rate into the 100 to 140s on hemodialysis yesterday with systolic pressure in the 01J at times, per Dr Michale Lesches  · Cardiology was made aware  · Cardiology following, recommendations noted  · Plan to defer ischemia testing to outpatient setting once subarachnoid hemorrhage resolved and patient able to take anti-platelet agents  · Echo EF 25% severe diffuse hypokinesis with regional variations, aortic valve moderate stenosis      VTE Pharmacologic Prophylaxis:   Pharmacologic: Pharmacologic VTE Prophylaxis contraindicated due to [facility]  Mechanical VTE Prophylaxis in Place: Yes    Patient Centered Rounds: I have performed bedside rounds with nursing staff today  Discussions with Specialists or Other Care Team Provider:     Education and Discussions with Family / Patient: patient    Time Spent for Care: 30 minutes  More than 50% of total time spent on counseling and coordination of care as described above      Current Length of Stay: 3 day(s)    Current Patient Status: Inpatient   Certification Statement: The patient will continue to require additional inpatient hospital stay due to need for MRI and also for a safe discharge plans    Discharge Plan: likely will need rehab expect potential discharge in the next 48 hours    Code Status: Level 1 - Full Code      Subjective:   Patient seen and examined while he was up in chair  Appears more alert and interactive today  [place] diet  No reports of N/V  No CP or SOB    Objective:     Vitals:   Temp (24hrs), Av 8 °F (37 1 °C), Min:98 8 °F (37 1 °C), Max:98 8 °F (37 1 °C)    Temp:  [98 8 °F (37 1 °C)] 98 8 °F (37 1 °C)  HR:  [] 74  Resp:  [17-20] 20  BP: (117-143)/(66-96) 136/76  SpO2:  [92 %-98 %] 98 %  Body mass index is 23 18 kg/m²  Input and Output Summary (last 24 hours): Intake/Output Summary (Last 24 hours) at 2019 1705  Last data filed at 2019 1300  Gross per 24 hour   Intake 1080 ml   Output    Net 1080 ml       Physical Exam  Constitutional: He is oriented to person, place, and time  He appears well-developed  No distress  Chronically ill-appearing   HENT:   Head: Normocephalic and atraumatic  Mouth/Throat: Oropharynx is clear and moist    Very hard hearing   Eyes: Pupils are equal, round, and reactive to light  Neck: Normal range of motion  Neck supple  Cardiovascular: Normal rate and regular rhythm  Pulmonary/Chest: Effort normal and breath sounds normal  No respiratory distress  Abdominal: Soft  Bowel sounds are normal  He exhibits no distension  There is no tenderness  Musculoskeletal: Normal range of motion  He exhibits no edema  Neurological: He is alert and oriented to person, place, and time  No cranial nerve deficit  Forgetful   Skin: Skin is warm and dry  There is erythema  LLE dressings dry and intact   Psychiatric: He has a normal mood and affect  His behavior is normal    Vitals reviewed    Additional Data:     Labs:    Results from last 7 days   Lab Units 08/24/19  0601   WBC Thousand/uL 11 24*   HEMOGLOBIN g/dL 12 0   HEMATOCRIT % 35 2*   PLATELETS Thousands/uL 347   NEUTROS PCT % 78*   LYMPHS PCT % 9*   MONOS PCT % 9   EOS PCT % 3     Results from last 7 days   Lab Units 08/24/19  0601   POTASSIUM mmol/L 4 2   CHLORIDE mmol/L 102   CO2 mmol/L 23   BUN mg/dL 54*   CREATININE mg/dL 6 24*   CALCIUM mg/dL 8 8   ALK PHOS U/L 99   ALT U/L 71   AST U/L 52*     Results from last 7 days   Lab Units 08/21/19  1116   INR  1 24*       * I Have Reviewed All Lab Data Listed Above  * Additional Pertinent Lab Tests Reviewed: All Labs Within Last 24 Hours Reviewed    Imaging:    Imaging Reports Reviewed Today Include: [facility]  Imaging Personally Reviewed by Myself Includes:  none    Recent Cultures (last 7 days):     Results from last 7 days   Lab Units 08/22/19  0056   BLOOD CULTURE  No Growth at 48 hrs  No Growth at 48 hrs  Last 24 Hours Medication List:     Current Facility-Administered Medications:  allopurinol 100 mg Oral Daily Ahmet Gruber MD   ALPRAZolam 0 5 mg Oral Daily PRN Ahmet Gruber MD   atorvastatin 20 mg Oral HS Ahmet Gruber MD   b complex-vitamin C-folic acid 1 capsule Oral Daily With Sina Tong MD   calcium acetate 667 mg Oral TID With Meals Ahmet Gruber MD   cholecalciferol 1,000 Units Oral Daily Ahmet Gruber MD   cinacalcet 30 mg Oral Daily With Sina Tong MD   escitalopram 10 mg Oral HS Ahmet Gruber MD   guaiFENesin 400 mg Oral BID Ahmet Gruber MD   metoprolol 5 mg Intravenous Q6H Ahmet Gruber MD   metoprolol tartrate 12 5 mg Oral Q12H [address] Ahmet Gruber MD   oxyCODONE 5 mg Oral TID PRN Ahmet Gruber MD   pantoprazole 40 mg Oral Early Morning Ahmet Gruber MD   sevelamer 800 mg Oral TID With Meals Ahmet Gruber MD        Today, Patient Was Seen By: ESPERANZA Dejesus    ** Please Note: Dictation voice to text software may have been used in the creation of this document   **

## 2019-08-24 NOTE — PLAN OF CARE
Problem: Potential for Falls  Goal: Patient will remain free of falls  Description  INTERVENTIONS:  - Assess patient frequently for physical needs  -  Identify cognitive and physical deficits and behaviors that affect risk of falls  -  [place] fall precautions as indicated by assessment   - Educate patient/family on patient safety including physical limitations  - Instruct patient to call for assistance with activity based on assessment  - Modify environment to reduce risk of injury  - Consider OT/PT consult to assist with strengthening/mobility  Outcome: Progressing     Problem: METABOLIC, FLUID AND ELECTROLYTES - ADULT  Goal: Electrolytes maintained within normal limits  Description  INTERVENTIONS:  - Monitor labs and assess patient for signs and symptoms of electrolyte imbalances  - Administer electrolyte replacement as ordered  - Monitor response to electrolyte replacements, including repeat lab results as appropriate  - Instruct patient on fluid and nutrition as appropriate  Outcome: Progressing  Goal: Fluid balance maintained  Description  INTERVENTIONS:  - Monitor labs   - Monitor I/O and WT  - Instruct patient on fluid and nutrition as appropriate  - Assess for signs & symptoms of volume excess or deficit  Outcome: Progressing  Goal: Glucose maintained within target range  Description  INTERVENTIONS:  - Monitor Blood Glucose as ordered  - Assess for signs and symptoms of hyperglycemia and hypoglycemia  - Administer ordered medications to maintain glucose within target range  - Assess nutritional intake and initiate nutrition service referral as needed  Outcome: Progressing     Problem: Nutrition/Hydration-ADULT  Goal: Nutrient/Hydration intake appropriate for improving, restoring or maintaining nutritional needs  Description  Monitor and assess patient's nutrition/hydration status for malnutrition  Collaborate with interdisciplinary team and initiate plan and interventions as ordered    Monitor patient's weight and dietary intake as ordered or per policy  Utilize nutrition screening tool and intervene as necessary  Determine patient's food preferences and provide high-protein, high-caloric foods as appropriate  INTERVENTIONS:  - Monitor oral intake, urinary output, labs, and treatment plans  - Assess nutrition and hydration status and recommend course of action  - Evaluate amount of meals eaten  - Assist patient with eating if necessary   - Allow adequate time for meals  - Recommend/ encourage appropriate diets, oral nutritional supplements, and vitamin/mineral supplements  - Order, calculate, and assess calorie counts as needed  - Recommend, monitor, and adjust tube feedings and TPN/PPN based on assessed needs  - Assess need for intravenous fluids  - Provide specific nutrition/hydration education as appropriate  - Include patient/family/caregiver in decisions related to nutrition  Outcome: Progressing     Problem: NEUROSENSORY - ADULT  Goal: Achieves stable or improved neurological status  Description  INTERVENTIONS  - Monitor and report changes in neurological status  - Monitor vital signs such as temperature, blood pressure, glucose, and any other labs ordered   - Initiate measures to prevent increased intracranial pressure  - Monitor for seizure activity and implement precautions if appropriate      Outcome: Progressing  Goal: Achieves maximal functionality and self care  Description  INTERVENTIONS  - Monitor swallowing and airway patency with patient fatigue and changes in neurological status  - Encourage and assist patient to increase activity and self care     - Encourage visually impaired, hearing impaired and aphasic patients to use assistive/communication devices  Outcome: Progressing     Problem: CARDIOVASCULAR - ADULT  Goal: Absence of cardiac dysrhythmias or at baseline rhythm  Description  INTERVENTIONS:  - Continuous cardiac monitoring, vital signs, obtain 12 lead EKG if ordered  - Administer antiarrhythmic and heart rate control medications as ordered  - Monitor electrolytes and administer replacement therapy as ordered  Outcome: Progressing     Problem: RESPIRATORY - ADULT  Goal: Achieves optimal ventilation and oxygenation  Description  INTERVENTIONS:  - Assess for changes in respiratory status  - Assess for changes in mentation and behavior  - Position to facilitate oxygenation and minimize respiratory effort  - Oxygen administered by appropriate delivery if ordered  - Initiate smoking cessation education as indicated  - Encourage broncho-pulmonary hygiene including cough, deep breathe, Incentive Spirometry  - Assess the need for suctioning and aspirate as needed  - Assess and instruct to report SOB or any respiratory difficulty  - Respiratory Therapy support as indicated  Outcome: Progressing     Problem: Prexisting or High Potential for Compromised Skin Integrity  Goal: Skin integrity is maintained or improved  Description  INTERVENTIONS:  - Identify patients at risk for skin breakdown  - Assess and monitor skin integrity  - Assess and monitor nutrition and hydration status  - Monitor labs   - Assess for incontinence   - Turn and reposition patient  - Assist with mobility/ambulation  - Relieve pressure over bony prominences  - Avoid friction and shearing  - Provide appropriate hygiene as needed including keeping skin clean and dry  - Evaluate need for skin moisturizer/barrier cream  - Collaborate with interdisciplinary team   - Patient/family teaching  - Consider wound care consult   Outcome: Progressing

## 2019-08-25 LAB
ANION GAP SERPL CALCULATED.3IONS-SCNC: 12 MMOL/L (ref 4–13)
BASOPHILS # BLD AUTO: 0.05 THOUSANDS/ΜL (ref 0–0.1)
BASOPHILS NFR BLD AUTO: 0 % (ref 0–1)
BUN SERPL-MCNC: 70 MG/DL (ref 5–25)
CALCIUM SERPL-MCNC: 8.6 MG/DL (ref 8.3–10.1)
CHLORIDE SERPL-SCNC: 98 MMOL/L (ref 100–108)
CO2 SERPL-SCNC: 26 MMOL/L (ref 21–32)
CREAT SERPL-MCNC: 8.12 MG/DL (ref 0.6–1.3)
EOSINOPHIL # BLD AUTO: 0.45 THOUSAND/ΜL (ref 0–0.61)
EOSINOPHIL NFR BLD AUTO: 4 % (ref 0–6)
ERYTHROCYTE [DISTWIDTH] IN BLOOD BY AUTOMATED COUNT: 14.7 % (ref 11.6–15.1)
GFR SERPL CREATININE-BSD FRML MDRD: 6 ML/MIN/1.73SQ M
GLUCOSE SERPL-MCNC: 165 MG/DL (ref 65–140)
HCT VFR BLD AUTO: 36.5 % (ref 36.5–49.3)
HGB BLD-MCNC: 11.9 G/DL (ref 12–17)
IMM GRANULOCYTES # BLD AUTO: 0.06 THOUSAND/UL (ref 0–0.2)
IMM GRANULOCYTES NFR BLD AUTO: 1 % (ref 0–2)
LYMPHOCYTES # BLD AUTO: 0.99 THOUSANDS/ΜL (ref 0.6–4.47)
LYMPHOCYTES NFR BLD AUTO: 9 % (ref 14–44)
MCH RBC QN AUTO: 33.5 PG (ref 26.8–34.3)
MCHC RBC AUTO-ENTMCNC: 32.6 G/DL (ref 31.4–37.4)
MCV RBC AUTO: 103 FL (ref 82–98)
MONOCYTES # BLD AUTO: 0.96 THOUSAND/ΜL (ref 0.17–1.22)
MONOCYTES NFR BLD AUTO: 8 % (ref 4–12)
NEUTROPHILS # BLD AUTO: 9 THOUSANDS/ΜL (ref 1.85–7.62)
NEUTS SEG NFR BLD AUTO: 78 % (ref 43–75)
NRBC BLD AUTO-RTO: 0 /100 WBCS
PLATELET # BLD AUTO: 326 THOUSANDS/UL (ref 149–390)
PMV BLD AUTO: 10.6 FL (ref 8.9–12.7)
POTASSIUM SERPL-SCNC: 4.3 MMOL/L (ref 3.5–5.3)
RBC # BLD AUTO: 3.55 MILLION/UL (ref 3.88–5.62)
SODIUM SERPL-SCNC: 136 MMOL/L (ref 136–145)
WBC # BLD AUTO: 11.51 THOUSAND/UL (ref 4.31–10.16)

## 2019-08-25 PROCEDURE — 85025 COMPLETE CBC W/AUTO DIFF WBC: CPT | Performed by: NURSE PRACTITIONER

## 2019-08-25 PROCEDURE — 99232 SBSQ HOSP IP/OBS MODERATE 35: CPT | Performed by: INTERNAL MEDICINE

## 2019-08-25 PROCEDURE — 99231 SBSQ HOSP IP/OBS SF/LOW 25: CPT | Performed by: INTERNAL MEDICINE

## 2019-08-25 PROCEDURE — 99232 SBSQ HOSP IP/OBS MODERATE 35: CPT | Performed by: NURSE PRACTITIONER

## 2019-08-25 PROCEDURE — 80048 BASIC METABOLIC PNL TOTAL CA: CPT | Performed by: NURSE PRACTITIONER

## 2019-08-25 RX ADMIN — SEVELAMER HYDROCHLORIDE 800 MG: 800 TABLET, FILM COATED PARENTERAL at 12:36

## 2019-08-25 RX ADMIN — Medication 1 CAPSULE: at 17:13

## 2019-08-25 RX ADMIN — CINACALCET HYDROCHLORIDE 30 MG: 30 TABLET, FILM COATED ORAL at 17:13

## 2019-08-25 RX ADMIN — CALCIUM ACETATE 667 MG: 667 CAPSULE ORAL at 12:36

## 2019-08-25 RX ADMIN — SEVELAMER HYDROCHLORIDE 800 MG: 800 TABLET, FILM COATED PARENTERAL at 17:13

## 2019-08-25 RX ADMIN — METOPROLOL TARTRATE 5 MG: 5 INJECTION, SOLUTION INTRAVENOUS at 20:28

## 2019-08-25 RX ADMIN — METOPROLOL TARTRATE 12.5 MG: 25 TABLET ORAL at 09:44

## 2019-08-25 RX ADMIN — METOPROLOL TARTRATE 5 MG: 5 INJECTION, SOLUTION INTRAVENOUS at 01:43

## 2019-08-25 RX ADMIN — GUAIFENESIN 400 MG: 100 SOLUTION ORAL at 08:30

## 2019-08-25 RX ADMIN — METOPROLOL TARTRATE 5 MG: 5 INJECTION, SOLUTION INTRAVENOUS at 08:32

## 2019-08-25 RX ADMIN — METOPROLOL TARTRATE 5 MG: 5 INJECTION, SOLUTION INTRAVENOUS at 13:38

## 2019-08-25 RX ADMIN — GUAIFENESIN 400 MG: 100 SOLUTION ORAL at 17:13

## 2019-08-25 RX ADMIN — VITAMIN D, TAB 1000IU (100/BT) 1000 UNITS: 25 TAB at 08:30

## 2019-08-25 RX ADMIN — METOPROLOL TARTRATE 12.5 MG: 25 TABLET ORAL at 22:00

## 2019-08-25 RX ADMIN — ATORVASTATIN CALCIUM 20 MG: 20 TABLET, FILM COATED ORAL at 22:00

## 2019-08-25 RX ADMIN — CALCIUM ACETATE 667 MG: 667 CAPSULE ORAL at 17:13

## 2019-08-25 RX ADMIN — SEVELAMER HYDROCHLORIDE 800 MG: 800 TABLET, FILM COATED PARENTERAL at 08:31

## 2019-08-25 RX ADMIN — PANTOPRAZOLE SODIUM 40 MG: 40 TABLET, DELAYED RELEASE ORAL at 05:18

## 2019-08-25 RX ADMIN — ESCITALOPRAM OXALATE 10 MG: 10 TABLET ORAL at 22:00

## 2019-08-25 RX ADMIN — CALCIUM ACETATE 667 MG: 667 CAPSULE ORAL at 08:31

## 2019-08-25 RX ADMIN — ALLOPURINOL 100 MG: 100 TABLET ORAL at 08:31

## 2019-08-25 NOTE — PLAN OF CARE
Problem: Potential for Falls  Goal: Patient will remain free of falls  Description  INTERVENTIONS:  - Assess patient frequently for physical needs  -  Identify cognitive and physical deficits and behaviors that affect risk of falls  -  [place] fall precautions as indicated by assessment   - Educate patient/family on patient safety including physical limitations  - Instruct patient to call for assistance with activity based on assessment  - Modify environment to reduce risk of injury  - Consider OT/PT consult to assist with strengthening/mobility  Outcome: Progressing     Problem: METABOLIC, FLUID AND ELECTROLYTES - ADULT  Goal: Electrolytes maintained within normal limits  Description  INTERVENTIONS:  - Monitor labs and assess patient for signs and symptoms of electrolyte imbalances  - Administer electrolyte replacement as ordered  - Monitor response to electrolyte replacements, including repeat lab results as appropriate  - Instruct patient on fluid and nutrition as appropriate  Outcome: Progressing  Goal: Fluid balance maintained  Description  INTERVENTIONS:  - Monitor labs   - Monitor I/O and WT  - Instruct patient on fluid and nutrition as appropriate  - Assess for signs & symptoms of volume excess or deficit  Outcome: Progressing  Goal: Glucose maintained within target range  Description  INTERVENTIONS:  - Monitor Blood Glucose as ordered  - Assess for signs and symptoms of hyperglycemia and hypoglycemia  - Administer ordered medications to maintain glucose within target range  - Assess nutritional intake and initiate nutrition service referral as needed  Outcome: Progressing     Problem: Nutrition/Hydration-ADULT  Goal: Nutrient/Hydration intake appropriate for improving, restoring or maintaining nutritional needs  Description  Monitor and assess patient's nutrition/hydration status for malnutrition  Collaborate with interdisciplinary team and initiate plan and interventions as ordered    Monitor patient's weight and dietary intake as ordered or per policy  Utilize nutrition screening tool and intervene as necessary  Determine patient's food preferences and provide high-protein, high-caloric foods as appropriate  INTERVENTIONS:  - Monitor oral intake, urinary output, labs, and treatment plans  - Assess nutrition and hydration status and recommend course of action  - Evaluate amount of meals eaten  - Assist patient with eating if necessary   - Allow adequate time for meals  - Recommend/ encourage appropriate diets, oral nutritional supplements, and vitamin/mineral supplements  - Order, calculate, and assess calorie counts as needed  - Recommend, monitor, and adjust tube feedings and TPN/PPN based on assessed needs  - Assess need for intravenous fluids  - Provide specific nutrition/hydration education as appropriate  - Include patient/family/caregiver in decisions related to nutrition  Outcome: Progressing     Problem: NEUROSENSORY - ADULT  Goal: Achieves stable or improved neurological status  Description  INTERVENTIONS  - Monitor and report changes in neurological status  - Monitor vital signs such as temperature, blood pressure, glucose, and any other labs ordered   - Initiate measures to prevent increased intracranial pressure  - Monitor for seizure activity and implement precautions if appropriate      Outcome: Progressing  Goal: Achieves maximal functionality and self care  Description  INTERVENTIONS  - Monitor swallowing and airway patency with patient fatigue and changes in neurological status  - Encourage and assist patient to increase activity and self care     - Encourage visually impaired, hearing impaired and aphasic patients to use assistive/communication devices  Outcome: Progressing     Problem: CARDIOVASCULAR - ADULT  Goal: Absence of cardiac dysrhythmias or at baseline rhythm  Description  INTERVENTIONS:  - Continuous cardiac monitoring, vital signs, obtain 12 lead EKG if ordered  - Administer antiarrhythmic and heart rate control medications as ordered  - Monitor electrolytes and administer replacement therapy as ordered  Outcome: Progressing     Problem: RESPIRATORY - ADULT  Goal: Achieves optimal ventilation and oxygenation  Description  INTERVENTIONS:  - Assess for changes in respiratory status  - Assess for changes in mentation and behavior  - Position to facilitate oxygenation and minimize respiratory effort  - Oxygen administered by appropriate delivery if ordered  - Initiate smoking cessation education as indicated  - Encourage broncho-pulmonary hygiene including cough, deep breathe, Incentive Spirometry  - Assess the need for suctioning and aspirate as needed  - Assess and instruct to report SOB or any respiratory difficulty  - Respiratory Therapy support as indicated  Outcome: Progressing     Problem: Prexisting or High Potential for Compromised Skin Integrity  Goal: Skin integrity is maintained or improved  Description  INTERVENTIONS:  - Identify patients at risk for skin breakdown  - Assess and monitor skin integrity  - Assess and monitor nutrition and hydration status  - Monitor labs   - Assess for incontinence   - Turn and reposition patient  - Assist with mobility/ambulation  - Relieve pressure over bony prominences  - Avoid friction and shearing  - Provide appropriate hygiene as needed including keeping skin clean and dry  - Evaluate need for skin moisturizer/barrier cream  - Collaborate with interdisciplinary team   - Patient/family teaching  - Consider wound care consult   Outcome: Progressing

## 2019-08-25 NOTE — PROGRESS NOTES
Progress Note Mora Marrow 1950, 71 y o  male MRN: [id number]    Unit/Bed#: [facility] 633-01 Encounter: [id number]    Primary Care Provider: Autumn Romero DO   Date and time admitted to hospital: 8/21/2019  4:24 PM        * Subarachnoid hemorrhage ([address] )  Assessment & Plan  · 8/21/19 CAT Scan of the head with new small foci of subarachnoid hemorrhage underlying left frontal operculum and within the anterior-inferiorclear of the interhemispheric fissure  · 8/24/19 Small focus of subarachnoid hemorrhage within the left posterior lateral frontal region on series 2 image 24 is slightly less apparent than the prior examination  Parenchymal hyperdensities are noted within the anterior medial frontal lobe, series 2 image 27 and within the posterior right thalamus on series 2 image 21 which are unchanged  These may represent dystrophic calcifications  · Patient stated he fell and hit the back of his head about a month ago, per discussion with patient's wife he has fallen more frequently than he admits with head trauma noted  · Neurosurgery following  · Unclear if acute versus chronic  · No keppra  · Keep SBP <160  · Repeat CT head stable results as noted above  · MRI brain rule out amyloid pending **PATIENT HAS HX OF RETINA BUCKLE PER MRI DEPT HE IS UNABLE TO HAVE MRI BRAIN WITHOUT MORE INFORMATION FROM OPTHALMOLOGY IF THIS IS MRI COMPATIBLE  HE HAD UNDERGONE MRI LAST YEAR WITH NO ISSUES BUT CURRENT MD IN MRI WILL NOT PERFORM MRI UNTIL MORE INFORMATION IS OBTAINED  PATIENTS WIFE TO CALL OPTH ON Monday  **  · Neuro checks stable  · Continue to hold heparin and anti-platelet agents    Elevated troponin  Assessment & Plan  · Non-MI troponin elevation, suspect secondary to volume overload, with underlying history of end-stage renal disease on hemodialysis  · Noted trend down, patient without chest pain    Volume overload  Assessment & Plan  · CT chest findings of volume overload with bilateral pulmonary edema and small layering effusions  · Management per HD/Nephrology (currently below dry weight)  · S/p HD Friday, did not undergo volume removal per Nephrology given hypotension and tachycardia  · Patient does not appear volume overloaded on exam    Type 2 diabetes mellitus with both eyes affected by moderate nonproliferative retinopathy without macular edema, without long-term current use of insulin (HCC)  Assessment & Plan  Lab Results   Component Value Date    HGBA1C 5 6 08/24/2019       No results for input(s): POCGLU in the last 72 hours      Blood Sugar Average: Last 72 hrs:  · Controlled as evidenced by A1c of 6 1% in May of 2019  · Will obtain or updated A1c  · Fasting glucose today 165, will defer sliding scale for now, if continues to rise consider adding SS  · Monitor on renal diet    End stage renal disease (HCC)  Assessment & Plan  · HD per nephrology MWF  · Continue nephrocaps, phoslo, renegel  · Status post treatment Friday (received 3 treatments in a row)    HLD (hyperlipidemia)  Assessment & Plan  · Continue atorvastatin  · Triglycerides 163, HDL 45, LDL 49    Essential hypertension  Assessment & Plan  · Blood pressure past 48 hours stable  Heart rate 70 to 80s, not noted to have elevations into the mid 100s telemetry NSR   · Denies CP, palpitations   · Continue with Lopressor 12 5 mg q 12 hours  · Patient was noted to have occasional heart rate into the 100 to 140s on hemodialysis Friday with systolic pressure in the 63H at times, per Dr Garcia Standard  · Cardiology was made aware  · Cardiology following, recommendations noted  · Plan to defer ischemia testing to outpatient setting once subarachnoid hemorrhage resolved and patient able to take anti-platelet agents  · Echo EF 25% severe diffuse hypokinesis with regional variations, aortic valve moderate stenosis  · Continue telemetry for another 24 hours if jenn HD with no arrhythmia then consider discontinuing telem      VTE Pharmacologic Prophylaxis:   Pharmacologic: Pharmacologic VTE Prophylaxis contraindicated due to [address]  Mechanical VTE Prophylaxis in Place: Yes    Patient Centered Rounds: I have performed bedside rounds with nursing staff today  Discussions with Specialists or Other Care Team Provider:     Education and Discussions with Family / Patient: called and updated patients wife    Time Spent for Care: 30 minutes  More than 50% of total time spent on counseling and coordination of care as described above  Current Length of Stay: 4 day(s)    Current Patient Status: Inpatient   Certification Statement: The patient will continue to require additional inpatient hospital stay due to need for MRI and clearance for discharge by neurosurgery, neurology    Discharge Plan: home when medically clear, patients wife said she will take him home    Code Status: Level 1 - Full Code      Subjective:   Patient  denies CP, SOB, HA, dizziness, no abd pain, no N/V  Reported feeling tired  Objective:     Vitals:   Temp (24hrs), Av 6 °F (36 4 °C), Min:97 3 °F (36 3 °C), Max:97 9 °F (36 6 °C)    Temp:  [97 3 °F (36 3 °C)-97 9 °F (36 6 °C)] 97 3 °F (36 3 °C)  HR:  [74-85] 79  Resp:  [16-20] 16  BP: (126-138)/(70-82) 138/82  SpO2:  [97 %-99 %] 97 %  Body mass index is 23 18 kg/m²  Input and Output Summary (last 24 hours): Intake/Output Summary (Last 24 hours) at 2019 1442  Last data filed at 2019 1300  Gross per 24 hour   Intake 900 ml   Output    Net 900 ml       Physical Exam:     Physical Exam   Constitutional: He is oriented to person, place, and time  He appears well-developed  No distress  Chronically ill appearing   HENT:   Head: Normocephalic and atraumatic  Mouth/Throat: Oropharynx is clear and moist    Eyes: Pupils are equal, round, and reactive to light  Neck: Neck supple  Cardiovascular: Normal rate and regular rhythm  Murmur heard  Pulmonary/Chest: Effort normal and breath sounds normal  No respiratory distress  Abdominal: Soft  Bowel sounds are normal  He exhibits no distension  There is no tenderness  Musculoskeletal: Normal range of motion  He exhibits no edema  Neurological: He is alert and oriented to person, place, and time  Skin: Skin is warm and dry  Capillary refill takes less than 2 seconds  dsg LLE D&I   Psychiatric: He has a normal mood and affect  His behavior is normal        Additional Data:     Labs:    Results from last 7 days   Lab Units 08/25/19  0516   WBC Thousand/uL 11 51*   HEMOGLOBIN g/dL 11 9*   HEMATOCRIT % 36 5   PLATELETS Thousands/uL 326   NEUTROS PCT % 78*   LYMPHS PCT % 9*   MONOS PCT % 8   EOS PCT % 4     Results from last 7 days   Lab Units 08/25/19  0516 08/24/19  0601   POTASSIUM mmol/L 4 3 4 2   CHLORIDE mmol/L 98* 102   CO2 mmol/L 26 23   BUN mg/dL 70* 54*   CREATININE mg/dL 8 12* 6 24*   CALCIUM mg/dL 8 6 8 8   ALK PHOS U/L  --  99   ALT U/L  --  71   AST U/L  --  52*     Results from last 7 days   Lab Units 08/21/19  1116   INR  1 24*       * I Have Reviewed All Lab Data Listed Above  * Additional Pertinent Lab Tests Reviewed: All Labs Within Last 24 Hours Reviewed    Imaging:    Imaging Reports Reviewed Today Include: none  Imaging Personally Reviewed by Myself Includes:  none    Recent Cultures (last 7 days):     Results from last 7 days   Lab Units 08/22/19  0056   BLOOD CULTURE  No Growth at 72 hrs  No Growth at 72 hrs         Last 24 Hours Medication List:     Current Facility-Administered Medications:  allopurinol 100 mg Oral Daily Ahmet Gruber MD   ALPRAZolam 0 5 mg Oral Daily PRN Ahmet Gruber MD   atorvastatin 20 mg Oral HS Ahmet Gruber MD   b complex-vitamin C-folic acid 1 capsule Oral Daily With Sina Tong MD   calcium acetate 667 mg Oral TID With Meals Ahmet Gruber MD   cholecalciferol 1,000 Units Oral Daily Ahmet Gruber MD   cinacalcet 30 mg Oral Daily With Sina Tong MD   escitalopram 10 mg Oral HS Ahmet Gruber MD   guaiFENesin 400 mg Oral BID Caroline Patrick, MD   metoprolol 5 mg Intravenous Q6H Caroline Patrick, MD   metoprolol tartrate 12 5 mg Oral Q12H Sang Cole MD   oxyCODONE 5 mg Oral TID PRN Caroline Patrick, MD   pantoprazole 40 mg Oral Early Morning Caroline Patrick, MD   sevelamer 800 mg Oral TID With Meals Caroline Patrick, MD        Today, Patient Was Seen By: ESPERANZA Duarte    ** Please Note: Dictation voice to text software may have been used in the creation of this document   **

## 2019-08-25 NOTE — ASSESSMENT & PLAN NOTE
· Blood pressure past 48 hours stable  Heart rate 70 to 80s, not noted to have elevations into the mid 100s telemetry NSR   · Denies CP, palpitations   · Continue with Lopressor 12 5 mg q 12 hours  · Patient was noted to have occasional heart rate into the 100 to 140s on hemodialysis Friday with systolic pressure in the 68A at times, per Dr Laura Bravo  · Cardiology was made aware  · Cardiology following, recommendations noted  · Plan to defer ischemia testing to outpatient setting once subarachnoid hemorrhage resolved and patient able to take anti-platelet agents  · Echo EF 25% severe diffuse hypokinesis with regional variations, aortic valve moderate stenosis  · Continue telemetry for another 24 hours if jenn HD with no arrhythmia then consider discontinuing telem

## 2019-08-25 NOTE — ASSESSMENT & PLAN NOTE
· CT chest findings of volume overload with bilateral pulmonary edema and small layering effusions  · Management per HD/Nephrology (currently below dry weight)  · S/p HD Friday, did not undergo volume removal per Nephrology given hypotension and tachycardia  · Patient does not appear volume overloaded on exam

## 2019-08-25 NOTE — PROGRESS NOTES
Advanced Heart Failure/Pulmonary Hypertension Service Progress Note - Eulalia Hernandez 71 y o  male MRN: [id number]    Unit/Bed#: [facility] 633-01 Encounter: [id number]      Assessment:    Principal Problem:    Subarachnoid hemorrhage (HCC)  Active Problems:    Essential hypertension    HLD (hyperlipidemia)    End stage renal disease (HCC)    Type 2 diabetes mellitus with both eyes affected by moderate nonproliferative retinopathy without macular edema, without long-term current use of insulin (HCC)    Cerebral hemorrhage (HCC)    Volume overload    Elevated troponin    # Newly diagnosed BiV HFrEF, LVEF ~25%, LVIDd 5 3 cm, NYHA II/III, ACC/AHA Stage C:  Diag:  --TTE 8/22/19: LVEF ~25%, LVIDd 5 3 cm, mild cLVH  Mildly reduced RVSF  Midl ANDREINA  Mild MR  Mod AS w/ mean grad of 13 mmHg  Obstructive index of 0 3  LORIN 1 3 cm2       Impression: Etiology unclear  Possible ischemic given RF +/- HTN +/- AS (although only mod by TTE) +/- AFib/tachy myopathy +/- myocarditis  Currently further workup precluded by [facility]       To do:  --Defer ischemia testing to outpatient setting once [facility] resolved and patient able to take anti-platelet agents     Therapeutic:  --Continue metoprolol tartrate 12 5 mg PO BID; change to succinate prior to D/C  --ACEi/ARB/ARNi and MRA precluded by renal dysfunction for now     # Newly diagnosed AFib: Currently NSR  No OAC given SAH  Can consider outpatient loop or 1 month monitor for burden assessment  --Continue BB; change to succinate prior to D/C given HFrEF     # SAH: Small focus of subarachnoid hemorrhage within the left posterior lateral frontal region on series 2 image 24 is slightly less apparent than the prior examination  # ESRD on HD MWF  # NSTEMI type II vs Non MI troponin elevation in the setting of CKD/ESRD w/ volume overload  --No antiplatelet agents for now  --Continue statin    Subjective:   Patient seen and examined  No significant events overnight      Objective:       Pricilla Ochoa (day, reason): Doherty catheter (day, reason):    Vitals: Blood pressure 126/70, pulse 78, temperature (!) 97 3 °F (36 3 °C), resp  rate 16, height 5' 9" (1 753 m), weight 71 2 kg (156 lb 15 5 oz), SpO2 97 %  , Body mass index is 23 18 kg/m² , I/O last 3 completed shifts: In: 1480 [P O :1480]  Out: -   No intake/output data recorded  Wt Readings from Last 3 Encounters:   08/23/19 71 2 kg (156 lb 15 5 oz)   08/23/19 70 8 kg (156 lb)   08/21/19 79 4 kg (175 lb 0 7 oz)       Intake/Output Summary (Last 24 hours) at 8/25/2019 0924  Last data filed at 8/25/2019 0636  Gross per 24 hour   Intake 1000 ml   Output    Net 1000 ml     I/O last 3 completed shifts: In: 1480 [P O :1480]  Out: -     No significant arrhythmias seen on telemetry review         Physical Exam:  Vitals:    08/24/19 2226 08/25/19 0144 08/25/19 0314 08/25/19 0736   BP: 134/80 133/81 132/73 126/70   BP Location:   Left arm    Pulse: 78 79 78 78   Resp:   16    Temp: 97 9 °F (36 6 °C)  97 5 °F (36 4 °C) (!) 97 3 °F (36 3 °C)   TempSrc:   Oral    SpO2: 98% 99% 99% 97%   Weight:       Height:         GEN: Sami Lopez appears well, alert and oriented x 3, pleasant and cooperative   HEENT: pupils equal, round, and reactive to light; extraocular muscles intact  NECK: supple, no carotid bruits   HEART: regular rhythm, normal S1 and S2, no murmurs, clicks, gallops or rubs, JVP is    LUNGS: clear to auscultation bilaterally; no wheezes, rales, or rhonchi   ABDOMEN: normal bowel sounds, soft, no tenderness, no distention  EXTREMITIES: peripheral pulses normal; no clubbing, cyanosis, or edema  NEURO: no focal findings   SKIN: normal without suspicious lesions on exposed skin      Current Facility-Administered Medications:     allopurinol (ZYLOPRIM) tablet 100 mg, 100 mg, Oral, Daily, Estrella Pichardo MD, 100 mg at 08/25/19 0831    ALPRAZolam (XANAX) tablet 0 5 mg, 0 5 mg, Oral, Daily PRN, Estrella Pichardo MD    atorvastatin (LIPITOR) tablet 20 mg, 20 mg, Oral, HS, Danny Oh MD, 20 mg at 08/24/19 2112    b complex-vitamin C-folic acid (NEPHROCAPS) capsule 1 capsule, 1 capsule, Oral, Daily With Carmel Pierre MD, 1 capsule at 08/24/19 1724    calcium acetate (PHOSLO) capsule 667 mg, 667 mg, Oral, TID With Meals, Danny Oh MD, 667 mg at 08/25/19 0831    cholecalciferol (VITAMIN D3) tablet 1,000 Units, 1,000 Units, Oral, Daily, Danny Oh MD, 1,000 Units at 08/25/19 0830    cinacalcet (SENSIPAR) tablet 30 mg, 30 mg, Oral, Daily With Carmel Pierre MD, 30 mg at 08/24/19 1724    escitalopram (LEXAPRO) tablet 10 mg, 10 mg, Oral, HS, Danny Oh MD, 10 mg at 08/24/19 2112    guaiFENesin (ROBITUSSIN) oral liquid 400 mg, 400 mg, Oral, BID, Danny Oh MD, 400 mg at 08/25/19 0830    metoprolol (LOPRESSOR) injection 5 mg, 5 mg, Intravenous, Q6H, Danny Oh MD, 5 mg at 08/25/19 0698    metoprolol tartrate (LOPRESSOR) partial tablet 12 5 mg, 12 5 mg, Oral, Q12H [facility], Danny Oh MD, 12 5 mg at 08/24/19 2227    oxyCODONE (ROXICODONE) IR tablet 5 mg, 5 mg, Oral, TID PRN, Danny Oh MD    pantoprazole (PROTONIX) EC tablet 40 mg, 40 mg, Oral, Early Morning, Danny Oh MD, 40 mg at 08/25/19 0518    sevelamer (RENAGEL) tablet 800 mg, 800 mg, Oral, TID With Meals, Danny Oh MD, 800 mg at 08/25/19 0831      Labs & Results:    Results from last 7 days   Lab Units 08/22/19  0746 08/22/19  0416 08/22/19  0057   TROPONIN I ng/mL 2 88* 2 98* 3 15*     Results from last 7 days   Lab Units 08/25/19  0516 08/24/19  0601 08/23/19  0553   WBC Thousand/uL 11 51* 11 24* 12 95*   HEMOGLOBIN g/dL 11 9* 12 0 11 7*   HEMATOCRIT % 36 5 35 2* 35 7*   PLATELETS Thousands/uL 326 347 377         Results from last 7 days   Lab Units 08/25/19  0516 08/24/19  0601 08/23/19  0553 08/22/19  0416 08/21/19  1117   POTASSIUM mmol/L 4 3 4 2 5 3 4 6 4 5   CHLORIDE mmol/L 98* 102 93* 94* 93*   CO2 mmol/L 26 23 24 27 29   BUN mg/dL 70* 54* 81* 43* 53* CREATININE mg/dL 8 12* 6 24* 8 74* 6 76* 8 75*   CALCIUM mg/dL 8 6 8 8 8 6 9 0 9 0   ALK PHOS U/L  --  99  --  92 93   ALT U/L  --  71  --  56 18   AST U/L  --  52*  --  83* 33     Results from last 7 days   Lab Units 08/21/19  1116   INR  1 24*     EKG personally reviewed by Leonides Mcdaniel MD      Counseling / Coordination of Care  Total floor / unit time spent today 40 minutes  Greater than 50% of total time was spent with the patient and / or family counseling and / or coordination of care  A description of the counseling / coordination of care: 20 min  Thank you for the opportunity to participate in the care of this patient      Leonides Mcdaniel MD, PhD   Marisa Hughes

## 2019-08-25 NOTE — ASSESSMENT & PLAN NOTE
· 8/21/19 CAT Scan of the head with new small foci of subarachnoid hemorrhage underlying left frontal operculum and within the anterior-inferiorclear of the interhemispheric fissure  · 8/24/19 Small focus of subarachnoid hemorrhage within the left posterior lateral frontal region on series 2 image 24 is slightly less apparent than the prior examination  Parenchymal hyperdensities are noted within the anterior medial frontal lobe, series 2 image 27 and within the posterior right thalamus on series 2 image 21 which are unchanged  These may represent dystrophic calcifications  · Patient stated he fell and hit the back of his head about a month ago, per discussion with patient's wife he has fallen more frequently than he admits with head trauma noted  · Neurosurgery following  · Unclear if acute versus chronic  · No keppra  · Keep SBP <160  · Repeat CT head stable results as noted above  · MRI brain rule out amyloid pending **PATIENT HAS HX OF RETINA BUCKLE PER MRI DEPT HE IS UNABLE TO HAVE MRI BRAIN WITHOUT MORE INFORMATION FROM OPTHALMOLOGY IF THIS IS MRI COMPATIBLE  HE HAD UNDERGONE MRI LAST YEAR WITH NO ISSUES BUT CURRENT MD IN MRI WILL NOT PERFORM MRI UNTIL MORE INFORMATION IS OBTAINED  PATIENTS WIFE TO CALL OPT ON Monday  **  · Neuro checks stable  · Continue to hold heparin and anti-platelet agents

## 2019-08-25 NOTE — ASSESSMENT & PLAN NOTE
Lab Results   Component Value Date    HGBA1C 5 6 08/24/2019       No results for input(s): POCGLU in the last 72 hours      Blood Sugar Average: Last 72 hrs:  · Controlled as evidenced by A1c of 6 1% in May of 2019  · Will obtain or updated A1c  · Fasting glucose today 165, will defer sliding scale for now, if continues to rise consider adding SS  · Monitor on renal diet

## 2019-08-25 NOTE — PROGRESS NOTES
NEPHROLOGY PROGRESS NOTE   Eulalia Hernandez 71 y o  male MRN: [id number]  Unit/Bed#: [facility] 633-01 Encounter: [id number]  Reason for Consult: ESRD    ASSESSMENT AND PLAN:  Patient is 51-year-old male with prior history of bladder cancer, status post surgery, diabetes, hypertension, ESRD on HD on MWF schedule, presented with worsening dyspnea and was found to have subarachnoid hemorrhage   We are consulted for dialysis management      ESRD on HD on MWF schedule at [facility]  -next HD on Monday   Outpatient dry weight 74 kg    last post HD weight 70 2 kg below his dry weight   -not on heparin on dialysis      Access, upper extremity AV fistula with good bruit and thrill present     CKD anemia, hemoglobin overall at goal   No need for Epogen for now      CKD BMD, continue to monitor phosphorus and PTH   -serum phosphorus 4 7 at goal      Pulmonary edema/hypoxic respiratory failure  -overall much improved  Now remains on room air at the time of my encounter    -salt restricted diet  -patient remains below his dry weight   UF removal as blood pressure tolerated on dialysis      Borderline hyponatremia, serum sodium improved with dialysis, continue to close monitor      Small subarachnoid hemorrhage, petechial hemorrhage in the subcortical right frontal lobe, neurosurgical follow-up  -repeat CT scan of head as per Neurosurgery      Systolic CHF, EF 18%, IVC dilated  -moderate aortic stenosis, eventual need for cardiac catheterization noted from Cardiology note      New onset AFib as per Cardiology note, close cardiology follow-up  SUBJECTIVE:  Patient seen and examined at bedside   No chest pain, shortness of breath, nausea, vomiting, abdominal pain    OBJECTIVE:  Current Weight: Weight - Scale: 71 2 kg (156 lb 15 5 oz)  Vitals:    08/25/19 0736   BP: 126/70   Pulse: 78   Resp:    Temp: (!) 97 3 °F (36 3 °C)   SpO2: 97%       Intake/Output Summary (Last 24 hours) at 8/25/2019 0912  Last data filed at 8/25/2019 9612  Gross per 24 hour   Intake 1000 ml   Output    Net 1000 ml     Wt Readings from Last 3 Encounters:   08/23/19 71 2 kg (156 lb 15 5 oz)   08/23/19 70 8 kg (156 lb)   08/21/19 79 4 kg (175 lb 0 7 oz)     Temp Readings from Last 3 Encounters:   08/25/19 (!) 97 3 °F (36 3 °C)   08/21/19 98 9 °F (37 2 °C) (Temporal)   05/23/19 (!) 97 °F (36 1 °C) (Tympanic)     BP Readings from Last 3 Encounters:   08/25/19 126/70   08/21/19 139/88   07/23/19 134/92     Pulse Readings from Last 3 Encounters:   08/25/19 78   08/21/19 (!) 109   05/23/19 84        Physical Examination:  General:  Lying in bed, no acute distress   Eyes:  Mild conjunctival pallor present  ENT:  External examination of ears and nose unremarkable  Neck:  Supple  Respiratory:  Bilateral air entry present  CVS:  S1, S2 present  GI:  Soft, nontender, nondistended  CNS:  Active alert oriented x3  Extremities:  No significant edema in legs  Skin:  No new rash in legs    Medications:    Current Facility-Administered Medications:     allopurinol (ZYLOPRIM) tablet 100 mg, 100 mg, Oral, Daily, Tyler Aquino MD, 100 mg at 08/25/19 0831    ALPRAZolam (XANAX) tablet 0 5 mg, 0 5 mg, Oral, Daily PRN, Tyler Aquino MD    atorvastatin (LIPITOR) tablet 20 mg, 20 mg, Oral, HS, Tyler Aquino MD, 20 mg at 08/24/19 2112    b complex-vitamin C-folic acid (NEPHROCAPS) capsule 1 capsule, 1 capsule, Oral, Daily With Dinner, Tyler Aquino MD, 1 capsule at 08/24/19 1724    calcium acetate (PHOSLO) capsule 667 mg, 667 mg, Oral, TID With Meals, Tyler Aquino MD, 667 mg at 08/25/19 0831    cholecalciferol (VITAMIN D3) tablet 1,000 Units, 1,000 Units, Oral, Daily, Tyler Aquino MD, 1,000 Units at 08/25/19 0830    cinacalcet (SENSIPAR) tablet 30 mg, 30 mg, Oral, Daily With Dinner, Tyler Aquino MD, 30 mg at 08/24/19 1724    escitalopram (LEXAPRO) tablet 10 mg, 10 mg, Oral, HS, Tyler Aquino MD, 10 mg at 08/24/19 2112    guaiFENesin (ROBITUSSIN) oral liquid 400 mg, 400 mg, Oral, BID, Romayne Petite, MD, 400 mg at 08/25/19 0830    metoprolol (LOPRESSOR) injection 5 mg, 5 mg, Intravenous, Q6H, Romayne Petite, MD, 5 mg at 08/25/19 1757    metoprolol tartrate (LOPRESSOR) partial tablet 12 5 mg, 12 5 mg, Oral, Q12H [address], Romayne Petite, MD, 12 5 mg at 08/24/19 2227    oxyCODONE (ROXICODONE) IR tablet 5 mg, 5 mg, Oral, TID PRN, Romayne Petite, MD    pantoprazole (PROTONIX) EC tablet 40 mg, 40 mg, Oral, Early Morning, Romayne Petite, MD, 40 mg at 08/25/19 0518    sevelamer (RENAGEL) tablet 800 mg, 800 mg, Oral, TID With Meals, Romayne Petite, MD, 800 mg at 08/25/19 0831    Laboratory Results:  Results from last 7 days   Lab Units 08/25/19  0516 08/24/19  0601 08/23/19  0553 08/22/19  0416 08/21/19  1117 08/21/19  1116   WBC Thousand/uL 11 51* 11 24* 12 95* 12 39*  --  13 36*   HEMOGLOBIN g/dL 11 9* 12 0 11 7* 10 5*  --  11 3*   HEMATOCRIT % 36 5 35 2* 35 7* 31 8*  --  34 9*   PLATELETS Thousands/uL 326 347 377 338  --  361   SODIUM mmol/L 136 140 135* 134* 139  --    POTASSIUM mmol/L 4 3 4 2 5 3 4 6 4 5  --    CHLORIDE mmol/L 98* 102 93* 94* 93*  --    CO2 mmol/L 26 23 24 27 29  --    BUN mg/dL 70* 54* 81* 43* 53*  --    CREATININE mg/dL 8 12* 6 24* 8 74* 6 76* 8 75*  --    CALCIUM mg/dL 8 6 8 8 8 6 9 0 9 0  --    MAGNESIUM mg/dL  --   --   --   --  1 9  --    PHOSPHORUS mg/dL  --   --   --  4 7*  --   --        CT head wo contrast   Final Result by Jarrell Fuentes DO (08/24 1052)      Small focus of subarachnoid hemorrhage within the left posterior lateral frontal region on series 2 image 24 is slightly less apparent than the prior examination  Parenchymal hyperdensities are noted within the anterior medial frontal lobe, series 2 image 27 and within the posterior right thalamus on series 2 image 21 which are unchanged  These may represent dystrophic calcifications  Stable moderate diffuse chronic microangiopathic change with old lacunar infarcts within the basal ganglia  Workstation performed: [id number]         MRI inpatient order    (Results Pending)       Portions of the record may have been created with voice recognition software  Occasional wrong word or "sound a like" substitutions may have occurred due to the inherent limitations of voice recognition software  Read the chart carefully and recognize, using context, where substitutions have occurred

## 2019-08-26 ENCOUNTER — APPOINTMENT (INPATIENT)
Dept: DIALYSIS | Facility: HOSPITAL | Age: 69
DRG: 040 | End: 2019-08-26
Attending: INTERNAL MEDICINE
Payer: MEDICARE

## 2019-08-26 DIAGNOSIS — Z71.89 COMPLEX CARE COORDINATION: Primary | ICD-10-CM

## 2019-08-26 PROBLEM — I50.82 BIVENTRICULAR HEART FAILURE (HCC): Status: ACTIVE | Noted: 2019-08-21

## 2019-08-26 PROBLEM — I48.0 PAROXYSMAL ATRIAL FIBRILLATION (HCC): Status: ACTIVE | Noted: 2019-08-26

## 2019-08-26 LAB
ANION GAP SERPL CALCULATED.3IONS-SCNC: 16 MMOL/L (ref 4–13)
BASOPHILS # BLD AUTO: 0.04 THOUSANDS/ΜL (ref 0–0.1)
BASOPHILS NFR BLD AUTO: 0 % (ref 0–1)
BUN SERPL-MCNC: 87 MG/DL (ref 5–25)
CALCIUM SERPL-MCNC: 8.8 MG/DL (ref 8.3–10.1)
CHLORIDE SERPL-SCNC: 101 MMOL/L (ref 100–108)
CO2 SERPL-SCNC: 22 MMOL/L (ref 21–32)
CREAT SERPL-MCNC: 9.66 MG/DL (ref 0.6–1.3)
EOSINOPHIL # BLD AUTO: 0.43 THOUSAND/ΜL (ref 0–0.61)
EOSINOPHIL NFR BLD AUTO: 4 % (ref 0–6)
ERYTHROCYTE [DISTWIDTH] IN BLOOD BY AUTOMATED COUNT: 15.2 % (ref 11.6–15.1)
GFR SERPL CREATININE-BSD FRML MDRD: 5 ML/MIN/1.73SQ M
GLUCOSE SERPL-MCNC: 124 MG/DL (ref 65–140)
HCT VFR BLD AUTO: 35.1 % (ref 36.5–49.3)
HGB BLD-MCNC: 11.3 G/DL (ref 12–17)
IMM GRANULOCYTES # BLD AUTO: 0.08 THOUSAND/UL (ref 0–0.2)
IMM GRANULOCYTES NFR BLD AUTO: 1 % (ref 0–2)
LYMPHOCYTES # BLD AUTO: 0.96 THOUSANDS/ΜL (ref 0.6–4.47)
LYMPHOCYTES NFR BLD AUTO: 9 % (ref 14–44)
MCH RBC QN AUTO: 33.1 PG (ref 26.8–34.3)
MCHC RBC AUTO-ENTMCNC: 32.2 G/DL (ref 31.4–37.4)
MCV RBC AUTO: 103 FL (ref 82–98)
MONOCYTES # BLD AUTO: 0.94 THOUSAND/ΜL (ref 0.17–1.22)
MONOCYTES NFR BLD AUTO: 9 % (ref 4–12)
NEUTROPHILS # BLD AUTO: 8.1 THOUSANDS/ΜL (ref 1.85–7.62)
NEUTS SEG NFR BLD AUTO: 77 % (ref 43–75)
NRBC BLD AUTO-RTO: 0 /100 WBCS
PLATELET # BLD AUTO: 315 THOUSANDS/UL (ref 149–390)
PMV BLD AUTO: 11 FL (ref 8.9–12.7)
POTASSIUM SERPL-SCNC: 4.7 MMOL/L (ref 3.5–5.3)
RBC # BLD AUTO: 3.41 MILLION/UL (ref 3.88–5.62)
SODIUM SERPL-SCNC: 139 MMOL/L (ref 136–145)
WBC # BLD AUTO: 10.55 THOUSAND/UL (ref 4.31–10.16)

## 2019-08-26 PROCEDURE — 99232 SBSQ HOSP IP/OBS MODERATE 35: CPT | Performed by: PHYSICIAN ASSISTANT

## 2019-08-26 PROCEDURE — 90935 HEMODIALYSIS ONE EVALUATION: CPT | Performed by: INTERNAL MEDICINE

## 2019-08-26 PROCEDURE — 97535 SELF CARE MNGMENT TRAINING: CPT

## 2019-08-26 PROCEDURE — 85025 COMPLETE CBC W/AUTO DIFF WBC: CPT | Performed by: NURSE PRACTITIONER

## 2019-08-26 PROCEDURE — 99232 SBSQ HOSP IP/OBS MODERATE 35: CPT | Performed by: INTERNAL MEDICINE

## 2019-08-26 PROCEDURE — 80048 BASIC METABOLIC PNL TOTAL CA: CPT | Performed by: NURSE PRACTITIONER

## 2019-08-26 RX ORDER — METOPROLOL SUCCINATE 25 MG/1
25 TABLET, EXTENDED RELEASE ORAL DAILY
Status: DISCONTINUED | OUTPATIENT
Start: 2019-08-27 | End: 2019-08-27 | Stop reason: HOSPADM

## 2019-08-26 RX ADMIN — VITAMIN D, TAB 1000IU (100/BT) 1000 UNITS: 25 TAB at 12:43

## 2019-08-26 RX ADMIN — PANTOPRAZOLE SODIUM 40 MG: 40 TABLET, DELAYED RELEASE ORAL at 06:00

## 2019-08-26 RX ADMIN — CINACALCET HYDROCHLORIDE 30 MG: 30 TABLET, FILM COATED ORAL at 17:37

## 2019-08-26 RX ADMIN — Medication 1 CAPSULE: at 17:37

## 2019-08-26 RX ADMIN — SEVELAMER HYDROCHLORIDE 800 MG: 800 TABLET, FILM COATED PARENTERAL at 17:37

## 2019-08-26 RX ADMIN — METOPROLOL TARTRATE 5 MG: 5 INJECTION, SOLUTION INTRAVENOUS at 01:58

## 2019-08-26 RX ADMIN — ATORVASTATIN CALCIUM 20 MG: 20 TABLET, FILM COATED ORAL at 21:00

## 2019-08-26 RX ADMIN — GUAIFENESIN 400 MG: 100 SOLUTION ORAL at 17:37

## 2019-08-26 RX ADMIN — METOPROLOL TARTRATE 12.5 MG: 25 TABLET ORAL at 21:00

## 2019-08-26 RX ADMIN — SEVELAMER HYDROCHLORIDE 800 MG: 800 TABLET, FILM COATED PARENTERAL at 07:25

## 2019-08-26 RX ADMIN — CALCIUM ACETATE 667 MG: 667 CAPSULE ORAL at 17:37

## 2019-08-26 RX ADMIN — ESCITALOPRAM OXALATE 10 MG: 10 TABLET ORAL at 21:00

## 2019-08-26 RX ADMIN — SEVELAMER HYDROCHLORIDE 800 MG: 800 TABLET, FILM COATED PARENTERAL at 12:44

## 2019-08-26 RX ADMIN — CALCIUM ACETATE 667 MG: 667 CAPSULE ORAL at 07:25

## 2019-08-26 RX ADMIN — ALLOPURINOL 100 MG: 100 TABLET ORAL at 12:43

## 2019-08-26 RX ADMIN — CALCIUM ACETATE 667 MG: 667 CAPSULE ORAL at 12:44

## 2019-08-26 RX ADMIN — METOPROLOL TARTRATE 12.5 MG: 25 TABLET ORAL at 12:44

## 2019-08-26 NOTE — ASSESSMENT & PLAN NOTE
· Continue with Lopressor 12 5 mg q 12 hours - will switch to succinate as recommended by Cardiology   Discontinue IV Lopressor  · Patient was noted to have occasional heart rate into the 100 to 140s on hemodialysis Friday with systolic pressure in the 83Z at times, per Dr Fabrice Snyder  · Cardiology was made aware - see plan above

## 2019-08-26 NOTE — ASSESSMENT & PLAN NOTE
· HD per nephrology MWF  · Continue nephrocaps, phoslo, renegel  · Continuing to challenge dry weight as tolerated

## 2019-08-26 NOTE — ASSESSMENT & PLAN NOTE
· 8/21/19 CAT Scan of the head with new small foci of subarachnoid hemorrhage underlying left frontal operculum and within the anterior-inferiorclear of the interhemispheric fissure  · 8/24/19 Small focus of subarachnoid hemorrhage within the left posterior lateral frontal region on series 2 image 24 is slightly less apparent than the prior examination  Parenchymal hyperdensities are noted within the anterior medial frontal lobe, series 2 image 27 and within the posterior right thalamus on series 2 image 21 which are unchanged  These may represent dystrophic calcifications  · Patient stated he fell and hit the back of his head about a month ago, per discussion with patient's wife he has fallen more frequently than he admits with head trauma noted  · Neurosurgery following  · Unclear if acute versus chronic  · No keppra  · Keep SBP <160  · Repeat CT head stable results as noted above  · MRI brain rule out amyloid pending  - has been cleared by opthalmology to proceed   Letter on chart  · Neuro checks stable  · OK per Neurosurgery to use heparin during HD and for DVT prophylaxis

## 2019-08-26 NOTE — ASSESSMENT & PLAN NOTE
· Newly diagnosed biventricular heart failure with EF of 25%  · CT chest findings of volume overload with bilateral pulmonary edema and small layering effusions  · Volume removal per Nephrology  · Cardiology to follow  · Ischemic workup as outpatient     · Continue BB

## 2019-08-26 NOTE — PLAN OF CARE
Problem: METABOLIC, FLUID AND ELECTROLYTES - ADULT  Goal: Electrolytes maintained within normal limits  Description  INTERVENTIONS:  - Monitor labs and assess patient for signs and symptoms of electrolyte imbalances  - Administer electrolyte replacement as ordered  - Monitor response to electrolyte replacements, including repeat lab results as appropriate  - Instruct patient on fluid and nutrition as appropriate  8/26/2019 0846 by Johanny Blankenship RN  Outcome: Progressing  8/26/2019 0846 by Johanny Blankenship RN  Outcome: Progressing     Problem: METABOLIC, FLUID AND ELECTROLYTES - ADULT  Goal: Fluid balance maintained  Description  INTERVENTIONS:  - Monitor labs   - Monitor I/O and WT  - Instruct patient on fluid and nutrition as appropriate  - Assess for signs & symptoms of volume excess or deficit  Outcome: Progressing    Patient currently receiving HD treatment:  -EDW: 70 5kg  -Pre treatment weight: 68 3kg  -UF Goal: even (500ml total)

## 2019-08-26 NOTE — PROGRESS NOTES
Progress Note - Cardiology   Josh Richardson 71 y o  male MRN: [id number]  Unit/Bed#: [facility] 633-01 Encounter: [id number]    Assessment:  Principal Problem:    Subarachnoid hemorrhage (HCC)  Active Problems:    Essential hypertension    HLD (hyperlipidemia)    End stage renal disease (HCC)    Type 2 diabetes mellitus with both eyes affected by moderate nonproliferative retinopathy without macular edema, without long-term current use of insulin (HCC)    Cerebral hemorrhage (HCC)    Biventricular heart failure (HCC)    Elevated troponin    Paroxysmal atrial fibrillation ([address] )    # [facility]  # New HFrEF 25% LVEDd 5 3, NYHA 2-3, Stage C  -mild RV dysfunction  -on Toprol 25mg; unable to initiate ACEi/ARB/ARNI due to renal dysfunction; will defer Isordil/Hydralazine at this time until re-evaluation in the clinic  # Newly diagnosed AF; now NSR  -[id number] 4; unable to start [facility] [place] operated by [facility] due to [facility]  # Moderate AS  # ESRD on HD  # NSTEMI type 2 vs non-MI troponin elevation due to ESRD/SAH  -not a candidate for antiplatelet therapy; continue BB, statin      Plan:  1  Agree with changing tartrate to succinate  Continue statin  2  Ischemic work up with be done on an outpatient basis after recovery from [facility]  3  Continue to hold antiplatelet and anticoagulation therapy at this time  Patient will need long-term monitoring for AF burden as OP  4  Patient to follow with Dr Mark Cade upon discharge  Subjective/Objective     Subjective: No events overnight  Feels better overall, dyspnea improved, able to lie flat  Denies dizziness, palpitations, CP        Objective:  Vitals: /69   Pulse 73   Temp (!) 97 2 °F (36 2 °C) (Tympanic)   Resp 16   Ht 5' 9" (1 753 m)   Wt 70 8 kg (156 lb)   SpO2 98%   BMI 23 04 kg/m²   Vitals:    08/23/19 0600 08/26/19 0600   Weight: 71 2 kg (156 lb 15 5 oz) 70 8 kg (156 lb)     Orthostatic Blood Pressures      Most Recent Value   Blood Pressure  134/69 filed at 08/26/2019 1243   Patient Position - Orthostatic VS  Lying filed at 08/26/2019 1157            Intake/Output Summary (Last 24 hours) at 8/26/2019 1346  Last data filed at 8/26/2019 1157  Gross per 24 hour   Intake 1040 ml   Output 1000 ml   Net 40 ml       Physical Exam:   General appearance: alert and in no acute distress  Head: Normocephalic, without obvious abnormality, atraumatic  Neck: no JVD and supple, symmetrical, trachea midline  Lungs: +ve bibasilar rales, no wheezing  Heart: S1, S2 normal and no S3 or S4, No murmur, No gallop, No rub  Abdomen: soft, non-tender; no masses,  no organomegaly  Extremities: extremities normal, atraumatic, no cyanosis, 1+ edema  Pulses: 2+ and symmetric bilaterally  Skin: Skin color, texture, turgor normal;   Neurologic: Grossly normal, Alert and oriented      Medications:    Current Facility-Administered Medications:     allopurinol (ZYLOPRIM) tablet 100 mg, 100 mg, Oral, Daily, Margaret Magana MD, 100 mg at 08/26/19 1243    ALPRAZolam (XANAX) tablet 0 5 mg, 0 5 mg, Oral, Daily PRN, Margaret Magana MD    atorvastatin (LIPITOR) tablet 20 mg, 20 mg, Oral, HS, Margaret Magana MD, 20 mg at 08/25/19 2200    b complex-vitamin C-folic acid (NEPHROCAPS) capsule 1 capsule, 1 capsule, Oral, Daily With Dinner, Margaret Magana MD, 1 capsule at 08/25/19 1713    calcium acetate (PHOSLO) capsule 667 mg, 667 mg, Oral, TID With Meals, Margaret Magana MD, 667 mg at 08/26/19 1244    cholecalciferol (VITAMIN D3) tablet 1,000 Units, 1,000 Units, Oral, Daily, Margaret Magana MD, 1,000 Units at 08/26/19 1243    cinacalcet (SENSIPAR) tablet 30 mg, 30 mg, Oral, Daily With Mary Azul MD, 30 mg at 08/25/19 1713    escitalopram (LEXAPRO) tablet 10 mg, 10 mg, Oral, HS, Margaret Magana MD, 10 mg at 08/25/19 2200    guaiFENesin (ROBITUSSIN) oral liquid 400 mg, 400 mg, Oral, BID, Margaret Magana MD, Stopped at 08/26/19 0930    [START ON 8/27/2019] metoprolol succinate (TOPROL-XL) 24 hr tablet 25 mg, 25 mg, Oral, Daily, Jael Makayla Beebe PA-C    metoprolol tartrate (LOPRESSOR) partial tablet 12 5 mg, 12 5 mg, Oral, Q12H [facility], Jael Meza PA-C, 12 5 mg at 08/26/19 1244    oxyCODONE (ROXICODONE) IR tablet 5 mg, 5 mg, Oral, TID PRN, Alma Dhillon MD    pantoprazole (PROTONIX) EC tablet 40 mg, 40 mg, Oral, Early Morning, Alma Dhillon MD, 40 mg at 08/26/19 0600    sevelamer (RENAGEL) tablet 800 mg, 800 mg, Oral, TID With Meals, Alma Dhillon MD, 800 mg at 08/26/19 1244    Lab Results:  Results from last 7 days   Lab Units 08/22/19  0746 08/22/19  0416 08/22/19  0057   TROPONIN I ng/mL 2 88* 2 98* 3 15*     Results from last 7 days   Lab Units 08/26/19  0517 08/25/19  0516 08/24/19  0601   WBC Thousand/uL 10 55* 11 51* 11 24*   HEMOGLOBIN g/dL 11 3* 11 9* 12 0   HEMATOCRIT % 35 1* 36 5 35 2*   PLATELETS Thousands/uL 315 326 347     Results from last 7 days   Lab Units 08/24/19  0601   TRIGLYCERIDES mg/dL 163*   HDL mg/dL 45     Results from last 7 days   Lab Units 08/26/19  0517 08/25/19  0516 08/24/19  0601  08/22/19  0416 08/21/19  1117   POTASSIUM mmol/L 4 7 4 3 4 2   < > 4 6 4 5   CHLORIDE mmol/L 101 98* 102   < > 94* 93*   CO2 mmol/L 22 26 23   < > 27 29   BUN mg/dL 87* 70* 54*   < > 43* 53*   CREATININE mg/dL 9 66* 8 12* 6 24*   < > 6 76* 8 75*   CALCIUM mg/dL 8 8 8 6 8 8   < > 9 0 9 0   ALK PHOS U/L  --   --  99  --  92 93   ALT U/L  --   --  71  --  56 18   AST U/L  --   --  52*  --  83* 33    < > = values in this interval not displayed  Results from last 7 days   Lab Units 08/21/19  1116   INR  1 24*   PTT seconds 36     Results from last 7 days   Lab Units 08/21/19  1117   MAGNESIUM mg/dL 1 9       Telemetry: Personally reviewed  Short run of NSVT 3 beats 2 days ago, otherwise NSR  Imaging: Personally reviewed  EKG: Personally reviewed       Aaron Walter MD  Cardiology Fellow

## 2019-08-26 NOTE — PLAN OF CARE
Problem: OCCUPATIONAL THERAPY ADULT  Goal: Performs self-care activities at highest level of function for planned discharge setting  See evaluation for individualized goals  Description  Treatment Interventions: ADL retraining, Functional transfer training, Endurance training, Cognitive reorientation, Patient/family training, Equipment evaluation/education, Compensatory technique education, Energy conservation, Activityengagement  Equipment Recommended: Bedside commode, Tub seat with back       See flowsheet documentation for full assessment, interventions and recommendations  Outcome: Progressing  Note:   Limitation: Decreased ADL status, Decreased Safe judgement during ADL, Decreased cognition, Decreased endurance, Decreased self-care trans, Decreased high-level ADLs  Prognosis: Good  Assessment: pt participated in pm ot session and was seen focusing on ub dressing, lb dressing, sit to stnad x 3 trials and sitting eob for dinner  pt was able to get to the eob with sba, claims to [facility] hosp bed  pt reports drives himself to dialysis and wife works 3-11 shift,  pt attempted to stand x 3 with retropulsion and abruptly sitting back onto bed  pt reports he is too tired after dialysis to walk at this time  pt questions if pts wife can provide this amt of asst and if unable recommend in pt rehab at this time  OT Discharge Recommendation: Other (Comment)(pt requires hands on asst for transfers   ? if wife can provi)  OT - OK to Discharge: No     April A Leo, HOWARD

## 2019-08-26 NOTE — TREATMENT PLAN
CT head without contrast 08/24/2019:  Small focus of subarachnoid hemorrhage within the left posterior lateral frontal region on series 2, image 24 is slightly less apparent on the prior examination  Parenchymal hyperdensities are noted within the anterior medial frontal lobe, series 2, image 27 and within the posterior right thalamus on series 2, image 21 which are unchanged  These may represent dystrophic calcifications  Stable moderate diffuse chronic microangiopathic change with old lacunar infarcts in the basal ganglia  MRI brain ordered and pending for further evaluation of possible amyloid  Per SLIM, patient was just cleared by ophthalmology to undergo MRI  Okay for pharm DVT ppx if warranted  Should he need heparin for outpatient hemodialysis, he is cleared from a neurosurgical standpoint to do so  Will review MRI brain once completed  Will see patient as needed during hospitalization  Please call with questions or concerns

## 2019-08-26 NOTE — ASSESSMENT & PLAN NOTE
· Non-MI troponin elevation, suspect secondary to volume overload, with underlying history of end-stage renal disease on hemodialysis  · Noted trend down, patient without chest pain  · Cardiology input appreciated  · Ischemic workup at later date when ok to use DAPT

## 2019-08-26 NOTE — SOCIAL WORK
Cm reviewed patient during care coordination rounds  Patient is not medically stable for d/c today  Patient is pending a MRI  Cm will met with patient and wife about rehab choices in case wife is not able to obtain FMLA

## 2019-08-26 NOTE — OCCUPATIONAL THERAPY NOTE
Occupational Therapy Treatment Note:       08/26/19 7592   Restrictions/Precautions   Other Precautions Cognitive; Bed Alarm; Fall Risk   Pain Assessment   Pain Assessment No/denies pain   ADL   UB Dressing Assistance 5  Supervision/Setup   UB Dressing Comments over head type shirt   LB Dressing Comments pt declined practicing pant donning  pt requried min asst for sock donning seated eob  Bed Mobility   Supine to Sit 5  Supervision   Additional Comments pt owns hosp bed and was able to perform supine to sit with sba with hob minimally elevated   Transfers   Sit to Stand 3  Moderate assistance   Additional items   (pt only stood for few seconds then allowed self to sit x 3)   Functional Mobility   Additional Comments not appropraite this session   Cognition   Overall Cognitive Status Impaired   Arousal/Participation Alert; Cooperative   Attention Within functional limits   Memory Decreased recall of precautions   Following Commands Follows one step commands with increased time or repetition   Comments pt is very [place] needing christina/l to repeat herself several times for each question asked  Activity Tolerance   Activity Tolerance Patient tolerated treatment well   Assessment   Assessment pt participated in pm ot session and was seen focusing on ub dressing, lb dressing, sit to stnad x 3 trials and sitting eob for dinner  pt was able to get to the eob with sba, claims to [facility]ve hosp bed  pt reports drives himself to dialysis and wife works 3-11 shift,  pt attempted to stand x 3 with retropulsion and abruptly sitting back onto bed  pt reports he is too tired after dialysis to walk at this time  pt questions if pts wife can provide this amt of asst and if unable recommend in pt rehab at this time  Plan   Treatment Interventions ADL retraining;Functional transfer training; Activityengagement;Equipment evaluation/education;Patient/family training; Endurance training;Cognitive reorientation   Goal Expiration Date 09/06/19 Treatment Day 1   OT Frequency 3-5x/wk   Recommendation   OT Discharge Recommendation Other (Comment)  (pt requires hands on asst for transfers   ? if wife can provi)   Equipment Recommended Bedside commode   OT - OK to Discharge No   Barthel Index   Feeding 10   Bathing 0   Grooming Score 5   Dressing Score 5   Bladder Score 10   Bowels Score 5   Toilet Use Score 5   Transfers (Bed/Chair) Score 5   Mobility (Level Surface) Score 0   Stairs Score 0   Barthel Index Score 45   Modified [place] Scale   Modified [place] Scale 4   April A LEE Bailey

## 2019-08-26 NOTE — PROGRESS NOTES
D/W patient's wife  She was concerned for need of a loop recorder  States that her  is refusing any future anticoagulation in light of recent retinal hemorrhage  He would rather risk his chance of stroke over loosing his vision  D/W Cardiology, they will cancel loop recorder  Progress Note Jeana Renteria 1950, 71 y o  male MRN: [id number]    Unit/Bed#: [facility] 633-01 Encounter: [id number]    Primary Care Provider: Katelynn Haji DO   Date and time admitted to hospital: 8/21/2019  4:24 PM        * Subarachnoid hemorrhage ([facility] [address] )  Assessment & Plan  · 8/21/19 CAT Scan of the head with new small foci of subarachnoid hemorrhage underlying left frontal operculum and within the anterior-inferiorclear of the interhemispheric fissure  · 8/24/19 Small focus of subarachnoid hemorrhage within the left posterior lateral frontal region on series 2 image 24 is slightly less apparent than the prior examination  Parenchymal hyperdensities are noted within the anterior medial frontal lobe, series 2 image 27 and within the posterior right thalamus on series 2 image 21 which are unchanged  These may represent dystrophic calcifications  · Patient stated he fell and hit the back of his head about a month ago, per discussion with patient's wife he has fallen more frequently than he admits with head trauma noted  · Neurosurgery following  · Unclear if acute versus chronic  · No keppra  · Keep SBP <160  · Repeat CT head stable results as noted above  · MRI brain rule out amyloid pending  - has been cleared by opthalmology to proceed   Letter on chart  · Neuro checks stable  · OK per Neurosurgery to use heparin during HD and for DVT prophylaxis    Elevated troponin  Assessment & Plan  · Non-MI troponin elevation, suspect secondary to volume overload, with underlying history of end-stage renal disease on hemodialysis  · Noted trend down, patient without chest pain  · Cardiology input appreciated  · Ischemic workup at later date when ok to use DAPT    Biventricular heart failure ([facility] [address] )  Assessment & Plan  · Newly diagnosed biventricular heart failure with EF of 25%  · CT chest findings of volume overload with bilateral pulmonary edema and small layering effusions  · Volume removal per Nephrology  · Cardiology to follow  · Ischemic workup as outpatient  · Continue BB    Paroxysmal atrial fibrillation (HCC)  Assessment & Plan  · New diagnosis  · NSR on telemetry  · Continue BB and statin  · No AC secondary to [facility]  · Cardiology input appreciated  · Ischemic workup as outpatient     Type 2 diabetes mellitus with both eyes affected by moderate nonproliferative retinopathy without macular edema, without long-term current use of insulin ([facility])  Assessment & Plan  Lab Results   Component Value Date    HGBA1C 5 6 08/24/2019       No results for input(s): POCGLU in the last 72 hours  Blood Sugar Average: Last 72 hrs:  ·  A1c 5 6  · will defer sliding scale for now  · Monitor on renal diet    End stage renal disease (HCC)  Assessment & Plan  · HD per nephrology MWF  · Continue nephrocaps, phoslo, renegel  · Continuing to challenge dry weight as tolerated    Essential hypertension  Assessment & Plan  · Continue with Lopressor 12 5 mg q 12 hours - will switch to succinate as recommended by Cardiology  Discontinue IV Lopressor  · Patient was noted to have occasional heart rate into the 100 to 140s on hemodialysis Friday with systolic pressure in the 66Y at times, per Dr Jensen Mcrae  · Cardiology was made aware - see plan above    HLD (hyperlipidemia)  Assessment & Plan  · Continue atorvastatin  · Triglycerides 163, HDL 45, LDL 49    Cerebral hemorrhage (HCC)  Assessment & Plan  · As above      VTE Pharmacologic Prophylaxis:   Pharmacologic: Patient has refused VTE prophylaxis  Mechanical VTE Prophylaxis in Place: Yes    Patient Centered Rounds: I have performed bedside rounds with nursing staff today      Discussions with Specialists or Other Care Team Provider: case management, Neurosurgery AP    Education and Discussions with Family / Patient: patient, wife called with update    Time Spent for Care: 30 minutes  More than 50% of total time spent on counseling and coordination of care as described above  Current Length of Stay: 5 day(s)    Current Patient Status: Inpatient   Certification Statement: The patient will continue to require additional inpatient hospital stay due to pending MRI    Discharge Plan: Hopefully in next 24 hours pending MRI    Code Status: Level 1 - Full Code      Subjective:   Seen in dialysis  Offers no complaints  Anxious to go home  Objective:     Vitals:   Temp (24hrs), Av 7 °F (36 5 °C), Min:97 1 °F (36 2 °C), Max:97 9 °F (36 6 °C)    Temp:  [97 1 °F (36 2 °C)-97 9 °F (36 6 °C)] 97 1 °F (36 2 °C)  HR:  [78-83] 80  Resp:  [16-18] 16  BP: (129-143)/(76-83) 139/83  SpO2:  [98 %-99 %] 98 %  Body mass index is 23 04 kg/m²  Input and Output Summary (last 24 hours): Intake/Output Summary (Last 24 hours) at 2019 1102  Last data filed at 2019 0755  Gross per 24 hour   Intake 1060 ml   Output    Net 1060 ml       Physical Exam:     Physical Exam   Constitutional: He is oriented to person, place, and time  He appears well-developed  No distress  HENT:   Head: Normocephalic and atraumatic  Neck: Normal range of motion  Neck supple  Cardiovascular: Normal rate and regular rhythm  No murmur heard  Pulmonary/Chest: Effort normal and breath sounds normal  No respiratory distress  He has no wheezes  He has no rales  Abdominal: Soft  Bowel sounds are normal  He exhibits no distension  Musculoskeletal: He exhibits no edema  Neurological: He is alert and oriented to person, place, and time  No cranial nerve deficit  Skin: Skin is warm and dry  No rash noted  Psychiatric: He has a normal mood and affect         Additional Data:     Labs:    Results from last 7 days   Lab Units 19  0517   WBC Thousand/uL 10 55*   HEMOGLOBIN g/dL 11 3*   HEMATOCRIT % 35 1*   PLATELETS Thousands/uL 315   NEUTROS PCT % 77*   LYMPHS PCT % 9*   MONOS PCT % 9   EOS PCT % 4     Results from last 7 days   Lab Units 08/26/19  0517  08/24/19  0601   SODIUM mmol/L 139   < > 140   POTASSIUM mmol/L 4 7   < > 4 2   CHLORIDE mmol/L 101   < > 102   CO2 mmol/L 22   < > 23   BUN mg/dL 87*   < > 54*   CREATININE mg/dL 9 66*   < > 6 24*   ANION GAP mmol/L 16*   < > 15*   CALCIUM mg/dL 8 8   < > 8 8   ALBUMIN g/dL  --   --  3 0*   TOTAL BILIRUBIN mg/dL  --   --  0 85   ALK PHOS U/L  --   --  99   ALT U/L  --   --  71   AST U/L  --   --  52*   GLUCOSE RANDOM mg/dL 124   < > 139    < > = values in this interval not displayed  Results from last 7 days   Lab Units 08/21/19  1116   INR  1 24*         Results from last 7 days   Lab Units 08/24/19  0601   HEMOGLOBIN A1C % 5 6     Results from last 7 days   Lab Units 08/22/19  0416   PROCALCITONIN ng/ml 1 38*           * I Have Reviewed All Lab Data Listed Above  * Additional Pertinent Lab Tests Reviewed: All Labs Within Last 24 Hours Reviewed    Imaging:    Imaging Reports Reviewed Today Include: CT head  Imaging Personally Reviewed by Myself Includes:  none    Recent Cultures (last 7 days):     Results from last 7 days   Lab Units 08/22/19  0056   BLOOD CULTURE  No Growth After 4 Days  No Growth After 4 Days         Last 24 Hours Medication List:     Current Facility-Administered Medications:  allopurinol 100 mg Oral Daily Margaret Magana MD   ALPRAZolam 0 5 mg Oral Daily PRN Margaret Magana MD   atorvastatin 20 mg Oral HS Margaret Magana MD   b complex-vitamin C-folic acid 1 capsule Oral Daily With Jossy Deng MD   calcium acetate 667 mg Oral TID With Meals Margaret Magana MD   cholecalciferol 1,000 Units Oral Daily Margaret Magana MD   cinacalcet 30 mg Oral Daily With Jossy Deng MD   escitalopram 10 mg Oral HS Margaret Magana MD   guaiFENesin 400 mg Oral BID Margaret Magana MD   [START ON 8/27/2019] metoprolol succinate 25 mg Oral Daily Bry Baer PA-C   metoprolol tartrate 12 5 mg Oral Q12H [facility] Jael Meza PA-C   oxyCODONE 5 mg Oral TID PRN Kathy Mckinney MD   pantoprazole 40 mg Oral Early Morning Kathy Mckinney MD   sevelamer 800 mg Oral TID With Meals Kathy Mckinney MD        Today, Patient Was Seen By: Bry Baer PA-C    ** Please Note: Dictation voice to text software may have been used in the creation of this document   **

## 2019-08-26 NOTE — ASSESSMENT & PLAN NOTE
Lab Results   Component Value Date    HGBA1C 5 6 08/24/2019       No results for input(s): POCGLU in the last 72 hours      Blood Sugar Average: Last 72 hrs:  ·  A1c 5 6  · will defer sliding scale for now  · Monitor on renal diet

## 2019-08-26 NOTE — UTILIZATION REVIEW
Continued Stay Review    Date: 8/25                         Current Patient Class: Inpatient Current Level of Care: Med Surg    HPI:69 y o  male initially admitted on 8/21 presents with shortness of breath  The patient was initially seen at the [facility] ED for shortness of breath  He has not had his dialysis today, last dialysis was 2 days ago  Assessment/Plan: 8/25 Progress notes;  Subarachnoid hemorrhage, Hypertentsion  8/24/19 Small focus of subarachnoid hemorrhage within the left posterior lateral frontal region  Keep SBP <160  MRI brain rule out amyloid pending **PATIENT HAS HX OF RETINA BUCKLE PER MRI DEPT HE IS UNABLE TO HAVE MRI BRAIN WITHOUT MORE INFORMATION FROM OPTHALMOLOGY IF THIS IS MRI COMPATIBLE  HE HAD UNDERGONE MRI LAST YEAR WITH NO ISSUES BUT CURRENT MD IN MRI WILL NOT PERFORM MRI UNTIL MORE INFORMATION IS OBTAINED  PATIENTS WIFE TO CALL OPTH ON Monday  Continue to hold heparin and anti-platelet agents  S/p HD Friday, did not undergo volume removal per Nephrology given hypotension and tachycardia  Occasional heart rate into the 100 to 140s on hemodialysis  Plan to defer ischemia testing to outpatient setting once subarachnoid hemorrhage resolved and patient able to take anti-platelet agents  EF 14%  Continue tele monitoring x24hrs       8/26 Cleared for MRI by Opthalmology    Pertinent Labs/Diagnostic Results:   Lab Units 08/26/19 0517 08/25/19  0516   WBC Thousand/uL 10 55* 11 51*   HEMOGLOBIN g/dL 11 3* 11 9*   HEMATOCRIT % 35 1* 36 5   PLATELETS Thousands/uL 315 326   NEUTROS ABS Thousands/µL 8 10* 9 00*         Lab Units 08/26/19 0517 08/25/19  0516   SODIUM mmol/L 139 136   POTASSIUM mmol/L 4 7 4 3   CHLORIDE mmol/L 101 98*   CO2 mmol/L 22 26   ANION GAP mmol/L 16* 12   BUN mg/dL 87* 70*   CREATININE mg/dL 9 66* 8 12*   EGFR ml/min/1 73sq m 5 6   CALCIUM mg/dL 8 8 8 6   MAGNESIUM mg/dL  --   --    PHOSPHORUS mg/dL  --   --          Lab Units 08/26/19 0517 08/25/19 0516 GLUCOSE RANDOM mg/dL 124 165*     Vital Signs:  08/25/19 19:18:47  97 7 °F (36 5 °C)  80  17  139/79  99  98 %       08/25/19 16:36:17  97 9 °F (36 6 °C)  78  18  139/77  98  99 %       08/25/19 0944    79    138/82           08/25/19 07:36:18  97 3 °F (36 3 °C)Abnormal   78    126/70  89  97 %         Medications:   Scheduled Meds:   Current Facility-Administered Medications:  allopurinol 100 mg Oral Daily   ALPRAZolam 0 5 mg Oral Daily PRN   atorvastatin 20 mg Oral HS   b complex-vitamin C-folic acid 1 capsule Oral Daily With Dinner   calcium acetate 667 mg Oral TID With Meals   cholecalciferol 1,000 Units Oral Daily   cinacalcet 30 mg Oral Daily With Dinner   escitalopram 10 mg Oral HS   guaiFENesin 400 mg Oral BID   [START ON 8/27/2019] metoprolol succinate 25 mg Oral Daily   metoprolol tartrate 12 5 mg Oral Q12H [address]   oxyCODONE 5 mg Oral TID PRN   pantoprazole 40 mg Oral Early Morning   sevelamer 800 mg Oral TID With Meals     Discharge Plan: Home when medically cleared    Network Utilization Review Department  Phone: [phone]; Fax [phone]  [email or web address]  ATTENTION: Please call with any questions or concerns to [phone]  and carefully listen to the prompts so that you are directed to the right person  Send all requests for admission clinical reviews, approved or denied determinations and any other requests to fax [phone]   All voicemails are confidential

## 2019-08-26 NOTE — PROGRESS NOTES
NEPHROLOGY PROGRESS NOTE   Nahed Calderon 71 y o  male MRN: [id number]  Unit/Bed#: [facility] 633-01 Encounter: [id number]      ASSESSMENT & PLAN:  1  ESRD on HD on MWF schedule at [facility]  Patient seen during dialysis treatment at 9:50 a m , his AV fistula is cannulated with blood flow, blood pressure is stable, will proceed with 0 5 kilos UF as tolerated  Reported last dialysis treatment patient went into AFib after  His outpatient dry weight was 74 kg and his weight was challenged due to respiratory failure pulmonary edema  His dry weight will be adjusted based on his weight at the end of the treatment today  2  Pulmonary edema,/hypoxemic respiratory failure, volume overload, clinically improved, his dry weight was challenged  Continue with 0 5 kilos UF today  3  Subarachnoid hemorrhage, neurosurgery on board  4  Newly diagnosed biventricular heart failure EF 25%, his dry weight was challenged during dialysis  Cardiology on board  5  Newly diagnosed AFib, as per Cardiology    6  Anemia chronic kidney disease, hemoglobin is above goal, no need for EPO  7  Access, right upper extremity AV fistula with good bruit and thrill  SUBJECTIVE:  Patient seen and examined during dialysis treatment at 9:50 a m , his AV fistula is cannulated with blood flow, his blood pressure is stable, denies any chest pain or shortness of breath, denies any dizziness      OBJECTIVE:  Current Weight: Weight - Scale: 70 8 kg (156 lb)  Vitals:    08/26/19 0930   BP: 135/81   Pulse: 82   Resp: 18   Temp:    SpO2:        Intake/Output Summary (Last 24 hours) at 8/26/2019 0956  Last data filed at 8/26/2019 0755  Gross per 24 hour   Intake 1060 ml   Output    Net 1060 ml     General: conscious, cooperative, in not acute distress  Eyes: conjunctivae pale, anicteric sclerae  ENT: lips and mucous membranes moist  Neck: supple, no JVD  Chest: clear breath sounds bilateral, no crackles, ronchus or wheezings  CVS: distinct S1 & S2, normal rate, regular rhythm  Abdomen: soft, non-tender, non-distended, normoactive bowel sounds  Extremities: no significant edema of both legs, right radiocephalic AV fistula cannulated with blood flow  Skin: no rash  Neuro: awake, alert, oriented, hard of hearing        Medications:    Current Facility-Administered Medications:     allopurinol (ZYLOPRIM) tablet 100 mg, 100 mg, Oral, Daily, Lisa Boyer MD, 100 mg at 08/25/19 0831    ALPRAZolam (XANAX) tablet 0 5 mg, 0 5 mg, Oral, Daily PRN, Lisa Boyer MD    atorvastatin (LIPITOR) tablet 20 mg, 20 mg, Oral, HS, Lisa Boyer MD, 20 mg at 08/25/19 2200    b complex-vitamin C-folic acid (NEPHROCAPS) capsule 1 capsule, 1 capsule, Oral, Daily With Dinner, Lisa Boyer MD, 1 capsule at 08/25/19 1713    calcium acetate (PHOSLO) capsule 667 mg, 667 mg, Oral, TID With Meals, Lisa Boyer MD, 667 mg at 08/26/19 0725    cholecalciferol (VITAMIN D3) tablet 1,000 Units, 1,000 Units, Oral, Daily, Lisa Boyer MD, 1,000 Units at 08/25/19 0830    cinacalcet (SENSIPAR) tablet 30 mg, 30 mg, Oral, Daily With Analy Cheek MD, 30 mg at 08/25/19 1713    escitalopram (LEXAPRO) tablet 10 mg, 10 mg, Oral, HS, Lisa Boyer MD, 10 mg at 08/25/19 2200    guaiFENesin (ROBITUSSIN) oral liquid 400 mg, 400 mg, Oral, BID, Lisa Boyer MD, 400 mg at 08/25/19 1713    metoprolol (LOPRESSOR) injection 5 mg, 5 mg, Intravenous, Q6H, Lisa Boyer MD, 5 mg at 08/26/19 0158    metoprolol tartrate (LOPRESSOR) partial tablet 12 5 mg, 12 5 mg, Oral, Q12H [address], Lisa Boyer MD, 12 5 mg at 08/25/19 2200    oxyCODONE (ROXICODONE) IR tablet 5 mg, 5 mg, Oral, TID PRN, Lisa Boyer MD    pantoprazole (PROTONIX) EC tablet 40 mg, 40 mg, Oral, Early Morning, Lisa Boyer MD, 40 mg at 08/26/19 0600    sevelamer (RENAGEL) tablet 800 mg, 800 mg, Oral, TID With Meals, Lisa Boyer MD, 800 mg at 08/26/19 0725    Invasive Devices:        Lab Results:   Results from last 7 days   Lab Units 08/26/19  0517 08/25/19  0516 08/24/19  0601  08/22/19  0416 08/21/19  1117   WBC Thousand/uL 10 55* 11 51* 11 24*   < > 12 39*  --    HEMOGLOBIN g/dL 11 3* 11 9* 12 0   < > 10 5*  --    HEMATOCRIT % 35 1* 36 5 35 2*   < > 31 8*  --    PLATELETS Thousands/uL 315 326 347   < > 338  --    SODIUM mmol/L 139 136 140   < > 134* 139   POTASSIUM mmol/L 4 7 4 3 4 2   < > 4 6 4 5   CHLORIDE mmol/L 101 98* 102   < > 94* 93*   CO2 mmol/L 22 26 23   < > 27 29   BUN mg/dL 87* 70* 54*   < > 43* 53*   CREATININE mg/dL 9 66* 8 12* 6 24*   < > 6 76* 8 75*   CALCIUM mg/dL 8 8 8 6 8 8   < > 9 0 9 0   MAGNESIUM mg/dL  --   --   --   --   --  1 9   PHOSPHORUS mg/dL  --   --   --   --  4 7*  --    ALK PHOS U/L  --   --  99  --  92 93   ALT U/L  --   --  71  --  56 18   AST U/L  --   --  52*  --  83* 33    < > = values in this interval not displayed  Previous work up:  See previous notes      Portions of the record may have been created with voice recognition software  Occasional wrong word or "sound a like" substitutions may have occurred due to the inherent limitations of voice recognition software  Read the chart carefully and recognize, using context, where substitutions have occurred  If you have any questions, please contact the dictating provider

## 2019-08-27 ENCOUNTER — APPOINTMENT (INPATIENT)
Dept: NON INVASIVE DIAGNOSTICS | Facility: HOSPITAL | Age: 69
DRG: 040 | End: 2019-08-27
Payer: MEDICARE

## 2019-08-27 VITALS
DIASTOLIC BLOOD PRESSURE: 82 MMHG | HEART RATE: 86 BPM | RESPIRATION RATE: 16 BRPM | BODY MASS INDEX: 23.11 KG/M2 | HEIGHT: 69 IN | WEIGHT: 156 LBS | OXYGEN SATURATION: 96 % | TEMPERATURE: 98.6 F | SYSTOLIC BLOOD PRESSURE: 135 MMHG

## 2019-08-27 LAB
BACTERIA BLD CULT: NORMAL
BACTERIA BLD CULT: NORMAL

## 2019-08-27 PROCEDURE — 33285 INSJ SUBQ CAR RHYTHM MNTR: CPT

## 2019-08-27 PROCEDURE — 99239 HOSP IP/OBS DSCHRG MGMT >30: CPT | Performed by: PHYSICIAN ASSISTANT

## 2019-08-27 PROCEDURE — 33285 INSJ SUBQ CAR RHYTHM MNTR: CPT | Performed by: INTERNAL MEDICINE

## 2019-08-27 PROCEDURE — 0JH632Z INSERTION OF MONITORING DEVICE INTO CHEST SUBCUTANEOUS TISSUE AND FASCIA, PERCUTANEOUS APPROACH: ICD-10-PCS | Performed by: INTERNAL MEDICINE

## 2019-08-27 PROCEDURE — 99232 SBSQ HOSP IP/OBS MODERATE 35: CPT | Performed by: INTERNAL MEDICINE

## 2019-08-27 PROCEDURE — C1764 EVENT RECORDER, CARDIAC: HCPCS

## 2019-08-27 PROCEDURE — 97535 SELF CARE MNGMENT TRAINING: CPT

## 2019-08-27 RX ORDER — LIDOCAINE HYDROCHLORIDE 10 MG/ML
INJECTION, SOLUTION INFILTRATION; PERINEURAL CODE/TRAUMA/SEDATION MEDICATION
Status: COMPLETED | OUTPATIENT
Start: 2019-08-27 | End: 2019-08-27

## 2019-08-27 RX ORDER — METOPROLOL SUCCINATE 25 MG/1
25 TABLET, EXTENDED RELEASE ORAL DAILY
Qty: 30 TABLET | Refills: 0 | Status: SHIPPED | OUTPATIENT
Start: 2019-08-28 | End: 2019-09-19 | Stop reason: SDUPTHER

## 2019-08-27 RX ORDER — OXYBUTYNIN CHLORIDE 5 MG/1
5 TABLET, EXTENDED RELEASE ORAL AS NEEDED
Status: SHIPPED | OUTPATIENT
Start: 2019-08-27

## 2019-08-27 RX ORDER — AMIODARONE HYDROCHLORIDE 200 MG/1
200 TABLET ORAL 2 TIMES DAILY WITH MEALS
Status: DISCONTINUED | OUTPATIENT
Start: 2019-08-27 | End: 2019-08-27 | Stop reason: HOSPADM

## 2019-08-27 RX ORDER — AMIODARONE HYDROCHLORIDE 200 MG/1
200 TABLET ORAL 2 TIMES DAILY WITH MEALS
Qty: 60 TABLET | Refills: 0 | Status: SHIPPED | OUTPATIENT
Start: 2019-08-27 | End: 2019-09-19 | Stop reason: SDUPTHER

## 2019-08-27 RX ADMIN — LIDOCAINE HYDROCHLORIDE 20 ML: 10 INJECTION, SOLUTION INFILTRATION; PERINEURAL at 16:50

## 2019-08-27 RX ADMIN — GUAIFENESIN 400 MG: 100 SOLUTION ORAL at 08:54

## 2019-08-27 RX ADMIN — SEVELAMER HYDROCHLORIDE 800 MG: 800 TABLET, FILM COATED PARENTERAL at 08:54

## 2019-08-27 RX ADMIN — CALCIUM ACETATE 667 MG: 667 CAPSULE ORAL at 08:54

## 2019-08-27 RX ADMIN — SEVELAMER HYDROCHLORIDE 800 MG: 800 TABLET, FILM COATED PARENTERAL at 13:04

## 2019-08-27 RX ADMIN — CALCIUM ACETATE 667 MG: 667 CAPSULE ORAL at 13:04

## 2019-08-27 RX ADMIN — METOPROLOL SUCCINATE 25 MG: 25 TABLET, EXTENDED RELEASE ORAL at 08:54

## 2019-08-27 RX ADMIN — ALLOPURINOL 100 MG: 100 TABLET ORAL at 08:54

## 2019-08-27 RX ADMIN — PANTOPRAZOLE SODIUM 40 MG: 40 TABLET, DELAYED RELEASE ORAL at 05:47

## 2019-08-27 RX ADMIN — VITAMIN D, TAB 1000IU (100/BT) 1000 UNITS: 25 TAB at 08:54

## 2019-08-27 NOTE — OCCUPATIONAL THERAPY NOTE
Occupational Therapy Treatment Note:       08/27/19 1140   Restrictions/Precautions   Other Precautions Hard of hearing;Cognitive; Fall Risk;Telemetry; Chair Alarm   Pain Assessment   Pain Assessment 0-10   Pain Score No Pain   ADL   Where Assessed Standing at sink  (seated for dressing standing for bathing)   Grooming Assistance 5  Supervision/Setup   UB Bathing Assistance 5  Supervision/Setup   LB Bathing Assistance 4  Minimal Assistance   UB Dressing Assistance 5  Supervision/Setup   LB Dressing Assistance 3  Moderate Assistance   LB Dressing Comments seated to johnathon underwear and socks  pt reports wife can assist?? Toileting Comments sba clothing management in stance at sinkside   Functional Standing Tolerance   Time 5 min sinkside for grooming and adls demosntrating fair balance   Bed Mobility   Supine to Sit 5  Supervision   Transfers   Sit to Stand 5  Supervision   Additional items Assist x 1   Stand to Sit 5  Supervision   Additional items Assist x 1   Functional Mobility   Functional Mobility 4  Minimal assistance   Additional Comments refusing rw   Additional items SPC   Cognition   Overall Cognitive Status Impaired   Comments pt is very [place], needing directions to be repeated numerous times  pt requries min cues for rest breaks and for safety with a d  pt refusing rw, states does not use in home  Activity Tolerance   Activity Tolerance Patient tolerated treatment well   Assessment   Assessment pt participated in am ot session and was seen focusing on adls in stance / seated as needed for adls  pt  requries close sba for functional mobility with spc to from short distances  pt refusing rw in room and states he does not use at home  pt requires asst for deppend donning and adls for b feet seated  question if his wife can provide necessary asst  pt reports he drives himself to dialysis which is not recommended at this time   pt with improved activity tolerence from last evening however continues to fatigue with activity  if pt uses spc he may require min asst for balance with functional mobility  pt tires fairly easily    Plan   Treatment Interventions ADL retraining;Functional transfer training; Activityengagement;Equipment evaluation/education;Patient/family training; Endurance training;Neuromuscular reeducation   Goal Expiration Date 09/06/19   Treatment Day 2   OT Frequency 3-5x/wk   Recommendation   OT Discharge Recommendation Other (Comment)  (home with wife's S/asst vs Rehab)   Barthel Index   Feeding 10   Bathing 0   Grooming Score 5   Dressing Score 5   Bladder Score 10   Bowels Score 5   Toilet Use Score 5   Transfers (Bed/Chair) Score 10   Mobility (Level Surface) Score 0   Stairs Score 0   Barthel Index Score 50   Modified [place] Scale   Modified [place]orgette Scale 4   April A LEE Bailey

## 2019-08-27 NOTE — ASSESSMENT & PLAN NOTE
· New diagnosis  · NSR on telemetry  · Continue BB and statin  · No AC secondary to [facility], and patient refuses  · Cardiology input appreciated  · Ischemic workup as outpatient   · Loop recorder placed today to assess a fib burden in setting of cardiomyopathy

## 2019-08-27 NOTE — TREATMENT PLAN
Informed this morning that patient is unable to undergo MRI imaging of his brain due to retained metal in his left eye  At this time, patient does not need any additional brain imaging from a neurosurgical standpoint  He is cleared for discharge if medically stable  Patient may follow up in the outpatient setting in about 1 month with repeat CT head for continued monitoring of likely atherosclerotic disease  He may call the office sooner should he experience worsening symptoms or red flag signs  Please call with questions or concerns  Signed off

## 2019-08-27 NOTE — ASSESSMENT & PLAN NOTE
· Switched metoprolol to succinate as recommended by Cardiology   Discontinue IV Lopressor  · Patient was noted to have occasional heart rate into the 100 to 140s on hemodialysis Friday with systolic pressure in the 70I at times, per Dr Lam Chang  · Cardiology was made aware - see plan above  · Unremarkable HD session yesterday

## 2019-08-27 NOTE — DISCHARGE INSTRUCTIONS
Keep loop recorder incision dry for one week  Do not use lotions/powders/creams on incision  Remove outer bandage 48 hours after procedure - if present, leave underlying steri-strips in place, they will either fall off on their own or will be removed at 2 week follow up appointment  Please call the office [phone] if you notice redness, swelling, bleeding, or drainage from incision or if you develop fevers  Cardiac Loop Recorder Insertion      WHAT YOU SHOULD KNOW:    A cardiac loop recorder is a device used to diagnose heart rhythm problems, such as a fast or irregular heartbeat  It is implanted in your left chest, just under the skin  The device records a pattern of your heart's rhythm, called an EKG  Your device records automatic EKGs, depending on how your caregiver programs it  You may also receive a handheld controller  You press a button on the controller when you have symptoms, such as dizziness, lightheadedness, or palpitations  The device will record an EKG at that moment  The recording can help your caregiver see if your symptoms may be caused by heart rhythm problems  Your caregiver will remove the device after it has collected enough data  You may need the device for up to 3 years  The procedure to remove the device is similar to the procedure used to implant it       AFTER YOU LEAVE:    Follow up with your cardiologist as directed: You will need to return in 1 to 2 weeks  Your cardiologist will check your incision  He may also program your device settings again  He will retrieve data from the device every 1 to 3 months with a monitor held over your skin  You may be able to transmit data from your device from home as well  You will do this by calling a number provided by your cardiologist, or as they have instructed you  Ask for information about this process  Write down your questions so you remember to ask them during your visits       Wound care: Keep loop recorder incision dry for one week  Do not use lotions/powders/creams on incision  Remove outer bandage 48 hours after procedure - leave underlying steri-strips in place, they will either fall off on their own or will be removed at 2 week follow up appointment  Please call the office if you notice redness, swelling, bleeding, or drainage from incision or if you develop fevers  After that first week, carefully wash your incision with soap and water  Keep the area clean and dry until it heals       Return to activity: If you received anesthesia, you will not be able to drive for 24 hours  Otherwise, most people can return to normal activities soon after the procedure  Your cardiologist may want to know if your work involves electrical current or high-voltage equipment  Ask about other electrical items that could interfere with your cardiac loop recorder       Contact your cardiologist if:   · You have a fever or chills  · Your wound is red, swollen, or draining pus  · You have questions or concerns about your condition or care  Seek care immediately or call 911 if:   · You feel weak, dizzy, or faint  · You lose consciousness  © 2014 [employer] is for End User's use only and may not be sold, redistributed or otherwise used for commercial purposes  All illustrations and images included in CareNotes® are the copyrighted property of A D [employer] M [employer]  or [employer]  The above information is an  only  It is not intended as medical advice for individual conditions or treatments  Talk to your doctor, nurse or pharmacist before following any medical regimen to see if it is safe and effective for you

## 2019-08-27 NOTE — SOCIAL WORK
Pt recommended for home with [address] services  Cm sent referral to [facility] and waiting for respond  CM will follow  Cm spoke to pt and wife presented at bed side  Pt made aware of $5 copay for medication  Med's to bed  Cm informed pt about the cost of shower chair $47 and pt agreed to pick it up at local pharmacy  Pt made aware that [facility] don't served the area  Cm provided pt with the list of [address] services in his area  Pt agreed and picked 3 diff [facility] services  Referral sent to [id number] [facility], [address] [place] [address], and [address]  Cm contacted [address] to follow up with HHPT recommendation for the pt  CM spoke to [facility] which then stated that the facility never received the necessary document to process the pt before discharged  Cm provided the information needed to [facility] and the [facility] starting 8/29/2019  Information send to [address] fax num [phone]  Cm informed Mar to contact the patient to set up the time of the appointment  Cm called the pt to informed about the [address], no respond, Cm left a message on voicemail

## 2019-08-27 NOTE — PROGRESS NOTES
NEPHROLOGY PROGRESS NOTE   Chele Arnett 71 y o  male MRN: [id number]  Unit/Bed#: [facility] 633-01 Encounter: [id number]      ASSESSMENT/PLAN:  1  End-stage renal disease: On hemodialysis Monday, Wednesday and Friday at [facility]  · Currently stable from a dialysis standpoint and next treatment will be tomorrow  · New dry weight around 68kg   · Access:  Right lower arm AV fistula  2  Anemia of CKD:  Hemoglobin is above goal and no need for Epogen  3  Mineral bone disease of CKD/secondary hyperparathyroidism:  Continue renal diet  · On phoslo 1 tid with meals and renagel 1 tid with meals (Confirmed with outpatient HD orders)  · Continue sensipar 30mg daily   4  Pulmonary edema/hypoxic respiratory failure:  Improved with decreasing dry weight (old EDW was  74kg and now 68kg)  5  Small subarachnoid hemorrhage:  Neurosurgery on board  6  Hypertension:  BP acceptable  7  New AFib:  Patient is getting loop recorder  8  Biventricular CHF with EF 25%:  Challenging dry weight as above       SUBJECTIVE:  Patient is feeling well and wants to go home today  He denies any chest pain, shortness of breath, nausea, vomiting or diarrhea      OBJECTIVE:  Current Weight: Weight - Scale: 70 8 kg (156 lb)  Vitals:    08/27/19 0742   BP: 136/82   Pulse: 87   Resp: 18   Temp: 98 6 °F (37 °C)   SpO2: 97%       Intake/Output Summary (Last 24 hours) at 8/27/2019 0854  Last data filed at 8/27/2019 0549  Gross per 24 hour   Intake 1090 ml   Output 1000 ml   Net 90 ml     General:  No acute distress  Skin:  No rash  Eyes:  Sclerae anicteric  ENT:  Moist mucous membranes  Neck:  Supple, symmetric  Chest:  Clear to auscultation bilaterally  CVS:  Regular rate and rhythm  Abdomen:  Soft, nontender, nondistended  Extremities:  No edema   Neuro:  Awake and alert  Psych:  Appropriate affect      Medications:  Scheduled Meds:    Current Facility-Administered Medications:  allopurinol 100 mg Oral Daily Kingsley Aase, MD   ALPRAZolam 0 5 mg Oral Daily PRN Gustavo Rojas MD   atorvastatin 20 mg Oral HS Gustavo Rojas MD   b complex-vitamin C-folic acid 1 capsule Oral Daily With Wali Mitchell MD   calcium acetate 667 mg Oral TID With Meals Gustavo Rojas MD   cholecalciferol 1,000 Units Oral Daily Gustavo Rojas MD   cinacalcet 30 mg Oral Daily With Wali Mitchell MD   escitalopram 10 mg Oral HS Gustavo Rojas MD   guaiFENesin 400 mg Oral BID Gustavo Rojas MD   metoprolol succinate 25 mg Oral Daily Modesta Altamirano PA-C   metoprolol tartrate 12 5 mg Oral Q12H [address] Modesta Altamirano PA-C   oxyCODONE 5 mg Oral TID PRN Gustavo Rojas MD   pantoprazole 40 mg Oral Early Morning Gustavo Rojas MD   sevelamer 800 mg Oral TID With Meals Gustavo Rojas MD       PRN Meds:  ALPRAZolam    oxyCODONE    Laboratory Results:  Results from last 7 days   Lab Units 08/26/19  0517 08/25/19  0516 08/24/19  0601 08/23/19  0553 08/22/19  0416 08/21/19  1117 08/21/19  1116   WBC Thousand/uL 10 55* 11 51* 11 24* 12 95* 12 39*  --  13 36*   HEMOGLOBIN g/dL 11 3* 11 9* 12 0 11 7* 10 5*  --  11 3*   HEMATOCRIT % 35 1* 36 5 35 2* 35 7* 31 8*  --  34 9*   PLATELETS Thousands/uL 315 326 347 377 338  --  361   SODIUM mmol/L 139 136 140 135* 134* 139  --    POTASSIUM mmol/L 4 7 4 3 4 2 5 3 4 6 4 5  --    CHLORIDE mmol/L 101 98* 102 93* 94* 93*  --    CO2 mmol/L 22 26 23 24 27 29  --    BUN mg/dL 87* 70* 54* 81* 43* 53*  --    CREATININE mg/dL 9 66* 8 12* 6 24* 8 74* 6 76* 8 75*  --    CALCIUM mg/dL 8 8 8 6 8 8 8 6 9 0 9 0  --    MAGNESIUM mg/dL  --   --   --   --   --  1 9  --    PHOSPHORUS mg/dL  --   --   --   --  4 7*  --   --           Radiology Results:   CT head wo contrast   Final Result by Forrest James DO (08/24 5682)      Small focus of subarachnoid hemorrhage within the left posterior lateral frontal region on series 2 image 24 is slightly less apparent than the prior examination        Parenchymal hyperdensities are noted within the anterior medial frontal lobe, series 2 image 27 and within the posterior right thalamus on series 2 image 21 which are unchanged  These may represent dystrophic calcifications  Stable moderate diffuse chronic microangiopathic change with old lacunar infarcts within the basal ganglia                    Workstation performed: [id number]

## 2019-08-27 NOTE — ASSESSMENT & PLAN NOTE
· Per Neurosurgery, likely not SAH  Suspect findings are more consistent with atherosclerotic vascular disease  Patient unable to have MRI secondary to metal in the left eye  Patient stable from Neurosurgery standpoint  Will follow up with Neruosurgery in about 1 month with repeat MRI  OK for heparin at HD, though patient refuses anticoagulation  · 8/21/19 CAT Scan of the head with new small foci of subarachnoid hemorrhage underlying left frontal operculum and within the anterior-inferiorclear of the interhemispheric fissure  · 8/24/19 Small focus of subarachnoid hemorrhage within the left posterior lateral frontal region on series 2 image 24 is slightly less apparent than the prior examination  Parenchymal hyperdensities are noted within the anterior medial frontal lobe, series 2 image 27 and within the posterior right thalamus on series 2 image 21 which are unchanged  These may represent dystrophic calcifications  · Patient stated he fell and hit the back of his head about a month ago, per discussion with patient's wife he has fallen more frequently than he admits with head trauma noted    · Neurosurgery following  · Unclear if acute versus chronic

## 2019-08-27 NOTE — ASSESSMENT & PLAN NOTE
· Newly diagnosed biventricular heart failure with EF of 25%  · CT chest findings of volume overload with bilateral pulmonary edema and small layering effusions  · Volume removal per Nephrology - EDW decreased from 74kg to 68kg  · Cardiology to follow  · Ischemic workup as outpatient     · Continue BB  · Loop recorder placed today to assess a fib burden

## 2019-08-27 NOTE — DISCHARGE SUMMARY
Discharge- Josh Josue 1950, 71 y o  male MRN: [id number]    Unit/Bed#: [facility] 633-01 Encounter: [id number]    Primary Care Provider: Nory Fletcher DO   Date and time admitted to hospital: 8/21/2019  4:24 PM        * Subarachnoid hemorrhage [facility])  Assessment & Plan  · Per Neurosurgery, likely not SAH  Suspect findings are more consistent with atherosclerotic vascular disease  Patient unable to have MRI secondary to metal in the left eye  Patient stable from Neurosurgery standpoint  Will follow up with Neruosurgery in about 1 month with repeat MRI  OK for heparin at HD, though patient refuses anticoagulation  · 8/21/19 CAT Scan of the head with new small foci of subarachnoid hemorrhage underlying left frontal operculum and within the anterior-inferiorclear of the interhemispheric fissure  · 8/24/19 Small focus of subarachnoid hemorrhage within the left posterior lateral frontal region on series 2 image 24 is slightly less apparent than the prior examination  Parenchymal hyperdensities are noted within the anterior medial frontal lobe, series 2 image 27 and within the posterior right thalamus on series 2 image 21 which are unchanged  These may represent dystrophic calcifications  · Patient stated he fell and hit the back of his head about a month ago, per discussion with patient's wife he has fallen more frequently than he admits with head trauma noted    · Neurosurgery following  · Unclear if acute versus chronic    Elevated troponin  Assessment & Plan  · Non-MI troponin elevation, suspect secondary to volume overload, with underlying history of end-stage renal disease on hemodialysis  · Noted trend down, patient without chest pain  · Cardiology input appreciated  · Ischemic workup at later date when ok to use DAPT    Biventricular heart failure ([address] )  Assessment & Plan  · Newly diagnosed biventricular heart failure with EF of 25%  · CT chest findings of volume overload with bilateral pulmonary edema and small layering effusions  · Volume removal per Nephrology - EDW decreased from 74kg to 68kg  · Cardiology to follow  · Ischemic workup as outpatient  · Continue BB  · Loop recorder placed today to assess a fib burden    Paroxysmal atrial fibrillation (HCC)  Assessment & Plan  · New diagnosis  · NSR on telemetry  · Continue BB and statin  · No AC secondary to [facility], and patient refuses  · Cardiology input appreciated  · Ischemic workup as outpatient   · Loop recorder placed today to assess a fib burden in setting of cardiomyopathy  · Cardiology added amiodarone 200mg BID  · Outpatient follow up with Dr Lexii Chaney    Type 2 diabetes mellitus with both eyes affected by moderate nonproliferative retinopathy without macular edema, without long-term current use of insulin ([facility] [address] )  Assessment & Plan  Lab Results   Component Value Date    HGBA1C 5 6 08/24/2019       No results for input(s): POCGLU in the last 72 hours  Blood Sugar Average: Last 72 hrs:  ·  A1c 5 6  · will defer sliding scale for now  · Monitor on renal diet    End stage renal disease (HCC)  Assessment & Plan  · HD per nephrology MWF  · Continue nephrocaps, phoslo, renegel    Essential hypertension  Assessment & Plan  · Switched metoprolol to succinate as recommended by Cardiology   Discontinue IV Lopressor  · Patient was noted to have occasional heart rate into the 100 to 140s on hemodialysis Friday with systolic pressure in the 96U at times, per Dr Aviva Edward  · Cardiology was made aware - see plan above  · Unremarkable HD session yesterday    HLD (hyperlipidemia)  Assessment & Plan  · Continue atorvastatin  · Triglycerides 163, HDL 45, LDL 49        Discharging Physician / Practitioner: Idris Aldrich PA-C  PCP: Autumn Romero DO  Admission Date:   Admission Orders (From admission, onward)     Ordered        08/21/19 1643  Inpatient Admission  Once                   Discharge Date: 08/27/19    Resolved Problems  Date Reviewed: 8/27/2019    None Consultations During Hospital Stay:  · Dr Deandre Dacosta  · Dr Cristofer Rivera  · Dr Raghu Akhtar  · Dr Connor Metzger    Procedures Performed:   CT head 8/24 - Small focus of subarachnoid hemorrhage within the left posterior lateral frontal region on series 2 image 24 is slightly less apparent than the prior examination      Parenchymal hyperdensities are noted within the anterior medial frontal lobe, series 2 image 27 and within the posterior right thalamus on series 2 image 21 which are unchanged  These may represent dystrophic calcifications      Stable moderate diffuse chronic microangiopathic change with old lacunar infarcts within the basal ganglia    LE duplex - Impression:  RIGHT LOWER LIMB:  No evidence of acute or chronic deep vein thrombosis  No evidence of superficial thrombophlebitis noted  Doppler evaluation shows a normal response to augmentation maneuvers  Popliteal, posterior tibial and anterior tibial arterial Doppler waveforms are  triphasic  LEFT LOWER LIMB:  No evidence of acute or chronic deep vein thrombosis  No evidence of superficial thrombophlebitis noted  Doppler evaluation shows a normal response to augmentation maneuvers  Popliteal, posterior tibial and anterior tibial arterial Doppler waveforms are  Triphasic      CT chest 8/21 - 1  Findings of volume overload with bilateral pulmonary edema and small layering effusions      2  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        CT head 8/21 - New small foci of subarachnoid hemorrhage underlying the left frontal operculum and within the anterior-inferior of the interhemispheric fissure      New punctate focus of hyperdensity concerning for petechial hemorrhage in the subcortical right frontal lobe        Recommend further evaluation with CTA of the head  Echocardiogram - LEFT VENTRICLE:  Systolic function was severely reduced  Ejection fraction was estimated to be 25 %    There was severe diffuse hypokinesis with regional variations  Wall thickness was mildly increased      RIGHT VENTRICLE:  Systolic function was mildly reduced      LEFT ATRIUM:  The atrium was mildly dilated      RIGHT ATRIUM:  The atrium was mildly dilated      MITRAL VALVE:  There was moderate to marked annular calcification  There was moderate calcification of the anterior leaflet, involving the leaflet margin more than the base, with mild chordal involvement  There was no evidence for stenosis  There was mild regurgitation      AORTIC VALVE:  The valve was trileaflet  Leaflets exhibited moderately to markedly increased thickness, moderate calcification, and moderately reduced cuspal separation  Transaortic velocity and gradient were increased due to stenosis, but lower than expected for the degree of stenosis due to concomitant decreased transaortic flow (decreased stroke volume)  There was moderate stenosis  There was trace regurgitation  Valve mean gradient was 13 mmHg  The aortic valve obstructive index (by VTI) was 0 3  Estimated aortic valve area (by VTI) was 1 25 cmï¾²  LV stroke index was 0 29 ml/mï¾², using LVOT flow data      TRICUSPID VALVE:  There was trace regurgitation    Significant Findings / Test Results:   · See above    Incidental Findings:   · none     Test Results Pending at Discharge (will require follow up):   · none     Outpatient Tests Requested:  · CT head per Neurosurgery    Complications:  none    Reason for Admission: shortness of breath    Hospital Course:     Abbi Delgado is a 71 y o  male patient who originally presented to the hospital on 8/21/2019 for Neurosurgery evaluation  Patient originally presented at [facility] with shortness of breath  In the ED, he was found to be volume overloaded and he was also noted to have possible petechial hemorrhages on CT scan  Patient was transferred to [facility] for neurosurgery evaluation  Patient was seen in consultation by Neurosurgery    Repeat CT head was obtained which was stable  Neurosurgery feels that this was less likely petechial hemorrhage and most likely chronic atherosclerotic vascular disease findings  Patient unable to have MRI secondary to metal in his left eye  Patient is stable from a neurosurgery standpoint  They will follow up with the patient in about 1 month with a repeat CT of the head  Patient is okay for heparin products during dialysis, but patient currently refusing all anticoagulation  Patient was also noted to have new onset AFib after dialysis session  He was seen in consultation by Cardiology  His metoprolol tartrate will be changed to metoprolol succinate 25 mg daily  Patient is currently in normal sinus rhythm  A loop recorder will be placed prior to discharge to assess for AFib burden  Patient was also noted to have an ejection fraction of 25% which is new compared to previous echocardiogram   Patient will need an ischemic workup as an outpatient  This will be done at a later time when patient is cleared for dual anti-platelet therapy  Nephrology challenge the patient's weight on dialysis  His new dry weight will be about 68 kg  Patient's breathing and congestion have improved with his decrease in dry weight  Patient was evaluated by Physical therapy  They were recommending inpatient rehab if the wife could not provide support for the patient  Patient declined rehab and will go home with the support of his wife as well as VNA  Please see above list of diagnoses and related plan for additional information  Condition at Discharge: good     Discharge Day Visit / Exam:     Subjective:  Offers no specific complaints  Patient felt weak and tired after dialysis yesterday, but feels improved today    Vitals: Blood Pressure: 136/82 (08/27/19 0742)  Pulse: 87 (08/27/19 0742)  Temperature: 98 6 °F (37 °C) (08/27/19 0742)  Temp Source: Oral (08/26/19 1534)  Respirations: 18 (08/27/19 0742)  Height: 5' 9" (175 3 cm) (08/21/19 1728)  Weight - Scale: 70 8 kg (156 lb) (08/26/19 0600)  SpO2: 97 % (08/27/19 0742)  Exam:   Physical Exam   Constitutional: He is oriented to person, place, and time  He appears well-developed  No distress  HENT:   Head: Normocephalic and atraumatic  Neck: Normal range of motion  Neck supple  Cardiovascular: Normal rate and regular rhythm  Murmur heard  Pulmonary/Chest: Effort normal and breath sounds normal  No respiratory distress  He has no wheezes  He has no rales  Abdominal: Soft  Bowel sounds are normal  He exhibits no distension  Musculoskeletal: He exhibits no edema  Neurological: He is alert and oriented to person, place, and time  No cranial nerve deficit  Skin: Skin is warm and dry  No rash noted  Superficial skin abrasion on left shin   Psychiatric: He has a normal mood and affect  Discussion with Family: wife at bedside    Discharge instructions/Information to patient and family:   See after visit summary for information provided to patient and family  Provisions for Follow-Up Care:  See after visit summary for information related to follow-up care and any pertinent home health orders  Disposition:     Home with VNA Services (Reminder: Complete face to face encounter)    For Discharges to [facility]F:   · Not Applicable to this Patient - Not Applicable to this Patient    Planned Readmission: none     Discharge Statement:  I spent 60 minutes discharging the patient  This time was spent on the day of discharge  I had direct contact with the patient on the day of discharge  Greater than 50% of the total time was spent examining patient, answering all patient questions, arranging and discussing plan of care with patient as well as directly providing post-discharge instructions  Additional time then spent on discharge activities  Discharge Medications:  See after visit summary for reconciled discharge medications provided to patient and family        ** Please Note: This note has been constructed using a voice recognition system **

## 2019-08-27 NOTE — PLAN OF CARE
Problem: OCCUPATIONAL THERAPY ADULT  Goal: Performs self-care activities at highest level of function for planned discharge setting  See evaluation for individualized goals  Description  Treatment Interventions: ADL retraining, Functional transfer training, Endurance training, Cognitive reorientation, Patient/family training, Equipment evaluation/education, Compensatory technique education, Energy conservation, Activityengagement  Equipment Recommended: Bedside commode, Tub seat with back       See flowsheet documentation for full assessment, interventions and recommendations  Outcome: Progressing  Note:   Limitation: Decreased ADL status, Decreased Safe judgement during ADL, Decreased cognition, Decreased endurance, Decreased self-care trans, Decreased high-level ADLs  Prognosis: Good  Assessment: pt participated in am ot session and was seen focusing on adls in stance / seated as needed for adls  pt  requries close sba for functional mobility with spc to from short distances  pt refusing rw in room and states he does not use at home  pt requires asst for deppend donning and adls for b feet seated  question if his wife can provide necessary asst  pt reports he drives himself to dialysis which is not recommended at this time  pt with improved activity tolerence from last evening however continues to fatigue with activity  if pt uses spc he may require min asst for balance with functional mobility   pt tires fairly easily      OT Discharge Recommendation: Other (Comment)(home with wife's S/asst vs Rehab)  OT - OK to Discharge: No     April A LEE Bailey

## 2019-08-27 NOTE — PROGRESS NOTES
Progress Note - Cardiology   Astrid Lim 71 y o  male MRN: [id number]  Unit/Bed#: [facility] 633-01 Encounter: [id number]    Assessment:  Principal Problem:    Subarachnoid hemorrhage (HCC)  Active Problems:    Essential hypertension    HLD (hyperlipidemia)    End stage renal disease (HCC)    Type 2 diabetes mellitus with both eyes affected by moderate nonproliferative retinopathy without macular edema, without long-term current use of insulin (HCC)    Cerebral hemorrhage (HCC)    Biventricular heart failure (HCC)    Elevated troponin    Paroxysmal atrial fibrillation ([address] )    # [address]  # New HFrEF 25% LVEDd 5 3, NYHA 2-3, Stage C; compensated  -mild RV dysfunction  -on Toprol 25mg;   # Newly diagnosed AF; now NSR  -[id number] 4; unable to start [facility] due to [address] but patient/family also would not want to start [facility] regardless due to prior hx of retinal hemorrhage; patient understands the risk of stroke and would favor that over possibly losing his vision  # Moderate AS  # ESRD on HD  # NSTEMI type 2 vs non-MI troponin elevation due to ESRD/SAH  -not a candidate for antiplatelet therapy; continue BB, statin      Plan:  1  Continue Toprol, statin  Will consider initiating amiodarone therapy for PAF suppression  Consideration for ACEi/ARB/ARNI initiation to be made as OP  2  Ischemic work up with be done on an outpatient basis after recovery from [address]  3  Continue to hold antiplatelet and anticoagulation therapy at this time  Patient does not want long term AC regardless of indication  4  Will implant ILR today for long-term monitoring of AF burden and in order to guide treatment  4  Patient to follow with Dr Shira Cartagena upon discharge  Subjective/Objective     Subjective: No events overnight  Feels well overall  Denies dizziness, palpitations, CP        Objective:  Vitals: /82   Pulse 87   Temp 98 6 °F (37 °C)   Resp 18   Ht 5' 9" (1 753 m)   Wt 70 8 kg (156 lb)   SpO2 97%   BMI 23 04 kg/m²   Vitals: 08/23/19 0600 08/26/19 0600   Weight: 71 2 kg (156 lb 15 5 oz) 70 8 kg (156 lb)     Orthostatic Blood Pressures      Most Recent Value   Blood Pressure  136/82 filed at 08/27/2019 3940   Patient Position - Orthostatic VS  Lying filed at 08/26/2019 1157            Intake/Output Summary (Last 24 hours) at 8/27/2019 1009  Last data filed at 8/27/2019 0549  Gross per 24 hour   Intake 1090 ml   Output 1000 ml   Net 90 ml       Physical Exam:   General appearance: alert and in no acute distress  Head: Normocephalic, without obvious abnormality, atraumatic  Neck: no JVD and supple  Lungs: clear to auscultation, no rales, no wheezing  Heart: S1, S2 normal and no S3 or S4, 2/6 holosystolic murmur, No gallop, No rub  Abdomen: soft, non-tender; no masses,  no organomegaly  Extremities: extremities normal, atraumatic, no cyanosis, 1+ edema  Pulses: 2+ and symmetric bilaterally  Skin: Skin color, texture, turgor normal;   Neurologic: Grossly normal, Alert and oriented      Medications:    Current Facility-Administered Medications:     allopurinol (ZYLOPRIM) tablet 100 mg, 100 mg, Oral, Daily, Estrella Pichardo MD, 100 mg at 08/27/19 0854    ALPRAZolam (XANAX) tablet 0 5 mg, 0 5 mg, Oral, Daily PRN, Estrella Pichardo MD    atorvastatin (LIPITOR) tablet 20 mg, 20 mg, Oral, HS, Estrella Pichardo MD, 20 mg at 08/26/19 2100    b complex-vitamin C-folic acid (NEPHROCAPS) capsule 1 capsule, 1 capsule, Oral, Daily With Dinner, Estrella Pichardo MD, 1 capsule at 08/26/19 1737    calcium acetate (PHOSLO) capsule 667 mg, 667 mg, Oral, TID With Meals, Estrella Pichardo MD, 667 mg at 08/27/19 0854    cholecalciferol (VITAMIN D3) tablet 1,000 Units, 1,000 Units, Oral, Daily, Estrella Pichardo MD, 1,000 Units at 08/27/19 0854    cinacalcet (SENSIPAR) tablet 30 mg, 30 mg, Oral, Daily With Sharla Luke, MD, 30 mg at 08/26/19 1737    escitalopram (LEXAPRO) tablet 10 mg, 10 mg, Oral, HS, Estrella Pichardo MD, 10 mg at 08/26/19 2100    guaiFENesin (ROBITUSSIN) oral liquid 400 mg, 400 mg, Oral, BID, Ihsan Carrasco MD, 400 mg at 08/27/19 0854    metoprolol succinate (TOPROL-XL) 24 hr tablet 25 mg, 25 mg, Oral, Daily, Jael Meza PA-C, 25 mg at 08/27/19 0854    oxyCODONE (ROXICODONE) IR tablet 5 mg, 5 mg, Oral, TID PRN, Ihsan Carrasco MD    pantoprazole (PROTONIX) EC tablet 40 mg, 40 mg, Oral, Early Morning, Ihsan Carrasco MD, 40 mg at 08/27/19 0547    sevelamer (RENAGEL) tablet 800 mg, 800 mg, Oral, TID With Meals, Ihsan Carrasco MD, 800 mg at 08/27/19 0854    Lab Results:  Results from last 7 days   Lab Units 08/22/19  0746 08/22/19  0416 08/22/19  0057   TROPONIN I ng/mL 2 88* 2 98* 3 15*     Results from last 7 days   Lab Units 08/26/19  0517 08/25/19  0516 08/24/19  0601   WBC Thousand/uL 10 55* 11 51* 11 24*   HEMOGLOBIN g/dL 11 3* 11 9* 12 0   HEMATOCRIT % 35 1* 36 5 35 2*   PLATELETS Thousands/uL 315 326 347     Results from last 7 days   Lab Units 08/24/19  0601   TRIGLYCERIDES mg/dL 163*   HDL mg/dL 45     Results from last 7 days   Lab Units 08/26/19  0517 08/25/19  0516 08/24/19  0601  08/22/19  0416 08/21/19  1117   POTASSIUM mmol/L 4 7 4 3 4 2   < > 4 6 4 5   CHLORIDE mmol/L 101 98* 102   < > 94* 93*   CO2 mmol/L 22 26 23   < > 27 29   BUN mg/dL 87* 70* 54*   < > 43* 53*   CREATININE mg/dL 9 66* 8 12* 6 24*   < > 6 76* 8 75*   CALCIUM mg/dL 8 8 8 6 8 8   < > 9 0 9 0   ALK PHOS U/L  --   --  99  --  92 93   ALT U/L  --   --  71  --  56 18   AST U/L  --   --  52*  --  83* 33    < > = values in this interval not displayed  Results from last 7 days   Lab Units 08/21/19  1116   INR  1 24*   PTT seconds 36     Results from last 7 days   Lab Units 08/21/19  1117   MAGNESIUM mg/dL 1 9       Telemetry: Personally reviewed  NSR  Imaging: Personally reviewed  EKG: Personally reviewed       Presley Sim MD  Cardiology Fellow

## 2019-08-28 ENCOUNTER — PATIENT OUTREACH (OUTPATIENT)
Dept: FAMILY MEDICINE CLINIC | Facility: CLINIC | Age: 69
End: 2019-08-28

## 2019-08-28 ENCOUNTER — TRANSITIONAL CARE MANAGEMENT (OUTPATIENT)
Dept: FAMILY MEDICINE CLINIC | Facility: CLINIC | Age: 69
End: 2019-08-28

## 2019-08-28 NOTE — PROCEDURES
PP  LINQ    History and physical were reviewed  Patient was examined and history was reviewed  No change in patient's condition Since history and physical has been completed        The pre- operative diagnosis:  Recurrent lightheadedness and fall  Assess load of atrial fibrillation        Postoperative diagnosis:  Recurrent lightheadedness and fall  Assess load of atrial fibrillation          Procedure:  LINQ implantation        Surgeon: Merissa Fajardo    Assistants -none    Specimens - none    Estimated blood loss-  5 ml    Findings-none    Complications none    Anesthesia-  local lidocaine by myself      Details of the device        Description of procedure: The patient was seen before the procedure  The details of the procedure was explained and patient agreed to the same  Appropriate consent was signed  The patient was seen in his room  Proper time out was done  Sterile dressing and draping was done  Local lidocaine was infiltrated, in the left third intercostal space, about 2 cm lateral to the sternal edge  Using the stab knife an incision was made  Thereafter the  was used, moved around to form a pocket, along the long axis of the heart, in the third  intercostal space region  Then plunger was used to deploy the device  The plunger was disengaged and removed  The  was pulled back  Pressure was held, figure of 8 suture applied, and hemostasis was obtained  Dressing was done with Steri-Strips, Telfa and Tegaderm      Summary of the procedure: The patient came for linq device placement   The device has been placed and patient tolerated the procedure well

## 2019-08-28 NOTE — PROGRESS NOTES
First Telephone Outreach attempt to patient to introduce self and Outpatient Care Management  Phone rings then goes to dead air   Unable to leave message

## 2019-08-29 ENCOUNTER — TELEPHONE (OUTPATIENT)
Dept: NEUROSURGERY | Facility: CLINIC | Age: 69
End: 2019-08-29

## 2019-08-29 NOTE — TELEPHONE ENCOUNTER
08/30/2019-CT SCHEDULED ON 09/17/2019 08/29/2019-ANA (08/26/2019) WILL SEE PRN  CALLED PT, SPOKE TO WIFE (Leonard Blevins), AND CONFIRMED 09/24/2019 APT  PT'S WIFE WILL SCHEDULE CT ONCE SHE HAS HER CALENDAR

## 2019-09-03 ENCOUNTER — OFFICE VISIT (OUTPATIENT)
Dept: FAMILY MEDICINE CLINIC | Facility: CLINIC | Age: 69
End: 2019-09-03
Payer: COMMERCIAL

## 2019-09-03 VITALS
WEIGHT: 164 LBS | SYSTOLIC BLOOD PRESSURE: 118 MMHG | HEIGHT: 69 IN | DIASTOLIC BLOOD PRESSURE: 72 MMHG | BODY MASS INDEX: 24.29 KG/M2

## 2019-09-03 DIAGNOSIS — I48.0 PAROXYSMAL ATRIAL FIBRILLATION (HCC): Primary | ICD-10-CM

## 2019-09-03 DIAGNOSIS — K21.9 GASTROESOPHAGEAL REFLUX DISEASE, ESOPHAGITIS PRESENCE NOT SPECIFIED: ICD-10-CM

## 2019-09-03 DIAGNOSIS — S81.812D SKIN TEAR OF LEFT LOWER LEG WITHOUT COMPLICATION, SUBSEQUENT ENCOUNTER: ICD-10-CM

## 2019-09-03 DIAGNOSIS — I61.9 CEREBRAL HEMORRHAGE (HCC): ICD-10-CM

## 2019-09-03 DIAGNOSIS — E78.5 HYPERLIPIDEMIA, UNSPECIFIED HYPERLIPIDEMIA TYPE: ICD-10-CM

## 2019-09-03 PROCEDURE — 99496 TRANSJ CARE MGMT HIGH F2F 7D: CPT | Performed by: FAMILY MEDICINE

## 2019-09-03 RX ORDER — OMEPRAZOLE 20 MG/1
20 CAPSULE, DELAYED RELEASE ORAL
Qty: 90 CAPSULE | Refills: 3 | Status: SHIPPED | OUTPATIENT
Start: 2019-09-03

## 2019-09-03 RX ORDER — ATORVASTATIN CALCIUM 20 MG/1
20 TABLET, FILM COATED ORAL DAILY
Qty: 90 TABLET | Refills: 3 | Status: SHIPPED | OUTPATIENT
Start: 2019-09-03

## 2019-09-03 NOTE — PROGRESS NOTES
Second Telephone Outreach attempt to patient to introduce self and Outpatient Care Management  No answer, left voicemail with contact information  Awaiting return phone call, will follow up in a few days if patient does not call back

## 2019-09-03 NOTE — PROGRESS NOTES
Transition of Care  Follow-up After Hospitalization    Angie Zuñiga 71 y o  male   Date:  9/3/2019    TCM Call (since 8/3/2019)     Date and time call was made  8/28/2019  3:35 PM    Hospital care reviewed  Other diagnostic studies pending <img src='C:FILES (X86)    Patient was hospitialized at  [address] [place] [address]; [facility]    Date of Admission  08/21/19    Date of discharge  08/27/19    Diagnosis  SOB; Subarachnoid hemorrhage    Disposition  Home    Were the patients medications reviewed and updated  No    Current Symptoms  None      TCM Call (since 8/3/2019)     Post hospital issues  None    Scheduled for follow up? Yes <img src='C:FILES (X86)    Patients specialists  Cardiologist; Other (comment); Urologist    Cardiologist name  [facility]    Urologist name  APPT 9/19 FOR PROCEDURE    Other specialists names  NEUROSURGICAL Susanaosito Siddiqi Hipps APPT 9/24/19 AT 1 PM; DEVICE CHECK APPT 9/12; DEVICE CLINIC APPT 9/12    Referrals needed  AMBULATORY REFERRAL TO HOME HEALTH-PT REFUSED NURSE AND PT/OT    Did you obtain your prescribed medications  Yes    Do you need help managing your prescriptions or medications  No    Is transportation to your appointment needed  No    I have advised the patient to call PCP with any new or worsening symptoms  [address] or [employer] other    Support System  Spouse    The type of support provided  Physical; Emotional    Do you have social support  Yes, as much as I need    Are you recieving any outpatient services  No    Are you recieving home care services  No <img src='C:FILES (X86)    Are you using any community resources  No    Current waiver services  No    Have you fallen in the last 12 months  No    Interperter language line needed  No    Counseling  Patient        Hospital records were reviewed  Medications upon discharge reviewed/updated  Discharge Disposition:    Follow up visits with other specialists:       Assessment and Plan:   For skin tear, Rx for Silvadene cream   Follow up with specialists as scheduled  Follow-up here as scheduled in November p r n  Jasson Tranr was seen today for transition of care management  Diagnoses and all orders for this visit:    Paroxysmal atrial fibrillation (HCC)    Cerebral hemorrhage (HCC)    Skin tear of left lower leg without complication, subsequent encounter  -     silver sulfadiazine (SILVADENE,SSD) 1 % cream; Apply topically daily    Hyperlipidemia, unspecified hyperlipidemia type  -     atorvastatin (LIPITOR) 20 mg tablet; Take 1 tablet (20 mg total) by mouth daily    Gastroesophageal reflux disease, esophagitis presence not specified  -     omeprazole (PriLOSEC) 20 mg delayed release capsule; Take 1 capsule (20 mg total) by mouth daily at bedtime            HPI:  CHELLE  Patient was admitted for shortness of breath, CHF, new onset AFib  They were worried about a subarachnoid hemorrhage  Neurology feels that it is chronic  Patient now in sinus rhythm  They placed him on amiodarone  In the hospital he received a skin tear on his left anterior shin from a EKG pad  Wife is been using bacitracin on it  ROS: Review of Systems   Constitutional: Negative  Respiratory: Negative  Cardiovascular:        As per HPI   Gastrointestinal: Negative  Genitourinary: Negative      Skin:        As per HPI       Past Medical History:   Diagnosis Date    AAA (abdominal aortic aneurysm) (HCC)     Abnormal liver function test     Anemia     Anxiety     Arthritis     Cancer (HCC)     bladder    Cardiac disorder     Chronic kidney disease     renal failure    Chronic pain disorder     back and legs    Depression     Diabetes mellitus ([facility]     Dialysis patient ([facility] [facility] )     Fracture of carpal bone     Fractures     spinal compression fx,closed fx of wrist & ankle    GERD (gastroesophageal reflux disease)     Gout     Hearing difficulty of both ears     Hepatitis A     A - remission since 1970's    Hyperlipidemia     Hyperlipidemia     Hypertension     Kidney stone     Migraine     Neuropathy     Nicotine dependence     Pancreatitis     Vitamin D deficiency     Wears glasses        Past Surgical History:   Procedure Laterality Date    BLADDER FULGURATION  06/17/2016    Cystoscopy with fulguration medium lesion (2-5 cm)    CATARACT EXTRACTION Bilateral     congenital    COLONOSCOPY      CYSTOSCOPY      Diagnostic 5/31/17, 10/10/17    CYSTOSCOPY N/A 6/14/2017    Procedure: CYSTOSCOPY WITH BLADDER  BIOPSIES WITH FULGURATION;  Surgeon: Hui Luis MD;  [facility];  Service: Urology    CYSTOSCOPY  12/06/2018    CYSTOSCOPY W/ URETERAL STENT PLACEMENT Right 06/01/2016    CYSTOSCOPY W/ URETEROSCOPY  06/17/2016    With removal of calculus    CYSTOSTOMY W/ BLADDER BIOPSY      9/6/26, 2/6/17    DENTAL SURGERY      ESOPHAGOGASTRODUODENOSCOPY      EYE SURGERY      catatact    EYE SURGERY Bilateral     Left eye detachment R eye retinal tear    HERNIA REPAIR      HERNIA REPAIR      L inguinal w/mesh;umbilical herniorraphy    RI ANASTOMOSIS,AV,ANY SITE Right 6/29/2016    Procedure: LOWER FOREARM AV FISTULA;  Surgeon: Nathalia Roca MD;  [facility];  Service: Vascular    RI COLONOSCOPY FLX DX W/COLLJ SPEC WHEN PFRMD N/A 3/4/2016    Procedure: COLONOSCOPY;  Surgeon: Harry Mckeon MD;  [facility];  Service: Gastroenterology    RI CYSTO/URETERO W/LITHOTRIPSY &INDWELL STENT INSRT Right 6/17/2016    Procedure: CYSTOSCOPY URETEROSCOPY WITH LITHOTRIPSY HOLMIUM LASER,BASKET STONE EXTRACTION, RETROGRADE PYELOGRAM AND INSERTION STENT URETERAL;  Surgeon: Rachel Olmstead MD;  [facility];  Service: Urology    RI CYSTOURETHROSCOPY N/A 7/13/2017    Procedure: CYSTOSCOPY;  Surgeon: Hui Luis MD;  [facility];  Service: Urology    RI CYSTOURETHROSCOPY W/IRRIG & EVAC CLOTS N/A 7/13/2017    Procedure: CYSTOSCOPY EVACUATION OF CLOTS,POSSIBLE FULGURATION;  Surgeon: Milan Gallardo MD;  [facility];  Service: Urology    NC CYSTOURETHROSCOPY,BIOPSY N/A 8/15/2016    Procedure: CYSTOSCOPY WITH RE BIOPSY OF BLADDER;  Surgeon: Isael Hilton MD;  [facility];  Service: Urology    NC CYSTOURETHROSCOPY,FULGUR 0 5-2 CM LESN N/A 2017    Procedure: TRANSURETHRAL RESECTION OF BLADDER TUMOR (TURBT); Surgeon: Milan Gallardo MD;  [facility];  Service: Urology    NC CYSTOURETHROSCOPY,FULGUR 0 5-2 CM LESN N/A 2017    Procedure: TRANSURETHRAL RESECTION OF BLADDER TUMOR (TURBT); Surgeon: Milan Gallardo MD;  [facility];  Service: Urology    NC CYSTOURETHROSCOPY,FULGUR <0 5 CM LESN N/A 2017    Procedure: [place]; BLADDER BIOPSY WITH Juanell Mouan;  Surgeon: Milan Gallardo MD;  [facility];  Service: Urology    NC CYSTOURETHROSCOPY,URETER CATHETER Right 2016    Procedure: CYSTOSCOPY RETROGRADE PYELOGRAM WITH INSERTION STENT URETERAL;  Surgeon: Tariq Nina MD;  Location:  MAIN OR;  Service: Urology    NC EGD TRANSORAL BIOPSY SINGLE/MULTIPLE N/A 3/4/2016    Procedure: ESOPHAGOGASTRODUODENOSCOPY (EGD);   Surgeon: Ruby Rowell MD;  [facility];  Service: Gastroenterology    VASCULAR SURGERY Right     AV fistula       Social History     Socioeconomic History    Marital status: /Civil Union     Spouse name: None    Number of children: None    Years of education: None    Highest education level: None   Occupational History    Occupation:    Social Needs    Financial resource strain: None    Food insecurity:     Worry: None     Inability: None    Transportation needs:     Medical: None     Non-medical: None   Tobacco Use    Smoking status: Former Smoker     Last attempt to quit: 1985     Years since quittin 6    Smokeless tobacco: Never Used   Substance and Sexual Activity    Alcohol use: Yes     Frequency: 2-4 times a month     Drinks per session: 1 or 2     Binge frequency: Never    Drug use: No    Sexual activity: Yes     Partners: Female   Lifestyle    Physical activity:     Days per week: None     Minutes per session: None    Stress: None   Relationships    Social connections:     Talks on phone: None     Gets together: None     Attends [employer] service: None     Active member of club or organization: None     Attends meetings of clubs or organizations: None     Relationship status: None    Intimate partner violence:     Fear of current or ex partner: None     Emotionally abused: None     Physically abused: None     Forced sexual activity: None   Other Topics Concern    None   Social History Narrative    Advance directive on file    Copy of advanced directive obtained from patient    History of no living will    Patient has living will       Family History   Problem Relation Age of Onset    Depression Mother     Diabetes Mother     Heart disease Mother     Hypertension Mother     Kidney disease Mother     Diabetes Father     Heart disease Father         Cardiac disorder    Hypertension Father     Stroke Father     Thyroid disease Sister     Cancer Maternal Grandmother        Allergies   Allergen Reactions    Acetaminophen      Due to LFT elevation    Heparin      Avoids due to eye conditions;can't have any med that may cause bleeding    Lovenox [Enoxaparin Sodium]      Avoids due to eye conditions;can't have any med that may cause bleeding in eyes    Nsaids      Avoids due to renal failure    Cardura [Doxazosin] Palpitations         Current Outpatient Medications:     allopurinol (ZYLOPRIM) 100 mg tablet, Take 1 tablet (100 mg total) by mouth daily, Disp: 90 tablet, Rfl: 3    ALPRAZolam (XANAX) 0 5 mg tablet, Take 0 5 mg by mouth daily as needed for anxiety  , Disp: , Rfl:     amiodarone 200 mg tablet, Take 1 tablet (200 mg total) by mouth 2 (two) times a day with meals, Disp: 60 tablet, Rfl: 0    ampicillin (PRINCIPEN) 250 mg capsule, Take 250 mg by mouth every 12 (twelve) hours Current treatment for UTI  PRE PROCEDURE, Disp: , Rfl:     atorvastatin (LIPITOR) 20 mg tablet, Take 1 tablet (20 mg total) by mouth daily, Disp: 90 tablet, Rfl: 3    B Complex-C-Folic Acid (TRIPHROCAPS) 1 MG CAPS, Take 1 mg by mouth daily, Disp: , Rfl:     calcium acetate (PHOSLO) 667 mg capsule, Take 667 mg by mouth 3 (three) times a day with meals , Disp: , Rfl:     Cholecalciferol (VITAMIN D-3 PO), Take 1 capsule by mouth daily  , Disp: , Rfl:     cinacalcet (SENSIPAR) 30 mg tablet, Take 30 mg by mouth daily with dinner, Disp: , Rfl:     colchicine-probenecid 0 5-500 MG per tablet, Take 1 tablet by mouth daily, Disp: 90 tablet, Rfl: 3    entecavir (BARACLUDE) 0 5 MG tablet, Take 1 tablet (0 5 mg total) by mouth daily for 90 days (Patient taking differently: Take 0 5 mg by mouth once a week  ), Disp: 90 tablet, Rfl: 1    escitalopram (LEXAPRO) 10 mg tablet, Take 1 tablet (10 mg total) by mouth daily (Patient taking differently: Take 10 mg by mouth daily at bedtime ), Disp: 90 tablet, Rfl: 3    guaiFENesin 200 MG tablet, Take 400 mg by mouth 2 (two) times a day, Disp: , Rfl:     metoprolol succinate (TOPROL-XL) 25 mg 24 hr tablet, Take 1 tablet (25 mg total) by mouth daily, Disp: 30 tablet, Rfl: 0    omeprazole (PriLOSEC) 20 mg delayed release capsule, Take 1 capsule (20 mg total) by mouth daily at bedtime, Disp: 90 capsule, Rfl: 3    oxybutynin (DITROPAN-XL) 5 mg 24 hr tablet, Take 1 tablet (5 mg total) by mouth as needed (before procedure), Disp: , Rfl:     oxyCODONE (ROXICODONE) 5 mg immediate release tablet, Take 1 tablet (5 mg total) by mouth 3 (three) times a day as needed for moderate painMax Daily Amount: 15 mg, Disp: 90 tablet, Rfl: 0    sevelamer carbonate (RENVELA) 800 mg tablet, Take 800 mg by mouth 3 (three) times a day with meals , Disp: , Rfl:     silver sulfadiazine (SILVADENE,SSD) 1 % cream, Apply topically daily, Disp: 50 g, Rfl: 5      Physical Exam:  /72   Ht 5' 9" (1 753 m)   Wt 74 4 kg (164 lb)   BMI 24 22 kg/m²     Physical Exam   Constitutional: He is oriented to person, place, and time  He appears well-developed and well-nourished  No distress  HENT:   Head: Normocephalic and atraumatic  Eyes: Conjunctivae are normal    Cardiovascular: Normal rate, regular rhythm and normal heart sounds  Exam reveals no gallop and no friction rub  No murmur heard  Pulmonary/Chest: Effort normal and breath sounds normal  No respiratory distress  He has no wheezes  He has no rales  Musculoskeletal: He exhibits no edema  Neurological: He is alert and oriented to person, place, and time  Skin: He is not diaphoretic    2 x 3 cm skin tear on his left lower anterior shin  Does not look infected  Psychiatric: He has a normal mood and affect  His behavior is normal  Judgment and thought content normal    Vitals reviewed            Labs:  Lab Results   Component Value Date    WBC 10 55 (H) 08/26/2019    HGB 11 3 (L) 08/26/2019    HCT 35 1 (L) 08/26/2019     (H) 08/26/2019     08/26/2019     Lab Results   Component Value Date     01/06/2016    K 4 7 08/26/2019     08/26/2019    CO2 22 08/26/2019    ANIONGAP 14 (H) 01/06/2016    BUN 87 (H) 08/26/2019    CREATININE 9 66 (H) 08/26/2019    GLUCOSE 152 (H) 09/14/2016    GLUF 135 (H) 05/07/2019    CALCIUM 8 8 08/26/2019    CORRECTEDCA 10 5 (H) 10/07/2018    AST 52 (H) 08/24/2019    ALT 71 08/24/2019    ALKPHOS 99 08/24/2019    PROT 6 9 12/01/2015    BILITOT 0 27 12/01/2015    EGFR 5 08/26/2019

## 2019-09-03 NOTE — PROGRESS NOTES
pts wife called back   Currents needs and services in place discussed  Pt is in need of shower chair with a back and he has script  Advised  [facility] is par with [employer] [facility] At his time CM declined but she has this CM contact info if needs change

## 2019-09-07 ENCOUNTER — APPOINTMENT (EMERGENCY)
Dept: CT IMAGING | Facility: HOSPITAL | Age: 69
End: 2019-09-07
Payer: COMMERCIAL

## 2019-09-07 ENCOUNTER — HOSPITAL ENCOUNTER (EMERGENCY)
Facility: HOSPITAL | Age: 69
Discharge: HOME | End: 2019-09-07
Attending: EMERGENCY MEDICINE | Admitting: EMERGENCY MEDICINE
Payer: COMMERCIAL

## 2019-09-07 ENCOUNTER — APPOINTMENT (EMERGENCY)
Dept: RADIOLOGY | Facility: HOSPITAL | Age: 69
End: 2019-09-07
Payer: COMMERCIAL

## 2019-09-07 VITALS
BODY MASS INDEX: 25.07 KG/M2 | SYSTOLIC BLOOD PRESSURE: 130 MMHG | TEMPERATURE: 99.2 F | HEART RATE: 92 BPM | OXYGEN SATURATION: 90 % | DIASTOLIC BLOOD PRESSURE: 80 MMHG | RESPIRATION RATE: 20 BRPM | WEIGHT: 169.75 LBS

## 2019-09-07 DIAGNOSIS — I12.9 TYPE 2 DM WITH CKD STAGE 4 AND HYPERTENSION (HCC): ICD-10-CM

## 2019-09-07 DIAGNOSIS — R77.8 ELEVATED TROPONIN: ICD-10-CM

## 2019-09-07 DIAGNOSIS — I50.9 CHF (CONGESTIVE HEART FAILURE) (HCC): Primary | ICD-10-CM

## 2019-09-07 DIAGNOSIS — E11.22 TYPE 2 DM WITH CKD STAGE 4 AND HYPERTENSION (HCC): ICD-10-CM

## 2019-09-07 DIAGNOSIS — R09.02 HYPOXIA: ICD-10-CM

## 2019-09-07 DIAGNOSIS — N18.4 TYPE 2 DM WITH CKD STAGE 4 AND HYPERTENSION (HCC): ICD-10-CM

## 2019-09-07 LAB
ALBUMIN SERPL BCP-MCNC: 3 G/DL (ref 3.5–5)
ALP SERPL-CCNC: 104 U/L (ref 46–116)
ALT SERPL W P-5'-P-CCNC: 17 U/L (ref 12–78)
ANION GAP SERPL CALCULATED.3IONS-SCNC: 7 MMOL/L (ref 4–13)
AST SERPL W P-5'-P-CCNC: 18 U/L (ref 5–45)
BASOPHILS # BLD AUTO: 0.05 THOUSANDS/ΜL (ref 0–0.1)
BASOPHILS NFR BLD AUTO: 1 % (ref 0–1)
BILIRUB SERPL-MCNC: 0.8 MG/DL (ref 0.2–1)
BUN SERPL-MCNC: 26 MG/DL (ref 5–25)
CALCIUM SERPL-MCNC: 8.8 MG/DL (ref 8.3–10.1)
CHLORIDE SERPL-SCNC: 96 MMOL/L (ref 100–108)
CO2 SERPL-SCNC: 36 MMOL/L (ref 21–32)
CREAT SERPL-MCNC: 5.14 MG/DL (ref 0.6–1.3)
EOSINOPHIL # BLD AUTO: 0.07 THOUSAND/ΜL (ref 0–0.61)
EOSINOPHIL NFR BLD AUTO: 1 % (ref 0–6)
ERYTHROCYTE [DISTWIDTH] IN BLOOD BY AUTOMATED COUNT: 14.9 % (ref 11.6–15.1)
GFR SERPL CREATININE-BSD FRML MDRD: 11 ML/MIN/1.73SQ M
GLUCOSE SERPL-MCNC: 220 MG/DL (ref 65–140)
HCT VFR BLD AUTO: 35.9 % (ref 36.5–49.3)
HGB BLD-MCNC: 11.4 G/DL (ref 12–17)
IMM GRANULOCYTES # BLD AUTO: 0.03 THOUSAND/UL (ref 0–0.2)
IMM GRANULOCYTES NFR BLD AUTO: 0 % (ref 0–2)
LACTATE SERPL-SCNC: 3 MMOL/L (ref 0.5–2)
LYMPHOCYTES # BLD AUTO: 0.84 THOUSANDS/ΜL (ref 0.6–4.47)
LYMPHOCYTES NFR BLD AUTO: 8 % (ref 14–44)
MAGNESIUM SERPL-MCNC: 1.8 MG/DL (ref 1.6–2.6)
MCH RBC QN AUTO: 33.8 PG (ref 26.8–34.3)
MCHC RBC AUTO-ENTMCNC: 31.8 G/DL (ref 31.4–37.4)
MCV RBC AUTO: 107 FL (ref 82–98)
MONOCYTES # BLD AUTO: 0.91 THOUSAND/ΜL (ref 0.17–1.22)
MONOCYTES NFR BLD AUTO: 9 % (ref 4–12)
NEUTROPHILS # BLD AUTO: 8.55 THOUSANDS/ΜL (ref 1.85–7.62)
NEUTS SEG NFR BLD AUTO: 81 % (ref 43–75)
NRBC BLD AUTO-RTO: 0 /100 WBCS
NT-PROBNP SERPL-MCNC: ABNORMAL PG/ML
PHOSPHATE SERPL-MCNC: 2.6 MG/DL (ref 2.3–4.1)
PLATELET # BLD AUTO: 313 THOUSANDS/UL (ref 149–390)
PMV BLD AUTO: 10 FL (ref 8.9–12.7)
POTASSIUM SERPL-SCNC: 3.6 MMOL/L (ref 3.5–5.3)
PROT SERPL-MCNC: 7.5 G/DL (ref 6.4–8.2)
RBC # BLD AUTO: 3.37 MILLION/UL (ref 3.88–5.62)
SODIUM SERPL-SCNC: 139 MMOL/L (ref 136–145)
TROPONIN I SERPL-MCNC: 0.08 NG/ML
WBC # BLD AUTO: 10.45 THOUSAND/UL (ref 4.31–10.16)

## 2019-09-07 PROCEDURE — 70450 CT HEAD/BRAIN W/O DYE: CPT

## 2019-09-07 PROCEDURE — 80053 COMPREHEN METABOLIC PANEL: CPT | Performed by: EMERGENCY MEDICINE

## 2019-09-07 PROCEDURE — 93005 ELECTROCARDIOGRAM TRACING: CPT

## 2019-09-07 PROCEDURE — 99285 EMERGENCY DEPT VISIT HI MDM: CPT

## 2019-09-07 PROCEDURE — 83735 ASSAY OF MAGNESIUM: CPT | Performed by: EMERGENCY MEDICINE

## 2019-09-07 PROCEDURE — 94760 N-INVAS EAR/PLS OXIMETRY 1: CPT

## 2019-09-07 PROCEDURE — 85025 COMPLETE CBC W/AUTO DIFF WBC: CPT | Performed by: EMERGENCY MEDICINE

## 2019-09-07 PROCEDURE — 71046 X-RAY EXAM CHEST 2 VIEWS: CPT

## 2019-09-07 PROCEDURE — 83605 ASSAY OF LACTIC ACID: CPT | Performed by: EMERGENCY MEDICINE

## 2019-09-07 PROCEDURE — 84484 ASSAY OF TROPONIN QUANT: CPT | Performed by: EMERGENCY MEDICINE

## 2019-09-07 PROCEDURE — 99284 EMERGENCY DEPT VISIT MOD MDM: CPT | Performed by: EMERGENCY MEDICINE

## 2019-09-07 PROCEDURE — 83880 ASSAY OF NATRIURETIC PEPTIDE: CPT | Performed by: EMERGENCY MEDICINE

## 2019-09-07 PROCEDURE — 94761 N-INVAS EAR/PLS OXIMETRY MLT: CPT

## 2019-09-07 PROCEDURE — 36415 COLL VENOUS BLD VENIPUNCTURE: CPT | Performed by: EMERGENCY MEDICINE

## 2019-09-07 PROCEDURE — 84100 ASSAY OF PHOSPHORUS: CPT | Performed by: EMERGENCY MEDICINE

## 2019-09-07 NOTE — ED PROVIDER NOTES
History  Chief Complaint   Patient presents with    Shortness of Breath     Patient has been feeling short of breath and low oxygen levels per wife  Patient also c/o feeling generally weak      22-year-old male presents complaining of shortness of breath and pulse ox in the 80s for several days  Patient has end-stage renal disease and is on dialysis  He states that after he ambulated in from dialysis yesterday he was short of breath and they wanted to put him on oxygen at dialysis  Patient does have faint crackles at his right lower lung base      History provided by:  Patient and spouse  Shortness of Breath   Severity:  Moderate  Onset quality:  Gradual  Timing:  Intermittent  Progression:  Waxing and waning  Chronicity:  Recurrent  Context: activity    Relieved by:  Rest, sitting up and oxygen  Worsened by:  Nothing  Ineffective treatments:  None tried  Associated symptoms: cough    Associated symptoms: no abdominal pain, no chest pain, no claudication, no diaphoresis and no ear pain    Risk factors: no recent alcohol use and no family hx of DVT        Prior to Admission Medications   Prescriptions Last Dose Informant Patient Reported? Taking? ALPRAZolam (XANAX) 0 5 mg tablet  Self Yes No   Sig: Take 0 5 mg by mouth daily as needed for anxiety     B Complex-C-Folic Acid (TRIPHROCAPS) 1 MG CAPS  Self Yes No   Sig: Take 1 mg by mouth daily   Cholecalciferol (VITAMIN D-3 PO)  Spouse/Significant Other Yes No   Sig: Take 1 capsule by mouth daily     allopurinol (ZYLOPRIM) 100 mg tablet   No No   Sig: Take 1 tablet (100 mg total) by mouth daily   amiodarone 200 mg tablet   No No   Sig: Take 1 tablet (200 mg total) by mouth 2 (two) times a day with meals   ampicillin (PRINCIPEN) 250 mg capsule  Self Yes No   Sig: Take 250 mg by mouth every 12 (twelve) hours Current treatment for UTI  PRE PROCEDURE   atorvastatin (LIPITOR) 20 mg tablet   No No   Sig: Take 1 tablet (20 mg total) by mouth daily   calcium acetate (PHOSLO) 667 mg capsule  Spouse/Significant Other Yes No   Sig: Take 667 mg by mouth 3 (three) times a day with meals    cinacalcet (SENSIPAR) 30 mg tablet   Yes No   Sig: Take 30 mg by mouth daily with dinner   colchicine-probenecid 0 5-500 MG per tablet  Self No No   Sig: Take 1 tablet by mouth daily   entecavir (BARACLUDE) 0 5 MG tablet  Spouse/Significant Other No No   Sig: Take 1 tablet (0 5 mg total) by mouth daily for 90 days   Patient taking differently: Take 0 5 mg by mouth once a week     escitalopram (LEXAPRO) 10 mg tablet   No No   Sig: Take 1 tablet (10 mg total) by mouth daily   Patient taking differently: Take 10 mg by mouth daily at bedtime    guaiFENesin 200 MG tablet  Self Yes No   Sig: Take 400 mg by mouth 2 (two) times a day   metoprolol succinate (TOPROL-XL) 25 mg 24 hr tablet   No No   Sig: Take 1 tablet (25 mg total) by mouth daily   omeprazole (PriLOSEC) 20 mg delayed release capsule   No No   Sig: Take 1 capsule (20 mg total) by mouth daily at bedtime   oxyCODONE (ROXICODONE) 5 mg immediate release tablet   No No   Sig: Take 1 tablet (5 mg total) by mouth 3 (three) times a day as needed for moderate painMax Daily Amount: 15 mg   oxybutynin (DITROPAN-XL) 5 mg 24 hr tablet   No No   Sig: Take 1 tablet (5 mg total) by mouth as needed (before procedure)   sevelamer carbonate (RENVELA) 800 mg tablet  Self Yes No   Sig: Take 800 mg by mouth 3 (three) times a day with meals    silver sulfadiazine (SILVADENE,SSD) 1 % cream   No No   Sig: Apply topically daily      Facility-Administered Medications: None       Past Medical History:   Diagnosis Date    AAA (abdominal aortic aneurysm) (HCC)     Abnormal liver function test     Anemia     Anxiety     Arthritis     Cancer (HCC)     bladder    Cardiac disorder     Chronic kidney disease     renal failure    Chronic pain disorder     back and legs    Depression     Diabetes mellitus ([facility] [address] )     [facility]     Fracture of carpal bone  Fractures     spinal compression fx,closed fx of wrist & ankle    GERD (gastroesophageal reflux disease)     Gout     Hearing difficulty of both ears     Hepatitis A     A - remission since 1970's    Hyperlipidemia     Hyperlipidemia     Hypertension     Kidney stone     Migraine     Neuropathy     Nicotine dependence     Pancreatitis     Vitamin D deficiency     Wears glasses        Past Surgical History:   Procedure Laterality Date    BLADDER FULGURATION  06/17/2016    Cystoscopy with fulguration medium lesion (2-5 cm)    CATARACT EXTRACTION Bilateral     congenital    COLONOSCOPY      CYSTOSCOPY      Diagnostic 5/31/17, 10/10/17    CYSTOSCOPY N/A 6/14/2017    Procedure: CYSTOSCOPY WITH BLADDER  BIOPSIES WITH FULGURATION;  Surgeon: Liya Pedroza MD;  [facility];  Service: Urology    CYSTOSCOPY  12/06/2018    CYSTOSCOPY W/ URETERAL STENT PLACEMENT Right 06/01/2016    CYSTOSCOPY W/ URETEROSCOPY  06/17/2016    With removal of calculus    CYSTOSTOMY W/ BLADDER BIOPSY      9/6/26, 2/6/17    DENTAL SURGERY      ESOPHAGOGASTRODUODENOSCOPY      EYE SURGERY      catatact    EYE SURGERY Bilateral     Left eye detachment R eye retinal tear    HERNIA REPAIR      HERNIA REPAIR      L inguinal w/mesh;umbilical herniorraphy    WA ANASTOMOSIS,AV,ANY SITE Right 6/29/2016    Procedure: LOWER FOREARM AV FISTULA;  Surgeon: Chicho Early MD;  [facility];  Service: Vascular    WA COLONOSCOPY FLX DX W/COLLJ SPEC WHEN PFRMD N/A 3/4/2016    Procedure: COLONOSCOPY;  Surgeon: Richa Giang MD;  [facility];  Service: Gastroenterology    WA CYSTO/URETERO W/LITHOTRIPSY &INDWELL STENT INSRT Right 6/17/2016    Procedure: CYSTOSCOPY URETEROSCOPY WITH LITHOTRIPSY HOLMIUM LASER,BASKET STONE EXTRACTION, RETROGRADE PYELOGRAM AND INSERTION STENT URETERAL;  Surgeon: Galina Mendez MD;  Location:  MAIN OR;  Service: Urology    WA CYSTOURETHROSCOPY N/A 7/13/2017    Procedure: CYSTOSCOPY;  Surgeon: Yennifer Mills MD;  [facility];  Service: Urology    IL CYSTOURETHROSCOPY W/IRRIG & EVAC CLOTS N/A 7/13/2017    Procedure: CYSTOSCOPY EVACUATION OF CLOTS,POSSIBLE FULGURATION;  Surgeon: Yennifer Mills MD;  [facility];  Service: Urology    IL CYSTOURETHROSCOPY,BIOPSY N/A 8/15/2016    Procedure: CYSTOSCOPY WITH RE BIOPSY OF BLADDER;  Surgeon: Portillo Rodney MD;  [facility];  Service: Urology    IL CYSTOURETHROSCOPY,FULGUR 0 5-2 CM LESN N/A 2/6/2017    Procedure: TRANSURETHRAL RESECTION OF BLADDER TUMOR (TURBT); Surgeon: Yennifer Mills MD;  [facility];  Service: Urology    IL CYSTOURETHROSCOPY,FULGUR 0 5-2 CM LESN N/A 6/14/2017    Procedure: TRANSURETHRAL RESECTION OF BLADDER TUMOR (TURBT); Surgeon: Yennifer Mills MD;  [facility];  Service: Urology    IL CYSTOURETHROSCOPY,FULGUR <0 5 CM LESN N/A 2/6/2017    Procedure: Evone [place]; BLADDER BIOPSY WITH Lorena Fore;  Surgeon: Yennifer Mills MD;  [facility];  Service: Urology    IL CYSTOURETHROSCOPY,URETER CATHETER Right 6/1/2016    Procedure: CYSTOSCOPY RETROGRADE PYELOGRAM WITH INSERTION STENT URETERAL;  Surgeon: Ruma Byrne MD;  Location:  MAIN OR;  Service: Urology    IL EGD TRANSORAL BIOPSY SINGLE/MULTIPLE N/A 3/4/2016    Procedure: ESOPHAGOGASTRODUODENOSCOPY (EGD); Surgeon: Rosa Protillo MD;  [facility];  Service: Gastroenterology    VASCULAR SURGERY Right     AV fistula       Family History   Problem Relation Age of Onset    Depression Mother     Diabetes Mother     Heart disease Mother     Hypertension Mother     Kidney disease Mother     Diabetes Father     Heart disease Father         Cardiac disorder    Hypertension Father     Stroke Father     Thyroid disease Sister     Cancer Maternal Grandmother      I have reviewed and agree with the history as documented      Social History     Tobacco Use    Smoking status: Former Smoker     Last attempt to quit:      Years since quittin 7    Smokeless tobacco: Never Used   Substance Use Topics    Alcohol use: Yes     Frequency: 2-4 times a month     Drinks per session: 1 or 2     Binge frequency: Never    Drug use: No        Review of Systems   Constitutional: Negative for diaphoresis  HENT: Negative for ear pain  Eyes: Negative  Respiratory: Positive for cough and shortness of breath  Cardiovascular: Negative for chest pain and claudication  Gastrointestinal: Negative for abdominal pain  Endocrine: Negative  Genitourinary: Negative  Musculoskeletal: Negative  Skin: Negative  Allergic/Immunologic: Negative  Neurological: Negative  Hematological: Negative  Psychiatric/Behavioral: Negative  All other systems reviewed and are negative  Physical Exam  Physical Exam   Constitutional: He appears well-developed and well-nourished  HENT:   Head: Normocephalic  Mouth/Throat: Oropharynx is clear and moist  No oropharyngeal exudate or posterior oropharyngeal edema  Eyes: Pupils are equal, round, and reactive to light  Neck: Normal range of motion  No tracheal deviation present  No thyromegaly present  Cardiovascular: Normal rate  Exam reveals no friction rub  No murmur heard  Pulmonary/Chest: Effort normal  No accessory muscle usage  No tachypnea and no bradypnea  No respiratory distress  He has rales in the right lower field  He exhibits no mass and no tenderness  Abdominal: Soft  He exhibits no distension  There is no tenderness  Musculoskeletal:        Right lower leg: He exhibits no edema  Left lower leg: He exhibits no edema  Neurological: He is alert  He is not disoriented  Skin: Capillary refill takes less than 2 seconds  No rash noted  He is not diaphoretic  No erythema  No pallor  Psychiatric: He has a normal mood and affect  His mood appears not anxious  He is not agitated     Vitals reviewed        Vital Signs  ED Triage Vitals [09/07/19 1112]   Temperature Pulse Respirations Blood Pressure SpO2   99 2 °F (37 3 °C) 94 21 137/76 96 %      Temp Source Heart Rate Source Patient Position - Orthostatic VS BP Location FiO2 (%)   Temporal Monitor Sitting Left arm --      Pain Score       --           Vitals:    09/07/19 1112   BP: 137/76   Pulse: 94   Patient Position - Orthostatic VS: Sitting         Visual Acuity      ED Medications  Medications - No data to display    Diagnostic Studies  Results Reviewed     Procedure Component Value Units Date/Time    CBC and differential [[id number]] Collected:  09/07/19 1123    Lab Status:  No result Specimen:  Blood from Arm, Left     Comprehensive metabolic panel [[id number]] Collected:  09/07/19 1123    Lab Status:  No result Specimen:  Blood from Arm, Left     Troponin I [[id number]] Collected:  09/07/19 1123    Lab Status:  No result Specimen:  Blood from Arm, Left     Magnesium [[id number]] Collected:  09/07/19 1123    Lab Status:  No result Specimen:  Blood from Arm, Left     Phosphorus [[id number]] Collected:  09/07/19 1123    Lab Status:  No result Specimen:  Blood from Arm, Left     Lactic acid, plasma [[id number]] Collected:  09/07/19 1123    Lab Status:  No result Specimen:  Blood from Arm, Left     B-type natriuretic peptide [[id number]] Collected:  09/07/19 1123    Lab Status:  No result Specimen:  Blood from Arm, Left                  XR chest 2 views    (Results Pending)   CT head without contrast    (Results Pending)              Procedures  Procedures       ED Course  ED Course as of Sep 07 1227   Sat Sep 07, 2019   1154 Interstitial edema      XR chest 2 views   1157 XR chest 2 views   1157 XR chest 2 views   1157 XR chest 2 views                               MDM  Number of Diagnoses or Management Options  CHF (congestive heart failure) ([facility] [address] ):   Elevated troponin:   Hypoxia:   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 ([facility] [address] ):   Diagnosis management comments: Differential diagnosis 1  Congestive heart failure 2  Pneumonia 3  Electrolyte abnormality  Patient has no signs or symptoms of DVT at this time  Patient cannot be anticoagulated  Family is interested in the possibility of having home O2       1227  I spoke with patient and his wife  Patient wants to go home  I did review old labs  They are aware of the lab abnormalities  Amount and/or Complexity of Data Reviewed  Clinical lab tests: ordered and reviewed  Tests in the radiology section of CPT®: ordered and reviewed  Tests in the medicine section of CPT®: reviewed and ordered    Risk of Complications, Morbidity, and/or Mortality  Presenting problems: high  Diagnostic procedures: high  Management options: high        Disposition  Final diagnoses:   None     ED Disposition     None      Follow-up Information    None         Patient's Medications   Discharge Prescriptions    No medications on file     No discharge procedures on file      ED Provider  Electronically Signed by           Tim Rothman DO  09/07/19 6281

## 2019-09-07 NOTE — ED NOTES
Patient ambulated about 150ft in department without oxygen  His oxygen saturation dropped down to 87% with a heart rate in the low to high 90's  Patient is feeling short of breathe        Marina Strong RN  09/07/19 9706

## 2019-09-07 NOTE — SOCIAL WORK
Pt is being dc'd and qualifies for home 02  At pt/wife request referral was sent to [facility] as pt's primary insurance is [employer] [facility]), and only has Medicare A  CM spoke with Nasrin aB at [address] [place] [address] who is aware pt is leaving here shortly and also that [place] is primary  Pt was provided with a portable tank to go home  Nasrin Fender will let the  know to deliver to pt's home as soon as possible

## 2019-09-07 NOTE — RESPIRATORY THERAPY NOTE
Home Oxygen Qualifying Test       Patient name: Sunny Hauser        : 1950   Date of Test:  2019  Diagnosis: Shortness of Breath     Home Oxygen Test:    **Medicare Guidelines require item(s) 1-5 on all ambulatory patients or 1 and 2 on non-ambulatory patients  1   Baseline SPO2 on Room Air at rest 90 %  2   SPO2 during exercise on Room Air 87 %  During exercise monitor SpO2  If SPO2 increases >=89% with ambulation do not add supplemental             oxygen  If <= 88% on room air add O2 via NC and titrate patient  Patient must be ambulated with O2 and titrated to > 88% with exertion  3   SPO2 on Oxygen at rest 95 % 2 lpm     4   SPO2 during exercise on Oxygen  94% a liter flow of 2 lpm     5   Exercise performed:          walking, duration 10 (min), distance 300 (feet)          [x]  Supplemental Home Oxygen is indicated  []  Client does not qualify for home oxygen        Respiratory Additional Notes- None    Ana Haji, RT

## 2019-09-09 ENCOUNTER — TELEPHONE (OUTPATIENT)
Dept: FAMILY MEDICINE CLINIC | Facility: CLINIC | Age: 69
End: 2019-09-09

## 2019-09-09 DIAGNOSIS — R09.02 HYPOXIA: Primary | ICD-10-CM

## 2019-09-09 LAB
ATRIAL RATE: 87 BPM
P AXIS: 18 DEGREES
PR INTERVAL: 182 MS
QRS AXIS: -30 DEGREES
QRSD INTERVAL: 104 MS
QT INTERVAL: 388 MS
QTC INTERVAL: 466 MS
T WAVE AXIS: 108 DEGREES
VENTRICULAR RATE: 87 BPM

## 2019-09-09 PROCEDURE — 93010 ELECTROCARDIOGRAM REPORT: CPT | Performed by: INTERNAL MEDICINE

## 2019-09-09 NOTE — TELEPHONE ENCOUNTER
PT WAS IN ER OVER THE WEEKEND, HE IS ON OXYGEN/[facility] PHONE NUM[facility] [phone] AND FAX [phone]  NEED TO WRITE FOR EVAL FOR O2 NEEDS

## 2019-09-12 ENCOUNTER — IN-CLINIC DEVICE VISIT (OUTPATIENT)
Dept: CARDIOLOGY CLINIC | Facility: CLINIC | Age: 69
End: 2019-09-12
Payer: COMMERCIAL

## 2019-09-12 DIAGNOSIS — Z95.818 PRESENCE OF OTHER CARDIAC IMPLANTS AND GRAFTS: Primary | ICD-10-CM

## 2019-09-12 PROCEDURE — 93291 INTERROG DEV EVAL SCRMS IP: CPT | Performed by: INTERNAL MEDICINE

## 2019-09-12 NOTE — PROGRESS NOTES
Results for orders placed or performed in visit on 09/12/19   Cardiac EP device report    Narrative    MDT-[address] [place] OFFICE  BATTERY STATUS "GOOD"  NO DEVICE DETECTED & NO PT ACTIVATED EPISODES  PRESENTING RHYTHM NSR  NORMAL DEVICE FUNCTION  WOUND CHECK: INCISION CLEAN AND DRY WITH EDGES APPROXIMATED; WOUND CARE AND RESTRICTIONS REVIEWED WITH PATIENT   GV

## 2019-09-16 NOTE — PROGRESS NOTES
Cardiology Follow Up    Josh Richardson  1950  [id number]  [facility] [id number]    1  Chronic combined systolic and diastolic congestive heart failure (HCC)  Echo complete with contrast if indicated   2  Paroxysmal atrial fibrillation (HCC)  POCT ECG   3  Cardiomyopathy ([address] )  Echo complete with contrast if indicated   4  Essential hypertension     5  Dyslipidemia     6  Moderate aortic stenosis  Echo complete with contrast if indicated   7  End-stage renal disease on hemodialysis [facility])         Discussion/Summary:  Chronic combined systolic and diastolic congestive heart failure: has mild edema and crackles on exam  He is already receiving hemodialysis M/W/F  Given his renal function I would defer his volume status/need for diuretics to nephrology  Patient already follows a 2865-1380 cc fluid restriction daily and a low sodium diet  Continue beta-blocker  Cardiomyopathy: unclear etiology, ischemic vs  Tachycardia mediated  Ischemic evaluation at this time is deferred due to inability to use antiplatelet agents  I expect that his EF will improve with restoration to sinus rhythm  Will recheck an echo in November  Continue beta-blocker  Not on an ACE/ARB/entresto secondary to renal function  Paroxysmal atrial fibrillation: currently maintaining normal sinus rhythm  Continue current AV tatiana blocking regimen  Continue amiodarone  Not on anticoagulation secondary to subarachnoid hemorrhage and prior retinal detachment  Moderate aortic stenosis: will follow serially with echocardiograms  Symptoms of progression discussed with patient and wife  Hypertension: adequately controlled in the office today - continue current regimen     Dyslipidemia: continue statin therapy, lipids are well controlled  He will RTO in 2-3 months with Dr Mark Cade after his echo  He will call with any concerns      Interval History:   Josh Richardson is a 71 y o  male with a history of newly diagnosed combined systolic and diastolic congestive heart failure with mild RV dysfunction, cardiomyopathy with an EF of 25% - unclear etiology, paroxysmal atrial fibrillation not on anticoagulation secondary to a subarachnoid hemorrhage, moderate aortic stenosis, hypertension, dyslipidemia, and end-stage renal disease on hemodialysis who presents to the office today for hospital follow up  Patient originally presented to [id number] in August 2019 complaints of shortness of breath  Given his comorbidities, he was transferred to [facility] for better management  He underwent an echocardiogram that showed a new cardiomyopathy with ejection fraction of 25% as well as moderate aortic stenosis  He was also found to have new onset atrial fibrillation and was placed on AV tatiana blocking agents  Unfortunately he was unable to be placed on anticoagulation secondary to a subarachnoid hemorrhage  In addition, patient had a prior retinal detachment for which his ophthalmologist prefers he is not on anticoagulation as it can mean losing his vision  A loop recorder was implanted to determine further a-fib burden  An ischemic evaluation was not pursued for his cardiomyopathy as he would be unable to tolerate antiplatelet agents at his time  Since hospital discharge he has been feeling ok  He presents with his wife who helps provide some of the history as he is a vague historian  Patient is mainly sedentary but is able to ambulate with a cane  He still reports some dyspnea on exertion although this seems to have improved since his hospitalization  Patient's wife reports that previously he utilized supplemental oxygen continuously but since discharge he has only had to use it occasionally  There has been mild lower extremity edema  He receives dialysis M/W/F and is not on any diuretics  He reports occasional chronic orthopnea  He denies PND   He denies lightheadedness, dizziness, palpitations, and syncope  He describes vague chest discomfort that he is unable to elaborate on further, but this has not occurred in "a while "    Problem List     Acute pancreatitis    Essential hypertension    HLD (hyperlipidemia)    History of anemia due to chronic kidney disease (Chronic)    CKD (chronic kidney disease) stage 4, GFR 15-29 ml/min (HCC) (Chronic)    Diabetes mellitus type 2 in nonobese (HCC) (Chronic)    Lab Results   Component Value Date    HGBA1C 5 6 08/24/2019       No results for input(s): POCGLU in the last 72 hours  Blood Sugar Average: Last 72 hrs:          End stage renal disease ([address] )    Acute UTI    Malignant tumor of urinary bladder ([facility] )    Overview Signed 2/1/2018  9:14 AM by Cristian Banks MD     3/2016 - LG (focal HG) cT1 bladder cancer  Re-resection in 8/2016, 2/2018, 6/2017 negative for cancer  Patient underwent induction 6 week BCG, but failed SWOG maintainence due to side effects (HD patient with oliguria)  Adjusted to 3 week mitomycin-C instillations: 12 mo course (8/2017)  18 mo 3 week course, due next 2/2018         Ascending aortic aneurysm (HCC)    Depression with anxiety    Chronic viral hepatitis B without delta agent and without coma ([facility] )    Sprain of scapholunate ligament    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 ([facility] )    Overview Signed 3/7/2019  5:01 PM by Amish Alexander     Per CMS ICD 10 Guidelines         Lab Results   Component Value Date    HGBA1C 5 6 08/24/2019       No results for input(s): POCGLU in the last 72 hours      Blood Sugar Average: Last 72 hrs:          Type 2 diabetes mellitus with both eyes affected by moderate nonproliferative retinopathy without macular edema, without long-term current use of insulin ([facility] )    Overview Signed 3/7/2019  5:01 PM by Amish Alexander     Per CMS ICD 10 Guidelines--Ophthalmology         Lab Results   Component Value Date    HGBA1C 5 6 08/24/2019       No results for input(s): POCGLU in the last 72 hours      Blood Sugar Average: Last 72 hrs:          Subarachnoid hemorrhage (HCC)    Cerebral hemorrhage (HCC)    Biventricular heart failure (HCC)    Elevated troponin    Paroxysmal atrial fibrillation (HCC)        Past Medical History:   Diagnosis Date    AAA (abdominal aortic aneurysm) (HCC)     Abnormal liver function test     Anemia     Anxiety     Arthritis     Cancer (HCC)     bladder    Cardiac disorder     Chronic kidney disease     renal failure    Chronic pain disorder     back and legs    Depression     Diabetes mellitus ([facility] )     Dialysis patient ([facility] )     Fracture of carpal bone     Fractures     spinal compression fx,closed fx of wrist & ankle    GERD (gastroesophageal reflux disease)     Gout     Hearing difficulty of both ears     Hepatitis A     A - remission since     Hyperlipidemia     Hyperlipidemia     Hypertension     Kidney stone     Migraine     Neuropathy     Nicotine dependence     Pancreatitis     Vitamin D deficiency     Wears glasses      Social History     Socioeconomic History    Marital status: /Civil Union     Spouse name: Not on file    Number of children: Not on file    Years of education: Not on file    Highest education level: Not on file   Occupational History    Occupation:    Social Needs    Financial resource strain: Not on file    Food insecurity:     Worry: Not on file     Inability: Not on file    Transportation needs:     Medical: Not on file     Non-medical: Not on file   Tobacco Use    Smoking status: Former Smoker     Last attempt to quit:      Years since quittin 7    Smokeless tobacco: Never Used   Substance and Sexual Activity    Alcohol use: Yes     Frequency: 2-4 times a month     Drinks per session: 1 or 2     Binge frequency: Never    Drug use: No    Sexual activity: Yes     Partners: Female   Lifestyle    Physical activity:     Days per week: Not on file Minutes per session: Not on file    Stress: Not on file   Relationships    Social connections:     Talks on phone: Not on file     Gets together: Not on file     Attends Faith service: Not on file     Active member of club or organization: Not on file     Attends meetings of clubs or organizations: Not on file     Relationship status: Not on file    Intimate partner violence:     Fear of current or ex partner: Not on file     Emotionally abused: Not on file     Physically abused: Not on file     Forced sexual activity: Not on file   Other Topics Concern    Not on file   Social History Narrative    Advance directive on file    Copy of advanced directive obtained from patient    History of no living will    Patient has living will      Family History   Problem Relation Age of Onset    Depression Mother     Diabetes Mother     Heart disease Mother     Hypertension Mother     Kidney disease Mother     Diabetes Father     Heart disease Father         Cardiac disorder    Hypertension Father     Stroke Father     Thyroid disease Sister     Cancer Maternal Grandmother      Past Surgical History:   Procedure Laterality Date    BLADDER FULGURATION  06/17/2016    Cystoscopy with fulguration medium lesion (2-5 cm)    CATARACT EXTRACTION Bilateral     congenital    COLONOSCOPY      CYSTOSCOPY      Diagnostic 5/31/17, 10/10/17    CYSTOSCOPY N/A 6/14/2017    Procedure: CYSTOSCOPY WITH BLADDER  BIOPSIES WITH FULGURATION;  Surgeon: Melinda Nieto MD;  Location: [facility] OR;  Service: Urology    CYSTOSCOPY  12/06/2018    CYSTOSCOPY W/ URETERAL STENT PLACEMENT Right 06/01/2016    CYSTOSCOPY W/ URETEROSCOPY  06/17/2016    With removal of calculus    CYSTOSTOMY W/ BLADDER BIOPSY      9/6/26, 2/6/17    DENTAL SURGERY      ESOPHAGOGASTRODUODENOSCOPY      EYE SURGERY      catatact    EYE SURGERY Bilateral     Left eye detachment R eye retinal tear    HERNIA REPAIR      HERNIA REPAIR      L inguinal w/mesh;umbilical herniorraphy    VT ANASTOMOSIS,AV,ANY SITE Right 6/29/2016    Procedure: LOWER FOREARM AV FISTULA;  Surgeon: Maddy Smiley MD;  Location: [facility];  Service: Vascular    VT COLONOSCOPY FLX DX W/COLLJ SPEC WHEN PFRMD N/A 3/4/2016    Procedure: COLONOSCOPY;  Surgeon: Aracelis Ahumada MD;  Location: [facility];  Service: Gastroenterology    VT CYSTO/URETERO W/LITHOTRIPSY &INDWELL STENT INSRT Right 6/17/2016    Procedure: CYSTOSCOPY URETEROSCOPY WITH LITHOTRIPSY HOLMIUM LASER,BASKET STONE EXTRACTION, RETROGRADE PYELOGRAM AND INSERTION STENT URETERAL;  Surgeon: Carlos Mcconnell MD;  Location:  MAIN OR;  Service: Urology    VT CYSTOURETHROSCOPY N/A 7/13/2017    Procedure: Justin Kam;  Surgeon: Erlinda Lawler MD;  [facility];  Service: Urology    VT CYSTOURETHROSCOPY W/IRRIG & EVAC CLOTS N/A 7/13/2017    Procedure: CYSTOSCOPY EVACUATION OF CLOTS,POSSIBLE FULGURATION;  Surgeon: Erlinda Lawler MD;  [facility];  Service: Urology    VT CYSTOURETHROSCOPY,BIOPSY N/A 8/15/2016    Procedure: CYSTOSCOPY WITH RE BIOPSY OF BLADDER;  Surgeon: Ac Augustin MD;  [facility];  Service: Urology    VT CYSTOURETHROSCOPY,FULGUR 0 5-2 CM LESN N/A 2/6/2017    Procedure: TRANSURETHRAL RESECTION OF BLADDER TUMOR (TURBT); Surgeon: Erlinda Lawler MD;  [facility];  Service: Urology    VT CYSTOURETHROSCOPY,FULGUR 0 5-2 CM LESN N/A 6/14/2017    Procedure: TRANSURETHRAL RESECTION OF BLADDER TUMOR (TURBT);   Surgeon: Erlinda Lawler MD;  Location: [facility];  Service: Urology    VT CYSTOURETHROSCOPY,FULGUR <0 5 CM LESN N/A 2/6/2017    Procedure: Justin Kam; BLADDER BIOPSY WITH Lara Go;  Surgeon: Erlinda Lawler MD;  [facility];  Service: Urology    VT CYSTOURETHROSCOPY,URETER CATHETER Right 6/1/2016    Procedure: CYSTOSCOPY RETROGRADE PYELOGRAM WITH INSERTION STENT URETERAL;  Surgeon: Carlos Mcconnell MD;  [facility]; Service: Urology    OH EGD TRANSORAL BIOPSY SINGLE/MULTIPLE N/A 3/4/2016    Procedure: ESOPHAGOGASTRODUODENOSCOPY (EGD); Surgeon: Deepthi Ramirez MD;  [facility]: [facility];  Service: Gastroenterology    VASCULAR SURGERY Right     AV fistula       Current Outpatient Medications:     allopurinol (ZYLOPRIM) 100 mg tablet, Take 1 tablet (100 mg total) by mouth daily, Disp: 90 tablet, Rfl: 3    ALPRAZolam (XANAX) 0 5 mg tablet, Take 0 5 mg by mouth daily as needed for anxiety  , Disp: , Rfl:     amiodarone 200 mg tablet, Take 1 tablet (200 mg total) by mouth 2 (two) times a day with meals, Disp: 60 tablet, Rfl: 0    ampicillin (PRINCIPEN) 250 mg capsule, Take 250 mg by mouth every 12 (twelve) hours Current treatment for UTI  PRE PROCEDURE, Disp: , Rfl:     atorvastatin (LIPITOR) 20 mg tablet, Take 1 tablet (20 mg total) by mouth daily, Disp: 90 tablet, Rfl: 3    B Complex-C-Folic Acid (TRIPHROCAPS) 1 MG CAPS, Take 1 mg by mouth daily, Disp: , Rfl:     calcium acetate (PHOSLO) 667 mg capsule, Take 667 mg by mouth 3 (three) times a day with meals , Disp: , Rfl:     Cholecalciferol (VITAMIN D-3 PO), Take 1 capsule by mouth daily  , Disp: , Rfl:     cinacalcet (SENSIPAR) 30 mg tablet, Take 30 mg by mouth daily with dinner, Disp: , Rfl:     colchicine-probenecid 0 5-500 MG per tablet, Take 1 tablet by mouth daily, Disp: 90 tablet, Rfl: 3    entecavir (BARACLUDE) 0 5 MG tablet, Take 1 tablet (0 5 mg total) by mouth daily for 90 days (Patient taking differently: Take 0 5 mg by mouth once a week  ), Disp: 90 tablet, Rfl: 1    escitalopram (LEXAPRO) 10 mg tablet, Take 1 tablet (10 mg total) by mouth daily (Patient taking differently: Take 10 mg by mouth daily at bedtime ), Disp: 90 tablet, Rfl: 3    guaiFENesin 200 MG tablet, Take 400 mg by mouth 2 (two) times a day, Disp: , Rfl:     metoprolol succinate (TOPROL-XL) 25 mg 24 hr tablet, Take 1 tablet (25 mg total) by mouth daily, Disp: 30 tablet, Rfl: 0   omeprazole (PriLOSEC) 20 mg delayed release capsule, Take 1 capsule (20 mg total) by mouth daily at bedtime, Disp: 90 capsule, Rfl: 3    oxybutynin (DITROPAN-XL) 5 mg 24 hr tablet, Take 1 tablet (5 mg total) by mouth as needed (before procedure), Disp: , Rfl:     oxyCODONE (ROXICODONE) 5 mg immediate release tablet, Take 1 tablet (5 mg total) by mouth 3 (three) times a day as needed for moderate painMax Daily Amount: 15 mg, Disp: 90 tablet, Rfl: 0    sevelamer carbonate (RENVELA) 800 mg tablet, Take 800 mg by mouth 3 (three) times a day with meals , Disp: , Rfl:     silver sulfadiazine (SILVADENE,SSD) 1 % cream, Apply topically daily, Disp: 50 g, Rfl: 5  Allergies   Allergen Reactions    Acetaminophen      Due to LFT elevation    Heparin      Avoids due to eye conditions;can't have any med that may cause bleeding    Lovenox [Enoxaparin Sodium]      Avoids due to eye conditions;can't have any med that may cause bleeding in eyes    Nsaids      Avoids due to renal failure    Cardura [Doxazosin] Palpitations       Labs:     Chemistry        Component Value Date/Time     01/06/2016 1105    K 3 6 09/07/2019 1123    K 4 0 01/06/2016 1105    CL 96 (L) 09/07/2019 1123     01/06/2016 1105    CO2 36 (H) 09/07/2019 1123    CO2 20 (L) 09/14/2016 1753    BUN 26 (H) 09/07/2019 1123    BUN 49 (H) 01/06/2016 1105    CREATININE 5 14 (H) 09/07/2019 1123    CREATININE 3 37 (H) 01/06/2016 1105        Component Value Date/Time    CALCIUM 8 8 09/07/2019 1123    CALCIUM 8 6 01/06/2016 1105    ALKPHOS 104 09/07/2019 1123    ALKPHOS 72 12/01/2015 1058    AST 18 09/07/2019 1123    AST 22 12/01/2015 1058    ALT 17 09/07/2019 1123    ALT 22 12/01/2015 1058    BILITOT 0 27 12/01/2015 1058            Lab Results   Component Value Date    CHOL 204 09/11/2015    CHOL 211 03/24/2015    CHOL 203 11/22/2014     Lab Results   Component Value Date    HDL 45 08/24/2019    HDL 71 (H) 05/07/2019    HDL 35 (L) 06/26/2018     Lab Results Component Value Date    LDLCALC 49 08/24/2019    LDLCALC 82 05/07/2019    LDLCALC 81 06/26/2018     Lab Results   Component Value Date    TRIG 163 (H) 08/24/2019    TRIG 132 05/07/2019    TRIG 234 (H) 06/26/2018     No results found for: CHOLHDL    Imaging: Xr Chest 2 Views    Result Date: 9/7/2019  Narrative: CHEST INDICATION:   short of breath  COMPARISON:  Chest CT from August 21, 2019 and chest x-ray from April 19, 2016  EXAM PERFORMED/VIEWS:  XR CHEST PA & LATERAL FINDINGS:  A loop recorder device projects over the left heart border  Mild cardiomegaly  Prominent interstitial and alveolar densities noted in the right mid and lower lung zones  Similar but more patchy and interstitial opacities noted in the left lower lobe and perihilar region  Small right greater than left pleural effusions  Osseous structures appear within normal limits for patient age  Impression: Redemonstrated interstitial and alveolar opacities in the right more than left lung with small right greater than left pleural effusions  These findings are similar to the recent CT chest evaluation from August 21, 2019  Pulmonary edema is suspected  Underlying pneumonia would be difficult to exclude at this time  Follow-up is recommended  Workstation performed: [id number]     Ct Head Without Contrast    Result Date: 9/7/2019  Narrative: CT BRAIN - WITHOUT CONTRAST INDICATION:   hx bleed  70-year-old male with history of shortness of breath with end-stage renal disease, on dialysis  COMPARISON:  Head CT from August 24, 2019  TECHNIQUE:  CT examination of the brain was performed  In addition to axial images, coronal 2D reformatted images were created and submitted for interpretation  Radiation dose length product (DLP) for this visit:  931 2 mGy-cm     This examination, like all CT scans performed in the [facility], was performed utilizing techniques to minimize radiation dose exposure, including the use of iterative reconstruction and automated exposure control  IMAGE QUALITY:  Diagnostic  FINDINGS: PARENCHYMA:  There is no acute intracranial hemorrhage  Punctate hyperdensity noted in the left posterior frontal region on the prior scan is again noted  This is rounded in appearance and appears to be cortically based  This may represent a cavernoma  There is no surrounding edema  Punctate curvilinear hyperdensities in the right anterior frontal lobe and right thalamus are stable in appearance  These foci are thought to represent represent dystrophic calcification, possibly vascular in etiology  Moderate periventricular, subcortical and deep cerebral white matter chronic microangiopathic disease is noted  Are chronic lacunar infarctions in the bilateral basal ganglia, corona radiata, left thalamus, and right cerebellum  There is no edema or herniation  Intracranial carotid and vertebral artery atherosclerotic calcifications are noted  VENTRICLES AND EXTRA-AXIAL SPACES: Generalized prominence of the extra-axial spaces consistent with atrophy  Normal for the patient's age  VISUALIZED ORBITS AND PARANASAL SINUSES:  Postsurgical changes of the left globe with redemonstrated metallic foreign body along the medial orbital wall margin and a mildly hyperdense scleral band  CALVARIUM AND EXTRACRANIAL SOFT TISSUES:  Normal  Scalp vascular calcifications, consistent with the reported history of chronic renal disease  Impression: No acute intracranial abnormality  Stable hyperdensities previously described in the left lateral frontal, right anterior frontal, and right thalamus  The right-sided hyperdensities are thought to represent dystrophic calcification  The left lateral frontal focal round hyperdensity may represent a cavernoma  There is no acute intracranial hemorrhage  Moderate cerebral chronic microangiopathic disease  Left ocular postoperative changes   Workstation performed: [id number]     Ct Head Wo Contrast    Result Date: 8/24/2019  Narrative: CT BRAIN - WITHOUT CONTRAST INDICATION:   follow up [address]  COMPARISON:  8/21/2019  TECHNIQUE:  CT examination of the brain was performed  In addition to axial images, coronal 2D reformatted images were created and submitted for interpretation  Radiation dose length product (DLP) for this visit:  967 mGy-cm   This examination, like all CT scans performed in the [facility], was performed utilizing techniques to minimize radiation dose exposure, including the use of iterative reconstruction and automated exposure control  IMAGE QUALITY:  Diagnostic  FINDINGS: PARENCHYMA:  Once again identified is focal linear hyperdensity within the posterior right thalamus on series 2 image 21, unchanged from the prior examination  There is a focus of hyperintense density within the right anterior medial frontal lobe within  an area of mild focal encephalomalacia on series 2 image 28  This is unchanged  Subtle subarachnoid hemorrhage is again identified within the left posterior lateral frontal region above the insula on series 2 image 24  Diffuse chronic microangiopathic change throughout the cerebral hemispheres  There is an old lacunar infarct within the right lentiform nucleus extending superiorly into the centrum semiovale  Similar old infarct within the left lentiform nucleus  Small old lacunar infarct within the left thalamus  No mass, mass effect or midline shift  Stable vascular calcifications  VENTRICLES AND EXTRA-AXIAL SPACES:  Ventricles and extra-axial CSF spaces are prominent commensurate with the degree of volume loss  No hydrocephalus  No acute extra-axial hemorrhage  VISUALIZED ORBITS AND PARANASAL SINUSES:  Prior scleral banding on the left  Clear paranasal sinuses   CALVARIUM AND EXTRACRANIAL SOFT TISSUES:  Normal      Impression: Small focus of subarachnoid hemorrhage within the left posterior lateral frontal region on series 2 image 24 is slightly less apparent than the prior examination  Parenchymal hyperdensities are noted within the anterior medial frontal lobe, series 2 image 27 and within the posterior right thalamus on series 2 image 21 which are unchanged  These may represent dystrophic calcifications  Stable moderate diffuse chronic microangiopathic change with old lacunar infarcts within the basal ganglia  Workstation performed: [id number]     Ct Head Without Contrast    Result Date: 8/21/2019  Narrative: CT BRAIN - WITHOUT CONTRAST INDICATION:   Weakness  COMPARISON:  9/14/2016  TECHNIQUE:  CT examination of the brain was performed  In addition to axial images, coronal 2D reformatted images were created and submitted for interpretation  Radiation dose length product (DLP) for this visit:  901 8 mGy-cm   This examination, like all CT scans performed in the [facility], was performed utilizing techniques to minimize radiation dose exposure, including the use of iterative reconstruction and automated exposure control  IMAGE QUALITY:  Diagnostic  FINDINGS: PARENCHYMA: Moderate patchy periventricular white matter hypodensities consistent with chronic microangiopathic changes  No acute infarct  No mass  Punctate parenchymal hyperdensity in the subcortical right frontal lobe (series 2, image 26) concerning for small parenchymal hemorrhage  Punctate hyperdensity at the left frontal arcual (series 2, image 25) appears to be within the sulcus on axial view, but coronal reformats may suggest cortical location  The finding is concerning for hemorrhage  Favor subarachnoid location  Punctate subarachnoid hyperdensity in the inferior aspect of the anterior interhemispheric fissure (series 400, image 25) also concerning for extra-axial hemorrhage  Areas of possible hemorrhage appear new from prior  VENTRICLES AND EXTRA-AXIAL SPACES:  Possible small foci of extra axial hemorrhage, discussed above    No intraventricular layering blood products  No hydrocephalus, herniation, or mass effect  VISUALIZED ORBITS AND PARANASAL SINUSES:  Prior bilateral lens replacements and left scleral banding  Paranasal sinuses are clear  Rightward nasal septal deviation  CALVARIUM AND EXTRACRANIAL SOFT TISSUES:  Normal      Impression: New small foci of subarachnoid hemorrhage underlying the left frontal operculum and within the anterior-inferior of the interhemispheric fissure  New punctate focus of hyperdensity concerning for petechial hemorrhage in the subcortical right frontal lobe  Recommend further evaluation with CTA of the head  Note:  I personally discussed this study with Santa Kaplan on 8/21/2019 at 1:00 PM  Workstation performed: [id number]     Ct Chest Without Contrast    Result Date: 8/21/2019  Narrative: CT CHEST WITHOUT IV CONTRAST INDICATION:   Cough shortness of breath  COMPARISON:  Chest CT dated 12/5/2018 TECHNIQUE: CT examination of the chest was performed without intravenous contrast   Axial, sagittal, and coronal 2D reformatted images were created from the source data and submitted for interpretation  Radiation dose length product (DLP) for this visit:  261 25 mGy-cm   This examination, like all CT scans performed in the [facility], was performed utilizing techniques to minimize radiation dose exposure, including the use of iterative  reconstruction and automated exposure control  FINDINGS: LUNGS:  Straight interlobular septal thickening  Central predominant groundglass opacities in both lungs  Mild paraseptal emphysema at the apices  No suspicious pulmonary nodules  No endobronchial lesions  PLEURA:  Small layering pleural effusions  HEART/GREAT VESSELS:  Cardiomegaly  Extensive valvular and coronary calcifications  No pericardial effusion  Ectasia of the ascending aorta measuring 4 3 cm  No dissection  MEDIASTINUM AND ROSAS:  Shotty mediastinal lymph nodes are present    Number in the prevascular space/anterior mediastinum is greater than typically seen  However, none of the nodes appear enlarged or significantly changed since 2018  CHEST WALL AND LOWER NECK:   Unremarkable  VISUALIZED STRUCTURES IN THE UPPER ABDOMEN:  Incidental note of colonic diverticulosis without diverticulitis  Renal artery atherosclerosis  No acute findings in the upper abdomen  OSSEOUS STRUCTURES:  No acute fracture or destructive osseous lesion  Impression: 1  Findings of volume overload with bilateral pulmonary edema and small layering effusions  2   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  Workstation performed: [id number]     Vas Lower Limb Venous Duplex Study, Complete Bilateral    Result Date: 8/21/2019  Narrative:  [facility] REPORT [facility]INICAL: Indications: Shortness of breath  Denies leg pain  Operative History: congenital cataract detached retina left inguinal hernia umbilical hernia Risk Factors The patient has history of HTN, Diabetes (Yes) and CKD  He has no history of DVT  FINDINGS:  Segment  Right            Left              Impression       Impression       CFV      Normal (Patent)  Normal (Patent)     CONCLUSION: Impression: RIGHT LOWER LIMB: No evidence of acute or chronic deep vein thrombosis  No evidence of superficial thrombophlebitis noted  Doppler evaluation shows a normal response to augmentation maneuvers  Popliteal, posterior tibial and anterior tibial arterial Doppler waveforms are triphasic  LEFT LOWER LIMB: No evidence of acute or chronic deep vein thrombosis  No evidence of superficial thrombophlebitis noted  Doppler evaluation shows a normal response to augmentation maneuvers  Popliteal, posterior tibial and anterior tibial arterial Doppler waveforms are triphasic  Technical findings were given to Dr Blake Teague    SIGNATURE: Electronically Signed by: Miguel A Lora MD, RPVI on 2019-08-21 03:24:29 PM      ECG: normal sinus rhythm  Prolonged QT  Possible LVH    Review of Systems   Constitution: Negative for chills and fever  HENT: Negative  Cardiovascular: Positive for dyspnea on exertion, leg swelling and orthopnea  Negative for chest pain, near-syncope, palpitations, paroxysmal nocturnal dyspnea and syncope  Respiratory: Negative for cough and shortness of breath  Gastrointestinal: Negative for diarrhea, nausea and vomiting  Genitourinary: Negative  Neurological: Negative for dizziness and light-headedness  Vitals:    09/17/19 1401   BP: 124/68   Pulse: 80     Vitals:    09/17/19 1401   Weight: 73 5 kg (162 lb)     Height: 5' 9" (175 3 cm)   Body mass index is 23 92 kg/m²  Physical Exam:  Physical Exam   Constitutional: He is oriented to person, place, and time  He appears well-developed  No distress  HENT:   Head: Normocephalic and atraumatic  Eyes: Pupils are equal, round, and reactive to light  Neck: Normal range of motion  Carotid bruit is not present  Cardiovascular: Normal rate, regular rhythm, S1 normal and S2 normal    Murmur heard  Systolic murmur is present with a grade of 2/6  Pulses:       Radial pulses are 2+ on the right side, and 2+ on the left side  Dorsalis pedis pulses are 2+ on the right side, and 2+ on the left side  2/6 systolic murmur best heard @ RUSB   Pulmonary/Chest: Effort normal  No respiratory distress  He has no wheezes  He has rales in the left lower field  Musculoskeletal: He exhibits no edema (+2 pitting edema in bilateral ankles)  Neurological: He is alert and oriented to person, place, and time  Skin: Skin is warm and dry  He is not diaphoretic  No erythema  Psychiatric: He has a normal mood and affect  His behavior is normal    Vitals reviewed

## 2019-09-17 ENCOUNTER — OFFICE VISIT (OUTPATIENT)
Dept: CARDIOLOGY CLINIC | Facility: HOSPITAL | Age: 69
End: 2019-09-17
Payer: COMMERCIAL

## 2019-09-17 VITALS
SYSTOLIC BLOOD PRESSURE: 124 MMHG | WEIGHT: 162 LBS | DIASTOLIC BLOOD PRESSURE: 68 MMHG | BODY MASS INDEX: 23.99 KG/M2 | HEIGHT: 69 IN | HEART RATE: 80 BPM

## 2019-09-17 DIAGNOSIS — Z99.2 END-STAGE RENAL DISEASE ON HEMODIALYSIS (HCC): ICD-10-CM

## 2019-09-17 DIAGNOSIS — I42.9 CARDIOMYOPATHY (HCC): ICD-10-CM

## 2019-09-17 DIAGNOSIS — I35.0 MODERATE AORTIC STENOSIS: ICD-10-CM

## 2019-09-17 DIAGNOSIS — I10 ESSENTIAL HYPERTENSION: ICD-10-CM

## 2019-09-17 DIAGNOSIS — I48.0 PAROXYSMAL ATRIAL FIBRILLATION (HCC): ICD-10-CM

## 2019-09-17 DIAGNOSIS — N18.6 END-STAGE RENAL DISEASE ON HEMODIALYSIS (HCC): ICD-10-CM

## 2019-09-17 DIAGNOSIS — I50.42 CHRONIC COMBINED SYSTOLIC AND DIASTOLIC CONGESTIVE HEART FAILURE (HCC): Primary | ICD-10-CM

## 2019-09-17 DIAGNOSIS — E78.5 DYSLIPIDEMIA: ICD-10-CM

## 2019-09-17 PROCEDURE — 3074F SYST BP LT 130 MM HG: CPT | Performed by: PHYSICIAN ASSISTANT

## 2019-09-17 PROCEDURE — 3078F DIAST BP <80 MM HG: CPT | Performed by: PHYSICIAN ASSISTANT

## 2019-09-17 PROCEDURE — 99214 OFFICE O/P EST MOD 30 MIN: CPT | Performed by: PHYSICIAN ASSISTANT

## 2019-09-17 PROCEDURE — 93000 ELECTROCARDIOGRAM COMPLETE: CPT | Performed by: PHYSICIAN ASSISTANT

## 2019-09-19 ENCOUNTER — PROCEDURE VISIT (OUTPATIENT)
Dept: UROLOGY | Facility: CLINIC | Age: 69
End: 2019-09-19
Payer: COMMERCIAL

## 2019-09-19 VITALS
WEIGHT: 159.83 LBS | SYSTOLIC BLOOD PRESSURE: 130 MMHG | HEART RATE: 84 BPM | BODY MASS INDEX: 23.67 KG/M2 | DIASTOLIC BLOOD PRESSURE: 90 MMHG | HEIGHT: 69 IN

## 2019-09-19 DIAGNOSIS — C67.9 MALIGNANT NEOPLASM OF URINARY BLADDER, UNSPECIFIED SITE (HCC): Primary | ICD-10-CM

## 2019-09-19 DIAGNOSIS — I48.0 PAROXYSMAL ATRIAL FIBRILLATION (HCC): ICD-10-CM

## 2019-09-19 PROCEDURE — 52000 CYSTOURETHROSCOPY: CPT | Performed by: UROLOGY

## 2019-09-19 PROCEDURE — 99214 OFFICE O/P EST MOD 30 MIN: CPT | Performed by: UROLOGY

## 2019-09-19 RX ORDER — AMIODARONE HYDROCHLORIDE 200 MG/1
200 TABLET ORAL 2 TIMES DAILY WITH MEALS
Qty: 180 TABLET | Refills: 3 | Status: SHIPPED | OUTPATIENT
Start: 2019-09-19

## 2019-09-19 RX ORDER — METOPROLOL SUCCINATE 25 MG/1
25 TABLET, EXTENDED RELEASE ORAL DAILY
Qty: 90 TABLET | Refills: 3 | Status: SHIPPED | OUTPATIENT
Start: 2019-09-19

## 2019-09-19 RX ORDER — CEFAZOLIN SODIUM 2 G/50ML
2000 SOLUTION INTRAVENOUS ONCE
Status: CANCELLED | OUTPATIENT
Start: 2019-11-13 | End: 2019-09-19

## 2019-09-19 NOTE — PROGRESS NOTES
Cystoscopy  Date/Time: 9/19/2019 11:31 AM  Performed by: Liya Pedroza MD  Authorized by: Liya Pedroza MD     Procedure details: cystoscopy    Patient tolerance: Patient tolerated the procedure well with no immediate complications    Additional Procedure Details:   Cystoscopy procedure note:    Risk and benefits of flexible cystoscopy were discussed  Informed consent was obtained  Urine dip was adequate for cystoscopy  The patient was placed in the supine position  His genitalia was prepped with Betadine and draped in a sterile fashion  Viscous 2% lidocaine jelly was instilled into the urethra and allowed dwell time for local anesthesia  Flexible cystoscopy was then performed using a 16F scope  The distal urethra and prostatic urethra were evaluated and were normal in course and caliber  Once inside the bladder, it was carefully inspected in a 360 degree fashion  There was moderate trabeculation  Left lateral sidewall lesion which had previously look like dystrophic calcification now has some more papillary tissue surrounding it  This is more concerning  There are also areas of prior healing dystrophic calcifications in the posterior bladder    Both ureteral orifices in their orthotopic location were visualized  Overall this was a   Cystoscopy with more concern for the left lateral sidewall lesion

## 2019-09-19 NOTE — PROGRESS NOTES
UROLOGY ROUTINE FOLLOW UP NOTE     CHIEF COMPLAINT   Ryan Lovett is a 71 y o  male with a complaint of   Chief Complaint   Patient presents with    Cystoscopy       History of Present Illness:     Nikita Waters is a 71 y o  male with a history of high-grade T1 bladder cancer  Patient underwent induction 6 weeks of BCG in our office  His routine SWOG maintenance course of BCG was complicated by multiple infections requiring treatment with ampicillin  The patient developed hematuria and clot retention  Ultimately, we decided that the patient should receive maintenance therapy with mitomycin in lieu of BCG given the patient's issues with recurrent infections and the need to delay his treatment  He underwent a course of 3 weeks of maintenance in August 2017  He underwent an additional course of mitomycin in February 2018  Patient did have nausea and vomiting throughout  Both BCG and mitomycin have been extremely morbid for this patient    He and his wife decided to abort intravesicle therapy given his poor tolerance  He is not a candidate for cystectomy at this time  We instead opted for surveillance imaging and cystoscopy  Since we have aborted attempts at intravesical therapy, the patient feels very well  He is not having urinary symptoms  The patient's wife to give him a single dose of antibiotics which we have agreed to prior to manipulation of the urinary system  Patient has had multiple emergency room visits due to shortness of breath and progressive congestive heart failure  Cardiac catheterization was not been performed given the inability to utilize anticoagulation due to subarachnoid bleeds and issues with his eyes  He is doing reasonably well today's visit      Past Medical History:     Past Medical History:   Diagnosis Date    AAA (abdominal aortic aneurysm) (HCC)     Abnormal liver function test     Anemia     Anxiety     Arthritis     Cancer (HCC)     bladder    Cardiac disorder  Chronic kidney disease     renal failure    Chronic pain disorder     back and legs    Depression     [facility] [address] [facility]     Dialysis patient ([facility] [address] [facility]     Fracture of carpal bone     Fractures     spinal compression fx,closed fx of wrist & ankle    GERD (gastroesophageal reflux disease)     Gout     Hearing difficulty of both ears     Hepatitis A     A - remission since 1970's    Hyperlipidemia     Hyperlipidemia     Hypertension     Kidney stone     Migraine     Neuropathy     Nicotine dependence     Pancreatitis     Vitamin D deficiency     Wears glasses        PAST SURGICAL HISTORY:     Past Surgical History:   Procedure Laterality Date    BLADDER FULGURATION  06/17/2016    Cystoscopy with fulguration medium lesion (2-5 cm)    CATARACT EXTRACTION Bilateral     congenital    COLONOSCOPY      CYSTOSCOPY      Diagnostic 5/31/17, 10/10/17    CYSTOSCOPY N/A 6/14/2017    Procedure: CYSTOSCOPY WITH BLADDER  BIOPSIES WITH FULGURATION;  Surgeon: Chris Greene MD;  [facility];  Service: Urology    CYSTOSCOPY  12/06/2018    CYSTOSCOPY W/ URETERAL STENT PLACEMENT Right 06/01/2016    CYSTOSCOPY W/ URETEROSCOPY  06/17/2016    With removal of calculus    CYSTOSTOMY W/ BLADDER BIOPSY      9/6/26, 2/6/17    DENTAL SURGERY      ESOPHAGOGASTRODUODENOSCOPY      EYE SURGERY      catatact    EYE SURGERY Bilateral     Left eye detachment R eye retinal tear    HERNIA REPAIR      HERNIA REPAIR      L inguinal w/mesh;umbilical herniorraphy    WV ANASTOMOSIS,AV,ANY SITE Right 6/29/2016    Procedure: LOWER FOREARM AV FISTULA;  Surgeon: Alaina Crow MD;  [facility];  Service: Vascular    WV COLONOSCOPY FLX DX W/COLLJ SPEC WHEN PFRMD N/A 3/4/2016    Procedure: COLONOSCOPY;  Surgeon: Chelo Donovan MD;  [facility];  Service: Gastroenterology    WV CYSTO/URETERO W/LITHOTRIPSY &INDWELL STENT INSRT Right 6/17/2016    Procedure: CYSTOSCOPY URETEROSCOPY WITH LITHOTRIPSY HOLMIUM LASER,BASKET STONE EXTRACTION, RETROGRADE PYELOGRAM AND INSERTION STENT URETERAL;  Surgeon: Rosette Cooper MD;  [facility];  Service: Urology    MS CYSTOURETHROSCOPY N/A 7/13/2017    Procedure: CYSTOSCOPY;  Surgeon: Chris Greene MD;  [facility];  Service: Urology    MS CYSTOURETHROSCOPY W/IRRIG & EVAC CLOTS N/A 7/13/2017    Procedure: CYSTOSCOPY EVACUATION OF CLOTS,POSSIBLE FULGURATION;  Surgeon: Chris Greene MD;  [facility];  Service: Urology    MS CYSTOURETHROSCOPY,BIOPSY N/A 8/15/2016    Procedure: CYSTOSCOPY WITH RE BIOPSY OF BLADDER;  Surgeon: America Kunz MD;  [facility];  Service: Urology    MS CYSTOURETHROSCOPY,FULGUR 0 5-2 CM LESN N/A 2/6/2017    Procedure: TRANSURETHRAL RESECTION OF BLADDER TUMOR (TURBT); Surgeon: Chris Greene MD;  [facility];  Service: Urology    MS CYSTOURETHROSCOPY,FULGUR 0 5-2 CM LESN N/A 6/14/2017    Procedure: TRANSURETHRAL RESECTION OF BLADDER TUMOR (TURBT); Surgeon: Chris Greene MD;  [facility];  Service: Urology    MS CYSTOURETHROSCOPY,FULGUR <0 5 CM LESN N/A 2/6/2017    Procedure: Shamar Murphy; BLADDER BIOPSY WITH Mike Duos;  Surgeon: Chris Greene MD;  [facility];  Service: Urology    MS CYSTOURETHROSCOPY,URETER CATHETER Right 6/1/2016    Procedure: CYSTOSCOPY RETROGRADE PYELOGRAM WITH INSERTION STENT URETERAL;  Surgeon: Rosette Cooper MD;  [facility];  Service: Urology    MS EGD TRANSORAL BIOPSY SINGLE/MULTIPLE N/A 3/4/2016    Procedure: ESOPHAGOGASTRODUODENOSCOPY (EGD);   Surgeon: Chelo Donovan MD;  [facility];  Service: Gastroenterology    VASCULAR SURGERY Right     AV fistula       CURRENT MEDICATIONS:     Current Outpatient Medications   Medication Sig Dispense Refill    allopurinol (ZYLOPRIM) 100 mg tablet Take 1 tablet (100 mg total) by mouth daily 90 tablet 3    ALPRAZolam (XANAX) 0 5 mg tablet Take 0 5 mg by mouth daily as needed for anxiety        amiodarone 200 mg tablet Take 1 tablet (200 mg total) by mouth 2 (two) times a day with meals 180 tablet 3    ampicillin (PRINCIPEN) 250 mg capsule Take 250 mg by mouth every 12 (twelve) hours Current treatment for UTI  PRE PROCEDURE      atorvastatin (LIPITOR) 20 mg tablet Take 1 tablet (20 mg total) by mouth daily 90 tablet 3    B Complex-C-Folic Acid (TRIPHROCAPS) 1 MG CAPS Take 1 mg by mouth daily      calcium acetate (PHOSLO) 667 mg capsule Take 667 mg by mouth 3 (three) times a day with meals       Cholecalciferol (VITAMIN D-3 PO) Take 1 capsule by mouth daily        cinacalcet (SENSIPAR) 30 mg tablet Take 30 mg by mouth daily with dinner      colchicine-probenecid 0 5-500 MG per tablet Take 1 tablet by mouth daily 90 tablet 3    escitalopram (LEXAPRO) 10 mg tablet Take 1 tablet (10 mg total) by mouth daily (Patient taking differently: Take 10 mg by mouth daily at bedtime ) 90 tablet 3    guaiFENesin 200 MG tablet Take 400 mg by mouth 2 (two) times a day      metoprolol succinate (TOPROL-XL) 25 mg 24 hr tablet Take 1 tablet (25 mg total) by mouth daily 90 tablet 3    omeprazole (PriLOSEC) 20 mg delayed release capsule Take 1 capsule (20 mg total) by mouth daily at bedtime 90 capsule 3    oxybutynin (DITROPAN-XL) 5 mg 24 hr tablet Take 1 tablet (5 mg total) by mouth as needed (before procedure)      oxyCODONE (ROXICODONE) 5 mg immediate release tablet Take 1 tablet (5 mg total) by mouth 3 (three) times a day as needed for moderate painMax Daily Amount: 15 mg 90 tablet 0    sevelamer carbonate (RENVELA) 800 mg tablet Take 800 mg by mouth 3 (three) times a day with meals       silver sulfadiazine (SILVADENE,SSD) 1 % cream Apply topically daily 50 g 5    entecavir (BARACLUDE) 0 5 MG tablet Take 1 tablet (0 5 mg total) by mouth daily for 90 days (Patient taking differently: Take 0 5 mg by mouth once a week  ) 90 tablet 1     No current facility-administered medications for this visit          ALLERGIES:     Allergies   Allergen Reactions    Acetaminophen      Due to LFT elevation    Heparin      Avoids due to eye conditions;can't have any med that may cause bleeding    Lovenox [Enoxaparin Sodium]      Avoids due to eye conditions;can't have any med that may cause bleeding in eyes    Nsaids      Avoids due to renal failure    Cardura [Doxazosin] Palpitations       SOCIAL HISTORY:     Social History     Socioeconomic History    Marital status: /Civil Union     Spouse name: None    Number of children: None    Years of education: None    Highest education level: None   Occupational History    Occupation: [email or web address]   Social Needs    Financial resource strain: None    Food insecurity:     Worry: None     Inability: None    Transportation needs:     Medical: None     Non-medical: None   Tobacco Use    Smoking status: Former Smoker     Last attempt to quit:      Years since quittin 7    Smokeless tobacco: Never Used   Substance and Sexual Activity    Alcohol use: Yes     Frequency: 2-4 times a month     Drinks per session: 1 or 2     Binge frequency: Never    Drug use: No    Sexual activity: Yes     Partners: Female   Lifestyle    Physical activity:     Days per week: None     Minutes per session: None    Stress: None   Relationships    Social connections:     Talks on phone: None     Gets together: None     Attends [employer] service: None     Active member of club or organization: None     Attends meetings of clubs or organizations: None     Relationship status: None    Intimate partner violence:     Fear of current or ex partner: None     Emotionally abused: None     Physically abused: None     Forced sexual activity: None   Other Topics Concern    None   Social History Narrative    Advance directive on file    Copy of advanced directive obtained from patient    History of no living will    Patient has living will       SOCIAL HISTORY: Family History   Problem Relation Age of Onset    Depression Mother     Diabetes Mother     Heart disease Mother     Hypertension Mother     Kidney disease Mother     Diabetes Father     Heart disease Father         Cardiac disorder    Hypertension Father     Stroke Father     Thyroid disease Sister     Cancer Maternal Grandmother        REVIEW OF SYSTEMS:     Review of Systems   Constitutional: Negative for fatigue (Improved)  HENT: Negative  Respiratory: Positive for shortness of breath  Negative for chest tightness  Cardiovascular: Positive for leg swelling  Negative for chest pain  Gastrointestinal: Negative  Genitourinary: Negative for hematuria, penile pain and urgency  Musculoskeletal: Positive for arthralgias, back pain and gait problem  Skin: Negative  Psychiatric/Behavioral: Negative  PHYSICAL EXAM:     /90   Pulse 84   Ht 5' 9" (1 753 m)   Wt 72 5 kg (159 lb 13 3 oz)   BMI 23 60 kg/m²     General:   Middle-age male, appears older than stated age, improvement in vitality since last visit  Has some jaundice  Hard of hearing  HEENT:  Normocephalic, atraumatic  Neck is supple without any palpable lymphadenopathy  Cardiovascular:  Patient has normal palpable distal radial pulses  Mild peripheral edema  Respiratory:  Patient has course respirations  There is audible wheeze  Abdomen:  Abdomen is obese but not distended or tender  : Circumcised, testicles descended  Musculoskeletal:  Shuffling gait  Dermatologic:  Patient has no skin abnormalities or rashes        LABS:     CBC:   Lab Results   Component Value Date    WBC 10 45 (H) 09/07/2019    HGB 11 4 (L) 09/07/2019    HCT 35 9 (L) 09/07/2019     (H) 09/07/2019     09/07/2019       BMP:   Lab Results   Component Value Date    GLUCOSE 152 (H) 09/14/2016    CALCIUM 8 8 09/07/2019     01/06/2016    K 3 6 09/07/2019    CO2 36 (H) 09/07/2019    CL 96 (L) 09/07/2019    BUN 26 (H) 09/07/2019    CREATININE 5 14 (H) 09/07/2019       PATHOLOGY:     12/5/18  CT CHEST, ABDOMEN AND PELVIS WITH AND WITHOUT IV CONTRAST     INDICATION:   History of bladder cancer  Surveillance      COMPARISON:  CT of the chest, abdomen, and pelvis dated 7/20/2018 and 9/14/2016  Chest CT dated 10/3/2017  Abdominal MRI dated 5/2/2018     TECHNIQUE: Initial CT of the abdomen and pelvis was performed without intravenous contrast   Subsequent CT evaluation of the chest, abdomen and pelvis was performed after the administration of intravenous contrast in corticomedullary phase  Finally,   delayed urographic phase postcontrast CT evaluation of the abdomen and pelvis was performed  Axial, sagittal, and coronal 2D reformatted images were created from the source data and submitted for interpretation       Radiation dose length product (DLP) for this visit:  1853 79 mGy-cm   This examination, like all CT scans performed in the [facility], was performed utilizing techniques to minimize radiation dose exposure, including the use of   iterative reconstruction and automated exposure control      IV Contrast:  100 mL of iohexol (OMNIPAQUE)  Enteric Contrast:  Enteric contrast was not administered      FINDINGS:     LUNGS:  Mild dependent atelectasis  Mild paraseptal emphysema at the upper lobes  No acute consolidation  No interstitial thickening  Trace mucoid debris in the trachea  No endobronchial masses  No suspicious lung parenchymal nodules or masses      PLEURA:  Unremarkable      HEART/GREAT VESSELS:  Mild cardiomegaly  Coronary atherosclerosis  No pericardial thickening or effusion  Thoracic aortic tortuosity without aneurysm      MEDIASTINUM AND ROSAS:  Anterior mediastinal fat stranding and shotty lymph nodes are unchanged from 2017 and decreased from 2016    No pathologically enlarged lymphadenopathy or enlarging nodes      CHEST WALL AND LOWER NECK:   Unremarkable      ABDOMEN     RIGHT KIDNEY AND URETER:  Hypodense right lower pole lesion shows no significant enhancement between pre and postcontrast images (17 HU vs 25 HU, respectively)  Other hypoenhancing foci are too small to characterize  No suspicious renal mass  Limited excretion of contrast on delayed phase  No hydronephrosis, ureterectasis, or stranding around the collecting system to suggest mass  Vascular calcifications at the renal hilum  No urinary tract calculi  No perinephric collection      LEFT KIDNEY AND URETER:  Subcentimeter hypoenhancing foci are too small to characterize, but likely represent cysts  No suspicious renal mass  Limited excretion of contrast on delayed phase  No hydronephrosis, ureterectasis, or stranding around the collecting system to suggest mass  Vascular calcifications at the renal hilum  No urinary tract calculi  No perinephric collection      URINARY BLADDER:  Impression on the bladder base from nodular prostatic hypertrophy  Sessile posterior wall calcifications in the bladder on series 2, image 153 and series 2, 162  Calcifications appear new since July  No associated abnormal wall enhancement on   postcontrast imaging  No polypoid masses      LIVER/BILIARY TREE:  Unremarkable      GALLBLADDER:  No calcified gallstones  No pericholecystic inflammatory change      SPLEEN:  Unremarkable      PANCREAS:  Pancreatic cystic lesion seen on prior MRI are not well demonstrated  No concerning masses or pancreatic ductal dilatation      ADRENAL GLANDS:  Bilateral adrenal hypertrophy--unchanged from priors  No focal masses     STOMACH AND BOWEL:  There is colonic diverticulosis without evidence of acute diverticulitis      ABDOMINOPELVIC CAVITY:  Shotty periportal lymph nodes are unchanged   No pathologically enlarged mesenteric, retroperitoneal, or pelvic sidewall lymphadenopathy      VESSELS:  Unremarkable for patient's age      PELVIS     REPRODUCTIVE ORGANS:  Nodular prostatic hypertrophy measuring 5 2 cm x 4 0 cm x 4 8 cm      APPENDIX: A normal appendix was visualized      ABDOMINAL WALL/INGUINAL REGIONS:  Diastasis recti  Plugging material from prior left inguinal hernia repair  Small amount of recurrent fat-containing left inguinal canal   Moderate-sized fat-containing right inguinal hernia      OSSEOUS STRUCTURES:  No acute fracture or destructive osseous lesion      IMPRESSION:     1  Posterior wall calcifications at the urinary bladder appear new since July  This could represent evolving dystrophic calcifications related to prior treatment  However, cystoscopy should be considered to exclude recurrent calcifying sessile mass      2  No suspicious renal parenchymal masses  Limited opacification of the collecting system without gross evidence for upper tract urothelial mass      3  No evidence for locoregional or distant tatiana spread  No evidence for metastasis in the chest, abdomen, or pelvis      The study was marked in EPIC for significant notification  CYTOLOGY:     1/18/18  Final Diagnosis   A  Urine, Other (ThinPrep):  Negative for high grade urothelial carcinoma ([address]) - see comment  2/6/18  Final Diagnosis   A  Urine, Cystoscopic:  Negative for high grade urothelial carcinoma ([address]) - see comment  9/4/18  A  Urine, Bladder Wash, :  Negative for high grade urothelial carcinoma ([address]) - see comment  Benign urothelial cells, benign squamous cells, and  Many neutrophils  12/6/18  Final Diagnosis   A  Urinary Bladder, :  Negative for high grade urothelial carcinoma ([address]) - see comment  PATHOLOGY:     6/14/17  Final Diagnosis   A  Left lateral bladder tumor (transurethral resection):     - Denuded urothelium with mild cystitis      B  Left lateral bladder wall (biopsy):     - Partially denuded urothelium with cystitis and non-necrotizing granulomatous reaction      - Microorganism studies pending      C   Posterior bladder (biopsy):     - Partially denuded urothelium with mild cystitis      D  Right bladder wall (biopsy):     - Denuded urothelium with cystitis  2/6/17  Final Diagnosis   A  Left lateral bladder (biopsy):     - Partially denuded urothelial mucosa  - No malignancy identified      B  Right lateral bladder (biopsy):     - Urothelium with reactive-type changes       - No malignancy identified       C  Posterior bladder (biopsy):     - Urothelium with reactive-type changes       - No malignancy identified  8/15/16  Final Diagnosis   A  Bladder, left lateral wall tumor, transurethral resection:  -  Ulcerated and severely inflamed urothelial tissue with cytologic atypia, cannot entirely exclude residual/recurrent tumor  6/17/16  Final Diagnosis   A  Urinary bladder tumor:     - Invasive low to focally high grade papillary urothelial carcinoma  - Carcinoma invades lamina propria  - No muscularis propria is identified in the submitted tissue  PROCEDURE:     SEE NOTE    ASSESSMENT:     71 y o  male with HG T1 bladder cancer who underwent stunted intravesicle treatments between [id number] complicated by comorbidities, infection, and poor tolerance of BCG and mitomycin    PLAN:     At this point time, the patient and his wife understand that he had a previous diagnosis of low to high-grade invasive T1 bladder cancer  Although there was no muscle initially in the specimen, repeat resections have not demonstrated any muscle invasive disease  The patient failed BCG therapy due to recurrent infections and his weakened immunocompromised state from his multiple other medical comorbidities  We then transitioned him to mitomycin  The patient has had significant morbidity from this as well including hematuria and bladder irritation  At this point time, he has refused additional mitomycin or intravesical treatments  He instead opted for surveillance imaging/cystoscopy      Patient and his wife understand that by losing out on the opportunity to follow the standard protocol, there is certainly a risk of disease progression or metastasis  They are comfortable with this risk  The patient appears overall improved with the decision to abort it additional intravesical therapy  He is not having any bladder symptoms and is overall fatigue has gotten better  the areas on the left lateral sidewall appear more tissue based and dystrophic calcifications as previous cystoscopy indicated  At this point time, I think we should proceed with resection to ensure that these do not represent recurrent cancer  Given the patient's significant medical comorbidities, he would need cardiac clearance prior to surgery  We discussed the plan for transurethral resection of his bladder and after discussion of risks and benefits with the patient and his wife, he has signed informed consent

## 2019-09-19 NOTE — PATIENT INSTRUCTIONS
Transurethral Resection of Bladder Tumors   WHAT YOU NEED TO KNOW:   Transurethral resection of bladder tumors (TURBT) is surgery to remove one or more tumors from your bladder  HOW TO PREPARE:   The week before your surgery:   · Write down the correct date, time, and location of your surgery  · Arrange a ride home  Ask a family member or friend to drive you home after your surgery or procedure  Do not drive yourself home  · Ask your caregiver if you need to stop using aspirin or any other prescribed or over-the-counter medicine before your procedure or surgery  · Bring your medicine bottles or a list of your medicines when you see your caregiver  Tell your caregiver if you are allergic to any medicine  Tell your caregiver if you use any herbs, food supplements, or over-the-counter medicine  · You may need blood or urine tests before your surgery  You may also need a CT scan or a cystoscopy  Talk to your healthcare provider about these or other tests you may need  Write down the date, time, and location for each test   The night before your surgery:  Ask caregivers about directions for eating and drinking  The day of your surgery:   · Ask your caregiver before you take any medicine on the day of your surgery  Bring a list of all the medicines you take, or your pill bottles, with you to the hospital  Caregivers will check that your medicines will not interact poorly with the medicine you need for surgery  · You or a close family member will be asked to sign a legal document called a consent form  It gives caregivers permission to do the procedure or surgery  It also explains the problems that may happen, and your choices  Make sure all your questions are answered before you sign this form  · Caregivers may insert an intravenous tube (IV) into your vein  A vein in the arm is usually chosen  Through the IV tube, you may be given liquids and medicine       · An anesthesiologist will talk to you before your surgery  You may need medicine to keep you asleep or numb an area of your body during surgery  Tell caregivers if you or anyone in your family has had a problem with anesthesia in the past   WHAT WILL HAPPEN:   What will happen: Your surgeon will insert a scope through your urethra and into your bladder  He will put fluid through the scope to wash your bladder and widen it for surgery  The scope will have a wire with an electric current  The current is used to stop bleeding in your bladder and remove bladder tumors  Your surgeon may also remove muscle and tissue from your bladder  He may use the scope to insert medicine into your bladder  The medicine will destroy pieces of tumor in your bladder and help prevent new tumors from growing  Your surgeon will remove the scope  After your surgery: You will be taken to a room to rest until you are fully awake  You will be monitored closely for any problems  Do not get out of bed until your healthcare provider says it is okay  You will then be able to go home or be taken to your hospital room  CONTACT YOUR HEALTHCARE PROVIDER IF:   · You cannot make it to your surgery  · You have a fever  · You get a cold or the flu  · You have questions or concerns about your surgery  SEEK CARE IMMEDIATELY IF:   · You have bleeding from your urethra that does not stop  · You start to urinate less often, very little, or not at all  · You have severe pain in your abdomen or pelvis  RISKS:   You may bleed more than expected or get an infection  Your bladder may be damaged  It may be painful to urinate, or you may have blood in your urine  You may feel discomfort in your abdomen or pelvis  You may feel like you need to urinate more often or without warning  You may develop more bladder tumors  CARE AGREEMENT:   You have the right to help plan your care  Learn about your health condition and how it may be treated   Discuss treatment options with your caregivers to decide what care you want to receive  You always have the right to refuse treatment  © 2017 [employer] [employer] Information is for End User's use only and may not be sold, redistributed or otherwise used for commercial purposes  All illustrations and images included in CareNotes® are the copyrighted property of [employer]  or [employer]  The above information is an  only  It is not intended as medical advice for individual conditions or treatments  Talk to your doctor, nurse or pharmacist before following any medical regimen to see if it is safe and effective for you

## 2019-09-20 PROBLEM — C67.9 MALIGNANT NEOPLASM OF URINARY BLADDER (HCC): Status: ACTIVE | Noted: 2019-09-20

## 2019-09-26 ENCOUNTER — OFFICE VISIT (OUTPATIENT)
Dept: NEUROSURGERY | Facility: CLINIC | Age: 69
End: 2019-09-26
Payer: COMMERCIAL

## 2019-09-26 VITALS
TEMPERATURE: 97.8 F | BODY MASS INDEX: 23.6 KG/M2 | HEIGHT: 69 IN | HEART RATE: 64 BPM | RESPIRATION RATE: 18 BRPM | DIASTOLIC BLOOD PRESSURE: 70 MMHG | SYSTOLIC BLOOD PRESSURE: 122 MMHG

## 2019-09-26 DIAGNOSIS — N18.4 CKD (CHRONIC KIDNEY DISEASE) STAGE 4, GFR 15-29 ML/MIN (HCC): Chronic | ICD-10-CM

## 2019-09-26 DIAGNOSIS — I61.9 CEREBRAL HEMORRHAGE (HCC): Primary | ICD-10-CM

## 2019-09-26 DIAGNOSIS — R93.0 ABNORMAL CT OF THE HEAD: ICD-10-CM

## 2019-09-26 DIAGNOSIS — I60.9 SUBARACHNOID HEMORRHAGE (HCC): ICD-10-CM

## 2019-09-26 PROCEDURE — 99213 OFFICE O/P EST LOW 20 MIN: CPT | Performed by: PHYSICIAN ASSISTANT

## 2019-09-26 NOTE — PROGRESS NOTES
Patient ID: Ludy Bates is a 71 y o  male  Diagnoses and all orders for this visit:    Cerebral hemorrhage ([address] )  -     CT head without contrast; Future    Abnormal CT of the head  -     CT head without contrast; Future    Subarachnoid hemorrhage (HCC)    CKD (chronic kidney disease) stage 4, GFR 15-29 ml/min (HCC)  -     CT head without contrast; Future          Assessment/Plan:    Very pleasant 60-year-old male, exceedingly hard of hearing, accompanied by his wife, arrives by wheelchair today, hospital follow-up  He has history of a fall, mid July approximately, this was without loss of consciousness  He has had recent admission 8/21/19 for shortness of breath, incidental finding on CT scan "subarachnoid hemorrhage "    As part of the evaluation a head CT head, revealed incidental finding of small hypodensities in the subcortical region of the brain at 2 sites as well as small areas of micro calcification in the posterior thalamus region  He has a history of retinal hemorrhage, and is followed regularly by Ophthalmology, as a consequence he is advised not to have anticoagulants, and he does not receive heparin during dialysis as well  History of stage IV renal failure  He has remote history of "metal" within the left eye, has history of a buckle placed to repair a retinal detachment, as a consequence Radiology is reluctant to perform MRI of the brain  At visit today he offers no complaints  Denies new gait or balance disturbance, motor or sensory difficulties in the upper or lower extremities, bowel or bladder incontinence, difficulty with memory or mentation, difficulty with executive function  He does a have a long history of hypertension on multiple medications, however since his renal dialysis his antihypertensive patients have no longer been necessary  He also has history of diabetes mellitus      He has had updated CT head 9/7/19, the study was carefully reviewed in detail, and compared with prior study 9/21/19  The areas of concern: (hyperdensities, left lateral frontal, and right anterior frontal)  8/21/19, series 2, image 25, and series 400, image 25  9/7/19, series 2, image 26 and series 400, image 23,  These areas remain unchanged when compared 9/7/19 and 8/21/19  On exam there are no focal neurologic deficits identified, there is no pronator drift, rapid alternating movements are intact, finger-nose is intact, motor exam of the upper and lower extremities is 5 x 5 for power, reflexes are intact  At this juncture as MRI is not possible secondary to ocular buckle, repeat CT in approximately 2 weeks, (6-7 weeks from initial study) is planned with clinical visit following studies for further management recommendations  These findings, impressions and recommendations are reviewed in great detail with the patient and their family, they expressed understanding and agreement, their questions were answered completely and to their satisfaction  Follow up has been scheduled  Return in about 2 years (around 9/26/2021) for Review CT head  Chief Complaint  Hospital follow-up, findings on CT head    HPI       The following portions of the patient's history were reviewed and updated as appropriate: allergies, current medications, past family history, past medical history, past social history and past surgical history     Review of Systems   Constitutional: Negative  HENT: Negative  Eyes: Negative  Respiratory: Positive for cough  Cardiovascular: Negative  Gastrointestinal: Negative  Endocrine: Negative  Genitourinary: Negative  Musculoskeletal: Negative  Skin: Negative  Neurological: Positive for weakness (over all weakness)  Hematological: Negative  Psychiatric/Behavioral: Negative  All other systems reviewed and are negative  Objective:    Physical Exam   Constitutional: He is oriented to person, place, and time   He appears well-developed and well-nourished  HENT:   Head: Normocephalic and atraumatic  Eyes: Pupils are equal, round, and reactive to light  EOM are normal    Cardiovascular: Normal rate  Grade 1-2 over 6 systolic ejection murmur heard best at the left sternal border  Pulmonary/Chest: Effort normal and breath sounds normal    Neurological: He is alert and oriented to person, place, and time  Skin: Skin is warm and dry  Multiple ecchymotic areas forearms  Psychiatric: He has a normal mood and affect  Vitals reviewed  Neurologic Exam     Mental Status   Oriented to person, place, and time  Cranial Nerves     CN III, IV, VI   Pupils are equal, round, and reactive to light  Extraocular motions are normal        CT BRAIN - WITHOUT CONTRAST  9/7/19     INDICATION:   hx bleed  54-year-old male with history of shortness of breath with end-stage renal disease, on dialysis      COMPARISON:  Head CT from August 24, 2019      TECHNIQUE:  CT examination of the brain was performed  In addition to axial images, coronal 2D reformatted images were created and submitted for interpretation        Radiation dose length product (DLP) for this visit:  931 2 mGy-cm   This examination, like all CT scans performed in the [facility], was performed utilizing techniques to minimize radiation dose exposure, including the use of iterative   reconstruction and automated exposure control        IMAGE QUALITY:  Diagnostic      FINDINGS:     PARENCHYMA:  There is no acute intracranial hemorrhage  Punctate hyperdensity noted in the left posterior frontal region on the prior scan is again noted  This is rounded in appearance and appears to be cortically based  This may represent a   cavernoma  There is no surrounding edema  Punctate curvilinear hyperdensities in the right anterior frontal lobe and right thalamus are stable in appearance    These foci are thought to represent represent dystrophic calcification, possibly vascular in   etiology  Moderate periventricular, subcortical and deep cerebral white matter chronic microangiopathic disease is noted      Are chronic lacunar infarctions in the bilateral basal ganglia, corona radiata, left thalamus, and right cerebellum  There is no edema or herniation  Intracranial carotid and vertebral artery atherosclerotic calcifications are noted      VENTRICLES AND EXTRA-AXIAL SPACES: Generalized prominence of the extra-axial spaces consistent with atrophy  Normal for the patient's age      VISUALIZED ORBITS AND PARANASAL SINUSES:  Postsurgical changes of the left globe with redemonstrated metallic foreign body along the medial orbital wall margin and a mildly hyperdense scleral band      CALVARIUM AND EXTRACRANIAL SOFT TISSUES:  Normal      Scalp vascular calcifications, consistent with the reported history of chronic renal disease      IMPRESSION:     No acute intracranial abnormality         Stable hyperdensities previously described in the left lateral frontal, right anterior frontal, and right thalamus  The right-sided hyperdensities are thought to represent dystrophic calcification  The left lateral frontal focal round hyperdensity may   represent a cavernoma  There is no acute intracranial hemorrhage      Moderate cerebral chronic microangiopathic disease  Left ocular postoperative changes

## 2019-09-26 NOTE — LETTER
September 26, 2019     Jessica Mederos, DO  [address] [facility] [address]    Patient: Jeana Ayala   YOB: 1950   Date of Visit: 9/26/2019       Dear Dr Jacinto Sawyer: Thank you for referring Jeana Ayala to me for evaluation  Below are my notes for this consultation  If you have questions, please do not hesitate to call me  I look forward to following your patient along with you  Sincerely,        Merly Hobbs PA-C        CC: No Recipients  Merly Hobbs PA-C  9/26/2019 12:04 PM  Sign at close encounter  Patient ID: Jeana Ayala is a 71 y o  male  Diagnoses and all orders for this visit:    Cerebral hemorrhage ([address] )  -     CT head without contrast; Future    Abnormal CT of the head  -     CT head without contrast; Future    Subarachnoid hemorrhage (HCC)    CKD (chronic kidney disease) stage 4, GFR 15-29 ml/min (HCC)  -     CT head without contrast; Future          Assessment/Plan:    Very pleasant 66-year-old male, exceedingly hard of hearing, accompanied by his wife, arrives by wheelchair today, hospital follow-up  He has history of a fall, mid July approximately, this was without loss of consciousness  He has had recent admission 8/21/19 for shortness of breath, incidental finding on CT scan "subarachnoid hemorrhage "    As part of the evaluation a head CT head, revealed incidental finding of small hypodensities in the subcortical region of the brain at 2 sites as well as small areas of micro calcification in the posterior thalamus region  He has a history of retinal hemorrhage, and is followed regularly by Ophthalmology, as a consequence he is advised not to have anticoagulants, and he does not receive heparin during dialysis as well  History of stage IV renal failure      He has remote history of "metal" within the left eye, has history of a buckle placed to repair a retinal detachment, as a consequence Radiology is reluctant to perform MRI of the brain  At visit today he offers no complaints  Denies new gait or balance disturbance, motor or sensory difficulties in the upper or lower extremities, bowel or bladder incontinence, difficulty with memory or mentation, difficulty with executive function  He does a have a long history of hypertension on multiple medications, however since his renal dialysis his antihypertensive patients have no longer been necessary  He also has history of diabetes mellitus  He has had updated CT head 9/7/19, the study was carefully reviewed in detail, and compared with prior study 9/21/19  The areas of concern: (hyperdensities, left lateral frontal, and right anterior frontal)  8/21/19, series 2, image 25, and series 400, image 25  9/7/19, series 2, image 26 and series 400, image 23,  These areas remain unchanged when compared 9/7/19 and 8/21/19  On exam there are no focal neurologic deficits identified, there is no pronator drift, rapid alternating movements are intact, finger-nose is intact, motor exam of the upper and lower extremities is 5 x 5 for power, reflexes are intact  At this juncture as MRI is not possible secondary to ocular buckle, repeat CT in approximately 2 weeks, (6-7 weeks from initial study) is planned with clinical visit following studies for further management recommendations  These findings, impressions and recommendations are reviewed in great detail with the patient and their family, they expressed understanding and agreement, their questions were answered completely and to their satisfaction  Follow up has been scheduled  Return in about 2 years (around 9/26/2021) for Review CT head  Chief Complaint  Hospital follow-up, findings on CT head    HPI       The following portions of the patient's history were reviewed and updated as appropriate: allergies, current medications, past family history, past medical history, past social history and past surgical history         Review of Systems   Constitutional: Negative  HENT: Negative  Eyes: Negative  Respiratory: Positive for cough  Cardiovascular: Negative  Gastrointestinal: Negative  Endocrine: Negative  Genitourinary: Negative  Musculoskeletal: Negative  Skin: Negative  Neurological: Positive for weakness (over all weakness)  Hematological: Negative  Psychiatric/Behavioral: Negative  All other systems reviewed and are negative  Objective:    Physical Exam   Constitutional: He is oriented to person, place, and time  He appears well-developed and well-nourished  HENT:   Head: Normocephalic and atraumatic  Eyes: Pupils are equal, round, and reactive to light  EOM are normal    Cardiovascular: Normal rate  Grade 1-2 over 6 systolic ejection murmur heard best at the left sternal border  Pulmonary/Chest: Effort normal and breath sounds normal    Neurological: He is alert and oriented to person, place, and time  Skin: Skin is warm and dry  Multiple ecchymotic areas forearms  Psychiatric: He has a normal mood and affect  Vitals reviewed  Neurologic Exam     Mental Status   Oriented to person, place, and time  Cranial Nerves     CN III, IV, VI   Pupils are equal, round, and reactive to light  Extraocular motions are normal        CT BRAIN - WITHOUT CONTRAST  9/7/19     INDICATION:   hx bleed  78-year-old male with history of shortness of breath with end-stage renal disease, on dialysis      COMPARISON:  Head CT from August 24, 2019      TECHNIQUE:  CT examination of the brain was performed  In addition to axial images, coronal 2D reformatted images were created and submitted for interpretation        Radiation dose length product (DLP) for this visit:  931 2 mGy-cm     This examination, like all CT scans performed in the [facility], was performed utilizing techniques to minimize radiation dose exposure, including the use of iterative   reconstruction and automated exposure control        IMAGE QUALITY:  Diagnostic      FINDINGS:     PARENCHYMA:  There is no acute intracranial hemorrhage  Punctate hyperdensity noted in the left posterior frontal region on the prior scan is again noted  This is rounded in appearance and appears to be cortically based  This may represent a   cavernoma  There is no surrounding edema  Punctate curvilinear hyperdensities in the right anterior frontal lobe and right thalamus are stable in appearance  These foci are thought to represent represent dystrophic calcification, possibly vascular in   etiology  Moderate periventricular, subcortical and deep cerebral white matter chronic microangiopathic disease is noted      Are chronic lacunar infarctions in the bilateral basal ganglia, corona radiata, left thalamus, and right cerebellum  There is no edema or herniation  Intracranial carotid and vertebral artery atherosclerotic calcifications are noted      VENTRICLES AND EXTRA-AXIAL SPACES: Generalized prominence of the extra-axial spaces consistent with atrophy  Normal for the patient's age      VISUALIZED ORBITS AND PARANASAL SINUSES:  Postsurgical changes of the left globe with redemonstrated metallic foreign body along the medial orbital wall margin and a mildly hyperdense scleral band      CALVARIUM AND EXTRACRANIAL SOFT TISSUES:  Normal      Scalp vascular calcifications, consistent with the reported history of chronic renal disease      IMPRESSION:     No acute intracranial abnormality         Stable hyperdensities previously described in the left lateral frontal, right anterior frontal, and right thalamus  The right-sided hyperdensities are thought to represent dystrophic calcification  The left lateral frontal focal round hyperdensity may   represent a cavernoma  There is no acute intracranial hemorrhage      Moderate cerebral chronic microangiopathic disease  Left ocular postoperative changes

## 2019-09-26 NOTE — LETTER
September 26, 2019     Gilberto Louise DO  [address] [facility] [address]    Patient: Jatin Ho   YOB: 1950   Date of Visit: 9/26/2019       Dear Dr Wyatt Valentin: Thank you for referring Jatin Ho to me for evaluation  Below are my notes for this consultation  If you have questions, please do not hesitate to call me  I look forward to following your patient along with you  Sincerely,        Shanelle Hinton PA-C        CC: No Recipients  Shanelle Hinton PA-C  9/26/2019 12:03 PM  Incomplete  Patient ID: Jatin Ho is a 71 y o  male  Diagnoses and all orders for this visit:    Cerebral hemorrhage ([facility] [address] )  -     CT head without contrast; Future    Abnormal CT of the head  -     CT head without contrast; Future    CKD (chronic kidney disease) stage 4, GFR 15-29 ml/min ([facility])  -     CT head without contrast; Future          Assessment/Plan:    Very pleasant 49-year-old male, exceedingly hard of hearing, accompanied by his wife, arrives by wheelchair today, hospital follow-up  He has history of a fall, mid July approximately, this was without loss of consciousness  He has had recent admission 8/21/19 for shortness of breath, incidental finding on CT scan "subarachnoid hemorrhage "    As part of the evaluation a head CT head, revealed incidental finding of small hypodensities in the subcortical region of the brain at 2 sites as well as small areas of micro calcification in the posterior thalamus region  He has a history of retinal hemorrhage, and is followed regularly by Ophthalmology, as a consequence he is advised not to have anticoagulants, and he does not receive heparin during dialysis as well  History of stage IV renal failure  He has remote history of "metal" within the left eye, has history of a buckle placed to repair a retinal detachment, as a consequence Radiology is reluctant to perform MRI of the brain      At visit today he offers no complaints  Denies new gait or balance disturbance, motor or sensory difficulties in the upper or lower extremities, bowel or bladder incontinence, difficulty with memory or mentation, difficulty with executive function  He does a have a long history of hypertension on multiple medications, however since his renal dialysis his antihypertensive patients have no longer been necessary  He also has history of diabetes mellitus  He has had updated CT head 9/7/19, the study was carefully reviewed in detail, and compared with prior study 9/21/19  The areas of concern: (hyperdensities, left lateral frontal, and right anterior frontal)  8/21/19, series 2, image 25, and series 400, image 25  9/7/19, series 2, image 26 and series 400, image 23,  These areas remain unchanged when compared 9/7/19 and 8/21/19  On exam there are no focal neurologic deficits identified, there is no pronator drift, rapid alternating movements are intact, finger-nose is intact, motor exam of the upper and lower extremities is 5 x 5 for power, reflexes are intact  At this juncture as MRI is not possible secondary to ocular buckle, repeat CT in approximately 2 weeks, (6-7 weeks from initial study) is planned with clinical visit following studies for further management recommendations  These findings, impressions and recommendations are reviewed in great detail with the patient and their family, they expressed understanding and agreement, their questions were answered completely and to their satisfaction  Follow up has been scheduled  Return in about 2 years (around 9/26/2021) for Review CT head  Chief Complaint  Hospital follow-up, findings on CT head    HPI       The following portions of the patient's history were reviewed and updated as appropriate: allergies, current medications, past family history, past medical history, past social history and past surgical history     Review of Systems   Constitutional: Negative  HENT: Negative  Eyes: Negative  Respiratory: Positive for cough  Cardiovascular: Negative  Gastrointestinal: Negative  Endocrine: Negative  Genitourinary: Negative  Musculoskeletal: Negative  Skin: Negative  Neurological: Positive for weakness (over all weakness)  Hematological: Negative  Psychiatric/Behavioral: Negative  All other systems reviewed and are negative  Objective:    Physical Exam   Constitutional: He is oriented to person, place, and time  He appears well-developed and well-nourished  HENT:   Head: Normocephalic and atraumatic  Eyes: Pupils are equal, round, and reactive to light  EOM are normal    Cardiovascular: Normal rate  Grade 1-2 over 6 systolic ejection murmur heard best at the left sternal border  Pulmonary/Chest: Effort normal and breath sounds normal    Neurological: He is alert and oriented to person, place, and time  Skin: Skin is warm and dry  Multiple ecchymotic areas forearms  Psychiatric: He has a normal mood and affect  Vitals reviewed  Neurologic Exam     Mental Status   Oriented to person, place, and time  Cranial Nerves     CN III, IV, VI   Pupils are equal, round, and reactive to light  Extraocular motions are normal        CT BRAIN - WITHOUT CONTRAST  9/7/19     INDICATION:   hx bleed  35-year-old male with history of shortness of breath with end-stage renal disease, on dialysis      COMPARISON:  Head CT from August 24, 2019      TECHNIQUE:  CT examination of the brain was performed  In addition to axial images, coronal 2D reformatted images were created and submitted for interpretation        Radiation dose length product (DLP) for this visit:  931 2 mGy-cm     This examination, like all CT scans performed in the [facility], was performed utilizing techniques to minimize radiation dose exposure, including the use of iterative   reconstruction and automated exposure control        IMAGE QUALITY:  Diagnostic      FINDINGS:     PARENCHYMA:  There is no acute intracranial hemorrhage  Punctate hyperdensity noted in the left posterior frontal region on the prior scan is again noted  This is rounded in appearance and appears to be cortically based  This may represent a   cavernoma  There is no surrounding edema  Punctate curvilinear hyperdensities in the right anterior frontal lobe and right thalamus are stable in appearance  These foci are thought to represent represent dystrophic calcification, possibly vascular in   etiology  Moderate periventricular, subcortical and deep cerebral white matter chronic microangiopathic disease is noted      Are chronic lacunar infarctions in the bilateral basal ganglia, corona radiata, left thalamus, and right cerebellum  There is no edema or herniation  Intracranial carotid and vertebral artery atherosclerotic calcifications are noted      VENTRICLES AND EXTRA-AXIAL SPACES: Generalized prominence of the extra-axial spaces consistent with atrophy  Normal for the patient's age      VISUALIZED ORBITS AND PARANASAL SINUSES:  Postsurgical changes of the left globe with redemonstrated metallic foreign body along the medial orbital wall margin and a mildly hyperdense scleral band      CALVARIUM AND EXTRACRANIAL SOFT TISSUES:  Normal      Scalp vascular calcifications, consistent with the reported history of chronic renal disease      IMPRESSION:     No acute intracranial abnormality         Stable hyperdensities previously described in the left lateral frontal, right anterior frontal, and right thalamus  The right-sided hyperdensities are thought to represent dystrophic calcification  The left lateral frontal focal round hyperdensity may   represent a cavernoma  There is no acute intracranial hemorrhage      Moderate cerebral chronic microangiopathic disease  Left ocular postoperative changes

## 2019-09-26 NOTE — PATIENT INSTRUCTIONS
As discussed, proceed for CT head in approximately 2 weeks  Return to Neurosurgery, Dr Vail Or [facility]) following study for further recommendations  Return sooner should there be any new changes

## 2019-10-01 ENCOUNTER — TELEPHONE (OUTPATIENT)
Dept: FAMILY MEDICINE CLINIC | Facility: CLINIC | Age: 69
End: 2019-10-01

## 2019-10-01 DIAGNOSIS — G62.9 PERIPHERAL POLYNEUROPATHY: Primary | ICD-10-CM

## 2019-10-01 RX ORDER — GABAPENTIN 100 MG/1
100 CAPSULE ORAL 3 TIMES DAILY
Qty: 270 CAPSULE | Refills: 0 | Status: SHIPPED | OUTPATIENT
Start: 2019-10-01

## 2019-10-01 NOTE — TELEPHONE ENCOUNTER
I put in for 100 milligram gabapentin  He will slowly taper at up    Start with 1 pill at bedtime for 2 days, then 1 pill twice a day for 2 days, then may increase to 3 times a day if necessary

## 2019-10-05 ENCOUNTER — HOSPITAL ENCOUNTER (OUTPATIENT)
Dept: CT IMAGING | Facility: HOSPITAL | Age: 69
Discharge: HOME | End: 2019-10-05
Payer: COMMERCIAL

## 2019-10-05 DIAGNOSIS — R93.0 ABNORMAL CT OF THE HEAD: ICD-10-CM

## 2019-10-05 DIAGNOSIS — N18.4 CKD (CHRONIC KIDNEY DISEASE) STAGE 4, GFR 15-29 ML/MIN (HCC): Chronic | ICD-10-CM

## 2019-10-05 DIAGNOSIS — I61.9 CEREBRAL HEMORRHAGE (HCC): ICD-10-CM

## 2019-10-05 PROCEDURE — 70450 CT HEAD/BRAIN W/O DYE: CPT

## 2019-10-10 ENCOUNTER — DOCTOR'S OFFICE (OUTPATIENT)
Dept: URBAN - NONMETROPOLITAN AREA CLINIC 1 | Facility: CLINIC | Age: 69
Setting detail: OPHTHALMOLOGY
End: 2019-10-10
Payer: COMMERCIAL

## 2019-10-10 DIAGNOSIS — H04.123: ICD-10-CM

## 2019-10-10 DIAGNOSIS — H33.003: ICD-10-CM

## 2019-10-10 DIAGNOSIS — H43.812: ICD-10-CM

## 2019-10-10 DIAGNOSIS — E11.3393: ICD-10-CM

## 2019-10-10 DIAGNOSIS — H43.811: ICD-10-CM

## 2019-10-10 DIAGNOSIS — H43.813: ICD-10-CM

## 2019-10-10 DIAGNOSIS — H35.373: ICD-10-CM

## 2019-10-10 DIAGNOSIS — H33.301: ICD-10-CM

## 2019-10-10 PROBLEM — H35.62: Status: ACTIVE | Noted: 2019-04-11

## 2019-10-10 PROBLEM — H40.013: Status: ACTIVE | Noted: 2018-08-02

## 2019-10-10 PROBLEM — H26.493: Status: ACTIVE | Noted: 2017-03-20

## 2019-10-10 PROBLEM — H35.61: Status: ACTIVE | Noted: 2019-04-11

## 2019-10-10 PROCEDURE — 83861 MICROFLUID ANALY TEARS: CPT | Performed by: OPHTHALMOLOGY

## 2019-10-10 PROCEDURE — 92226 OPHTHALMOSCOPY EXT SUBSEQUENT: CPT | Performed by: OPHTHALMOLOGY

## 2019-10-10 PROCEDURE — 92134 CPTRZ OPH DX IMG PST SGM RTA: CPT | Performed by: OPHTHALMOLOGY

## 2019-10-10 PROCEDURE — 92014 COMPRE OPH EXAM EST PT 1/>: CPT | Performed by: OPHTHALMOLOGY

## 2019-10-10 ASSESSMENT — REFRACTION_MANIFEST
OS_VA1: 20/
OS_VA2: 20/
OD_VA1: 20/
OD_VA2: 20/
OD_VA1: 20/
OU_VA: 20/
OU_VA: 20/
OS_VA3: 20/
OD_VA2: 20/
OD_VA3: 20/
OS_VA2: 20/
OS_VA1: 20/
OS_VA3: 20/
OD_VA3: 20/

## 2019-10-10 ASSESSMENT — REFRACTION_CURRENTRX
OS_OVR_VA: 20/
OD_OVR_VA: 20/
OS_OVR_VA: 20/
OS_OVR_VA: 20/

## 2019-10-10 ASSESSMENT — CONFRONTATIONAL VISUAL FIELD TEST (CVF)
OS_FINDINGS: FULL
OD_FINDINGS: FULL

## 2019-10-10 ASSESSMENT — VISUAL ACUITY
OS_BCVA: 20/25+2
OD_BCVA: 20/400

## 2019-10-10 ASSESSMENT — LID EXAM ASSESSMENTS
OD_BLEPHARITIS: RUL 2+
OS_BLEPHARITIS: LUL 2+

## 2019-10-17 ENCOUNTER — TELEPHONE (OUTPATIENT)
Dept: FAMILY MEDICINE CLINIC | Facility: CLINIC | Age: 69
End: 2019-10-17

## 2019-10-17 ENCOUNTER — OFFICE VISIT (OUTPATIENT)
Dept: NEUROSURGERY | Facility: CLINIC | Age: 69
End: 2019-10-17
Payer: COMMERCIAL

## 2019-10-17 VITALS
HEART RATE: 74 BPM | DIASTOLIC BLOOD PRESSURE: 65 MMHG | TEMPERATURE: 99 F | BODY MASS INDEX: 23.6 KG/M2 | SYSTOLIC BLOOD PRESSURE: 118 MMHG | HEIGHT: 69 IN

## 2019-10-17 DIAGNOSIS — R29.898 WEAKNESS OF BOTH LOWER EXTREMITIES: ICD-10-CM

## 2019-10-17 DIAGNOSIS — I60.9 SUBARACHNOID HEMORRHAGE (HCC): Primary | ICD-10-CM

## 2019-10-17 DIAGNOSIS — I50.82 BIVENTRICULAR HEART FAILURE (HCC): ICD-10-CM

## 2019-10-17 DIAGNOSIS — R53.1 WEAKNESS: Primary | ICD-10-CM

## 2019-10-17 PROCEDURE — 99213 OFFICE O/P EST LOW 20 MIN: CPT | Performed by: PHYSICIAN ASSISTANT

## 2019-10-17 NOTE — TELEPHONE ENCOUNTER
Patient's wife called to request a referral to [facility] for in home PT  Patient would like to have someone come to his home 2x/week    Ph [phone]  Fax [phone]

## 2019-10-17 NOTE — PROGRESS NOTES
Neurosurgery Office Note  Long Madrigal 71 y o  male MRN: [id number]      Assessment/Plan     Subarachnoid hemorrhage [facility])  SAH follow up after fall in July 2019 without LOC  Patient currently denies headache  Follow up today with repeat CT scan - prior [facility] showed resolution of SAH but incidental finding of several hyperdensities, possibly representing microcalcifications  Patient cannot have MRI due to ocular buckle w/metal from prior retinal hemorrhage    Imaging:  CT head w/o, 10/5/19:No acute intracranial abnormality  Microangiopathic changes  Chronic lacunar infarcts are noted as described    Plan:  SAH resolved  Hyperdensities stable on repeat imaging, likely representing calcifications  Patient is currently asymptomatic  No follow up needed    Referral for PT given due to frequent falls, neuropathy, and bilateral leg weakness  Patient states he may or may not use it  Diagnoses and all orders for this visit:    Subarachnoid hemorrhage ([address] )    Weakness of both lower extremities  -     Ambulatory referral to Physical Therapy; Future            CHIEF COMPLAINT    Chief Complaint   Patient presents with    Follow-up     Cerebral hemorrhage        HISTORY      This is a 70 yo male with a PMH of end stage renal disease on dialysis and hearing loss (severely hard of hearing) who presents today to review imaging  He is s/p fall in July 2019 without LOC  At that time a tiny SAH was found on CTH  Repeat CTH in September showed complete resolution of SAH but incidental finding of multiple hyperdensities of unknown significance  2 week repeat CTH was recommended as the patient cannot under MRI - he is s/p ocular buckle for a retinal hemorrhage which contains metal     Currently the patient denies headaches, vision changes, speech deficits, worsening hearing loss  He is weakness in his BLE but this is chronic as he doesn't walk well 2/2 peripheral neuropathy  He has frequent falls from the neuropathy  REVIEW OF SYSTEMS    Review of Systems   HENT: Positive for hearing loss  Musculoskeletal: Positive for gait problem (off balance when walking )  Neurological: Positive for light-headedness  All other systems reviewed and are negative  Meds/Allergies     Current Outpatient Medications   Medication Sig Dispense Refill    allopurinol (ZYLOPRIM) 100 mg tablet Take 1 tablet (100 mg total) by mouth daily 90 tablet 3    ALPRAZolam (XANAX) 0 5 mg tablet Take 0 5 mg by mouth daily as needed for anxiety        amiodarone 200 mg tablet Take 1 tablet (200 mg total) by mouth 2 (two) times a day with meals 180 tablet 3    ampicillin (PRINCIPEN) 250 mg capsule Take 250 mg by mouth every 12 (twelve) hours Current treatment for UTI  PRE PROCEDURE      atorvastatin (LIPITOR) 20 mg tablet Take 1 tablet (20 mg total) by mouth daily 90 tablet 3    B Complex-C-Folic Acid (TRIPHROCAPS) 1 MG CAPS Take 1 mg by mouth daily      calcium acetate (PHOSLO) 667 mg capsule Take 667 mg by mouth 3 (three) times a day with meals       Cholecalciferol (VITAMIN D-3 PO) Take 1 capsule by mouth daily        cinacalcet (SENSIPAR) 30 mg tablet Take 30 mg by mouth daily with dinner      colchicine-probenecid 0 5-500 MG per tablet Take 1 tablet by mouth daily 90 tablet 3    entecavir (BARACLUDE) 0 5 MG tablet Take 1 tablet (0 5 mg total) by mouth daily for 90 days (Patient taking differently: Take 0 5 mg by mouth once a week  ) 90 tablet 1    escitalopram (LEXAPRO) 10 mg tablet Take 1 tablet (10 mg total) by mouth daily (Patient taking differently: Take 10 mg by mouth daily at bedtime ) 90 tablet 3    gabapentin (NEURONTIN) 100 mg capsule Take 1 capsule (100 mg total) by mouth 3 (three) times a day 270 capsule 0    guaiFENesin 200 MG tablet Take 400 mg by mouth 2 (two) times a day      metoprolol succinate (TOPROL-XL) 25 mg 24 hr tablet Take 1 tablet (25 mg total) by mouth daily 90 tablet 3    omeprazole (PriLOSEC) 20 mg delayed release capsule Take 1 capsule (20 mg total) by mouth daily at bedtime 90 capsule 3    oxybutynin (DITROPAN-XL) 5 mg 24 hr tablet Take 1 tablet (5 mg total) by mouth as needed (before procedure)      oxyCODONE (ROXICODONE) 5 mg immediate release tablet Take 1 tablet (5 mg total) by mouth 3 (three) times a day as needed for moderate painMax Daily Amount: 15 mg 90 tablet 0    sevelamer carbonate (RENVELA) 800 mg tablet Take 800 mg by mouth 3 (three) times a day with meals       silver sulfadiazine (SILVADENE,SSD) 1 % cream Apply topically daily 50 g 5     No current facility-administered medications for this visit          Allergies   Allergen Reactions    Acetaminophen      Due to LFT elevation    Heparin      Avoids due to eye conditions;can't have any med that may cause bleeding    Lovenox [Enoxaparin Sodium]      Avoids due to eye conditions;can't have any med that may cause bleeding in eyes    Nsaids      Avoids due to renal failure    Cardura [Doxazosin] Palpitations       PAST HISTORY    Past Medical History:   Diagnosis Date    AAA (abdominal aortic aneurysm) (HCC)     Abnormal liver function test     Anemia     Anxiety     Arthritis     Cancer (HCC)     bladder    Cardiac disorder     Chronic kidney disease     renal failure    Chronic pain disorder     back and legs    Depression     Diabetes mellitus ([facility] [address] )     Dialysis patient ([facility] [address] )     Fracture of carpal bone     Fractures     spinal compression fx,closed fx of wrist & ankle    GERD (gastroesophageal reflux disease)     Gout     Hearing difficulty of both ears     Hepatitis A     A - remission since 1970's    Hyperlipidemia     Hyperlipidemia     Hypertension     Kidney stone     Migraine     Neuropathy     Nicotine dependence     Pancreatitis     Vitamin D deficiency     Wears glasses        Past Surgical History:   Procedure Laterality Date    BLADDER FULGURATION  06/17/2016    Cystoscopy with fulguration medium lesion (2-5 cm)    CATARACT EXTRACTION Bilateral     congenital    COLONOSCOPY      CYSTOSCOPY      Diagnostic 5/31/17, 10/10/17    CYSTOSCOPY N/A 6/14/2017    Procedure: CYSTOSCOPY WITH BLADDER  BIOPSIES WITH FULGURATION;  Surgeon: Karoline Valladares MD;  Location: [facility];  Service: Urology    CYSTOSCOPY  12/06/2018    CYSTOSCOPY W/ URETERAL STENT PLACEMENT Right 06/01/2016    CYSTOSCOPY W/ URETEROSCOPY  06/17/2016    With removal of calculus    CYSTOSTOMY W/ BLADDER BIOPSY      9/6/26, 2/6/17    DENTAL SURGERY      ESOPHAGOGASTRODUODENOSCOPY      EYE SURGERY      catatact    EYE SURGERY Bilateral     Left eye detachment R eye retinal tear    HERNIA REPAIR      HERNIA REPAIR      L inguinal w/mesh;umbilical herniorraphy    UT ANASTOMOSIS,AV,ANY SITE Right 6/29/2016    Procedure: LOWER FOREARM AV FISTULA;  Surgeon: Inocencio Baumgarten, MD;  Location: [facility];  Service: Vascular    UT COLONOSCOPY FLX DX W/COLLJ SPEC WHEN PFRMD N/A 3/4/2016    Procedure: COLONOSCOPY;  Surgeon: Hafsa Aguilar MD;  [facility];  Service: Gastroenterology    UT CYSTO/URETERO W/LITHOTRIPSY &INDWELL STENT INSRT Right 6/17/2016    Procedure: CYSTOSCOPY URETEROSCOPY WITH LITHOTRIPSY HOLMIUM LASER,BASKET STONE EXTRACTION, RETROGRADE PYELOGRAM AND INSERTION STENT URETERAL;  Surgeon: Melissa Herrera MD;  Location:  MAIN OR;  Service: Urology    UT CYSTOURETHROSCOPY N/A 7/13/2017    Procedure: CYSTOSCOPY;  Surgeon: Karoline Valladares MD;  [facility];  Service: Urology    UT CYSTOURETHROSCOPY W/IRRIG & EVAC CLOTS N/A 7/13/2017    Procedure: CYSTOSCOPY EVACUATION OF CLOTS,POSSIBLE FULGURATION;  Surgeon: Karoline Valladares MD;  [facility];  Service: Urology    UT CYSTOURETHROSCOPY,BIOPSY N/A 8/15/2016    Procedure: CYSTOSCOPY WITH RE BIOPSY OF BLADDER;  Surgeon: Yanely Rangel MD;  [facility];  Service: Urology    UT CYSTOURETHROSCOPY,FULGUR 0 5-2 CM LESN N/A 2/6/2017 Procedure: TRANSURETHRAL RESECTION OF BLADDER TUMOR (TURBT); Surgeon: Cammy Hair MD;  [facility];  Service: Urology    ID CYSTOURETHROSCOPY,FULGUR 0 5-2 CM LESN N/A 2017    Procedure: TRANSURETHRAL RESECTION OF BLADDER TUMOR (TURBT); Surgeon: Cammy Hair MD;  [facility];  Service: Urology    ID CYSTOURETHROSCOPY,FULGUR <0 5 CM LESN N/A 2017    Procedure: Korina Fore; BLADDER BIOPSY WITH Dickson Lunch;  Surgeon: Cammy Hair MD;  [facility];  Service: Urology    ID CYSTOURETHROSCOPY,URETER CATHETER Right 2016    Procedure: CYSTOSCOPY RETROGRADE PYELOGRAM WITH INSERTION STENT URETERAL;  Surgeon: Safia Holm MD;  Location:  MAIN OR;  Service: Urology    ID EGD TRANSORAL BIOPSY SINGLE/MULTIPLE N/A 3/4/2016    Procedure: ESOPHAGOGASTRODUODENOSCOPY (EGD); Surgeon: Brissa Graves MD;  [facility];  Service: Gastroenterology    VASCULAR SURGERY Right     AV fistula       Social History     Tobacco Use    Smoking status: Former Smoker     Last attempt to quit:      Years since quittin 8    Smokeless tobacco: Never Used   Substance Use Topics    Alcohol use: Yes     Frequency: 2-4 times a month     Drinks per session: 1 or 2     Binge frequency: Never    Drug use: No       Family History   Problem Relation Age of Onset    Depression Mother     Diabetes Mother     Heart disease Mother     Hypertension Mother     Kidney disease Mother     Diabetes Father     Heart disease Father         Cardiac disorder    Hypertension Father     Stroke Father     Thyroid disease Sister     Cancer Maternal Grandmother          Above history personally reviewed  EXAM    Vitals: There were no vitals taken for this visit  ,There is no height or weight on file to calculate BMI  Physical Exam   Constitutional: He is oriented to person, place, and time  He appears well-developed and well-nourished     HENT:   Head: Normocephalic and atraumatic  Eyes: Pupils are equal, round, and reactive to light  EOM are normal    Neck: Normal range of motion  Cardiovascular: Normal rate  Pulmonary/Chest: Effort normal  No respiratory distress  Abdominal: Soft  Musculoskeletal: Normal range of motion  Neurological: He is alert and oriented to person, place, and time  Skin: Skin is warm and dry  Psychiatric: He has a normal mood and affect  His speech is normal and behavior is normal  Judgment and thought content normal    Nursing note and vitals reviewed  Neurologic Exam     Mental Status   Oriented to person, place, and time  Follows 2 step commands  Attention: normal  Concentration: normal    Speech: speech is normal   Level of consciousness: alert  Able to repeat  Normal comprehension  Cranial Nerves   Cranial nerves II through XII intact  CN III, IV, VI   Pupils are equal, round, and reactive to light  Extraocular motions are normal      Motor Exam   Muscle bulk: decreased  Right arm pronator drift: absent  Left arm pronator drift: absent    Strength   Right deltoid: 5/5  Left deltoid: 5/5  Right biceps: 5/5  Left biceps: 5/5  Right triceps: 5/5  Left triceps: 5/5  Right wrist flexion: 5/5  Left wrist flexion: 5/5  Right wrist extension: 5/5  Left wrist extension: 5/5  Right interossei: 5/5  Left interossei: 5/5  Right iliopsoas: 4/5  Left iliopsoas: 4/5  Right quadriceps: 4/5  Left quadriceps: 4/5  Right hamstrin/5  Left hamstrin/5  Right anterior tibial: 4/5  Left anterior tibial: 4/5  Right posterior tibial: 4/5  Left posterior tibial: 4/5        MEDICAL DECISION MAKING    Imaging Studies:     Ct Head Without Contrast  Result Date: 10/7/2019  Impression: No acute intracranial abnormality  Microangiopathic changes  Chronic lacunar infarcts are noted as described  Workstation performed: [id number]       I have personally reviewed pertinent reports     and I have personally reviewed pertinent films in PACS

## 2019-10-17 NOTE — ASSESSMENT & PLAN NOTE
SAH follow up after fall in July 2019 without LOC  Patient currently denies headache  Follow up today with repeat CT scan - prior [facility] showed resolution of SAH but incidental finding of several hyperdensities, possibly representing microcalcifications  Patient cannot have MRI due to ocular buckle w/metal from prior retinal hemorrhage    Imaging:  CT head w/o, 10/5/19:No acute intracranial abnormality  Microangiopathic changes  Chronic lacunar infarcts are noted as described    Plan:  SAH resolved  Hyperdensities stable on repeat imaging, likely representing calcifications  Patient is currently asymptomatic  No follow up needed    Referral for PT given due to frequent falls, neuropathy, and bilateral leg weakness  Patient states he may or may not use it

## 2019-10-18 DIAGNOSIS — C67.9 MALIGNANT NEOPLASM OF URINARY BLADDER, UNSPECIFIED SITE (HCC): ICD-10-CM

## 2019-10-18 RX ORDER — OXYCODONE HYDROCHLORIDE 5 MG/1
5 TABLET ORAL 3 TIMES DAILY PRN
Qty: 90 TABLET | Refills: 0 | Status: SHIPPED | OUTPATIENT
Start: 2019-10-18 | End: 2019-11-22 | Stop reason: SDUPTHER

## 2019-10-21 DIAGNOSIS — N18.4 CKD (CHRONIC KIDNEY DISEASE) STAGE 4, GFR 15-29 ML/MIN (HCC): Chronic | ICD-10-CM

## 2019-10-21 RX ORDER — PROBENECID AND COLCHICINE 500; .5 MG/1; MG/1
1 TABLET ORAL DAILY
Qty: 90 TABLET | Refills: 3 | Status: SHIPPED | OUTPATIENT
Start: 2019-10-21

## 2019-10-22 ENCOUNTER — TELEPHONE (OUTPATIENT)
Dept: FAMILY MEDICINE CLINIC | Facility: CLINIC | Age: 69
End: 2019-10-22

## 2019-10-22 NOTE — TELEPHONE ENCOUNTER
Patient's wife called to advise that she needs to be at home full-time at this point to help care for her   States you filled out her form previously for partial FMLA  She did contact [address] and was advised you would just need to fax a note stating the patient requires full-time care and you are approving [employer] full FMLA  Can be faxed to Attn: Alina Arndt [phone]

## 2019-10-22 NOTE — TELEPHONE ENCOUNTER
Received a call from  with [facility] office, she was questioning if you would do a narrative report for patient

## 2019-10-23 ENCOUNTER — TELEPHONE (OUTPATIENT)
Dept: FAMILY MEDICINE CLINIC | Facility: CLINIC | Age: 69
End: 2019-10-23

## 2019-10-23 NOTE — TELEPHONE ENCOUNTER
HAS A SMALL NECROTIC AREA ON HIS 4TH LEFT TOE ON THE TIP  SOME REDNESS, SOMETIMES COOL SOMETIMES WARM  HAS PEDAL PULSE, SOME INCREASED PAIN  COULD THIS BE SOMETHING VASCULAR? DO YOU WANT TO ORDER SOMETHING  EF IS 20/25%

## 2019-10-28 ENCOUNTER — ANESTHESIA EVENT (OUTPATIENT)
Dept: PERIOP | Facility: HOSPITAL | Age: 69
End: 2019-10-28
Payer: COMMERCIAL

## 2019-10-28 RX ORDER — SODIUM CHLORIDE 9 MG/ML
75 INJECTION, SOLUTION INTRAVENOUS CONTINUOUS
Status: CANCELLED | OUTPATIENT
Start: 2019-11-13

## 2019-10-29 ENCOUNTER — APPOINTMENT (OUTPATIENT)
Dept: LAB | Facility: HOSPITAL | Age: 69
End: 2019-10-29
Attending: UROLOGY
Payer: COMMERCIAL

## 2019-10-29 ENCOUNTER — APPOINTMENT (OUTPATIENT)
Dept: PREADMISSION TESTING | Facility: HOSPITAL | Age: 69
End: 2019-10-29
Payer: COMMERCIAL

## 2019-10-29 DIAGNOSIS — C67.9 MALIGNANT NEOPLASM OF URINARY BLADDER, UNSPECIFIED SITE (HCC): ICD-10-CM

## 2019-10-29 LAB
ALBUMIN SERPL BCP-MCNC: 3.1 G/DL (ref 3.5–5)
ALP SERPL-CCNC: 146 U/L (ref 46–116)
ALT SERPL W P-5'-P-CCNC: 29 U/L (ref 12–78)
ANION GAP SERPL CALCULATED.3IONS-SCNC: 9 MMOL/L (ref 4–13)
AST SERPL W P-5'-P-CCNC: 27 U/L (ref 5–45)
BASOPHILS # BLD AUTO: 0.06 THOUSANDS/ΜL (ref 0–0.1)
BASOPHILS NFR BLD AUTO: 1 % (ref 0–1)
BILIRUB SERPL-MCNC: 0.64 MG/DL (ref 0.2–1)
BUN SERPL-MCNC: 33 MG/DL (ref 5–25)
CALCIUM SERPL-MCNC: 8.3 MG/DL (ref 8.3–10.1)
CHLORIDE SERPL-SCNC: 99 MMOL/L (ref 100–108)
CO2 SERPL-SCNC: 29 MMOL/L (ref 21–32)
CREAT SERPL-MCNC: 5.53 MG/DL (ref 0.6–1.3)
EOSINOPHIL # BLD AUTO: 0.14 THOUSAND/ΜL (ref 0–0.61)
EOSINOPHIL NFR BLD AUTO: 2 % (ref 0–6)
ERYTHROCYTE [DISTWIDTH] IN BLOOD BY AUTOMATED COUNT: 16.1 % (ref 11.6–15.1)
EST. AVERAGE GLUCOSE BLD GHB EST-MCNC: 128 MG/DL
GFR SERPL CREATININE-BSD FRML MDRD: 10 ML/MIN/1.73SQ M
GLUCOSE SERPL-MCNC: 99 MG/DL (ref 65–140)
HBA1C MFR BLD: 6.1 % (ref 4.2–6.3)
HCT VFR BLD AUTO: 37.7 % (ref 36.5–49.3)
HGB BLD-MCNC: 11.9 G/DL (ref 12–17)
IMM GRANULOCYTES # BLD AUTO: 0.04 THOUSAND/UL (ref 0–0.2)
IMM GRANULOCYTES NFR BLD AUTO: 1 % (ref 0–2)
INR PPP: 1.14 (ref 0.84–1.19)
LYMPHOCYTES # BLD AUTO: 0.63 THOUSANDS/ΜL (ref 0.6–4.47)
LYMPHOCYTES NFR BLD AUTO: 7 % (ref 14–44)
MCH RBC QN AUTO: 32.7 PG (ref 26.8–34.3)
MCHC RBC AUTO-ENTMCNC: 31.6 G/DL (ref 31.4–37.4)
MCV RBC AUTO: 104 FL (ref 82–98)
MONOCYTES # BLD AUTO: 0.81 THOUSAND/ΜL (ref 0.17–1.22)
MONOCYTES NFR BLD AUTO: 10 % (ref 4–12)
NEUTROPHILS # BLD AUTO: 6.89 THOUSANDS/ΜL (ref 1.85–7.62)
NEUTS SEG NFR BLD AUTO: 79 % (ref 43–75)
NRBC BLD AUTO-RTO: 0 /100 WBCS
PLATELET # BLD AUTO: 232 THOUSANDS/UL (ref 149–390)
PMV BLD AUTO: 10.5 FL (ref 8.9–12.7)
POTASSIUM SERPL-SCNC: 4.7 MMOL/L (ref 3.5–5.3)
PROT SERPL-MCNC: 7.6 G/DL (ref 6.4–8.2)
PROTHROMBIN TIME: 14.8 SECONDS (ref 11.6–14.5)
RBC # BLD AUTO: 3.64 MILLION/UL (ref 3.88–5.62)
SODIUM SERPL-SCNC: 137 MMOL/L (ref 136–145)
WBC # BLD AUTO: 8.57 THOUSAND/UL (ref 4.31–10.16)

## 2019-10-29 PROCEDURE — 83036 HEMOGLOBIN GLYCOSYLATED A1C: CPT

## 2019-10-29 PROCEDURE — 85610 PROTHROMBIN TIME: CPT

## 2019-10-29 PROCEDURE — 36415 COLL VENOUS BLD VENIPUNCTURE: CPT

## 2019-10-29 PROCEDURE — 80053 COMPREHEN METABOLIC PANEL: CPT

## 2019-10-29 PROCEDURE — 85025 COMPLETE CBC W/AUTO DIFF WBC: CPT

## 2019-10-29 NOTE — ANESTHESIA PREPROCEDURE EVALUATION
Review of Systems/Medical History  Patient summary reviewed  Chart reviewed  No history of anesthetic complications     Cardiovascular  Exercise tolerance (METS): poor,  Hyperlipidemia, Hypertension controlled, Dysrhythmias , atrial fibrillation, CHF heart failure unspecified, Aortic disease (Ascending AA),   Comment: EF=25%,  Pulmonary  COPD severe- O2 dependent , Shortness of breath,        GI/Hepatic    GERD , Liver disease , Hepatitis A,  Hiatal hernia,        Kidney stones, Chronic kidney disease (ESRD) , Dialysis hemodialysis Date of last dialysis: M-W-F, Genitourinary malignancy Bladder cancer,        Endo/Other  Diabetes type 1 Insulin,      GYN       Hematology  Anemia renal failure anemia,     Musculoskeletal    Arthritis     Neurology    Headaches,   Comment: CEREBRAL HEMORRHAGE HX Psychology   Anxiety, Depression , being treated for depression,              Physical Exam    Airway    Mallampati score: III  TM Distance: <3 FB  Neck ROM: full     Dental       Cardiovascular  Rhythm: regular, Rate: normal, Cardiovascular exam normal    Pulmonary  Rales (R>L base),     Other Findings        Anesthesia Plan  ASA Score- 4     Anesthesia Type- general with ASA Monitors  Additional Monitors:   Airway Plan:         Plan Factors-  Patient did not smoke on day of surgery  Induction- intravenous  Postoperative Plan-     Informed Consent- Anesthetic plan and risks discussed with patient and spouse Mamie Herrera

## 2019-10-29 NOTE — PRE-PROCEDURE INSTRUCTIONS
Pre-Surgery Instructions:   Medication Instructions    allopurinol (ZYLOPRIM) 100 mg tablet Instructed patient per Anesthesia Guidelines   ALPRAZolam (XANAX) 0 5 mg tablet Instructed patient per Anesthesia Guidelines   amiodarone 200 mg tablet Instructed patient per Anesthesia Guidelines   ampicillin (PRINCIPEN) 250 mg capsule Instructed patient per Anesthesia Guidelines   atorvastatin (LIPITOR) 20 mg tablet Instructed patient per Anesthesia Guidelines   B Complex-C-Folic Acid (TRIPHROCAPS) 1 MG CAPS Instructed patient per Anesthesia Guidelines   calcium acetate (PHOSLO) 667 mg capsule Instructed patient per Anesthesia Guidelines   Cholecalciferol (VITAMIN D-3 PO) Instructed patient per Anesthesia Guidelines   cinacalcet (SENSIPAR) 30 mg tablet Instructed patient per Anesthesia Guidelines   colchicine-probenecid 0 5-500 MG per tablet Instructed patient per Anesthesia Guidelines   entecavir (BARACLUDE) 0 5 MG tablet Instructed patient per Anesthesia Guidelines   escitalopram (LEXAPRO) 10 mg tablet Instructed patient per Anesthesia Guidelines   gabapentin (NEURONTIN) 100 mg capsule Instructed patient per Anesthesia Guidelines   guaiFENesin 200 MG tablet Instructed patient per Anesthesia Guidelines   metoprolol succinate (TOPROL-XL) 25 mg 24 hr tablet Instructed patient per Anesthesia Guidelines   omeprazole (PriLOSEC) 20 mg delayed release capsule Instructed patient per Anesthesia Guidelines   oxybutynin (DITROPAN-XL) 5 mg 24 hr tablet Instructed patient per Anesthesia Guidelines   oxyCODONE (ROXICODONE) 5 mg immediate release tablet Instructed patient per Anesthesia Guidelines   silver sulfadiazine (SILVADENE,SSD) 1 % cream Instructed patient per Anesthesia Guidelines  Instructed to take amiodarone and gabapentin am of surgery per anesthesia DR Toi Joaquin   To take oxycodone if needed am of surgery

## 2019-11-05 ENCOUNTER — OFFICE VISIT (OUTPATIENT)
Dept: UROLOGY | Facility: CLINIC | Age: 69
End: 2019-11-05
Payer: COMMERCIAL

## 2019-11-05 VITALS
BODY MASS INDEX: 23.11 KG/M2 | DIASTOLIC BLOOD PRESSURE: 70 MMHG | WEIGHT: 156.53 LBS | SYSTOLIC BLOOD PRESSURE: 118 MMHG

## 2019-11-05 DIAGNOSIS — C67.9 MALIGNANT NEOPLASM OF URINARY BLADDER, UNSPECIFIED SITE (HCC): Primary | ICD-10-CM

## 2019-11-05 PROCEDURE — 99213 OFFICE O/P EST LOW 20 MIN: CPT | Performed by: PHYSICIAN ASSISTANT

## 2019-11-05 NOTE — H&P (VIEW-ONLY)
Pre-op visit  11/5/2019      Chief Complaint   Patient presents with    Malignant neoplasm of urinary bladder, unspecified site [facility]         Assessment and Plan     71 y o  male managed by Dr Nemo Lin    1  High grade T1 bladder cancer    History and physical was performed for the patients upcoming TURBT scheduled for 11/13/19 with Dr Nemo Lin  All questions and concerns regarding surgery have been addressed and answered  Will proceed with surgery as planned  Patient reports he does not make much urine since starting hemodialysis, secondary to this he will be unable to provide a preoperative urine specimen for culture  History of Present Illness  Ron Shin is a 71 y o  male here for history and physical prior to their upcoming TURBT for h/o bladder cancer and recent abnormal cystoscopy  The patient has several ongoing medical comorbidities  Now off anticoagulation, arm and the odor own for new or finding of atrial fibrillation, now in sinus rhythm today on exam as well as per wife on loop recorder within the past month  He is on hemodialysis Mondays, Wednesdays, Fridays  He has made arrangements with his nephrologist and his dialysis center to have sessions performed on Monday, Tuesday, Friday of next week since his surgery will be Wednesday  He denies any prior complications with anesthesia  Of note he does wear 2 L nasal cannula any time he is lying down or sleeping  Patient has a history of high-grade T1 bladder cancer, he did not tolerate BCG nor mitomycin in the past 4911-9303 complicated by infections, poor tolerance, and his comorbidities  Intravesical therapy was aborted, and opted for surveillance imaging and cystoscopy  On recent surveillance cystoscopy performed 09/19/2019 there is a lesion on the left lateral sidewall felt to represent dystrophic calcifications, but plan to repeat resection at this location  He has no bothersome bladder urinary symptoms    He reports he does not make much urine since starting hemodialysis  Review of Systems   Constitutional: Negative  Respiratory: Negative  Cardiovascular: Negative  Genitourinary: Negative for decreased urine volume, difficulty urinating, dysuria, flank pain, frequency, hematuria and urgency  Musculoskeletal: Negative            Past Medical History  Past Medical History:   Diagnosis Date    AAA (abdominal aortic aneurysm) (HCC)     Abnormal liver function test     Anemia     Anxiety     Arthritis     At high risk for falls     Bladder cancer (HCC)     Cancer (HCC)     bladder- 2016    Cardiac disorder     CHF (congestive heart failure) (HCC)     history    Chronic kidney disease     renal failure    Chronic narcotic dependence (HCC)     Chronic pain disorder     back and legs    Colon polyp     Depression     Diabetes mellitus (HCC)     no meds at present    Dialysis patient ([facility] [address] )     M-W-F  DAVITA- AV FISTULA RIGHT  FOREARM    Ejection fraction < 50%     25 percent aug 2019    Fractures     spinal compression fx,closed fx of wrist & ankle in past    GERD (gastroesophageal reflux disease)     Gout     Hearing difficulty of both ears     Hepatitis A     A - remission since 1970's    Hiatal hernia     Hyperlipidemia     Hyperlipidemia     Hypertension     Irregular heart beat     Kidney stone     Migraine     Multiple skin tears     left knee/ right arm/ tino feet - multiple scabs from past skin tears also    Muscle weakness     Neuropathy     Pancreatitis     in past    Paroxysmal atrial fibrillation (HCC)     Pulmonary emphysema (HCC)     Requires supplemental oxygen     when lies flat- nasal O2 2LPM    Shortness of breath     Status post placement of implantable loop recorder     Uses walker     short distances/ w/c for longer distance    Vitamin D deficiency     Wears glasses        Past Social History  Past Surgical History:   Procedure Laterality Date    BLADDER FULGURATION 06/17/2016    Cystoscopy with fulguration medium lesion (2-5 cm)    CATARACT EXTRACTION Bilateral     congenital    COLONOSCOPY      CYSTOSCOPY      Diagnostic 5/31/17, 10/10/17    CYSTOSCOPY N/A 6/14/2017    Procedure: CYSTOSCOPY WITH BLADDER  BIOPSIES WITH FULGURATION;  Surgeon: Krissy Coley MD;  Location[facility];  Service: Urology    CYSTOSCOPY  12/06/2018    CYSTOSCOPY W/ URETERAL STENT PLACEMENT Right 06/01/2016    CYSTOSCOPY W/ URETEROSCOPY  06/17/2016    With removal of calculus    CYSTOSTOMY W/ BLADDER BIOPSY      9/6/26, 2/6/17    DENTAL SURGERY      ESOPHAGOGASTRODUODENOSCOPY      EYE SURGERY      catatact    EYE SURGERY Bilateral     Left eye detachment R eye retinal tear    HERNIA REPAIR      HERNIA REPAIR      L inguinal w/mesh;umbilical herniorraphy    MN ANASTOMOSIS,AV,ANY SITE Right 6/29/2016    Procedure: LOWER FOREARM AV FISTULA;  Surgeon: Leon Verdugo MD;  [facility];  Service: Vascular    MN COLONOSCOPY FLX DX W/COLLJ SPEC WHEN PFRMD N/A 3/4/2016    Procedure: COLONOSCOPY;  Surgeon: Paxton Singer MD;  [facility];  Service: Gastroenterology    MN CYSTO/URETERO W/LITHOTRIPSY &INDWELL STENT INSRT Right 6/17/2016    Procedure: CYSTOSCOPY URETEROSCOPY WITH LITHOTRIPSY HOLMIUM LASER,BASKET STONE EXTRACTION, RETROGRADE PYELOGRAM AND INSERTION STENT URETERAL;  Surgeon: Verito Little MD;  Location:  MAIN OR;  Service: Urology    MN CYSTOURETHROSCOPY N/A 7/13/2017    Procedure: CYSTOSCOPY;  Surgeon: Krissy Coley MD;  [facility];  Service: Urology    MN CYSTOURETHROSCOPY W/IRRIG & EVAC CLOTS N/A 7/13/2017    Procedure: CYSTOSCOPY EVACUATION OF CLOTS,POSSIBLE FULGURATION;  Surgeon: Krissy Coley MD;  [facility];  Service: Urology    MN CYSTOURETHROSCOPY,BIOPSY N/A 8/15/2016    Procedure: CYSTOSCOPY WITH RE BIOPSY OF BLADDER;  Surgeon: Chad Antony MD;  [facility];  Service: Urology    MN CYSTOURETHROSCOPY,FULGUR 0 5-2 CM LESN N/A 2/6/2017    Procedure: TRANSURETHRAL RESECTION OF BLADDER TUMOR (TURBT); Surgeon: Nidia Saleh MD;  [facility];  Service: Urology    MN CYSTOURETHROSCOPY,FULGUR 0 5-2 CM LESN N/A 6/14/2017    Procedure: TRANSURETHRAL RESECTION OF BLADDER TUMOR (TURBT); Surgeon: Nidia Saleh MD;  [facility];  Service: Urology    MN CYSTOURETHROSCOPY,FULGUR <0 5 CM LESN N/A 2/6/2017    Procedure: Merl Kins; BLADDER BIOPSY WITH Helen Basurto;  Surgeon: Nidia Saleh MD;  [facility];  Service: Urology    MN CYSTOURETHROSCOPY,URETER CATHETER Right 6/1/2016    Procedure: CYSTOSCOPY RETROGRADE PYELOGRAM WITH INSERTION STENT URETERAL;  Surgeon: Denver Fey, MD;  Location:  MAIN OR;  Service: Urology    MN EGD TRANSORAL BIOPSY SINGLE/MULTIPLE N/A 3/4/2016    Procedure: ESOPHAGOGASTRODUODENOSCOPY (EGD);   Surgeon: Mikey Henderson MD;  [facility];  Service: Gastroenterology    VASCULAR SURGERY Right     AV fistula       Past Family History  Family History   Problem Relation Age of Onset    Depression Mother     Diabetes Mother     Heart disease Mother     Hypertension Mother     Kidney disease Mother     Diabetes Father     Heart disease Father         Cardiac disorder    Hypertension Father     Stroke Father     Thyroid disease Sister     Cancer Maternal Grandmother        Past Social history  Social History     Socioeconomic History    Marital status: /Civil Union     Spouse name: Not on file    Number of children: Not on file    Years of education: Not on file    Highest education level: Not on file   Occupational History    Occupation:    Social Needs    Financial resource strain: Not on file    Food insecurity:     Worry: Not on file     Inability: Not on file    Transportation needs:     Medical: Not on file     Non-medical: Not on file   Tobacco Use    Smoking status: Former Smoker Last attempt to quit: 1985     Years since quittin 8    Smokeless tobacco: Never Used   Substance and Sexual Activity    Alcohol use: Not Currently     Binge frequency: Never    Drug use: No    Sexual activity: Yes     Partners: Female   Lifestyle    Physical activity:     Days per week: Not on file     Minutes per session: Not on file    Stress: Not on file   Relationships    Social connections:     Talks on phone: Not on file     Gets together: Not on file     Attends [employer] service: Not on file     Active member of club or organization: Not on file     Attends meetings of clubs or organizations: Not on file     Relationship status: Not on file    Intimate partner violence:     Fear of current or ex partner: Not on file     Emotionally abused: Not on file     Physically abused: Not on file     Forced sexual activity: Not on file   Other Topics Concern    Not on file   Social History Narrative    Advance directive on file    Copy of advanced directive obtained from patient    History of no living will    Patient has living will       Current Medications  Current Outpatient Medications   Medication Sig Dispense Refill    allopurinol (ZYLOPRIM) 100 mg tablet Take 1 tablet (100 mg total) by mouth daily 90 tablet 3    ALPRAZolam (XANAX) 0 5 mg tablet Take 0 5 mg by mouth daily as needed for anxiety        amiodarone 200 mg tablet Take 1 tablet (200 mg total) by mouth 2 (two) times a day with meals 180 tablet 3    ampicillin (PRINCIPEN) 250 mg capsule Take 250 mg by mouth every 12 (twelve) hours Current treatment for UTI  PRE PROCEDURE      atorvastatin (LIPITOR) 20 mg tablet Take 1 tablet (20 mg total) by mouth daily 90 tablet 3    B Complex-C-Folic Acid (TRIPHROCAPS) 1 MG CAPS Take 1 mg by mouth daily      calcium acetate (PHOSLO) 667 mg capsule Take 667 mg by mouth 3 (three) times a day with meals And with snack prn      Cholecalciferol (VITAMIN D-3 PO) Take 1 capsule by mouth daily        cinacalcet (SENSIPAR) 30 mg tablet Take 30 mg by mouth daily with dinner      colchicine-probenecid 0 5-500 MG per tablet Take 1 tablet by mouth daily 90 tablet 3    entecavir (BARACLUDE) 0 5 MG tablet Take 1 tablet (0 5 mg total) by mouth daily for 90 days (Patient taking differently: Take 0 5 mg by mouth once a week  ) 90 tablet 1    escitalopram (LEXAPRO) 10 mg tablet Take 1 tablet (10 mg total) by mouth daily (Patient taking differently: Take 10 mg by mouth daily at bedtime ) 90 tablet 3    gabapentin (NEURONTIN) 100 mg capsule Take 1 capsule (100 mg total) by mouth 3 (three) times a day 270 capsule 0    guaiFENesin 200 MG tablet Take 400 mg by mouth 2 (two) times a day      metoprolol succinate (TOPROL-XL) 25 mg 24 hr tablet Take 1 tablet (25 mg total) by mouth daily (Patient taking differently: Take 25 mg by mouth daily at bedtime ) 90 tablet 3    omeprazole (PriLOSEC) 20 mg delayed release capsule Take 1 capsule (20 mg total) by mouth daily at bedtime 90 capsule 3    oxybutynin (DITROPAN-XL) 5 mg 24 hr tablet Take 1 tablet (5 mg total) by mouth as needed (before procedure)      oxyCODONE (ROXICODONE) 5 mg immediate release tablet Take 1 tablet (5 mg total) by mouth 3 (three) times a day as needed for moderate painMax Daily Amount: 15 mg 90 tablet 0    silver sulfadiazine (SILVADENE,SSD) 1 % cream Apply topically daily 50 g 5     No current facility-administered medications for this visit  Allergies  Allergies   Allergen Reactions    Acetaminophen      Due to LFT elevation    Heparin      Avoids due to eye conditions;can't have any med that may cause bleeding    Lovenox [Enoxaparin Sodium]      Avoids due to eye conditions;can't have any med that may cause bleeding in eyes    Nsaids      Avoids due to renal failure    Cardura [Doxazosin] Palpitations         Past Medical History, Social History, Family History, medications and allergies were reviewed        Vitals  Vitals:    11/05/19 1147 BP: 118/70   Weight: 71 kg (156 lb 8 4 oz)         Physical Exam  General: chronically ill appearing male in wheelchair, alert, cooperative  HEENT:  Normocephalic, atraumatic  Conjunctiva clear, PERRL  Oropharynx clear  Moist mucous membranes  Neck supple without palpable lymhadenopathy  Cardiovascular: Heart regular rate and rhythm  Radial pulse +2 bilaterally  No extremity edema  +RUE thrill at AV fistula  Respiratory: Lungs are clear to auscultation bilaterally without wheeze or rhonchi, respirations are non-labored and symmetrical   Abdomen:  Soft nontender without tympany without hernia  No suprapubic or CVA tenderness  Genitourinary: pt declined  Musculoskeletal:  FROM x 4 extremities with good equal strength  Neuro: No gross neurologic defect or abnormality  WC bound/nonambulatory today    Dermatologic: skin warm, dry; no rash erythema or ecchymosis

## 2019-11-05 NOTE — PROGRESS NOTES
Pre-op visit  11/5/2019      Chief Complaint   Patient presents with    Malignant neoplasm of urinary bladder, unspecified site [facility]         Assessment and Plan     71 y o  male managed by Dr August Cunningham    1  High grade T1 bladder cancer    History and physical was performed for the patients upcoming TURBT scheduled for 11/13/19 with Dr August Cunningham  All questions and concerns regarding surgery have been addressed and answered  Will proceed with surgery as planned  Patient reports he does not make much urine since starting hemodialysis, secondary to this he will be unable to provide a preoperative urine specimen for culture  History of Present Illness  Sylvia Nobles is a 71 y o  male here for history and physical prior to their upcoming TURBT for h/o bladder cancer and recent abnormal cystoscopy  The patient has several ongoing medical comorbidities  Now off anticoagulation, arm and the odor own for new or finding of atrial fibrillation, now in sinus rhythm today on exam as well as per wife on loop recorder within the past month  He is on hemodialysis Mondays, Wednesdays, Fridays  He has made arrangements with his nephrologist and his dialysis center to have sessions performed on Monday, Tuesday, Friday of next week since his surgery will be Wednesday  He denies any prior complications with anesthesia  Of note he does wear 2 L nasal cannula any time he is lying down or sleeping  Patient has a history of high-grade T1 bladder cancer, he did not tolerate BCG nor mitomycin in the past [id number] complicated by infections, poor tolerance, and his comorbidities  Intravesical therapy was aborted, and opted for surveillance imaging and cystoscopy  On recent surveillance cystoscopy performed 09/19/2019 there is a lesion on the left lateral sidewall felt to represent dystrophic calcifications, but plan to repeat resection at this location  He has no bothersome bladder urinary symptoms    He reports he does not make much urine since starting hemodialysis  Review of Systems   Constitutional: Negative  Respiratory: Negative  Cardiovascular: Negative  Genitourinary: Negative for decreased urine volume, difficulty urinating, dysuria, flank pain, frequency, hematuria and urgency  Musculoskeletal: Negative            Past Medical History  Past Medical History:   Diagnosis Date    AAA (abdominal aortic aneurysm) (HCC)     Abnormal liver function test     Anemia     Anxiety     Arthritis     At high risk for falls     Bladder cancer (HCC)     Cancer (HCC)     bladder- 2016    Cardiac disorder     CHF (congestive heart failure) (HCC)     history    Chronic kidney disease     renal failure    Chronic narcotic dependence (HCC)     Chronic pain disorder     back and legs    Colon polyp     Depression     Diabetes mellitus (HCC)     no meds at present    Dialysis patient ([facility] [address] )     M-W-F  DAVITA- AV FISTULA RIGHT  FOREARM    Ejection fraction < 50%     25 percent aug 2019    Fractures     spinal compression fx,closed fx of wrist & ankle in past    GERD (gastroesophageal reflux disease)     Gout     Hearing difficulty of both ears     Hepatitis A     A - remission since 1970's    Hiatal hernia     Hyperlipidemia     Hyperlipidemia     Hypertension     Irregular heart beat     Kidney stone     Migraine     Multiple skin tears     left knee/ right arm/ tino feet - multiple scabs from past skin tears also    Muscle weakness     Neuropathy     Pancreatitis     in past    Paroxysmal atrial fibrillation (HCC)     Pulmonary emphysema (HCC)     Requires supplemental oxygen     when lies flat- nasal O2 2LPM    Shortness of breath     Status post placement of implantable loop recorder     Uses walker     short distances/ w/c for longer distance    Vitamin D deficiency     Wears glasses        Past Social History  Past Surgical History:   Procedure Laterality Date    BLADDER FULGURATION 06/17/2016    Cystoscopy with fulguration medium lesion (2-5 cm)    CATARACT EXTRACTION Bilateral     congenital    COLONOSCOPY      CYSTOSCOPY      Diagnostic 5/31/17, 10/10/17    CYSTOSCOPY N/A 6/14/2017    Procedure: CYSTOSCOPY WITH BLADDER  BIOPSIES WITH FULGURATION;  Surgeon: Erum Alonzo MD;  [facility]: [facility];  Service: Urology    CYSTOSCOPY  12/06/2018    CYSTOSCOPY W/ URETERAL STENT PLACEMENT Right 06/01/2016    CYSTOSCOPY W/ URETEROSCOPY  06/17/2016    With removal of calculus    CYSTOSTOMY W/ BLADDER BIOPSY      9/6/26, 2/6/17    DENTAL SURGERY      ESOPHAGOGASTRODUODENOSCOPY      EYE SURGERY      catatact    EYE SURGERY Bilateral     Left eye detachment R eye retinal tear    HERNIA REPAIR      HERNIA REPAIR      L inguinal w/mesh;umbilical herniorraphy    IA ANASTOMOSIS,AV,ANY SITE Right 6/29/2016    Procedure: LOWER FOREARM AV FISTULA;  Surgeon: Santy Cheatham MD;  [facility];  Service: Vascular    IA COLONOSCOPY FLX DX W/COLLJ SPEC WHEN PFRMD N/A 3/4/2016    Procedure: COLONOSCOPY;  Surgeon: Jayde Johnson MD;  [facility];  Service: Gastroenterology    IA CYSTO/URETERO W/LITHOTRIPSY &INDWELL STENT INSRT Right 6/17/2016    Procedure: CYSTOSCOPY URETEROSCOPY WITH LITHOTRIPSY HOLMIUM LASER,BASKET STONE EXTRACTION, RETROGRADE PYELOGRAM AND INSERTION STENT URETERAL;  Surgeon: Bree Burden MD;  Location:  MAIN OR;  Service: Urology    IA CYSTOURETHROSCOPY N/A 7/13/2017    Procedure: CYSTOSCOPY;  Surgeon: Erum Alonzo MD;  [facility];  Service: Urology    IA CYSTOURETHROSCOPY W/IRRIG & EVAC CLOTS N/A 7/13/2017    Procedure: CYSTOSCOPY EVACUATION OF CLOTS,POSSIBLE FULGURATION;  Surgeon: Erum Alonzo MD;  [facility];  Service: Urology    IA CYSTOURETHROSCOPY,BIOPSY N/A 8/15/2016    Procedure: CYSTOSCOPY WITH RE BIOPSY OF BLADDER;  Surgeon: Jovita Tony MD;  [facility];  Service: Urology    IA CYSTOURETHROSCOPY,FULGUR 0 5-2 CM LESN N/A 2/6/2017    Procedure: TRANSURETHRAL RESECTION OF BLADDER TUMOR (TURBT); Surgeon: Candi Bearden MD;  [facility];  Service: Urology    VT CYSTOURETHROSCOPY,FULGUR 0 5-2 CM LESN N/A 6/14/2017    Procedure: TRANSURETHRAL RESECTION OF BLADDER TUMOR (TURBT); Surgeon: Candi Bearden MD;  [facility];  Service: Urology    VT CYSTOURETHROSCOPY,FULGUR <0 5 CM LESN N/A 2/6/2017    Procedure: Zackery Crew; BLADDER BIOPSY WITH Lanie Blazer;  Surgeon: Candi Bearden MD;  [facility];  Service: Urology    VT CYSTOURETHROSCOPY,URETER CATHETER Right 6/1/2016    Procedure: CYSTOSCOPY RETROGRADE PYELOGRAM WITH INSERTION STENT URETERAL;  Surgeon: Rockie Lombard, MD;  Location:  MAIN OR;  Service: Urology    VT EGD TRANSORAL BIOPSY SINGLE/MULTIPLE N/A 3/4/2016    Procedure: ESOPHAGOGASTRODUODENOSCOPY (EGD);   Surgeon: Margarito Lennon MD;  [facility];  Service: Gastroenterology    VASCULAR SURGERY Right     AV fistula       Past Family History  Family History   Problem Relation Age of Onset    Depression Mother     Diabetes Mother     Heart disease Mother     Hypertension Mother     Kidney disease Mother     Diabetes Father     Heart disease Father         Cardiac disorder    Hypertension Father     Stroke Father     Thyroid disease Sister     Cancer Maternal Grandmother        Past Social history  Social History     Socioeconomic History    Marital status: /Civil Union     Spouse name: Not on file    Number of children: Not on file    Years of education: Not on file    Highest education level: Not on file   Occupational History    Occupation:    Social Needs    Financial resource strain: Not on file    Food insecurity:     Worry: Not on file     Inability: Not on file    Transportation needs:     Medical: Not on file     Non-medical: Not on file   Tobacco Use    Smoking status: Former Smoker Last attempt to quit: 1985     Years since quittin 8    Smokeless tobacco: Never Used   Substance and Sexual Activity    Alcohol use: Not Currently     Binge frequency: Never    Drug use: No    Sexual activity: Yes     Partners: Female   Lifestyle    Physical activity:     Days per week: Not on file     Minutes per session: Not on file    Stress: Not on file   Relationships    Social connections:     Talks on phone: Not on file     Gets together: Not on file     Attends [employer] service: Not on file     Active member of club or organization: Not on file     Attends meetings of clubs or organizations: Not on file     Relationship status: Not on file    Intimate partner violence:     Fear of current or ex partner: Not on file     Emotionally abused: Not on file     Physically abused: Not on file     Forced sexual activity: Not on file   Other Topics Concern    Not on file   Social History Narrative    Advance directive on file    Copy of advanced directive obtained from patient    History of no living will    Patient has living will       Current Medications  Current Outpatient Medications   Medication Sig Dispense Refill    allopurinol (ZYLOPRIM) 100 mg tablet Take 1 tablet (100 mg total) by mouth daily 90 tablet 3    ALPRAZolam (XANAX) 0 5 mg tablet Take 0 5 mg by mouth daily as needed for anxiety        amiodarone 200 mg tablet Take 1 tablet (200 mg total) by mouth 2 (two) times a day with meals 180 tablet 3    ampicillin (PRINCIPEN) 250 mg capsule Take 250 mg by mouth every 12 (twelve) hours Current treatment for UTI  PRE PROCEDURE      atorvastatin (LIPITOR) 20 mg tablet Take 1 tablet (20 mg total) by mouth daily 90 tablet 3    B Complex-C-Folic Acid (TRIPHROCAPS) 1 MG CAPS Take 1 mg by mouth daily      calcium acetate (PHOSLO) 667 mg capsule Take 667 mg by mouth 3 (three) times a day with meals And with snack prn      Cholecalciferol (VITAMIN D-3 PO) Take 1 capsule by mouth daily        cinacalcet (SENSIPAR) 30 mg tablet Take 30 mg by mouth daily with dinner      colchicine-probenecid 0 5-500 MG per tablet Take 1 tablet by mouth daily 90 tablet 3    entecavir (BARACLUDE) 0 5 MG tablet Take 1 tablet (0 5 mg total) by mouth daily for 90 days (Patient taking differently: Take 0 5 mg by mouth once a week  ) 90 tablet 1    escitalopram (LEXAPRO) 10 mg tablet Take 1 tablet (10 mg total) by mouth daily (Patient taking differently: Take 10 mg by mouth daily at bedtime ) 90 tablet 3    gabapentin (NEURONTIN) 100 mg capsule Take 1 capsule (100 mg total) by mouth 3 (three) times a day 270 capsule 0    guaiFENesin 200 MG tablet Take 400 mg by mouth 2 (two) times a day      metoprolol succinate (TOPROL-XL) 25 mg 24 hr tablet Take 1 tablet (25 mg total) by mouth daily (Patient taking differently: Take 25 mg by mouth daily at bedtime ) 90 tablet 3    omeprazole (PriLOSEC) 20 mg delayed release capsule Take 1 capsule (20 mg total) by mouth daily at bedtime 90 capsule 3    oxybutynin (DITROPAN-XL) 5 mg 24 hr tablet Take 1 tablet (5 mg total) by mouth as needed (before procedure)      oxyCODONE (ROXICODONE) 5 mg immediate release tablet Take 1 tablet (5 mg total) by mouth 3 (three) times a day as needed for moderate painMax Daily Amount: 15 mg 90 tablet 0    silver sulfadiazine (SILVADENE,SSD) 1 % cream Apply topically daily 50 g 5     No current facility-administered medications for this visit  Allergies  Allergies   Allergen Reactions    Acetaminophen      Due to LFT elevation    Heparin      Avoids due to eye conditions;can't have any med that may cause bleeding    Lovenox [Enoxaparin Sodium]      Avoids due to eye conditions;can't have any med that may cause bleeding in eyes    Nsaids      Avoids due to renal failure    Cardura [Doxazosin] Palpitations         Past Medical History, Social History, Family History, medications and allergies were reviewed        Vitals  Vitals:    11/05/19 1147 BP: 118/70   Weight: 71 kg (156 lb 8 4 oz)         Physical Exam  General: chronically ill appearing male in wheelchair, alert, cooperative  HEENT:  Normocephalic, atraumatic  Conjunctiva clear, PERRL  Oropharynx clear  Moist mucous membranes  Neck supple without palpable lymhadenopathy  Cardiovascular: Heart regular rate and rhythm  Radial pulse +2 bilaterally  No extremity edema  +RUE thrill at AV fistula  Respiratory: Lungs are clear to auscultation bilaterally without wheeze or rhonchi, respirations are non-labored and symmetrical   Abdomen:  Soft nontender without tympany without hernia  No suprapubic or CVA tenderness  Genitourinary: pt declined  Musculoskeletal:  FROM x 4 extremities with good equal strength  Neuro: No gross neurologic defect or abnormality  WC bound/nonambulatory today    Dermatologic: skin warm, dry; no rash erythema or ecchymosis

## 2019-11-07 ENCOUNTER — OFFICE VISIT (OUTPATIENT)
Dept: FAMILY MEDICINE CLINIC | Facility: CLINIC | Age: 69
End: 2019-11-07
Payer: COMMERCIAL

## 2019-11-07 ENCOUNTER — TELEPHONE (OUTPATIENT)
Dept: FAMILY MEDICINE CLINIC | Facility: CLINIC | Age: 69
End: 2019-11-07

## 2019-11-07 VITALS
DIASTOLIC BLOOD PRESSURE: 62 MMHG | BODY MASS INDEX: 23.88 KG/M2 | WEIGHT: 161.2 LBS | SYSTOLIC BLOOD PRESSURE: 102 MMHG | HEIGHT: 69 IN

## 2019-11-07 DIAGNOSIS — N18.4 TYPE 2 DM WITH CKD STAGE 4 AND HYPERTENSION (HCC): ICD-10-CM

## 2019-11-07 DIAGNOSIS — E11.22 TYPE 2 DM WITH CKD STAGE 4 AND HYPERTENSION (HCC): ICD-10-CM

## 2019-11-07 DIAGNOSIS — K21.9 GASTROESOPHAGEAL REFLUX DISEASE, ESOPHAGITIS PRESENCE NOT SPECIFIED: ICD-10-CM

## 2019-11-07 DIAGNOSIS — G57.93 NEUROPATHIC PAIN, LEG, BILATERAL: Primary | ICD-10-CM

## 2019-11-07 DIAGNOSIS — I12.9 TYPE 2 DM WITH CKD STAGE 4 AND HYPERTENSION (HCC): ICD-10-CM

## 2019-11-07 DIAGNOSIS — I71.2 THORACIC AORTIC ANEURYSM WITHOUT RUPTURE (HCC): ICD-10-CM

## 2019-11-07 PROCEDURE — 99213 OFFICE O/P EST LOW 20 MIN: CPT | Performed by: FAMILY MEDICINE

## 2019-11-07 RX ORDER — LIDOCAINE AND PRILOCAINE 25; 25 MG/G; MG/G
CREAM TOPICAL AS NEEDED
Qty: 30 G | Refills: 3 | Status: SHIPPED | OUTPATIENT
Start: 2019-11-07

## 2019-11-07 RX ORDER — ONDANSETRON 4 MG/1
4 TABLET, ORALLY DISINTEGRATING ORAL EVERY 6 HOURS PRN
Qty: 20 TABLET | Refills: 0 | Status: SHIPPED | OUTPATIENT
Start: 2019-11-07 | End: 2019-11-19

## 2019-11-07 NOTE — TELEPHONE ENCOUNTER
Patient's wife called to report she reached out to [facility] in regards to a compounding cream for the patient    Per Esteban , the pharmacist is requesting to speak with you regarding the cream       [facility] [phone]

## 2019-11-07 NOTE — PROGRESS NOTES
Assessment/Plan: For his leg burning, Rx put in for EMLA cream   Patient's wife will call [facility] to see if they have a compound neck could help with his leg burning  Follow-up Podiatry  We will see him back in 3 months or p r n     Problem List Items Addressed This Visit        Endocrine    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 (HCC)    Relevant Orders    Triglycerides       Other    Neuropathic pain, leg, bilateral - Primary    Relevant Medications    lidocaine-prilocaine (EMLA) cream      Other Visit Diagnoses     Thoracic aortic aneurysm without rupture (HCC)        Gastroesophageal reflux disease, esophagitis presence not specified        Relevant Medications    ondansetron (ZOFRAN-ODT) 4 mg disintegrating tablet           Diagnoses and all orders for this visit:    Neuropathic pain, leg, bilateral  -     lidocaine-prilocaine (EMLA) cream; Apply topically as needed for mild pain    Thoracic aortic aneurysm without rupture (HCC)    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 (HCC)  -     Triglycerides    Gastroesophageal reflux disease, esophagitis presence not specified  -     ondansetron (ZOFRAN-ODT) 4 mg disintegrating tablet; Take 1 tablet (4 mg total) by mouth every 6 (six) hours as needed for nausea or vomiting        No problem-specific Assessment & Plan notes found for this encounter  Subjective:      Patient ID: Jose Roberto Lynch is a 71 y o  male  Patient here today for follow-up  Patient denies any chest pain or shortness of breath  Patient getting a lot of burning in his legs bilaterally  He is on gabapentin, it helps a little  He also used to have EMLA cream that helped a little        The following portions of the patient's history were reviewed and updated as appropriate:   He has a past medical history of AAA (abdominal aortic aneurysm) ([facility] [address] ), Abnormal liver function test, Anemia, Anxiety, Arthritis, At high risk for falls, Bladder cancer ([facility] [address] ), Cancer [facility]), Cardiac disorder, [facility] Chronic kidney disease, Chronic narcotic dependence ([facility] ), Chronic pain disorder, Colon polyp, Depression, Diabetes mellitus ([facility] ), Dialysis patient ([facility] ), Ejection fraction < 50%, Fractures, GERD (gastroesophageal reflux disease), Gout, Hearing difficulty of both ears, Hepatitis A, Hiatal hernia, Hyperlipidemia, Hyperlipidemia, Hypertension, Irregular heart beat, Kidney stone, Migraine, Multiple skin tears, Muscle weakness, Neuropathy, Pancreatitis, Paroxysmal atrial fibrillation ([facility] ), Pulmonary emphysema ([facility] ), Requires supplemental oxygen, Shortness of breath, Status post placement of implantable loop recorder, Uses walker, Vitamin D deficiency, and Wears glasses  ,  does not have any pertinent problems on file  ,   has a past surgical history that includes Hernia repair; Eye surgery; Hernia repair; pr cystourethroscopy,fulgur <0 5 cm lesn (N/A, 2/6/2017); pr cystourethroscopy,fulgur 0 5-2 cm lesn (N/A, 2/6/2017); pr cystourethroscopy,biopsy (N/A, 8/15/2016); pr anastomosis,av,any site (Right, 6/29/2016); pr cysto/uretero w/lithotripsy &indwell stent insrt (Right, 6/17/2016); pr cystourethroscopy,ureter catheter (Right, 6/1/2016); pr colonoscopy flx dx w/collj spec when pfrmd (N/A, 3/4/2016); pr egd transoral biopsy single/multiple (N/A, 3/4/2016); Vascular surgery (Right); Eye surgery (Bilateral); Cataract extraction (Bilateral); Cystoscopy; Esophagogastroduodenoscopy; Colonoscopy; Dental surgery; pr cystourethroscopy,fulgur 0 5-2 cm lesn (N/A, 6/14/2017); CYSTOSCOPY (N/A, 6/14/2017); pr cystourethroscopy (N/A, 7/13/2017); pr cystourethroscopy w/irrig & evac clots (N/A, 7/13/2017); Cystostomy w/ bladder biopsy; Bladder fulguration (06/17/2016); Cystoscopy w/ ureteral stent placement (Right, 06/01/2016); Cystoscopy w/ ureteroscopy (06/17/2016); and Cystoscopy (12/06/2018)  ,  family history includes Cancer in his maternal grandmother; Depression in his mother; Diabetes in his father and mother; Heart disease in his father and mother; Hypertension in his father and mother; Kidney disease in his mother; Stroke in his father; Thyroid disease in his sister  ,   reports that he quit smoking about 34 years ago  He has never used smokeless tobacco  He reports that he drank alcohol  He reports that he does not use drugs  ,  is allergic to acetaminophen; heparin; lovenox [enoxaparin sodium]; nsaids; and cardura [doxazosin]     Current Outpatient Medications   Medication Sig Dispense Refill    allopurinol (ZYLOPRIM) 100 mg tablet Take 1 tablet (100 mg total) by mouth daily 90 tablet 3    ALPRAZolam (XANAX) 0 5 mg tablet Take 0 5 mg by mouth daily as needed for anxiety        amiodarone 200 mg tablet Take 1 tablet (200 mg total) by mouth 2 (two) times a day with meals 180 tablet 3    ampicillin (PRINCIPEN) 250 mg capsule Take 250 mg by mouth every 12 (twelve) hours Current treatment for UTI  PRE PROCEDURE      atorvastatin (LIPITOR) 20 mg tablet Take 1 tablet (20 mg total) by mouth daily 90 tablet 3    B Complex-C-Folic Acid (TRIPHROCAPS) 1 MG CAPS Take 1 mg by mouth daily      calcium acetate (PHOSLO) 667 mg capsule Take 667 mg by mouth 3 (three) times a day with meals And with snack prn      Cholecalciferol (VITAMIN D-3 PO) Take 1 capsule by mouth daily        cinacalcet (SENSIPAR) 30 mg tablet Take 30 mg by mouth daily with dinner      colchicine-probenecid 0 5-500 MG per tablet Take 1 tablet by mouth daily 90 tablet 3    entecavir (BARACLUDE) 0 5 MG tablet Take 1 tablet (0 5 mg total) by mouth daily for 90 days (Patient taking differently: Take 0 5 mg by mouth once a week  ) 90 tablet 1    escitalopram (LEXAPRO) 10 mg tablet Take 1 tablet (10 mg total) by mouth daily (Patient taking differently: Take 10 mg by mouth daily at bedtime ) 90 tablet 3    gabapentin (NEURONTIN) 100 mg capsule Take 1 capsule (100 mg total) by mouth 3 (three) times a day 270 capsule 0    guaiFENesin 200 MG tablet Take 400 mg by mouth 2 (two) times a day      metoprolol succinate (TOPROL-XL) 25 mg 24 hr tablet Take 1 tablet (25 mg total) by mouth daily (Patient taking differently: Take 25 mg by mouth daily at bedtime ) 90 tablet 3    omeprazole (PriLOSEC) 20 mg delayed release capsule Take 1 capsule (20 mg total) by mouth daily at bedtime 90 capsule 3    oxybutynin (DITROPAN-XL) 5 mg 24 hr tablet Take 1 tablet (5 mg total) by mouth as needed (before procedure)      oxyCODONE (ROXICODONE) 5 mg immediate release tablet Take 1 tablet (5 mg total) by mouth 3 (three) times a day as needed for moderate painMax Daily Amount: 15 mg 90 tablet 0    silver sulfadiazine (SILVADENE,SSD) 1 % cream Apply topically daily 50 g 5    lidocaine-prilocaine (EMLA) cream Apply topically as needed for mild pain 30 g 3    ondansetron (ZOFRAN-ODT) 4 mg disintegrating tablet Take 1 tablet (4 mg total) by mouth every 6 (six) hours as needed for nausea or vomiting 20 tablet 0     No current facility-administered medications for this visit  Review of Systems   Constitutional: Negative  Respiratory: Negative  Cardiovascular: Negative  Gastrointestinal: Negative  Genitourinary: Negative  Neurological: Positive for weakness  Bilateral lower extremity neuropathy         Objective:  Vitals:    11/07/19 1206   BP: 102/62   Weight: 73 1 kg (161 lb 3 2 oz)   Height: 5' 9" (1 753 m)     Body mass index is 23 81 kg/m²  Physical Exam   Constitutional: He is oriented to person, place, and time  He appears well-developed and well-nourished  No distress  HENT:   Head: Normocephalic and atraumatic  Eyes: Conjunctivae are normal    Cardiovascular: Normal rate, regular rhythm and normal heart sounds  Exam reveals no gallop and no friction rub  No murmur heard  Pulmonary/Chest: Effort normal and breath sounds normal  No respiratory distress  He has no wheezes  He has no rales     Musculoskeletal: He exhibits no edema  Neurological: He is alert and oriented to person, place, and time  Skin: He is not diaphoretic  Psychiatric: He has a normal mood and affect  His behavior is normal  Judgment and thought content normal    Vitals reviewed

## 2019-11-08 NOTE — TELEPHONE ENCOUNTER
I spoke to the pharmacist   He will call the patient and see what insurance they have and what they can offer him    They will get back to us

## 2019-11-13 ENCOUNTER — ANESTHESIA (OUTPATIENT)
Dept: PERIOP | Facility: HOSPITAL | Age: 69
End: 2019-11-13
Payer: COMMERCIAL

## 2019-11-13 ENCOUNTER — TELEPHONE (OUTPATIENT)
Dept: FAMILY MEDICINE CLINIC | Facility: CLINIC | Age: 69
End: 2019-11-13

## 2019-11-13 ENCOUNTER — HOSPITAL ENCOUNTER (OUTPATIENT)
Facility: HOSPITAL | Age: 69
Setting detail: OUTPATIENT SURGERY
Discharge: HOME | End: 2019-11-13
Attending: UROLOGY | Admitting: UROLOGY
Payer: COMMERCIAL

## 2019-11-13 VITALS
HEIGHT: 69 IN | HEART RATE: 74 BPM | TEMPERATURE: 97.4 F | OXYGEN SATURATION: 95 % | BODY MASS INDEX: 23.31 KG/M2 | SYSTOLIC BLOOD PRESSURE: 117 MMHG | RESPIRATION RATE: 16 BRPM | WEIGHT: 157.41 LBS | DIASTOLIC BLOOD PRESSURE: 64 MMHG

## 2019-11-13 DIAGNOSIS — C67.9 MALIGNANT NEOPLASM OF URINARY BLADDER, UNSPECIFIED SITE (HCC): ICD-10-CM

## 2019-11-13 LAB — GLUCOSE SERPL-MCNC: 107 MG/DL (ref 65–140)

## 2019-11-13 PROCEDURE — 88112 CYTOPATH CELL ENHANCE TECH: CPT | Performed by: PATHOLOGY

## 2019-11-13 PROCEDURE — 88305 TISSUE EXAM BY PATHOLOGIST: CPT | Performed by: PATHOLOGY

## 2019-11-13 PROCEDURE — 82948 REAGENT STRIP/BLOOD GLUCOSE: CPT

## 2019-11-13 PROCEDURE — 52204 CYSTOSCOPY W/BIOPSY(S): CPT | Performed by: UROLOGY

## 2019-11-13 RX ORDER — SODIUM CHLORIDE 9 MG/ML
75 INJECTION, SOLUTION INTRAVENOUS CONTINUOUS
Status: DISCONTINUED | OUTPATIENT
Start: 2019-11-13 | End: 2019-11-13 | Stop reason: HOSPADM

## 2019-11-13 RX ORDER — LIDOCAINE HYDROCHLORIDE 20 MG/ML
JELLY TOPICAL AS NEEDED
Status: DISCONTINUED | OUTPATIENT
Start: 2019-11-13 | End: 2019-11-13 | Stop reason: HOSPADM

## 2019-11-13 RX ORDER — ONDANSETRON 2 MG/ML
4 INJECTION INTRAMUSCULAR; INTRAVENOUS ONCE AS NEEDED
Status: DISCONTINUED | OUTPATIENT
Start: 2019-11-13 | End: 2019-11-13 | Stop reason: HOSPADM

## 2019-11-13 RX ORDER — GLYCINE 1.5 G/100ML
SOLUTION IRRIGATION AS NEEDED
Status: DISCONTINUED | OUTPATIENT
Start: 2019-11-13 | End: 2019-11-13 | Stop reason: HOSPADM

## 2019-11-13 RX ORDER — ONDANSETRON 2 MG/ML
INJECTION INTRAMUSCULAR; INTRAVENOUS AS NEEDED
Status: DISCONTINUED | OUTPATIENT
Start: 2019-11-13 | End: 2019-11-13 | Stop reason: SURG

## 2019-11-13 RX ORDER — CEFAZOLIN SODIUM 2 G/50ML
2000 SOLUTION INTRAVENOUS ONCE
Status: DISCONTINUED | OUTPATIENT
Start: 2019-11-13 | End: 2019-11-13

## 2019-11-13 RX ORDER — FENTANYL CITRATE/PF 50 MCG/ML
50 SYRINGE (ML) INJECTION
Status: DISCONTINUED | OUTPATIENT
Start: 2019-11-13 | End: 2019-11-13 | Stop reason: HOSPADM

## 2019-11-13 RX ORDER — LEVOFLOXACIN 5 MG/ML
500 INJECTION, SOLUTION INTRAVENOUS ONCE
Status: DISCONTINUED | OUTPATIENT
Start: 2019-11-13 | End: 2019-11-13

## 2019-11-13 RX ORDER — MIDAZOLAM HYDROCHLORIDE 2 MG/2ML
INJECTION, SOLUTION INTRAMUSCULAR; INTRAVENOUS AS NEEDED
Status: DISCONTINUED | OUTPATIENT
Start: 2019-11-13 | End: 2019-11-13 | Stop reason: SURG

## 2019-11-13 RX ORDER — PROPOFOL 10 MG/ML
INJECTION, EMULSION INTRAVENOUS AS NEEDED
Status: DISCONTINUED | OUTPATIENT
Start: 2019-11-13 | End: 2019-11-13 | Stop reason: SURG

## 2019-11-13 RX ORDER — LEVOFLOXACIN 5 MG/ML
500 INJECTION, SOLUTION INTRAVENOUS ONCE
Status: COMPLETED | OUTPATIENT
Start: 2019-11-13 | End: 2019-11-13

## 2019-11-13 RX ORDER — EPHEDRINE SULFATE 50 MG/ML
INJECTION INTRAVENOUS AS NEEDED
Status: DISCONTINUED | OUTPATIENT
Start: 2019-11-13 | End: 2019-11-13 | Stop reason: SURG

## 2019-11-13 RX ORDER — FENTANYL CITRATE 50 UG/ML
INJECTION, SOLUTION INTRAMUSCULAR; INTRAVENOUS AS NEEDED
Status: DISCONTINUED | OUTPATIENT
Start: 2019-11-13 | End: 2019-11-13 | Stop reason: SURG

## 2019-11-13 RX ADMIN — PHENYLEPHRINE HYDROCHLORIDE 100 MCG: 10 INJECTION INTRAVENOUS at 13:56

## 2019-11-13 RX ADMIN — LEVOFLOXACIN: 5 INJECTION, SOLUTION INTRAVENOUS at 13:25

## 2019-11-13 RX ADMIN — MIDAZOLAM 2 MG: 1 INJECTION INTRAMUSCULAR; INTRAVENOUS at 13:27

## 2019-11-13 RX ADMIN — ONDANSETRON 4 MG: 2 INJECTION INTRAMUSCULAR; INTRAVENOUS at 14:07

## 2019-11-13 RX ADMIN — PHENYLEPHRINE HYDROCHLORIDE 100 MCG: 10 INJECTION INTRAVENOUS at 13:50

## 2019-11-13 RX ADMIN — SODIUM CHLORIDE 75 ML/HR: 0.9 INJECTION, SOLUTION INTRAVENOUS at 11:47

## 2019-11-13 RX ADMIN — PHENYLEPHRINE HYDROCHLORIDE 100 MCG: 10 INJECTION INTRAVENOUS at 14:03

## 2019-11-13 RX ADMIN — EPHEDRINE SULFATE 10 MG: 50 INJECTION, SOLUTION INTRAVENOUS at 14:02

## 2019-11-13 RX ADMIN — PROPOFOL 80 MG: 10 INJECTION, EMULSION INTRAVENOUS at 13:44

## 2019-11-13 RX ADMIN — FENTANYL CITRATE 25 MCG: 50 INJECTION, SOLUTION INTRAMUSCULAR; INTRAVENOUS at 13:56

## 2019-11-13 RX ADMIN — LIDOCAINE HYDROCHLORIDE 50 MG: 20 INJECTION, SOLUTION INTRAVENOUS at 13:44

## 2019-11-13 RX ADMIN — PHENYLEPHRINE HYDROCHLORIDE 100 MCG: 10 INJECTION INTRAVENOUS at 13:44

## 2019-11-13 NOTE — TELEPHONE ENCOUNTER
Valeria Esquivel is waiting to hear back from HR and will reach out to our office with more details tomorrow

## 2019-11-13 NOTE — INTERVAL H&P NOTE
H&P reviewed  After examining the patient I find no changes in the patients condition since the H&P had been written      Vitals:    11/13/19 1115   BP: 111/68   Pulse: 66   Resp: 16   Temp: (!) 96 8 °F (36 °C)   SpO2: 95%

## 2019-11-13 NOTE — TELEPHONE ENCOUNTER
Patients wife, Mini Shown, called to advise she will need a new note done stating she will need full-time FMLA to care for patient

## 2019-11-13 NOTE — DISCHARGE INSTRUCTIONS
Bladder Cancer   WHAT YOU NEED TO KNOW:   Bladder cancer starts in the cells that line your bladder  DISCHARGE INSTRUCTIONS:   Call 911 for any of the following:   · You suddenly feel lightheaded and short of breath  · You cough up blood  Seek care immediately if:   · Your arm or leg feels warm, tender, and painful  It may look swollen and red  · You are unable to urinate  Contact your healthcare provider or oncologist if:   · You have a fever  · You vomit and cannot keep any liquids or food down  · You have new or worsening pain  · Your pain gets worse or does not go away after you take pain medicine  · You have questions or concerns about your condition or care  Self-care:   · Do not smoke  Nicotine can damage blood vessels and make it hard to manage your bladder cancer  Smoking also increases your risk for new or returning cancer  Ask your healthcare provider for information if you currently smoke and need help to quit  E-cigarettes or smokeless tobacco still contain nicotine  Talk to your healthcare provider before you use these products  · Limit or do not drink alcohol  Alcohol may cause you to become dehydrated  Ask your oncologist if it is safe for you to drink alcohol, and how much is safe to drink  · Eat healthy foods  Healthy foods include fruit, vegetables, whole-grain breads, low-fat dairy products, beans, lean meats, and fish  Your healthcare provider may recommend that you eat less red meat  You need to eat enough calories to help prevent weight loss and increase your energy level  You also need protein to give you strength  If you do not feel hungry, eat small amounts often instead of large meals  · Drink liquids as directed  You may need to drink more liquids than usual to prevent dehydration  Ask how much liquid to drink each day and which liquids are best for you  · Exercise as directed  Exercise may help increase your energy level and appetite  Ask your healthcare provider how much exercise you need and which exercises are best for you  For more information and support: It may be difficult for you and your family to go through cancer and cancer treatments  Join a support group or talk with others who have gone through treatment  · Dante Stanautumn Dove  [address]  [place] [address]  Phone: [phone]  Web Address: http://[employer]/  [employer]  · [address] [place] [address], [address] [place] [address]  Phone: [phone]  Web Address: http://[employer]/  [employer]  Follow up with your healthcare provider or oncologist as directed: You will need to see your oncologist for ongoing treatment  Write down your questions so you remember to ask them during your visits  © 2017 [address] [place] [address] Information is for End User's use only and may not be sold, redistributed or otherwise used for commercial purposes  All illustrations and images included in CareNotes® are the copyrighted property of [employer]  or [employer]  The above information is an  only  It is not intended as medical advice for individual conditions or treatments  Talk to your doctor, nurse or pharmacist before following any medical regimen to see if it is safe and effective for you  Transurethral Resection of Bladder Tumors   WHAT YOU NEED TO KNOW:   Transurethral resection of bladder tumors (TURBT) is surgery to remove one or more tumors from your bladder  DISCHARGE INSTRUCTIONS:   Medicines:   · Medicines  help decrease pain or prevent vomiting  · Take your medicine as directed  Contact your healthcare provider if you think your medicine is not helping or if you have side effects  Tell him or her if you are allergic to any medicine  Keep a list of the medicines, vitamins, and herbs you take  Include the amounts, and when and why you take them   Bring the list or the pill bottles to follow-up visits  Carry your medicine list with you in case of an emergency  Follow up with your healthcare provider as directed:  Write down your questions so you remember to ask them during your visits  Care for your Doherty catheter:  Keep the bag below your waist  This will prevent urine from flowing back into your bladder and causing an infection or other problems  Also, keep the tube free of kinks so the urine will drain properly  Do not pull on the catheter  This can cause pain and bleeding and may cause the catheter to come out  Empty your urine drainage bag when it is ½ to ? full, or every 8 hours  If you have a smaller leg bag, empty it every 3 to 4 hours  Do the following when you empty your urine drainage bag:  · Hold the urine bag over the toilet or a large container  · Remove the drain spout from its sleeve at the bottom of the urine bag  Do not touch the tip of the drain spout  Open the slide valve on the spout  · Let the urine flow out of the urine bag into the toilet or container  Do not let the drainage tube touch anything  · Clean the end of the drain spout with alcohol when the bag is empty  Ask which cleaning solution is best to use  · Close the slide valve and put the drain spout into its sleeve at the bottom of the urine bag  Write down how much urine was in your bag if you were asked to keep a record  Contact your healthcare provider if:   · You have a fever or chills  · You have new or more blood in your urine  · You have nausea or are vomiting  · You have new or more pain when you urinate  · You are unable to control when you urinate  · You have questions or concerns about your condition or care  Seek care immediately or call 911 if:   · You have heavy bleeding from your urethra  · You start to urinate less often, very little, or not at all  · You have severe pain in your abdomen or pelvis    © 2017 [address] Information is for End User's use only and may not be sold, redistributed or otherwise used for commercial purposes  All illustrations and images included in CareNotes® are the copyrighted property of [employer]  or [employer]  The above information is an  only  It is not intended as medical advice for individual conditions or treatments  Talk to your doctor, nurse or pharmacist before following any medical regimen to see if it is safe and effective for you

## 2019-11-13 NOTE — OP NOTE
OPERATIVE REPORT  PATIENT NAME: Arline Lopez    :  1950  MRN: [id number]  Pt Location: [facility] OR ROOM 07    SURGERY DATE: 2019    Surgeon(s) and Role:     * Lukas Edmonds MD - Primary    Preop Diagnosis:  Malignant neoplasm of urinary bladder, unspecified site ([facility] [address] ) [C67 9]    Post-Op Diagnosis Codes:     * Malignant neoplasm of urinary bladder, unspecified site ([facility] [address] ) [C67 9]    Procedure(s) (LRB):  TRANSURETHRAL RESECTION OF BLADDER TUMOR (TURBT) (N/A)    Specimen(s):  ID Type Source Tests Collected by Time Destination   1 : Left Lateral Bladder Wall Tissue Urinary Bladder TISSUE EXAM Lukas Edmonds MD 2019 1408    2 : Anterior Bladder Wall Tissue Urinary Bladder TISSUE EXAM Lukas Edmonds MD 2019 1409    3 : Posterior Bladder Wall Tissue Urinary Bladder TISSUE EXAM Lukas Edmonds MD 2019 1409    4 : Right Lateral Bladder Wall Tissue Urinary Bladder TISSUE EXAM Lukas Edmonds MD 2019 1410    5 :  Urine Urine, Cystoscopic CYTOLOGY, URINE Lukas Edmonds MD 2019 1414        Estimated Blood Loss:   5 mL    Drains:  * No LDAs found *    Anesthesia Type:   Choice    Operative Indications:  Malignant neoplasm of urinary bladder, unspecified site [facility]) [C67 9]      Operative Findings:  1  Multiple areas of dystrophic calcification with some concern along the left lateral wall for some papillary changes surrounding, multiple areas of erythema in the anterior and right lateral surface of the bladder wall biopsied    Complications:   None    Procedure and Technique:  Arline Lopez is a 71y o -year-old male with a history of bladder cancer  Patient has been intolerant intravesical therapy and after discussion and given review of his comorbidities and general health, the patient his wife and I agreed to perform simple surveillance     Flexible cystoscopy in the office revealed multiple dystrophic calcifications with some concern for papillary changes surrounding and multiple rounds of erythema in the patient is anuric bladder     The remainder of the bladder was free and clear of bladder tumor  Risk and benefits of cystoscopy with bladder biopsy of the questionable areas were discussed and reviewed  Informed consent was obtained  Patient was brought to the operating room on 11/13/2019  After the smooth induction of general LMA anesthesia, the patient was placed in the dorsal lithotomy position  His genitalia was prepped and draped in a sterile fashion  Intravenous antibiotics were administered  A timeout was performed with all members of the operative team confirming the patient's identity and procedure to be performed  A 22 Filipino rigid cystoscope with 30° lens was inserted  The bladder was thoroughly inspected  Multiple areas of dystrophic calcifications and erythema throughout the bladder were identified  Cold cup biopsy samples were taken from the left lateral wall, right lateral wall, anterior and posterior bladder  The tumors were sent as specimen  The base of the tumor was fulgurated with electrocautery  Hemostasis was excellent  The bladder was left emptied and scope was removed  2% viscous lidocaine placed in the urethra  Overall the patient tolerated the procedure well  The patient was extubated in the operating room and transferred to the PACU in stable condition at the conclusion of the case  Plan-pathology review       I was present for the entire procedure    Patient Disposition:  PACU     SIGNATURE: Evita Loaiza MD  DATE: November 13, 2019  TIME: 2:17 PM

## 2019-11-13 NOTE — PROGRESS NOTES
D/C instructions reviewed w/ pt & spouse, verbalized understanding  No drainage or discomfort noted from penis  Not expected to void due to history of anuria  Multiple abrasions noted over arms & legs from previous fall, unchanged from admit

## 2019-11-15 ENCOUNTER — TELEPHONE (OUTPATIENT)
Dept: FAMILY MEDICINE CLINIC | Facility: CLINIC | Age: 69
End: 2019-11-15

## 2019-11-15 DIAGNOSIS — G57.93 NEUROPATHIC PAIN, LEG, BILATERAL: Primary | ICD-10-CM

## 2019-11-15 RX ORDER — VEHICLE BASE NO.13
CREAM (GRAM) MISCELLANEOUS
Qty: 100 G | Refills: 5 | Status: SHIPPED | OUTPATIENT
Start: 2019-11-15

## 2019-11-15 NOTE — TELEPHONE ENCOUNTER
Patient's wife, Mg Bernardo, called to advise she spoke with [employer]  and was advised she doesn't need any new forms filled out by you  She just needs a letter stating that she needs full-time FMLA to care for her  beginning 11/11/19  Mg Bernardo also wants you to be aware that the Zofran is helping the patient

## 2019-11-15 NOTE — TELEPHONE ENCOUNTER
Received a call from [facility] at [place] [address], he spoke with patients wife in regards to patients drug allergies and was advised the patient only has an allergy to Diclofenac when taken orally  They did come up with a formulation that would be covered under the patient's insurance      -5% Diclofenac  -2% Amitriptyline  -2% Lidocaine  -2% Prilocaine

## 2019-11-18 ENCOUNTER — TELEPHONE (OUTPATIENT)
Dept: UROLOGY | Facility: MEDICAL CENTER | Age: 69
End: 2019-11-18

## 2019-11-18 ENCOUNTER — TELEPHONE (OUTPATIENT)
Dept: FAMILY MEDICINE CLINIC | Facility: CLINIC | Age: 69
End: 2019-11-18

## 2019-11-18 DIAGNOSIS — R53.1 WEAKNESS: Primary | ICD-10-CM

## 2019-11-18 DIAGNOSIS — G57.93 NEUROPATHIC PAIN, LEG, BILATERAL: ICD-10-CM

## 2019-11-18 NOTE — TELEPHONE ENCOUNTER
Previous patient of Dr Rupali Hinojosa seen in [place]    Patient would like to discuss results over the phone rather than come in for an office visit      Can be reached at [phone]

## 2019-11-18 NOTE — TELEPHONE ENCOUNTER
CAN YOU DO A SCRIPT FOR PHYSICAL THERAPY FOR PT  IT MUST BE FAXED [phone]  WIFE WAS ABLE TO GET OUT OF NETWORK ACCEPTANCE WITH A REFERENCE # [id number] FROM [employer]

## 2019-11-19 DIAGNOSIS — K21.9 GASTROESOPHAGEAL REFLUX DISEASE, ESOPHAGITIS PRESENCE NOT SPECIFIED: ICD-10-CM

## 2019-11-19 RX ORDER — ONDANSETRON 4 MG/1
4 TABLET, FILM COATED ORAL EVERY 8 HOURS PRN
Qty: 60 TABLET | Refills: 3 | Status: SHIPPED | OUTPATIENT
Start: 2019-11-19

## 2019-11-19 NOTE — TELEPHONE ENCOUNTER
Patient's wife called to request a 90 day supply of Zofran to [email or web address] [place]  Also wants to know if patient can have the non-disintegrating tablets instead

## 2019-11-19 NOTE — TELEPHONE ENCOUNTER
As of now results are not final in Epic  Returned call to wifeIsaiah  Left message, per communication consent  Blue Pereyra, results are not final yet  Advised, will direct message to Dr Lorena Kessler regarding request to have results given over the phone versus coming for follow up office visit  Instructed office will contact after discussion with Dr Lorena Kessler regarding need to keep or cancel appointment on 11/22

## 2019-11-20 ENCOUNTER — TELEPHONE (OUTPATIENT)
Dept: OTHER | Facility: HOSPITAL | Age: 69
End: 2019-11-20

## 2019-11-20 DIAGNOSIS — C67.9 MALIGNANT NEOPLASM OF URINARY BLADDER, UNSPECIFIED SITE (HCC): Primary | ICD-10-CM

## 2019-11-20 NOTE — TELEPHONE ENCOUNTER
I called the patient at his home and his wife voicemail  Voice messages left with call back number  Can we please male the patient a copy of his pathology  He will return to see me in 6 months for cystoscopy

## 2019-11-20 NOTE — TELEPHONE ENCOUNTER
Patient wife called back  Wife received message from doctor and wanted to thank him for leaving message    Wife canceled appointment for 11/22/2019 and scheduled cysto for 6 months on 05/22/2020

## 2019-11-21 ENCOUNTER — HOSPITAL ENCOUNTER (OUTPATIENT)
Dept: NON INVASIVE DIAGNOSTICS | Facility: HOSPITAL | Age: 69
Discharge: HOME | End: 2019-11-21
Payer: COMMERCIAL

## 2019-11-21 ENCOUNTER — APPOINTMENT (OUTPATIENT)
Dept: LAB | Facility: HOSPITAL | Age: 69
End: 2019-11-21
Payer: COMMERCIAL

## 2019-11-21 DIAGNOSIS — I50.42 CHRONIC COMBINED SYSTOLIC AND DIASTOLIC CONGESTIVE HEART FAILURE (HCC): ICD-10-CM

## 2019-11-21 DIAGNOSIS — B18.1 CHRONIC VIRAL HEPATITIS B WITHOUT DELTA AGENT AND WITHOUT COMA (HCC): ICD-10-CM

## 2019-11-21 DIAGNOSIS — I35.0 MODERATE AORTIC STENOSIS: ICD-10-CM

## 2019-11-21 DIAGNOSIS — I42.9 CARDIOMYOPATHY (HCC): ICD-10-CM

## 2019-11-21 DIAGNOSIS — R23.8 EASY BRUISING: ICD-10-CM

## 2019-11-21 LAB
ALBUMIN SERPL BCP-MCNC: 2.9 G/DL (ref 3.5–5)
ALP SERPL-CCNC: 167 U/L (ref 46–116)
ALT SERPL W P-5'-P-CCNC: 34 U/L (ref 12–78)
ANION GAP SERPL CALCULATED.3IONS-SCNC: 11 MMOL/L (ref 4–13)
APTT PPP: 32 SECONDS (ref 23–37)
AST SERPL W P-5'-P-CCNC: 37 U/L (ref 5–45)
BILIRUB SERPL-MCNC: 0.4 MG/DL (ref 0.2–1)
BUN SERPL-MCNC: 36 MG/DL (ref 5–25)
CALCIUM SERPL-MCNC: 9.1 MG/DL (ref 8.3–10.1)
CHLORIDE SERPL-SCNC: 99 MMOL/L (ref 100–108)
CO2 SERPL-SCNC: 30 MMOL/L (ref 21–32)
CREAT SERPL-MCNC: 6.17 MG/DL (ref 0.6–1.3)
GFR SERPL CREATININE-BSD FRML MDRD: 8 ML/MIN/1.73SQ M
GLUCOSE SERPL-MCNC: 145 MG/DL (ref 65–140)
INR PPP: 1.13 (ref 0.84–1.19)
POTASSIUM SERPL-SCNC: 4.3 MMOL/L (ref 3.5–5.3)
PROT SERPL-MCNC: 7.4 G/DL (ref 6.4–8.2)
PROTHROMBIN TIME: 14.5 SECONDS (ref 11.6–14.5)
SODIUM SERPL-SCNC: 140 MMOL/L (ref 136–145)
TRIGL SERPL-MCNC: 94 MG/DL

## 2019-11-21 PROCEDURE — 93306 TTE W/DOPPLER COMPLETE: CPT | Performed by: INTERNAL MEDICINE

## 2019-11-21 PROCEDURE — 85610 PROTHROMBIN TIME: CPT

## 2019-11-21 PROCEDURE — 80053 COMPREHEN METABOLIC PANEL: CPT

## 2019-11-21 PROCEDURE — 87517 HEPATITIS B DNA QUANT: CPT

## 2019-11-21 PROCEDURE — 84478 ASSAY OF TRIGLYCERIDES: CPT | Performed by: FAMILY MEDICINE

## 2019-11-21 PROCEDURE — 93306 TTE W/DOPPLER COMPLETE: CPT

## 2019-11-21 PROCEDURE — 36415 COLL VENOUS BLD VENIPUNCTURE: CPT | Performed by: FAMILY MEDICINE

## 2019-11-21 PROCEDURE — 85730 THROMBOPLASTIN TIME PARTIAL: CPT

## 2019-11-22 DIAGNOSIS — C67.9 MALIGNANT NEOPLASM OF URINARY BLADDER, UNSPECIFIED SITE (HCC): ICD-10-CM

## 2019-11-22 RX ORDER — OXYCODONE HYDROCHLORIDE 5 MG/1
5 TABLET ORAL 3 TIMES DAILY PRN
Qty: 90 TABLET | Refills: 0 | Status: SHIPPED | OUTPATIENT
Start: 2019-11-22

## 2019-12-16 ENCOUNTER — TELEPHONE (OUTPATIENT)
Dept: FAMILY MEDICINE CLINIC | Facility: CLINIC | Age: 69
End: 2019-12-16

## 2019-12-16 DIAGNOSIS — R62.51 FAILURE TO THRIVE (0-17): ICD-10-CM

## 2019-12-16 DIAGNOSIS — R53.1 WEAKNESS: ICD-10-CM

## 2019-12-16 DIAGNOSIS — R53.1 WEAKNESS: Primary | ICD-10-CM

## 2019-12-16 DIAGNOSIS — C67.9 MALIGNANT NEOPLASM OF URINARY BLADDER, UNSPECIFIED SITE (HCC): ICD-10-CM

## 2019-12-16 DIAGNOSIS — N18.6 END STAGE RENAL DISEASE (HCC): Primary | ICD-10-CM

## 2019-12-16 DIAGNOSIS — R62.7 FAILURE TO THRIVE IN ADULT: ICD-10-CM

## 2019-12-16 DIAGNOSIS — E11.3393 TYPE 2 DIABETES MELLITUS WITH BOTH EYES AFFECTED BY MODERATE NONPROLIFERATIVE RETINOPATHY WITHOUT MACULAR EDEMA, WITHOUT LONG-TERM CURRENT USE OF INSULIN (HCC): ICD-10-CM

## 2019-12-16 DIAGNOSIS — G57.93 NEUROPATHIC PAIN, LEG, BILATERAL: ICD-10-CM

## 2019-12-16 RX ORDER — DRONABINOL 2.5 MG/1
2.5 CAPSULE ORAL
Qty: 60 CAPSULE | Refills: 0 | Status: SHIPPED | OUTPATIENT
Start: 2019-12-16

## 2019-12-16 NOTE — TELEPHONE ENCOUNTER
PT NEEDS THE REFERRAL TO [facility] SINCE THEY ARE COMING IN FOR PHYSICAL THERAPY  [facility] DOES NOT GO TO THAT AREA

## 2019-12-16 NOTE — TELEPHONE ENCOUNTER
PT HAS ELENA COMING INTO THE HOME, WOULD LIKE TO HAVE HOME HEALTH TO COME IN ALSO  CAN YOU PUT IN A REFERRAL FOR THEM TO COME? WOULD LIKE A SCRIPT FOR A HEIDI LIFT ALSO  PT LEGS ARE GETTING WEAK ANS SHE IS UNABLE TO LIFT HIM BACK UP  FAX SCRIPT TO  [address] [place] [address] [phone]

## 2019-12-16 NOTE — TELEPHONE ENCOUNTER
Done    Controlled Substance Review    [place] PDMP or [place]  reviewed: No red flags were identified; safe to proceed with prescription  Carla Serum

## 2019-12-16 NOTE — TELEPHONE ENCOUNTER
PT HAS A DECREASED APPETITE, CAN YOU ORDER SOMETHING LIKE MARINOL 2 5? SHE DOES NOT WANT MEGACE OR REMERON, DOES NOT FEEL THAT IS A GOOD CHOICE

## 2019-12-23 ENCOUNTER — REMOTE DEVICE CLINIC VISIT (OUTPATIENT)
Dept: CARDIOLOGY CLINIC | Facility: CLINIC | Age: 69
End: 2019-12-23
Payer: COMMERCIAL

## 2019-12-23 DIAGNOSIS — Z95.818 PRESENCE OF CARDIAC DEVICE: Primary | ICD-10-CM

## 2019-12-23 PROCEDURE — 93298 REM INTERROG DEV EVAL SCRMS: CPT | Performed by: INTERNAL MEDICINE

## 2019-12-23 PROCEDURE — 93299 PR REM INTERROG ICPMS/SCRMS <30 D TECH REVIEW: CPT | Performed by: INTERNAL MEDICINE

## 2019-12-23 NOTE — PROGRESS NOTES
Results for orders placed or performed in visit on 12/23/19   Cardiac EP device report    Narrative    MDT-LOOP RECORDER  CARELINK TRANSMISSION: BATTERY STATUS "OK"   NO PATIENT OR DEVICE ACTIVATED EPISODES  --RESENDEZ

## 2019-12-24 ENCOUNTER — TELEPHONE (OUTPATIENT)
Dept: FAMILY MEDICINE CLINIC | Facility: CLINIC | Age: 69
End: 2019-12-24

## 2019-12-24 DIAGNOSIS — N18.4 CKD (CHRONIC KIDNEY DISEASE) STAGE 4, GFR 15-29 ML/MIN (HCC): Primary | Chronic | ICD-10-CM

## 2019-12-24 DIAGNOSIS — C67.9 MALIGNANT NEOPLASM OF URINARY BLADDER, UNSPECIFIED SITE (HCC): ICD-10-CM

## 2019-12-24 DIAGNOSIS — R53.1 WEAKNESS: ICD-10-CM

## 2019-12-24 DIAGNOSIS — N18.6 END STAGE RENAL DISEASE (HCC): ICD-10-CM

## 2019-12-24 NOTE — TELEPHONE ENCOUNTER
PT DOES NOT WANT TO DO DIALYSIS ANYMORE, HE HAS BEEN DECLINING  HE WANTS TO GO IN HOSPICE  CAN YOU SET THAT IN MOTION FOR HIM?

## 2019-12-24 NOTE — TELEPHONE ENCOUNTER
I put in a referral to our hospice team   Please have our scheduling team contact them so they can get out there ASAP

## 2019-12-24 NOTE — TELEPHONE ENCOUNTER
FAXED TO [phone], PT IS ESTABLISHED WITH THEM  [facility] DOES NOT SERVICE THERE AREA  PT WIFE AWARE

## 2020-01-02 ENCOUNTER — TELEPHONE (OUTPATIENT)
Dept: UROLOGY | Facility: MEDICAL CENTER | Age: 70
End: 2020-01-02

## 2020-01-02 NOTE — TELEPHONE ENCOUNTER
Patients wife called and wanted to thank Dr Dionisio Montague and Vitaly Cordova for taking care of her

## 2021-12-15 NOTE — PROGRESS NOTES
UROLOGY ROUTINE FOLLOW UP NOTE     CHIEF COMPLAINT   Jonathan Mendoza is a 76 y o  male with a complaint of   Chief Complaint   Patient presents with    Bladder Cancer       History of Present Illness:     Livan Mohamud is a 76 y o  male with a history of high-grade T1 bladder cancer  Patient underwent induction 6 weeks of BCG in our office  His routine SWOG maintenance course of BCG was complicated by multiple infections requiring treatment with ampicillin  The patient developed hematuria and clot retention  Ultimately, we decided that the patient should receive maintenance therapy with mitomycin in lieu of BCG given the patient's issues with recurrent infections and the need to delay his treatment  He underwent a course of 3 weeks of maintenance in August 2017  He underwent an additional course of mitomycin in February 2018  Patient did have nausea and vomiting throughout  Both BCG and mitomycin have been extremely morbid for this patient    He and his wife decided to abort intravesicle therapy given his poor tolerance  He is not a candidate for cystectomy at this time  We instead opted for surveillance imaging and cystoscopy  Since we have aborted attempts at intravesical therapy, the patient feels very well  He is not having urinary symptoms  The patient's wife to give him a single dose of antibiotics which we have agreed to prior to manipulation of the urinary system  No issues since last visit      Past Medical History:     Past Medical History:   Diagnosis Date    AAA (abdominal aortic aneurysm) (HCC)     Abnormal liver function test     Anemia     Anxiety     Arthritis     Cancer (HCC)     bladder    Cardiac disorder     Chronic kidney disease     renal failure    Chronic pain disorder     back and legs    Depression     Diabetes mellitus ([facility] [address] [facility]     Dialysis patient ([facility] [address] )     Fracture of carpal bone     Fractures     spinal compression fx,closed fx of wrist & ankle    GERD (gastroesophageal reflux disease)     Gout     Hearing difficulty of both ears     Hepatitis A     A - remission since 1970's    Hyperlipidemia     Hyperlipidemia     Hypertension     Kidney stone     Migraine     Neuropathy     Nicotine dependence     Pancreatitis     Vitamin D deficiency     Wears glasses        PAST SURGICAL HISTORY:     Past Surgical History:   Procedure Laterality Date    BLADDER FULGURATION  06/17/2016    Cystoscopy with fulguration medium lesion (2-5 cm)    CATARACT EXTRACTION Bilateral     congenital    COLONOSCOPY      CYSTOSCOPY      Diagnostic 5/31/17, 10/10/17    CYSTOSCOPY N/A 6/14/2017    Procedure: CYSTOSCOPY WITH BLADDER  BIOPSIES WITH FULGURATION;  Surgeon: Rolly Torres MD;  [facility];  Service: Urology    CYSTOSCOPY  12/06/2018    CYSTOSCOPY W/ URETERAL STENT PLACEMENT Right 06/01/2016    CYSTOSCOPY W/ URETEROSCOPY  06/17/2016    With removal of calculus    CYSTOSTOMY W/ BLADDER BIOPSY      9/6/26, 2/6/17    DENTAL SURGERY      ESOPHAGOGASTRODUODENOSCOPY      EYE SURGERY      catatact    EYE SURGERY Bilateral     Left eye detachment R eye retinal tear    HERNIA REPAIR      HERNIA REPAIR      L inguinal w/mesh;umbilical herniorraphy    UT ANASTOMOSIS,AV,ANY SITE Right 6/29/2016    Procedure: LOWER FOREARM AV FISTULA;  Surgeon: Favian Valadez MD;  [facility];  Service: Vascular    UT COLONOSCOPY FLX DX W/COLLJ SPEC WHEN PFRMD N/A 3/4/2016    Procedure: COLONOSCOPY;  Surgeon: Reyna Shepard MD;  [facility];  Service: Gastroenterology    UT CYSTO/URETERO W/LITHOTRIPSY &INDWELL STENT INSRT Right 6/17/2016    Procedure: CYSTOSCOPY URETEROSCOPY WITH LITHOTRIPSY HOLMIUM LASER,BASKET STONE EXTRACTION, RETROGRADE PYELOGRAM AND INSERTION STENT URETERAL;  Surgeon: Tahir Desir MD;  Location:  MAIN OR;  Service: Urology    UT CYSTOURETHROSCOPY N/A 7/13/2017    Procedure: CYSTOSCOPY;  Surgeon: Rolly Torres MD; [facility];  Service: Urology    CO CYSTOURETHROSCOPY W/IRRIG & EVAC CLOTS N/A 7/13/2017    Procedure: CYSTOSCOPY EVACUATION OF CLOTS,POSSIBLE FULGURATION;  Surgeon: Richa Levy MD;  [facility];  Service: Urology    CO CYSTOURETHROSCOPY,BIOPSY N/A 8/15/2016    Procedure: CYSTOSCOPY WITH RE BIOPSY OF BLADDER;  Surgeon: Danica Jaffe MD;  [facility];  Service: Urology    CO CYSTOURETHROSCOPY,FULGUR 0 5-2 CM LESN N/A 2/6/2017    Procedure: TRANSURETHRAL RESECTION OF BLADDER TUMOR (TURBT); Surgeon: Richa Levy MD;  [facility];  Service: Urology    CO CYSTOURETHROSCOPY,FULGUR 0 5-2 CM LESN N/A 6/14/2017    Procedure: TRANSURETHRAL RESECTION OF BLADDER TUMOR (TURBT); Surgeon: Richa Levy MD;  [facility];  Service: Urology    CO CYSTOURETHROSCOPY,FULGUR <0 5 CM LESN N/A 2/6/2017    Procedure: Patino Musty; BLADDER BIOPSY WITH Akilah Silverio;  Surgeon: Richa Levy MD;  [facility];  Service: Urology    CO CYSTOURETHROSCOPY,URETER CATHETER Right 6/1/2016    Procedure: CYSTOSCOPY RETROGRADE PYELOGRAM WITH INSERTION STENT URETERAL;  Surgeon: Jesus Cardenas MD;  Location:  MAIN OR;  Service: Urology    CO EGD TRANSORAL BIOPSY SINGLE/MULTIPLE N/A 3/4/2016    Procedure: ESOPHAGOGASTRODUODENOSCOPY (EGD); Surgeon: Hilario Wells MD;  [facility];  Service: Gastroenterology    VASCULAR SURGERY Right     AV fistula       CURRENT MEDICATIONS:     Current Outpatient Medications   Medication Sig Dispense Refill    allopurinol (ZYLOPRIM) 100 mg tablet Take 1 tablet (100 mg total) by mouth daily 90 tablet 3    ALPRAZolam (XANAX) 0 5 mg tablet Take 0 5 mg by mouth daily as needed for anxiety        ampicillin (PRINCIPEN) 250 mg capsule Take 250 mg by mouth every 12 (twelve) hours Current treatment for UTI      atorvastatin (LIPITOR) 20 mg tablet Take 1 tablet (20 mg total) by mouth daily 90 tablet 3    B Complex-C-Folic Acid (TRIPHROCAPS) 1 MG CAPS Take 1 mg by mouth daily      calcium acetate (PHOSLO) 667 mg capsule Take 667 mg by mouth daily   Cholecalciferol (VITAMIN D-3 PO) Take 1 capsule by mouth daily        colchicine-probenecid 0 5-500 MG per tablet Take 1 tablet by mouth daily 90 tablet 3    entecavir (BARACLUDE) 0 5 MG tablet Take 1 tablet (0 5 mg total) by mouth daily for 90 days (Patient taking differently: Take 0 5 mg by mouth once a week  ) 90 tablet 1    fexofenadine (ALLEGRA) 30 MG tablet Take 30 mg by mouth daily as needed      gemfibrozil (LOPID) 600 mg tablet Take 1 tablet (600 mg total) by mouth 2 (two) times a day before meals 90 tablet 3    guaiFENesin 200 MG tablet Take 400 mg by mouth 2 (two) times a day      metoprolol tartrate (LOPRESSOR) 25 mg tablet 1/2 tab po BID every other day 90 tablet 3    omeprazole (PriLOSEC) 20 mg delayed release capsule TAKE 1 CAPSULE DAILY 90 capsule 3    omeprazole (PriLOSEC) 40 MG capsule Take 20 mg by mouth daily        ondansetron (ZOFRAN) 4 mg tablet Take 4 mg by mouth 4 (four) times a day as needed for nausea or vomiting   oxybutynin (DITROPAN-XL) 5 mg 24 hr tablet Take 1 tablet by mouth daily (Patient taking differently: Take 5 mg by mouth as needed  ) 10 tablet 0    oxyCODONE (ROXICODONE) 5 mg immediate release tablet Take 1 tablet (5 mg total) by mouth 3 (three) times a day as needed for moderate pain Max Daily Amount: 15 mg 90 tablet 0    PARoxetine (PAXIL) 30 mg tablet Take 1 tablet (30 mg total) by mouth daily 90 tablet 3     No current facility-administered medications for this visit          ALLERGIES:     Allergies   Allergen Reactions    Acetaminophen      Due to LFT elevation    Heparin      Avoids due to eye conditions;can't have any med that may cause bleeding    Lovenox [Enoxaparin Sodium]      Avoids due to eye conditions;can't have any med that may cause bleeding in eyes    Nsaids      Avoids due to renal failure  Cardura [Doxazosin] Palpitations       SOCIAL HISTORY:     Social History     Socioeconomic History    Marital status: /Civil Union     Spouse name: None    Number of children: None    Years of education: None    Highest education level: None   Occupational History    Occupation:    Social Needs    Financial resource strain: None    Food insecurity:     Worry: None     Inability: None    Transportation needs:     Medical: None     Non-medical: None   Tobacco Use    Smoking status: Former Smoker     Last attempt to quit:      Years since quittin 2    Smokeless tobacco: Never Used   Substance and Sexual Activity    Alcohol use: No    Drug use: No    Sexual activity: Yes     Partners: Female   Lifestyle    Physical activity:     Days per week: None     Minutes per session: None    Stress: None   Relationships    Social connections:     Talks on phone: None     Gets together: None     Attends Yazidism service: None     Active member of club or organization: None     Attends meetings of clubs or organizations: None     Relationship status: None    Intimate partner violence:     Fear of current or ex partner: None     Emotionally abused: None     Physically abused: None     Forced sexual activity: None   Other Topics Concern    None   Social History Narrative    Advance directive on file    Copy of advanced directive obtained from patient    History of no living will    Patient has living will       SOCIAL HISTORY:     Family History   Problem Relation Age of Onset    Depression Mother     Diabetes Mother     Heart disease Mother     Hypertension Mother     Kidney disease Mother     Diabetes Father     Heart disease Father         Cardiac disorder    Hypertension Father     Stroke Father     Thyroid disease Sister     Cancer Maternal Grandmother        REVIEW OF SYSTEMS:     Review of Systems   Constitutional: Negative for fatigue (Improved)  HENT: Negative  Respiratory: Negative for chest tightness and shortness of breath  Cardiovascular: Negative for chest pain  Gastrointestinal: Negative  Genitourinary: Negative for hematuria, penile pain and urgency  Musculoskeletal: Positive for arthralgias, back pain and gait problem  Skin: Negative  Psychiatric/Behavioral: Negative  PHYSICAL EXAM:     /80   Pulse 84   Wt 74 8 kg (165 lb)   BMI 24 37 kg/m²     General:   Middle-age male, appears older than stated age, improvement in vitality since last visit  Has some jaundice  Hard of hearing  HEENT:  Normocephalic, atraumatic  Neck is supple without any palpable lymphadenopathy  Cardiovascular:  Patient has normal palpable distal radial pulses  There is no significant peripheral edema  No JVD is noted  Respiratory:  Patient has course respirations  There is audible wheeze  Abdomen:  Abdomen is obese but not distended or tender  : Circumcised, testicles descended  Musculoskeletal:  Shuffling gait  Dermatologic:  Patient has no skin abnormalities or rashes  LABS:     CBC:   Lab Results   Component Value Date    WBC 12 53 (H) 10/07/2018    HGB 14 0 10/07/2018    HCT 41 4 10/07/2018    MCV 99 (H) 10/07/2018     10/07/2018       BMP:   Lab Results   Component Value Date    GLUCOSE 152 (H) 09/14/2016    CALCIUM 10 2 (H) 10/07/2018     01/06/2016    K 4 5 10/07/2018    CO2 31 10/07/2018    CL 94 (L) 10/07/2018    BUN 55 (H) 10/07/2018    CREATININE 8 11 (H) 10/07/2018       PATHOLOGY:     12/5/18  CT CHEST, ABDOMEN AND PELVIS WITH AND WITHOUT IV CONTRAST     INDICATION:   History of bladder cancer  Surveillance      COMPARISON:  CT of the chest, abdomen, and pelvis dated 7/20/2018 and 9/14/2016  Chest CT dated 10/3/2017    Abdominal MRI dated 5/2/2018     TECHNIQUE: Initial CT of the abdomen and pelvis was performed without intravenous contrast   Subsequent CT evaluation of the chest, abdomen and pelvis was performed after the administration of intravenous contrast in corticomedullary phase  Finally,   delayed urographic phase postcontrast CT evaluation of the abdomen and pelvis was performed  Axial, sagittal, and coronal 2D reformatted images were created from the source data and submitted for interpretation       Radiation dose length product (DLP) for this visit:  1853 79 mGy-cm   This examination, like all CT scans performed in the [facility], was performed utilizing techniques to minimize radiation dose exposure, including the use of   iterative reconstruction and automated exposure control      IV Contrast:  100 mL of iohexol (OMNIPAQUE)  Enteric Contrast:  Enteric contrast was not administered      FINDINGS:     LUNGS:  Mild dependent atelectasis  Mild paraseptal emphysema at the upper lobes  No acute consolidation  No interstitial thickening  Trace mucoid debris in the trachea  No endobronchial masses  No suspicious lung parenchymal nodules or masses      PLEURA:  Unremarkable      HEART/GREAT VESSELS:  Mild cardiomegaly  Coronary atherosclerosis  No pericardial thickening or effusion  Thoracic aortic tortuosity without aneurysm      MEDIASTINUM AND ROSAS:  Anterior mediastinal fat stranding and shotty lymph nodes are unchanged from 2017 and decreased from 2016  No pathologically enlarged lymphadenopathy or enlarging nodes      CHEST WALL AND LOWER NECK:   Unremarkable      ABDOMEN     RIGHT KIDNEY AND URETER:  Hypodense right lower pole lesion shows no significant enhancement between pre and postcontrast images (17 HU vs 25 HU, respectively)  Other hypoenhancing foci are too small to characterize  No suspicious renal mass  Limited excretion of contrast on delayed phase  No hydronephrosis, ureterectasis, or stranding around the collecting system to suggest mass  Vascular calcifications at the renal hilum  No urinary tract calculi     No perinephric collection      LEFT KIDNEY AND URETER:  Subcentimeter hypoenhancing foci are too small to characterize, but likely represent cysts  No suspicious renal mass  Limited excretion of contrast on delayed phase  No hydronephrosis, ureterectasis, or stranding around the collecting system to suggest mass  Vascular calcifications at the renal hilum  No urinary tract calculi  No perinephric collection      URINARY BLADDER:  Impression on the bladder base from nodular prostatic hypertrophy  Sessile posterior wall calcifications in the bladder on series 2, image 153 and series 2, 162  Calcifications appear new since July  No associated abnormal wall enhancement on   postcontrast imaging  No polypoid masses      LIVER/BILIARY TREE:  Unremarkable      GALLBLADDER:  No calcified gallstones  No pericholecystic inflammatory change      SPLEEN:  Unremarkable      PANCREAS:  Pancreatic cystic lesion seen on prior MRI are not well demonstrated  No concerning masses or pancreatic ductal dilatation      ADRENAL GLANDS:  Bilateral adrenal hypertrophy--unchanged from priors  No focal masses     STOMACH AND BOWEL:  There is colonic diverticulosis without evidence of acute diverticulitis      ABDOMINOPELVIC CAVITY:  Shotty periportal lymph nodes are unchanged  No pathologically enlarged mesenteric, retroperitoneal, or pelvic sidewall lymphadenopathy      VESSELS:  Unremarkable for patient's age      PELVIS     REPRODUCTIVE ORGANS:  Nodular prostatic hypertrophy measuring 5 2 cm x 4 0 cm x 4 8 cm      APPENDIX: A normal appendix was visualized      ABDOMINAL WALL/INGUINAL REGIONS:  Diastasis recti  Plugging material from prior left inguinal hernia repair  Small amount of recurrent fat-containing left inguinal canal   Moderate-sized fat-containing right inguinal hernia      OSSEOUS STRUCTURES:  No acute fracture or destructive osseous lesion      IMPRESSION:     1  Posterior wall calcifications at the urinary bladder appear new since July    This could represent evolving dystrophic calcifications related to prior treatment  However, cystoscopy should be considered to exclude recurrent calcifying sessile mass      2  No suspicious renal parenchymal masses  Limited opacification of the collecting system without gross evidence for upper tract urothelial mass      3  No evidence for locoregional or distant tatiana spread  No evidence for metastasis in the chest, abdomen, or pelvis      The study was marked in EPIC for significant notification  CYTOLOGY:     1/18/18  Final Diagnosis   A  Urine, Other (ThinPrep):  Negative for high grade urothelial carcinoma ([address]) - see comment  2/6/18  Final Diagnosis   A  Urine, Cystoscopic:  Negative for high grade urothelial carcinoma ([address]) - see comment  9/4/18  A  Urine, Bladder Wash, :  Negative for high grade urothelial carcinoma ([address]) - see comment  Benign urothelial cells, benign squamous cells, and  Many neutrophils  12/6/18  Final Diagnosis   A  Urinary Bladder, :  Negative for high grade urothelial carcinoma ([address]) - see comment  PATHOLOGY:     6/14/17  Final Diagnosis   A  Left lateral bladder tumor (transurethral resection):     - Denuded urothelium with mild cystitis      B  Left lateral bladder wall (biopsy):     - Partially denuded urothelium with cystitis and non-necrotizing granulomatous reaction      - Microorganism studies pending      C  Posterior bladder (biopsy):     - Partially denuded urothelium with mild cystitis      D  Right bladder wall (biopsy):     - Denuded urothelium with cystitis  2/6/17  Final Diagnosis   A  Left lateral bladder (biopsy):     - Partially denuded urothelial mucosa  - No malignancy identified      B  Right lateral bladder (biopsy):     - Urothelium with reactive-type changes       - No malignancy identified       C  Posterior bladder (biopsy):     - Urothelium with reactive-type changes       - No malignancy identified        8/15/16  Final Diagnosis   A  Bladder, left lateral wall tumor, transurethral resection:  -  Ulcerated and severely inflamed urothelial tissue with cytologic atypia, cannot entirely exclude residual/recurrent tumor  6/17/16  Final Diagnosis   A  Urinary bladder tumor:     - Invasive low to focally high grade papillary urothelial carcinoma  - Carcinoma invades lamina propria  - No muscularis propria is identified in the submitted tissue  PROCEDURE:     SEE NOTE    ASSESSMENT:     76 y o  male with HG T1 bladder cancer who underwent stunted intravesicle treatments between 1780-1216 complicated by comorbidities, infection, and poor tolerance of BCG and mitomycin    PLAN:     At this point time, the patient and his wife understand that he had a previous diagnosis of low to high-grade invasive T1 bladder cancer  Although there was no muscle initially in the specimen, repeat resections have not demonstrated any muscle invasive disease  The patient failed BCG therapy due to recurrent infections and his weakened immunocompromised state from his multiple other medical comorbidities  We then transitioned him to mitomycin  The patient has had significant morbidity from this as well including hematuria and bladder irritation  At this point time, he has refused additional mitomycin or intravesical treatments  He instead opted for surveillance imaging/cystoscopy  Patient and his wife understand that by losing out on the opportunity to follow the standard protocol, there is certainly a risk of disease progression or metastasis  They are comfortable with this risk  The patient appears overall improved with the decision to abort it additional intravesical therapy  He is not having any bladder symptoms and is overall fatigue has gotten better  Patient continues to have some mild dystrophic calcification along the left side of his bladder wall and posterior left bladder  No other abnormalities or concerns  Cytology was sent    We will plan cystoscopy in 6 months, 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

## 2023-03-07 NOTE — PROGRESS NOTES
1247 Patient and wife deny any urinary symptoms  Wife states they use a coude catheter for him  1312  Doherty catheter #14 Hungarian coude inserted using sterile technique  Tip of penis very tender to touch with prepping the skin and lidocaine uroject  Bladder drained per Doctor's orders for approximately 100 ml yellow urine  States tip of penis burning sensation remains  1332 Mitomycin instilled per Doctor's orders and patient will rotate every 15 minutes  His wife is present  1345 Call placed to Rodolfo Pedroza RN to discuss sensitivity/burning on insertion and waiting return call  You can access the FollowMyHealth Patient Portal offered by [facility] by registering at the following website: [email or web address]. By joining [employer]’s FollowMyHealth portal, you will also be able to view your health information using other applications (apps) compatible with our system.

## 2023-06-16 NOTE — PROGRESS NOTES
1430 Return call from [facility] and informed her of patient discomfort on insertion of catheter  She states he is always sensitive even with insertion of the lidocaine and he did have recent cystoscopy  1433 Doherty catheter unclamped after one hour of instillation and bladder drained  Doherty catheter removed and patient does not tolerate insertion or removal well  It creates discomfort  States tip of penis feels better once catheter removed  Vega Person will contact wife regarding by mouth pain medication before coming for treatment  [phone] Patient discharged in stable condition   AVS printed never

## (undated) DEVICE — TUBING SUCTION 5MM X 12 FT

## (undated) DEVICE — SCD SEQUENTIAL COMPRESSION COMFORT SLEEVE MEDIUM KNEE LENGTH: Brand: KENDALL SCD

## (undated) DEVICE — PACK TUR

## (undated) DEVICE — RESECTOSCOPE LOOE ELECTRODE 27040F/6

## (undated) DEVICE — LUBRICANT SURGILUBE TUBE 4 OZ  FLIP TOP

## (undated) DEVICE — BAG URINE DRAINAGE 2000ML ANTI RFLX LF

## (undated) DEVICE — SPECIMEN CONTAINER STERILE PEEL PACK

## (undated) DEVICE — GUARDIAN LVC: Brand: GUARDIAN

## (undated) DEVICE — GLOVE SRG BIOGEL 8

## (undated) DEVICE — CATH FOLEY 18FR 5ML 2 WAY SILICONE ELASTIMER

## (undated) DEVICE — GLOVE INDICATOR PI UNDERGLOVE SZ 8 BLUE

## (undated) DEVICE — BASIC SINGLE BASIN-LF: Brand: MEDLINE INDUSTRIES, INC.

## (undated) DEVICE — BASIC SINGLE BASIN 2-LF: Brand: MEDLINE INDUSTRIES, INC.

## (undated) DEVICE — DRESSING TELFA 2 X 3 IN STRL

## (undated) DEVICE — URO CATCHER BAG STERILE 0-UC32

## (undated) DEVICE — UROCATCH BAG

## (undated) DEVICE — REM POLYHESIVE ADULT PATIENT RETURN ELECTRODE: Brand: VALLEYLAB

## (undated) DEVICE — EVACUATOR BLADDER ELLIK DISP STRL

## (undated) DEVICE — BAG DECANTER